# Patient Record
Sex: FEMALE | Race: WHITE | NOT HISPANIC OR LATINO | Employment: OTHER | URBAN - METROPOLITAN AREA
[De-identification: names, ages, dates, MRNs, and addresses within clinical notes are randomized per-mention and may not be internally consistent; named-entity substitution may affect disease eponyms.]

---

## 2018-09-21 ENCOUNTER — TRANSCRIBE ORDERS (OUTPATIENT)
Dept: ADMINISTRATIVE | Facility: HOSPITAL | Age: 78
End: 2018-09-21

## 2018-09-21 DIAGNOSIS — Z12.39 SCREENING BREAST EXAMINATION: Primary | ICD-10-CM

## 2018-10-10 ENCOUNTER — HOSPITAL ENCOUNTER (OUTPATIENT)
Dept: MAMMOGRAPHY | Facility: CLINIC | Age: 78
Discharge: HOME | End: 2018-10-10
Payer: COMMERCIAL

## 2018-10-10 DIAGNOSIS — Z12.39 SCREENING BREAST EXAMINATION: ICD-10-CM

## 2018-10-10 PROCEDURE — 77063 BREAST TOMOSYNTHESIS BI: CPT

## 2018-10-10 PROCEDURE — 77067 SCR MAMMO BI INCL CAD: CPT

## 2018-10-12 ENCOUNTER — HOSPITAL ENCOUNTER (OUTPATIENT)
Dept: MAMMOGRAPHY | Facility: CLINIC | Age: 78
Discharge: HOME | End: 2018-10-12
Payer: COMMERCIAL

## 2018-10-12 DIAGNOSIS — R92.8 ABNORMAL MAMMOGRAM: ICD-10-CM

## 2018-10-12 PROCEDURE — 77065 DX MAMMO INCL CAD UNI: CPT

## 2018-10-12 RX ORDER — DEXTROAMPHETAMINE SACCHARATE, AMPHETAMINE ASPARTATE, DEXTROAMPHETAMINE SULFATE AND AMPHETAMINE SULFATE 3.75; 3.75; 3.75; 3.75 MG/1; MG/1; MG/1; MG/1
15 TABLET ORAL 2 TIMES DAILY
COMMUNITY
End: 2018-11-01

## 2018-10-12 NOTE — PROGRESS NOTES
Met with patient, patient daughter Divine Brooks 89[address] and Dr Elvia Jean-Baptiste regarding recommendation for;      _____ RIGHT ___X___LEFT      _____Ultrasound guided  ___X___Stereotactic breast biopsy  __X___Verbalized understanding        Blood thinners:  _____yes ___X__no    Date stopped: ____N/A_____    Biopsy teaching sheet given:  ___X____yes ______no

## 2018-10-16 ENCOUNTER — HOSPITAL ENCOUNTER (OUTPATIENT)
Dept: MAMMOGRAPHY | Facility: CLINIC | Age: 78
Discharge: HOME | End: 2018-10-16
Payer: COMMERCIAL

## 2018-10-16 ENCOUNTER — HOSPITAL ENCOUNTER (OUTPATIENT)
Dept: MAMMOGRAPHY | Facility: CLINIC | Age: 78
Discharge: HOME | End: 2018-10-16

## 2018-10-16 VITALS — DIASTOLIC BLOOD PRESSURE: 77 MMHG | HEART RATE: 76 BPM | SYSTOLIC BLOOD PRESSURE: 141 MMHG

## 2018-10-16 DIAGNOSIS — R92.8 ABNORMAL MAMMOGRAM: ICD-10-CM

## 2018-10-16 PROCEDURE — 88341 IMHCHEM/IMCYTCHM EA ADD ANTB: CPT | Performed by: PATHOLOGY

## 2018-10-16 PROCEDURE — 88305 TISSUE EXAM BY PATHOLOGIST: CPT | Performed by: PATHOLOGY

## 2018-10-16 PROCEDURE — 88342 IMHCHEM/IMCYTCHM 1ST ANTB: CPT | Performed by: PATHOLOGY

## 2018-10-16 PROCEDURE — 19081 BX BREAST 1ST LESION STRTCTC: CPT

## 2018-10-16 RX ORDER — LIDOCAINE HYDROCHLORIDE 10 MG/ML
5 INJECTION, SOLUTION INFILTRATION; PERINEURAL ONCE
Status: COMPLETED | OUTPATIENT
Start: 2018-10-16 | End: 2018-10-16

## 2018-10-16 RX ORDER — LIDOCAINE HYDROCHLORIDE AND EPINEPHRINE 20; 5 MG/ML; UG/ML
10 INJECTION, SOLUTION EPIDURAL; INFILTRATION; INTRACAUDAL; PERINEURAL ONCE
Status: COMPLETED | OUTPATIENT
Start: 2018-10-16 | End: 2018-10-16

## 2018-10-16 RX ADMIN — LIDOCAINE HYDROCHLORIDE 5 ML: 10 INJECTION, SOLUTION INFILTRATION; PERINEURAL at 14:35

## 2018-10-16 RX ADMIN — LIDOCAINE HYDROCHLORIDE AND EPINEPHRINE 10 ML: 20; 5 INJECTION, SOLUTION EPIDURAL; INFILTRATION; INTRACAUDAL; PERINEURAL at 14:35

## 2018-10-16 NOTE — PROGRESS NOTES
Patient arrived via:    __x___ambulatory    _____wheelchair    _____stretcher      Domestic violence screen    ___x___negative______positive    Breast Implants:    _______yes ____x____no

## 2018-10-16 NOTE — PROGRESS NOTES
Ice pack given:    __x___yes _____no    Discharge instructions signed by patient:    __x__yes _____no    Discharge instructions given to patient:    ____x_yes _____no    Discharged via:    ___x__amulatory    _____wheelchair    _____stretcher    Stable on discharge:    __x___yes ____no  Please call pt daughter Bonnie Penny with results [phone]

## 2018-10-16 NOTE — DISCHARGE INSTR - OTHER ORDERS
POST LARGE CORE BREAST BIOPSY PATIENT INFORMATION      1  Place an ice pack inside your bra over the top of the dressing every hour for 20 minutes (20 minutes on, 60 minutes off)  Do this until bedtime  2  Do not shower or bathe until the following morning  3  You may bathe your breast carefully with the steri-strips in place  Be careful    Not to loosen them  The steri-strips will fall off in 3-5 days  4  You may have mild discomfort, and you may have some bruising where the   Needle entered the skin  This should clear within 5-7 days  5  If you need medicine for discomfort, take acetaminophen products such as   Tylenol  You may also take Advil or Motrin products  6  Do not participate in strenuous activities such as-tennis, aerobics, skiing,  Weight lifting, etc  for 24 hours  Refrain from swimming/soaking for 72 hours  7  Wearing a bra for sleeping may be more comfortable for the first 24-48 hours  8  Watch for continued bleeding, pain or fever over 101; please call with any questions or concerns  For procedures done at the [facility] call:  King Anderson RN at [phone]  Adalberto Coronel RN at [phone]                    *After 4 PM call the Interventional Radiology Department                    [phone] and ask to speak with the nurse on call  For procedures done at the [address] [place] [address] call:         Samir Sears RN at   *After 4 PM call the Interventional Radiology Department   [phone] and ask to speak with the nurse on call  For procedures done at [address] [place] [address] call: The Radiology Nurse at [phone]  *After 4 PM call your physician, or go to the Emergency Department  9          The final results of your biopsy are usually available within one week

## 2018-10-16 NOTE — PROGRESS NOTES
Procedure type:    _____ultrasound guided ___x__stereotactic    Breast:    __x___Left _____Right    Location: Lateral     Needle: 10 gauge revolve     # of passes: 6 ( 1 with calcifications 5 without calcifications )     Clip: Mammotome U shape     Performed by: Dr Viv Cha held for 5 minutes by:    Blaine Strips:    ___x__yes _____no    Band aid:    ___x__yes_____no    Tape and guaze:    _____yes __x___no    Tolerated procedure:    __x___yes _____no

## 2018-10-17 NOTE — PROGRESS NOTES
Post procedure call completed on 10/17/18 at 1157    Bleeding: _____yes ___x__no    Pain: _____yes ___x___no    Redness/Swelling: ______yes ___x___no    Band aid removed: _____yes __x___no    Steri-Strips intact: ___x___yes _____no

## 2018-10-22 ENCOUNTER — TELEPHONE (OUTPATIENT)
Dept: MAMMOGRAPHY | Facility: CLINIC | Age: 78
End: 2018-10-22

## 2018-10-25 PROBLEM — N60.92 ATYPICAL DUCTAL HYPERPLASIA OF LEFT BREAST: Status: ACTIVE | Noted: 2018-10-25

## 2018-10-31 ENCOUNTER — LAB (OUTPATIENT)
Dept: LAB | Facility: CLINIC | Age: 78
End: 2018-10-31
Payer: COMMERCIAL

## 2018-10-31 ENCOUNTER — OFFICE VISIT (OUTPATIENT)
Dept: SURGICAL ONCOLOGY | Facility: CLINIC | Age: 78
End: 2018-10-31
Payer: COMMERCIAL

## 2018-10-31 ENCOUNTER — APPOINTMENT (OUTPATIENT)
Dept: RADIOLOGY | Facility: CLINIC | Age: 78
End: 2018-10-31
Payer: COMMERCIAL

## 2018-10-31 VITALS
HEART RATE: 84 BPM | HEIGHT: 66 IN | DIASTOLIC BLOOD PRESSURE: 86 MMHG | TEMPERATURE: 97.8 F | SYSTOLIC BLOOD PRESSURE: 126 MMHG | WEIGHT: 122 LBS | BODY MASS INDEX: 19.61 KG/M2 | RESPIRATION RATE: 14 BRPM

## 2018-10-31 DIAGNOSIS — N60.92 ATYPICAL DUCTAL HYPERPLASIA OF LEFT BREAST: ICD-10-CM

## 2018-10-31 DIAGNOSIS — N60.92 ATYPICAL DUCTAL HYPERPLASIA OF LEFT BREAST: Primary | ICD-10-CM

## 2018-10-31 LAB
ALBUMIN SERPL BCP-MCNC: 4 G/DL (ref 3.5–5)
ALP SERPL-CCNC: 77 U/L (ref 46–116)
ALT SERPL W P-5'-P-CCNC: 26 U/L (ref 12–78)
ANION GAP SERPL CALCULATED.3IONS-SCNC: 10 MMOL/L (ref 4–13)
AST SERPL W P-5'-P-CCNC: 18 U/L (ref 5–45)
ATRIAL RATE: 62 BPM
BASOPHILS # BLD AUTO: 0.06 THOUSANDS/ΜL (ref 0–0.1)
BASOPHILS NFR BLD AUTO: 1 % (ref 0–1)
BILIRUB SERPL-MCNC: 0.5 MG/DL (ref 0.2–1)
BUN SERPL-MCNC: 21 MG/DL (ref 5–25)
CALCIUM SERPL-MCNC: 9.8 MG/DL (ref 8.3–10.1)
CHLORIDE SERPL-SCNC: 102 MMOL/L (ref 100–108)
CO2 SERPL-SCNC: 26 MMOL/L (ref 21–32)
CREAT SERPL-MCNC: 1.23 MG/DL (ref 0.6–1.3)
EOSINOPHIL # BLD AUTO: 0.06 THOUSAND/ΜL (ref 0–0.61)
EOSINOPHIL NFR BLD AUTO: 1 % (ref 0–6)
ERYTHROCYTE [DISTWIDTH] IN BLOOD BY AUTOMATED COUNT: 13.2 % (ref 11.6–15.1)
GFR SERPL CREATININE-BSD FRML MDRD: 43 ML/MIN/1.73SQ M
GLUCOSE P FAST SERPL-MCNC: 90 MG/DL (ref 65–99)
HCT VFR BLD AUTO: 43.2 % (ref 34.8–46.1)
HGB BLD-MCNC: 14.4 G/DL (ref 11.5–15.4)
IMM GRANULOCYTES # BLD AUTO: 0.03 THOUSAND/UL (ref 0–0.2)
IMM GRANULOCYTES NFR BLD AUTO: 1 % (ref 0–2)
LYMPHOCYTES # BLD AUTO: 1.61 THOUSANDS/ΜL (ref 0.6–4.47)
LYMPHOCYTES NFR BLD AUTO: 26 % (ref 14–44)
MCH RBC QN AUTO: 31 PG (ref 26.8–34.3)
MCHC RBC AUTO-ENTMCNC: 33.3 G/DL (ref 31.4–37.4)
MCV RBC AUTO: 93 FL (ref 82–98)
MONOCYTES # BLD AUTO: 0.53 THOUSAND/ΜL (ref 0.17–1.22)
MONOCYTES NFR BLD AUTO: 9 % (ref 4–12)
NEUTROPHILS # BLD AUTO: 3.98 THOUSANDS/ΜL (ref 1.85–7.62)
NEUTS SEG NFR BLD AUTO: 62 % (ref 43–75)
NRBC BLD AUTO-RTO: 0 /100 WBCS
P AXIS: 74 DEGREES
PLATELET # BLD AUTO: 265 THOUSANDS/UL (ref 149–390)
PMV BLD AUTO: 10.1 FL (ref 8.9–12.7)
POTASSIUM SERPL-SCNC: 4.5 MMOL/L (ref 3.5–5.3)
PR INTERVAL: 156 MS
PROT SERPL-MCNC: 7.4 G/DL (ref 6.4–8.2)
QRS AXIS: 82 DEGREES
QRSD INTERVAL: 76 MS
QT INTERVAL: 432 MS
QTC INTERVAL: 438 MS
RBC # BLD AUTO: 4.64 MILLION/UL (ref 3.81–5.12)
SODIUM SERPL-SCNC: 138 MMOL/L (ref 136–145)
T WAVE AXIS: 77 DEGREES
VENTRICULAR RATE: 62 BPM
WBC # BLD AUTO: 6.27 THOUSAND/UL (ref 4.31–10.16)

## 2018-10-31 PROCEDURE — 93005 ELECTROCARDIOGRAM TRACING: CPT

## 2018-10-31 PROCEDURE — 99205 OFFICE O/P NEW HI 60 MIN: CPT | Performed by: SURGERY

## 2018-10-31 PROCEDURE — 36415 COLL VENOUS BLD VENIPUNCTURE: CPT

## 2018-10-31 PROCEDURE — 85025 COMPLETE CBC W/AUTO DIFF WBC: CPT

## 2018-10-31 PROCEDURE — 80053 COMPREHEN METABOLIC PANEL: CPT

## 2018-10-31 PROCEDURE — 71046 X-RAY EXAM CHEST 2 VIEWS: CPT

## 2018-10-31 PROCEDURE — 93010 ELECTROCARDIOGRAM REPORT: CPT | Performed by: INTERNAL MEDICINE

## 2018-10-31 RX ORDER — OXYCODONE HYDROCHLORIDE AND ACETAMINOPHEN 5; 325 MG/1; MG/1
1 TABLET ORAL EVERY 6 HOURS PRN
Qty: 6 TABLET | Refills: 0 | Status: SHIPPED | OUTPATIENT
Start: 2018-10-31 | End: 2019-01-11

## 2018-10-31 NOTE — PATIENT INSTRUCTIONS
Pre-Surgery Instructions:   Medication Instructions    amphetamine-dextroamphetamine (ADDERALL) 15 MG tablet Take morning of surgery         Breast Lumpectomy   AMBULATORY CARE:   What you need to know about a lumpectomy:  A lumpectomy is surgery to remove a mass in your breast  Breast tissue that surrounds the mass may also be taken  A lumpectomy is also known as breast-conserving surgery, a partial mastectomy, or a segmental mastectomy  How to prepare for a lumpectomy:   · You may need a mammogram or ultrasound before surgery  These tests may be done the same day as your surgery or at an earlier time  Your healthcare provider may use pictures from these tests to alfred the location of the mass  The marker will show him where to make your incision  · Your healthcare provider will talk to you about how to prepare for surgery  He may tell you not to eat or drink anything after midnight on the day of your surgery  He will tell you what medicines to take or not take on the day of your surgery  You may need to stop taking blood thinners or aspirin several days before your surgery  Arrange for someone to drive you home and stay with you for 24 hours after surgery  This person can help care for you, and monitor for any problems  What will happen during a lumpectomy:  You will be given general anesthesia to keep you asleep and free from pain during surgery  You may be given an antibiotic through your IV to help prevent a bacterial infection  Your healthcare provider will make an incision in your breast and remove the mass  He may also remove breast tissue or lymph nodes that are close to the mass  A drain may be inserted near your incision to remove extra fluid  This will decrease swelling and help your incision heal  Your healthcare provider will close your incision with stitches or strips of medical tape and cover it with a bandage  He may also wrap a tight-fitting bandage around both of your breasts   This may decrease swelling, bleeding, and pain  What will happen after a lumpectomy:  Healthcare providers will monitor you until you are awake  You may able to go home when you are awake and your pain is controlled  Instead you may need to spend the night in the hospital    Risks of a lumpectomy:  You may bleed more than expected or get an infection  Nerves, blood vessels, and muscles may be damaged during your surgery  You may have swelling in your arm closest to the lumpectomy or where lymph nodes were removed  This swelling is called lymphedema  Lymphedema may cause tingling, numbness, stiffness, and weakness in your arm  This may be permanent  You may get a blood clot in your arm or leg  The blood clot may travel to your heart lungs, or brain  This may become life-threatening  Call 911 for any of the following:   · You feel lightheaded, short of breath, and have chest pain  · You cough up blood  · You have trouble breathing  Seek care immediately if:   · Blood soaks through your bandage  · Your stitches come apart  · Your bruise suddenly gets bigger  · Your leg or arm is larger than normal and painful  Contact your healthcare provider if:   · You have a fever or chills  · Your wound is red, swollen, or draining pus  · You have nausea or are vomiting  · Your skin is itchy, swollen, or you have a rash  · Your pain does not get better after you take pain medicine  · Your drain falls out or stops draining fluid  · Your drain has pus or foul-smelling fluid coming out of it  · You have questions or concerns about your condition or care  Medicines: You may need any of the following:  · Antibiotics  help prevent a bacterial infection  · Prescription pain medicine  may be given  Ask your healthcare provider how to take this medicine safely  Some prescription pain medicines contain acetaminophen   Do not take other medicines that contain acetaminophen without talking to your healthcare provider  Too much acetaminophen may cause liver damage  Prescription pain medicine may cause constipation  Ask your healthcare provider how to prevent or treat constipation  · NSAIDs , such as ibuprofen, help decrease swelling, pain, and fever  NSAIDs can cause stomach bleeding or kidney problems in certain people  If you take blood thinner medicine, always ask your healthcare provider if NSAIDs are safe for you  Always read the medicine label and follow directions  · Take your medicine as directed  Contact your healthcare provider if you think your medicine is not helping or if you have side effects  Tell him or her if you are allergic to any medicine  Keep a list of the medicines, vitamins, and herbs you take  Include the amounts, and when and why you take them  Bring the list or the pill bottles to follow-up visits  Carry your medicine list with you in case of an emergency  Care for your wound as directed: If you have a tight-fitting bandage, you can remove it in 24 to 48 hours, or as directed  Ask your healthcare provider when your incision can get wet  You may need to take a sponge bath until your drain is removed  Carefully wash around the incision with soap and water  It is okay to allow the soap and water to gently run over your incision  Gently pat dry the area and put on new, clean bandages as directed  Change your bandages when they get wet or dirty  Check your incision every day for redness, pus, or swelling  Self-care:   · Apply ice  on your breast for 15 to 20 minutes every hour or as directed  Use an ice pack, or put crushed ice in a plastic bag  Cover it with a towel  Ice helps prevent tissue damage and decreases swelling and pain  · Rest  as directed  Do not lift anything heavy  Do not push or pull with your arms  Take short walks around the house  Gradually walk further as you feel better  Ask your healthcare provider when you can return to your normal activities  · Empty your drain  as directed  You may need to write down how much you empty from your drain each day  Ask your healthcare provider for more information about how to empty your drain  · Wear a supportive bra  as directed  Wait until you remove the tight-fitting bandage to wear a bra  You may be given a surgical bra or told to wear a sports bra  A supportive bra may help hold your bandages in place  It may also help with swelling and pain  Do not  wear bras with lace or underwire  They may rub against your incision and cause discomfort  Arm stretches: Your healthcare provider may show you how to do arm stretches  Arm stretches may prevent stiff arms or shoulders  You may need to wait until after your drains are removed to begin stretching  Do not do arm stretches until your healthcare provider says it is okay  Ask your healthcare provider for more information about arm stretches  Follow up with your healthcare provider as directed:  Write down your questions so you remember to ask them during your visits  © 2017 [facility] Information is for End User's use only and may not be sold, redistributed or otherwise used for commercial purposes  All illustrations and images included in CareNotes® are the copyrighted property of [employer] , [employer]  or Mehul Gaspar  The above information is an  only  It is not intended as medical advice for individual conditions or treatments  Talk to your doctor, nurse or pharmacist before following any medical regimen to see if it is safe and effective for you

## 2018-10-31 NOTE — LETTER
October 31, 2018     [address]  [address] [place] [address]    Patient: Julieta Vega   YOB: 1940   Date of Visit: 10/31/2018       Dear Dr Elmo Cox: Thank you for referring Della Cevallos to me for evaluation  Below are my notes for this consultation  If you have questions, please do not hesitate to call me  I look forward to following your patient along with you  Sincerely,        Zunilda Lazaro MD        CC: No Recipients  Zunilda Lazaro MD  10/31/2018 10:49 AM  Sign at close encounter               Surgical Oncology Consult Note       [address] [place] [address]  1940  [id number]  [address],[address]  [facility] [address] [address]      Chief Complaint:     Chief Complaint   Patient presents with    Consult     New diagnosis of Atypical ductal hyperplasia of left breast       Assessment and Plan:   Assessment/Plan   Patient has a new diagnosis of left breast atypical ductal hyperplasia  I have recommended a needle localization lumpectomy  The patient is agreeable to this approach  We subsequent to the process of informed consent for this  Oncology History:      No history exists  History of Present Illness: This is a 42-year-old woman who went for a screening mammogram which showed no abnormalities on the right side however on the left side there are 2 areas of concern  She subsequently had a diagnostic mammogram which showed that 1 area had scattered calcifications the other area however was meritorious of a biopsy which was subsequently performed which demonstrated atypical ductal hyperplasia  This was concordant with the radiologist   They recommended surgical consultation  The patient now presents here for an opinion regarding further management    She has a family history of a father with prostate cancer at an elderly age  Review of Systems:   Review of Systems   Constitutional: Negative for activity change, appetite change and fatigue  HENT: Negative  Eyes: Negative  Respiratory: Negative for cough, shortness of breath and wheezing  Cardiovascular: Negative for chest pain and leg swelling  Gastrointestinal: Negative  Endocrine: Negative  Genitourinary: Negative  Skin: Negative  Allergic/Immunologic: Negative  Neurological: Negative  Hematological: Negative  Psychiatric/Behavioral: Negative  Past Medical History:      Patient Active Problem List   Diagnosis    Atypical ductal hyperplasia of left breast      History reviewed  No pertinent past medical history  Past Surgical History:   Procedure Laterality Date    MAMMO STEREOTACTIC BREAST BIOPSY LEFT (ALL INC) Left 10/16/2018        Family History   Problem Relation Age of Onset    Prostate cancer Father         Social History     Social History    Marital status:      Spouse name: N/A    Number of children: N/A    Years of education: N/A     Occupational History    Not on file  Social History Main Topics    Smoking status: Former Smoker     Quit date: 4/30/2016    Smokeless tobacco: Never Used    Alcohol use 1 8 oz/week     3 Standard drinks or equivalent per week    Drug use: No    Sexual activity: No     Other Topics Concern    Not on file     Social History Narrative    No narrative on file        Current Outpatient Prescriptions:     amphetamine-dextroamphetamine (ADDERALL) 15 MG tablet, Take 15 mg by mouth 2 (two) times a day, Disp: , Rfl:    No Known Allergies    Physical Exam:     Vitals:    10/31/18 1014   BP: 126/86   Pulse: 84   Resp: 14   Temp: 97 8 °F (36 6 °C)     Physical Exam   Constitutional: She is oriented to person, place, and time  She appears well-developed and well-nourished     HENT:   Head: Normocephalic and atraumatic  Mouth/Throat: Oropharynx is clear and moist    Eyes: Pupils are equal, round, and reactive to light  EOM are normal    Neck: Normal range of motion  Neck supple  No JVD present  No tracheal deviation present  No thyromegaly present  Cardiovascular: Normal rate, regular rhythm, normal heart sounds and intact distal pulses  Exam reveals no gallop and no friction rub  No murmur heard  Pulmonary/Chest: Effort normal and breath sounds normal  No respiratory distress  She has no wheezes  She has no rales  Examination of the right breast in the sitting and supine position demonstrate no skin changes dominant masses or axillary adenopathy  Examination of the left breast demonstrates a dominant mass as well as some ecchymosis in the lower outer quadrant  There are no other dominant masses  This is consistent with her biopsy  There is no axillary or supraclavicular adenopathy and no worrisome skin changes and no nipple discharge  Abdominal: Soft  She exhibits no distension and no mass  There is no hepatomegaly  There is no tenderness  There is no rebound and no guarding  Musculoskeletal: Normal range of motion  She exhibits no edema or tenderness  Lymphadenopathy:     She has no cervical adenopathy  Neurological: She is alert and oriented to person, place, and time  No cranial nerve deficit  Skin: Skin is warm and dry  No rash noted  No erythema  Psychiatric: She has a normal mood and affect  Her behavior is normal    Vitals reviewed  Results:   Pathology:  I reviewed her pathology as well as her Imaging I agree with radiologist that there concordant  Discussion/Summary:   I explained the nature of atypical ductal hyperplasia to the patient and her daughter  I explained that we prefer excising the area to make sure there is no adjacent in situ or invasive cancer which we would treat differently    A subsequent to the process of informed consent for needle localization lumpectomy of the left breast   The patient understands she could potentially need additional treatment if a cancer was identified  I also explained that atypical ductal hyperplasia increases when risk of developing breast cancers in the future though I doubt that it would increase her risk substantially to change screening recommendations  We would have further discussions on final pathology  Advance Care Planning/Advance Directives:  I discussed the disease status, treatment plans and follow-up with the patient

## 2018-10-31 NOTE — PROGRESS NOTES
Surgical Oncology Consult Note       [address]  [facility] [place]  [address] [place] [address]  1940  [id number]  [facility]  [facility] [place]  [address] [place] [address]      Chief Complaint:     Chief Complaint   Patient presents with    Consult     New diagnosis of Atypical ductal hyperplasia of left breast       Assessment and Plan:   Assessment/Plan   Patient has a new diagnosis of left breast atypical ductal hyperplasia  I have recommended a needle localization lumpectomy  The patient is agreeable to this approach  We subsequent to the process of informed consent for this  Oncology History:      No history exists  History of Present Illness: This is a 17-year-old woman who went for a screening mammogram which showed no abnormalities on the right side however on the left side there are 2 areas of concern  She subsequently had a diagnostic mammogram which showed that 1 area had scattered calcifications the other area however was meritorious of a biopsy which was subsequently performed which demonstrated atypical ductal hyperplasia  This was concordant with the radiologist   They recommended surgical consultation  The patient now presents here for an opinion regarding further management  She has a family history of a father with prostate cancer at an elderly age  Review of Systems:   Review of Systems   Constitutional: Negative for activity change, appetite change and fatigue  HENT: Negative  Eyes: Negative  Respiratory: Negative for cough, shortness of breath and wheezing  Cardiovascular: Negative for chest pain and leg swelling  Gastrointestinal: Negative  Endocrine: Negative  Genitourinary: Negative  Skin: Negative  Allergic/Immunologic: Negative  Neurological: Negative  Hematological: Negative  Psychiatric/Behavioral: Negative  Past Medical History:      Patient Active Problem List   Diagnosis    Atypical ductal hyperplasia of left breast      History reviewed  No pertinent past medical history  Past Surgical History:   Procedure Laterality Date    MAMMO STEREOTACTIC BREAST BIOPSY LEFT (ALL INC) Left 10/16/2018        Family History   Problem Relation Age of Onset    Prostate cancer Father         Social History     Social History    Marital status:      Spouse name: N/A    Number of children: N/A    Years of education: N/A     Occupational History    Not on file  Social History Main Topics    Smoking status: Former Smoker     Quit date: 4/30/2016    Smokeless tobacco: Never Used    Alcohol use 1 8 oz/week     3 Standard drinks or equivalent per week    Drug use: No    Sexual activity: No     Other Topics Concern    Not on file     Social History Narrative    No narrative on file        Current Outpatient Prescriptions:     amphetamine-dextroamphetamine (ADDERALL) 15 MG tablet, Take 15 mg by mouth 2 (two) times a day, Disp: , Rfl:    No Known Allergies    Physical Exam:     Vitals:    10/31/18 1014   BP: 126/86   Pulse: 84   Resp: 14   Temp: 97 8 °F (36 6 °C)     Physical Exam   Constitutional: She is oriented to person, place, and time  She appears well-developed and well-nourished  HENT:   Head: Normocephalic and atraumatic  Mouth/Throat: Oropharynx is clear and moist    Eyes: Pupils are equal, round, and reactive to light  EOM are normal    Neck: Normal range of motion  Neck supple  No JVD present  No tracheal deviation present  No thyromegaly present  Cardiovascular: Normal rate, regular rhythm, normal heart sounds and intact distal pulses  Exam reveals no gallop and no friction rub  No murmur heard  Pulmonary/Chest: Effort normal and breath sounds normal  No respiratory distress  She has no wheezes  She has no rales     Examination of the right breast in the sitting and supine position demonstrate no skin changes dominant masses or axillary adenopathy  Examination of the left breast demonstrates a dominant mass as well as some ecchymosis in the lower outer quadrant  There are no other dominant masses  This is consistent with her biopsy  There is no axillary or supraclavicular adenopathy and no worrisome skin changes and no nipple discharge  Abdominal: Soft  She exhibits no distension and no mass  There is no hepatomegaly  There is no tenderness  There is no rebound and no guarding  Musculoskeletal: Normal range of motion  She exhibits no edema or tenderness  Lymphadenopathy:     She has no cervical adenopathy  Neurological: She is alert and oriented to person, place, and time  No cranial nerve deficit  Skin: Skin is warm and dry  No rash noted  No erythema  Psychiatric: She has a normal mood and affect  Her behavior is normal    Vitals reviewed  Results:   Pathology:  I reviewed her pathology as well as her Imaging I agree with radiologist that there concordant  Discussion/Summary:   I explained the nature of atypical ductal hyperplasia to the patient and her daughter  I explained that we prefer excising the area to make sure there is no adjacent in situ or invasive cancer which we would treat differently  A subsequent to the process of informed consent for needle localization lumpectomy of the left breast   The patient understands she could potentially need additional treatment if a cancer was identified  I also explained that atypical ductal hyperplasia increases when risk of developing breast cancers in the future though I doubt that it would increase her risk substantially to change screening recommendations  We would have further discussions on final pathology  Advance Care Planning/Advance Directives:  I discussed the disease status, treatment plans and follow-up with the patient

## 2018-11-01 ENCOUNTER — TELEPHONE (OUTPATIENT)
Dept: SURGICAL ONCOLOGY | Facility: CLINIC | Age: 78
End: 2018-11-01

## 2018-11-01 RX ORDER — OMEGA-3-ACID ETHYL ESTERS 1 G/1
1 CAPSULE, LIQUID FILLED ORAL DAILY
COMMUNITY
End: 2019-01-11

## 2018-11-01 RX ORDER — LANOLIN ALCOHOL/MO/W.PET/CERES
CREAM (GRAM) TOPICAL DAILY
COMMUNITY
End: 2019-01-11

## 2018-11-01 NOTE — PRE-PROCEDURE INSTRUCTIONS
Pre-Surgery Instructions:   Medication Instructions    cyanocobalamin (VITAMIN B-12) 1,000 mcg tablet Patient was instructed by Physician and understands   omega-3-acid ethyl esters (LOVAZA) 1 g capsule Patient was instructed by Physician and understands  Pre op and bathing instructions reviewed   Pt will get hibiclens

## 2018-11-01 NOTE — TELEPHONE ENCOUNTER
Called to review cxr findings and rec for CT, left message for pt to call back  I spoke with the patient's Trey Llanos  I explained that there is an abnormality on her chest x-ray in a radiologist had recommended a CT scan  The patient's daughter informs me that on a previous chest x-ray in [place] they had said she had a shadow  They are going to try to get these films and/or reports and possibly avoid a CT  I explained I do not think this finding is related to her breast problem and I recommended proceeding with surgery for the time being and we would re-evaluate this as films became available  If they are unable to get the films then we would be able to get a CT for follow-up

## 2018-11-07 ENCOUNTER — TELEPHONE (OUTPATIENT)
Dept: SURGICAL ONCOLOGY | Facility: CLINIC | Age: 78
End: 2018-11-07

## 2018-11-07 DIAGNOSIS — R93.89 ABNORMAL CHEST X-RAY: Primary | ICD-10-CM

## 2018-11-07 NOTE — TELEPHONE ENCOUNTER
Left message tyo return my call regarding CT review which has recommended a repeat CT since there is a small increase in the area

## 2018-11-09 ENCOUNTER — TELEPHONE (OUTPATIENT)
Dept: SURGICAL ONCOLOGY | Facility: CLINIC | Age: 78
End: 2018-11-09

## 2018-11-09 NOTE — TELEPHONE ENCOUNTER
Spoke with Hailee Bartlett (listed on patient's communication consent)    Reviewed that a comparison had been made of recent CXR to CT chest from 2015 and there is an increased size of the nodule and a CT at this time has been recommended  Provided the number for central scheduling and Hailee Bartlett will call to have the CT scan scheduled  Order has been previously placed by Dr Gerson Elena  All of her questions were answered  ----- Message from Adarsh Christy sent at 11/9/2018  8:53 AM EST -----  Regarding: Dr Shin Alpha: [phone]  Aurelia's daughter, Hailee Bartlett, called she stated that Dr Gerson Elena called her mom and she would like to know what the call was in pertinence to? May Dr Gerson Elena call her back at [phone]

## 2018-11-13 ENCOUNTER — ANESTHESIA (OUTPATIENT)
Dept: PERIOP | Facility: HOSPITAL | Age: 78
End: 2018-11-13
Payer: COMMERCIAL

## 2018-11-13 ENCOUNTER — HOSPITAL ENCOUNTER (OUTPATIENT)
Facility: HOSPITAL | Age: 78
Setting detail: OUTPATIENT SURGERY
Discharge: HOME | End: 2018-11-13
Attending: SURGERY | Admitting: SURGERY
Payer: COMMERCIAL

## 2018-11-13 ENCOUNTER — HOSPITAL ENCOUNTER (OUTPATIENT)
Dept: MAMMOGRAPHY | Facility: HOSPITAL | Age: 78
Discharge: HOME | End: 2018-11-13
Attending: SURGERY
Payer: COMMERCIAL

## 2018-11-13 ENCOUNTER — ANESTHESIA EVENT (OUTPATIENT)
Dept: PERIOP | Facility: HOSPITAL | Age: 78
End: 2018-11-13
Payer: COMMERCIAL

## 2018-11-13 ENCOUNTER — APPOINTMENT (OUTPATIENT)
Dept: MAMMOGRAPHY | Facility: HOSPITAL | Age: 78
End: 2018-11-13
Attending: SURGERY
Payer: COMMERCIAL

## 2018-11-13 VITALS
WEIGHT: 125 LBS | SYSTOLIC BLOOD PRESSURE: 151 MMHG | HEART RATE: 76 BPM | DIASTOLIC BLOOD PRESSURE: 68 MMHG | TEMPERATURE: 97.6 F | OXYGEN SATURATION: 97 % | HEIGHT: 65 IN | RESPIRATION RATE: 16 BRPM | BODY MASS INDEX: 20.83 KG/M2

## 2018-11-13 DIAGNOSIS — N60.92 ATYPICAL DUCTAL HYPERPLASIA OF LEFT BREAST: ICD-10-CM

## 2018-11-13 DIAGNOSIS — N60.92 BENIGN MAMMARY DYSPLASIA OF LEFT BREAST: ICD-10-CM

## 2018-11-13 PROCEDURE — 88342 IMHCHEM/IMCYTCHM 1ST ANTB: CPT | Performed by: PATHOLOGY

## 2018-11-13 PROCEDURE — 19281 PERQ DEVICE BREAST 1ST IMAG: CPT

## 2018-11-13 PROCEDURE — 88307 TISSUE EXAM BY PATHOLOGIST: CPT | Performed by: PATHOLOGY

## 2018-11-13 PROCEDURE — 19301 PARTIAL MASTECTOMY: CPT | Performed by: SURGERY

## 2018-11-13 RX ORDER — FENTANYL CITRATE/PF 50 MCG/ML
50 SYRINGE (ML) INJECTION
Status: DISCONTINUED | OUTPATIENT
Start: 2018-11-13 | End: 2018-11-13 | Stop reason: HOSPADM

## 2018-11-13 RX ORDER — BUPIVACAINE HYDROCHLORIDE AND EPINEPHRINE 2.5; 5 MG/ML; UG/ML
INJECTION, SOLUTION INFILTRATION; PERINEURAL AS NEEDED
Status: DISCONTINUED | OUTPATIENT
Start: 2018-11-13 | End: 2018-11-13 | Stop reason: HOSPADM

## 2018-11-13 RX ORDER — LIDOCAINE HYDROCHLORIDE 10 MG/ML
INJECTION, SOLUTION INFILTRATION; PERINEURAL AS NEEDED
Status: DISCONTINUED | OUTPATIENT
Start: 2018-11-13 | End: 2018-11-13 | Stop reason: SURG

## 2018-11-13 RX ORDER — ONDANSETRON 2 MG/ML
4 INJECTION INTRAMUSCULAR; INTRAVENOUS ONCE AS NEEDED
Status: DISCONTINUED | OUTPATIENT
Start: 2018-11-13 | End: 2018-11-13 | Stop reason: HOSPADM

## 2018-11-13 RX ORDER — OXYCODONE HYDROCHLORIDE AND ACETAMINOPHEN 5; 325 MG/1; MG/1
1 TABLET ORAL EVERY 4 HOURS PRN
Status: DISCONTINUED | OUTPATIENT
Start: 2018-11-13 | End: 2018-11-13 | Stop reason: HOSPADM

## 2018-11-13 RX ORDER — ONDANSETRON 2 MG/ML
INJECTION INTRAMUSCULAR; INTRAVENOUS AS NEEDED
Status: DISCONTINUED | OUTPATIENT
Start: 2018-11-13 | End: 2018-11-13 | Stop reason: SURG

## 2018-11-13 RX ORDER — SODIUM CHLORIDE 9 MG/ML
100 INJECTION, SOLUTION INTRAVENOUS CONTINUOUS
Status: DISCONTINUED | OUTPATIENT
Start: 2018-11-13 | End: 2018-11-13 | Stop reason: HOSPADM

## 2018-11-13 RX ORDER — LIDOCAINE HYDROCHLORIDE 10 MG/ML
5 INJECTION, SOLUTION EPIDURAL; INFILTRATION; INTRACAUDAL; PERINEURAL ONCE
Status: COMPLETED | OUTPATIENT
Start: 2018-11-13 | End: 2018-11-13

## 2018-11-13 RX ORDER — FENTANYL CITRATE 50 UG/ML
INJECTION, SOLUTION INTRAMUSCULAR; INTRAVENOUS AS NEEDED
Status: DISCONTINUED | OUTPATIENT
Start: 2018-11-13 | End: 2018-11-13 | Stop reason: SURG

## 2018-11-13 RX ORDER — SODIUM CHLORIDE 9 MG/ML
INJECTION, SOLUTION INTRAVENOUS CONTINUOUS PRN
Status: DISCONTINUED | OUTPATIENT
Start: 2018-11-13 | End: 2018-11-13 | Stop reason: SURG

## 2018-11-13 RX ORDER — PROPOFOL 10 MG/ML
INJECTION, EMULSION INTRAVENOUS AS NEEDED
Status: DISCONTINUED | OUTPATIENT
Start: 2018-11-13 | End: 2018-11-13 | Stop reason: SURG

## 2018-11-13 RX ADMIN — ONDANSETRON 4 MG: 2 INJECTION INTRAMUSCULAR; INTRAVENOUS at 10:06

## 2018-11-13 RX ADMIN — DEXAMETHASONE SODIUM PHOSPHATE 4 MG: 10 INJECTION INTRAMUSCULAR; INTRAVENOUS at 09:43

## 2018-11-13 RX ADMIN — FENTANYL CITRATE 50 MCG: 50 INJECTION INTRAMUSCULAR; INTRAVENOUS at 09:44

## 2018-11-13 RX ADMIN — FENTANYL CITRATE 25 MCG: 50 INJECTION INTRAMUSCULAR; INTRAVENOUS at 10:00

## 2018-11-13 RX ADMIN — LIDOCAINE HYDROCHLORIDE 4 ML: 10 INJECTION, SOLUTION EPIDURAL; INFILTRATION; INTRACAUDAL; PERINEURAL at 09:00

## 2018-11-13 RX ADMIN — FENTANYL CITRATE 25 MCG: 50 INJECTION INTRAMUSCULAR; INTRAVENOUS at 10:09

## 2018-11-13 RX ADMIN — PROPOFOL 50 MG: 10 INJECTION, EMULSION INTRAVENOUS at 09:40

## 2018-11-13 RX ADMIN — CEFAZOLIN SODIUM 1000 MG: 1 SOLUTION INTRAVENOUS at 09:35

## 2018-11-13 RX ADMIN — LIDOCAINE HYDROCHLORIDE 50 MG: 10 INJECTION, SOLUTION INFILTRATION; PERINEURAL at 09:40

## 2018-11-13 RX ADMIN — SODIUM CHLORIDE: 0.9 INJECTION, SOLUTION INTRAVENOUS at 09:43

## 2018-11-13 NOTE — ANESTHESIA PREPROCEDURE EVALUATION
Review of Systems/Medical History  Patient summary reviewed    No history of anesthetic complications     Cardiovascular  EKG reviewed, Exercise tolerance (METS): >4,  Hyperlipidemia,    Pulmonary  Smoker ex-smoker  ,        GI/Hepatic  Negative GI/hepatic ROS          Negative  ROS        Endo/Other  Negative endo/other ROS      GYN    Breast cancer        Hematology  Negative hematology ROS      Musculoskeletal  Negative musculoskeletal ROS        Neurology  Negative neurology ROS      Psychology   Negative psychology ROS              Physical Exam    Airway    Mallampati score: I  TM Distance: >3 FB  Neck ROM: full     Dental   upper dentures,     Cardiovascular  Cardiovascular exam normal    Pulmonary  Pulmonary exam normal     Other Findings        Anesthesia Plan  ASA Score- 2     Anesthesia Type- general with ASA Monitors  Additional Monitors:   Airway Plan: LMA  Plan Factors-  Patient did not smoke on day of surgery  Induction- intravenous  Postoperative Plan-   Planned trial extubation    Informed Consent- Anesthetic plan and risks discussed with patient

## 2018-11-13 NOTE — ANESTHESIA POSTPROCEDURE EVALUATION
Post-Op Assessment Note      CV Status:  Stable    Mental Status:  Alert and awake    Hydration Status:  Euvolemic and stable    PONV Controlled:  Controlled    Airway Patency:  Patent    Post Op Vitals Reviewed: Yes          Staff: Anesthesiologist           /59   Temp   97 3   Pulse  73   Resp   16   SpO2   99

## 2018-11-13 NOTE — H&P (VIEW-ONLY)
Surgical Oncology Consult Note       [address]  [facility] [place] [address]  1940  [id number]  [address] [facility]  [facility] [id number]      Chief Complaint:     Chief Complaint   Patient presents with    Consult     New diagnosis of Atypical ductal hyperplasia of left breast       Assessment and Plan:   Assessment/Plan   Patient has a new diagnosis of left breast atypical ductal hyperplasia  I have recommended a needle localization lumpectomy  The patient is agreeable to this approach  We subsequent to the process of informed consent for this  Oncology History:      No history exists  History of Present Illness: This is a 81-year-old woman who went for a screening mammogram which showed no abnormalities on the right side however on the left side there are 2 areas of concern  She subsequently had a diagnostic mammogram which showed that 1 area had scattered calcifications the other area however was meritorious of a biopsy which was subsequently performed which demonstrated atypical ductal hyperplasia  This was concordant with the radiologist   They recommended surgical consultation  The patient now presents here for an opinion regarding further management  She has a family history of a father with prostate cancer at an elderly age  Review of Systems:   Review of Systems   Constitutional: Negative for activity change, appetite change and fatigue  HENT: Negative  Eyes: Negative  Respiratory: Negative for cough, shortness of breath and wheezing  Cardiovascular: Negative for chest pain and leg swelling  Gastrointestinal: Negative  Endocrine: Negative  Genitourinary: Negative  Skin: Negative  Allergic/Immunologic: Negative  Neurological: Negative  Hematological: Negative  Psychiatric/Behavioral: Negative  Past Medical History:      Patient Active Problem List   Diagnosis    Atypical ductal hyperplasia of left breast      History reviewed  No pertinent past medical history  Past Surgical History:   Procedure Laterality Date    MAMMO STEREOTACTIC BREAST BIOPSY LEFT (ALL INC) Left 10/16/2018        Family History   Problem Relation Age of Onset    Prostate cancer Father         Social History     Social History    Marital status:      Spouse name: N/A    Number of children: N/A    Years of education: N/A     Occupational History    Not on file  Social History Main Topics    Smoking status: Former Smoker     Quit date: 4/30/2016    Smokeless tobacco: Never Used    Alcohol use 1 8 oz/week     3 Standard drinks or equivalent per week    Drug use: No    Sexual activity: No     Other Topics Concern    Not on file     Social History Narrative    No narrative on file        Current Outpatient Prescriptions:     amphetamine-dextroamphetamine (ADDERALL) 15 MG tablet, Take 15 mg by mouth 2 (two) times a day, Disp: , Rfl:    No Known Allergies    Physical Exam:     Vitals:    10/31/18 1014   BP: 126/86   Pulse: 84   Resp: 14   Temp: 97 8 °F (36 6 °C)     Physical Exam   Constitutional: She is oriented to person, place, and time  She appears well-developed and well-nourished  HENT:   Head: Normocephalic and atraumatic  Mouth/Throat: Oropharynx is clear and moist    Eyes: Pupils are equal, round, and reactive to light  EOM are normal    Neck: Normal range of motion  Neck supple  No JVD present  No tracheal deviation present  No thyromegaly present  Cardiovascular: Normal rate, regular rhythm, normal heart sounds and intact distal pulses  Exam reveals no gallop and no friction rub  No murmur heard  Pulmonary/Chest: Effort normal and breath sounds normal  No respiratory distress  She has no wheezes  She has no rales     Examination of the right breast in the sitting and supine position demonstrate no skin changes dominant masses or axillary adenopathy  Examination of the left breast demonstrates a dominant mass as well as some ecchymosis in the lower outer quadrant  There are no other dominant masses  This is consistent with her biopsy  There is no axillary or supraclavicular adenopathy and no worrisome skin changes and no nipple discharge  Abdominal: Soft  She exhibits no distension and no mass  There is no hepatomegaly  There is no tenderness  There is no rebound and no guarding  Musculoskeletal: Normal range of motion  She exhibits no edema or tenderness  Lymphadenopathy:     She has no cervical adenopathy  Neurological: She is alert and oriented to person, place, and time  No cranial nerve deficit  Skin: Skin is warm and dry  No rash noted  No erythema  Psychiatric: She has a normal mood and affect  Her behavior is normal    Vitals reviewed  Results:   Pathology:  I reviewed her pathology as well as her Imaging I agree with radiologist that there concordant  Discussion/Summary:   I explained the nature of atypical ductal hyperplasia to the patient and her daughter  I explained that we prefer excising the area to make sure there is no adjacent in situ or invasive cancer which we would treat differently  A subsequent to the process of informed consent for needle localization lumpectomy of the left breast   The patient understands she could potentially need additional treatment if a cancer was identified  I also explained that atypical ductal hyperplasia increases when risk of developing breast cancers in the future though I doubt that it would increase her risk substantially to change screening recommendations  We would have further discussions on final pathology  Advance Care Planning/Advance Directives:  I discussed the disease status, treatment plans and follow-up with the patient

## 2018-11-13 NOTE — DISCHARGE INSTRUCTIONS
POST-OPERATIVE BREAST CARE INSTRUCTIONS    Wound Closure/Dressing: Your wound is closed with:   Steri strips-white pieces of tape that hold your incision together along with surgical glue           Dissolvable sutures-underneath the skin    Wound care:     If you have gauze over your wound you may remove it the day after surgery  Leave the Steri-strips on, they will fall off on their own in 1-2 weeks   You may shower using soap and water to clean your wound  Gently pat dry  Drain Care: If you do not have a drain, disregard this section   Visiting Nursing should have been arranged upon discharge from hospital   A nurse will call your home to schedule an initial visit  Please call the number below if you have not received a call within 48 hours of hospital discharge   You may shower with the ESTHER drains  Wash area around the drains with soap and water and pat dry   Record drain outputs on chart given to you at pre op visit and bring that chart to office at post op appt   ESTHER drains can be removed in the office by a nurse either at post op visit OR when drain output is 30 ccs or less in a 24 hour period for three days in a row   If you had plastic surgery or reconstructive surgery, the plastic surgeon will manage the drains  Other:       You can begin wearing your own bra when it feels comfortable   You may resume using deodorant the day after surgery                 Post-op visit:  your post-op visit is scheduled for:  2018 @ 12:30 PM    Call our office at [phone] if you have any  of the followin  Redness, swelling, heat, unusual drainage or heavy bleeding from wound  2  Fever greater than 101 °  3  Pain not relieved by medication

## 2018-11-13 NOTE — OP NOTE
OPERATIVE REPORT  PATIENT NAME: Devon White    :  1940  MRN: [id number]  Pt Location: [facility] OR ROOM 02    SURGERY DATE: 2018    Surgeon(s) and Role:     * Jen Amezcua MD - Primary     * Darryll Mcardle, MD - Assisting    Preop Diagnosis:  Atypical ductal hyperplasia of left breast [N60 92]    Post-Op Diagnosis Codes:     * Atypical ductal hyperplasia of left breast [N60 92]    Procedure(s) (LRB):  BREAST LUMPECTOMY; BREAST NEEDLE LOCALIZATION (NEEDLE LOC AT 0830) (Left)    Specimen(s):  ID Type Source Tests Collected by Time Destination   1 : left breast lumpectomy  Tissue Breast, Left TISSUE EXAM Jen Amezcua MD 2018 1000        Estimated Blood Loss:   Minimal    Drains:       Anesthesia Type:   General    Operative Indications:  Atypical ductal hyperplasia of left breast [N60 92]      Operative Findings:  Good specimen radiograph    Complications:   None    Procedure and Technique:  I previously reviewed the needle localization images  Lumpectomy  The patient was brought to the operation room and placed under general anesthesia  Attention was paid to ensure appropriate padding and correct positioning  The left breast was prepped and draped in a sterile fashion  I initiated a time-out, identifying the patient, the correct side and the above procedure  All parties agreed with the time out  The planned incision was then injected with Marcaine and epinephrine for local anesthesia  The incision was sharply incised  The localizing wire was used to guide the dissection  Superior, inferior, medial and lateral planes were developed around the localizing wire and the specimen truncated posteriorly with electrocautery just beyond the tip of the wire  The specimen was then inked for orientation purposes  A specimen radiograph was taken in two dimensions which showed the clip in the middle of the specimen   Superficial bleeding controlled with electrocautery and the wound was extensively irrigated  The wound was closed with two layers, an inner layer of 3-0 vicryl and a subcuticular layer of 4-0 monocryl  Steri-strips were applied     I was present for the entire procedure    Patient Disposition:  PACU     SIGNATURE: Gary Krause MD  DATE: November 13, 2018  TIME: 10:05 AM

## 2018-11-15 ENCOUNTER — TELEPHONE (OUTPATIENT)
Dept: SURGICAL ONCOLOGY | Facility: CLINIC | Age: 78
End: 2018-11-15

## 2018-11-15 NOTE — TELEPHONE ENCOUNTER
Post op telephone call to patient  No questions or concerns  Post op appointment scheduled for 11/29  Encouraged to contact the office with any questions or concerns

## 2018-11-18 ENCOUNTER — HOSPITAL ENCOUNTER (OUTPATIENT)
Dept: CT IMAGING | Facility: HOSPITAL | Age: 78
Discharge: HOME | End: 2018-11-18
Attending: SURGERY
Payer: COMMERCIAL

## 2018-11-18 DIAGNOSIS — R93.89 ABNORMAL CHEST X-RAY: ICD-10-CM

## 2018-11-18 PROCEDURE — 71250 CT THORAX DX C-: CPT

## 2018-11-21 ENCOUNTER — TELEPHONE (OUTPATIENT)
Dept: SURGICAL ONCOLOGY | Facility: CLINIC | Age: 78
End: 2018-11-21

## 2018-11-21 NOTE — TELEPHONE ENCOUNTER
I spoke with the daughter and conveyed the CT scan reports  I have also faxed to her PCP a note as well as a copy of the CT scan  I have asked them to manage follow-up for her CT scan nodule  I did convey that her breast mass was benign    We will see her in her postoperative visit and follow-up further

## 2018-11-26 PROBLEM — D24.2 PAPILLOMA OF LEFT BREAST: Status: ACTIVE | Noted: 2018-11-26

## 2018-12-03 ENCOUNTER — OFFICE VISIT (OUTPATIENT)
Dept: SURGICAL ONCOLOGY | Facility: CLINIC | Age: 78
End: 2018-12-03

## 2018-12-03 VITALS
RESPIRATION RATE: 16 BRPM | HEART RATE: 80 BPM | WEIGHT: 127.5 LBS | BODY MASS INDEX: 21.24 KG/M2 | DIASTOLIC BLOOD PRESSURE: 68 MMHG | TEMPERATURE: 98.1 F | SYSTOLIC BLOOD PRESSURE: 110 MMHG | HEIGHT: 65 IN

## 2018-12-03 DIAGNOSIS — D24.2 PAPILLOMA OF LEFT BREAST: ICD-10-CM

## 2018-12-03 DIAGNOSIS — R91.1 LUNG NODULE, SOLITARY: ICD-10-CM

## 2018-12-03 DIAGNOSIS — N60.92 ATYPICAL DUCTAL HYPERPLASIA OF LEFT BREAST: Primary | ICD-10-CM

## 2018-12-03 DIAGNOSIS — R91.1 LUNG NODULE: Primary | ICD-10-CM

## 2018-12-03 PROCEDURE — 99024 POSTOP FOLLOW-UP VISIT: CPT | Performed by: SURGERY

## 2018-12-03 NOTE — PROGRESS NOTES
Surgical Oncology Breast Post-Op       [address]  [facility] [address]  1940  [id number]  [address] SURGICAL ONCOLOGY [address] [address]    Chief Complaint:   Joseph Vasquez is seen for a post-operative visit of her recent left breast surgery  Oncology History:      No history exists  Assessment & Plan:   Assessment/Plan    The patient is healing well from her surgery  I explained that she had atypical ductal hyperplasia  Her lifetime risk is under 20% consequently I have recommended annual 3D mammograms but no adjuvant studies as long as her mammograms are normal   We will see her back on as-needed basis  However because of her lung nodule am recommending she see thoracic surgery  She is in agreement with this  History of Present Illness:   Patient was recently diagnosed with atypical ductal hyperplasia  However on her preoperative chest x-ray she had a nodule  A CT scan was done and ultimately her old records were obtained which demonstrated this nodule is increased  I have recommended she see thoracic surgery for opinion regarding further management  Interval History:   Patient tolerated her surgery quite well  I provided her a copy of her pathology report  Review of Systems:   Review of Systems   All other systems reviewed and are negative  Past Medical History:     Patient Active Problem List   Diagnosis    Atypical ductal hyperplasia of left breast    Papilloma of left breast     Past Medical History:   Diagnosis Date    Dementia     Early stage     Hyperlipidemia     diet controled     Past Surgical History:   Procedure Laterality Date    BREAST LUMPECTOMY Left 11/13/2018    Procedure: BREAST LUMPECTOMY; BREAST NEEDLE LOCALIZATION (NEEDLE LOC AT 0830);   Surgeon: Romain Wellington MD;  [facility];  Service: Surgical Oncology    COLONOSCOPY      LAPAROSCOPY      MAMMO NEEDLE LOCALIZATION LEFT (ALL INC) Left 11/13/2018    MAMMO STEREOTACTIC BREAST BIOPSY LEFT (ALL INC) Left 10/16/2018     Family History   Problem Relation Age of Onset    Prostate cancer Father      Social History     Social History    Marital status:      Spouse name: N/A    Number of children: N/A    Years of education: N/A     Occupational History    Not on file  Social History Main Topics    Smoking status: Former Smoker     Quit date: 4/30/2016    Smokeless tobacco: Never Used      Comment: smoked 1 ppd x 60 yrs    Alcohol use 0 6 oz/week     1 Cans of beer per week      Comment: daily    Drug use: No    Sexual activity: No     Other Topics Concern    Not on file     Social History Narrative    No narrative on file       Current Outpatient Prescriptions:     cyanocobalamin (VITAMIN B-12) 1,000 mcg tablet, Take by mouth daily, Disp: , Rfl:     omega-3-acid ethyl esters (LOVAZA) 1 g capsule, Take 1 g by mouth daily, Disp: , Rfl:     oxyCODONE-acetaminophen (PERCOCET) 5-325 mg per tablet, Take 1 tablet by mouth every 6 (six) hours as needed for moderate pain Earliest Fill Date: 10/31/18 Max Daily Amount: 4 tablets (Patient not taking: Reported on 12/3/2018 ), Disp: 6 tablet, Rfl: 0  No Known Allergies    Physical Exams:     Vitals:    12/03/18 0951   BP: 110/68   Pulse: 80   Resp: 16   Temp: 98 1 °F (36 7 °C)     Physical Exam   Pulmonary/Chest:   The patient's breast wound is healing quite well  Results:       Labs:       Discussion/Summary:   The patient is aware of her CT scan results  I provided her a copy of her breast pathology as well as her CT scan  We will coordinate appointment with thoracic Oncology

## 2018-12-03 NOTE — LETTER
December 3, 2018     [address]  [address] [place] [address]    Patient: Sulaiman Sahni   YOB: 1940   Date of Visit: 12/3/2018       Dear Dr Rosalba Howard: Thank you for referring Laureen Oglesby to me for evaluation  Below are my notes for this consultation  If you have questions, please do not hesitate to call me  I look forward to following your patient along with you  Sincerely,        Talon Rolon MD        CC: No Recipients  Talon Rolon MD  12/3/2018 10:40 AM  Sign at close encounter     Surgical Oncology Breast Post-Op       [address] [place] [address]  1940  [id number]  [address] [facility],[address]  [facility] [address] [address]    Chief Complaint:   William Campbell is seen for a post-operative visit of her recent left breast surgery  Oncology History:      No history exists  Assessment & Plan:   Assessment/Plan    The patient is healing well from her surgery  I explained that she had atypical ductal hyperplasia  Her lifetime risk is under 20% consequently I have recommended annual 3D mammograms but no adjuvant studies as long as her mammograms are normal   We will see her back on as-needed basis  However because of her lung nodule am recommending she see thoracic surgery  She is in agreement with this  History of Present Illness:   Patient was recently diagnosed with atypical ductal hyperplasia  However on her preoperative chest x-ray she had a nodule  A CT scan was done and ultimately her old records were obtained which demonstrated this nodule is increased  I have recommended she see thoracic surgery for opinion regarding further management  Interval History:   Patient tolerated her surgery quite well  I provided her a copy of her pathology report      Review of Systems:   Review of Systems   All other systems reviewed and are negative  Past Medical History:     Patient Active Problem List   Diagnosis    Atypical ductal hyperplasia of left breast    Papilloma of left breast     Past Medical History:   Diagnosis Date    Dementia     Early stage     Hyperlipidemia     diet controled     Past Surgical History:   Procedure Laterality Date    BREAST LUMPECTOMY Left 11/13/2018    Procedure: BREAST LUMPECTOMY; BREAST NEEDLE LOCALIZATION (NEEDLE LOC AT 0830); Surgeon: Beckie Vargas MD;  [facility];  Service: Surgical Oncology    COLONOSCOPY      LAPAROSCOPY      MAMMO NEEDLE LOCALIZATION LEFT (ALL INC) Left 11/13/2018    MAMMO STEREOTACTIC BREAST BIOPSY LEFT (ALL INC) Left 10/16/2018     Family History   Problem Relation Age of Onset    Prostate cancer Father      Social History     Social History    Marital status:      Spouse name: N/A    Number of children: N/A    Years of education: N/A     Occupational History    Not on file       Social History Main Topics    Smoking status: Former Smoker     Quit date: 4/30/2016    Smokeless tobacco: Never Used      Comment: smoked 1 ppd x 60 yrs    Alcohol use 0 6 oz/week     1 Cans of beer per week      Comment: daily    Drug use: No    Sexual activity: No     Other Topics Concern    Not on file     Social History Narrative    No narrative on file       Current Outpatient Prescriptions:     cyanocobalamin (VITAMIN B-12) 1,000 mcg tablet, Take by mouth daily, Disp: , Rfl:     omega-3-acid ethyl esters (LOVAZA) 1 g capsule, Take 1 g by mouth daily, Disp: , Rfl:     oxyCODONE-acetaminophen (PERCOCET) 5-325 mg per tablet, Take 1 tablet by mouth every 6 (six) hours as needed for moderate pain Earliest Fill Date: 10/31/18 Max Daily Amount: 4 tablets (Patient not taking: Reported on 12/3/2018 ), Disp: 6 tablet, Rfl: 0  No Known Allergies    Physical Exams:     Vitals:    12/03/18 0951   BP: 110/68 Pulse: 80   Resp: 16   Temp: 98 1 °F (36 7 °C)     Physical Exam   Pulmonary/Chest:   The patient's breast wound is healing quite well  Results:       Labs:       Discussion/Summary:   The patient is aware of her CT scan results  I provided her a copy of her breast pathology as well as her CT scan  We will coordinate appointment with thoracic Oncology

## 2018-12-14 ENCOUNTER — HOSPITAL ENCOUNTER (OUTPATIENT)
Dept: PULMONOLOGY | Facility: HOSPITAL | Age: 78
Discharge: HOME | End: 2018-12-14
Attending: THORACIC SURGERY (CARDIOTHORACIC VASCULAR SURGERY)
Payer: COMMERCIAL

## 2018-12-14 DIAGNOSIS — R91.1 LUNG NODULE: ICD-10-CM

## 2018-12-14 PROCEDURE — 94727 GAS DIL/WSHOT DETER LNG VOL: CPT

## 2018-12-14 PROCEDURE — 94729 DIFFUSING CAPACITY: CPT | Performed by: INTERNAL MEDICINE

## 2018-12-14 PROCEDURE — 94060 EVALUATION OF WHEEZING: CPT | Performed by: INTERNAL MEDICINE

## 2018-12-14 PROCEDURE — 94760 N-INVAS EAR/PLS OXIMETRY 1: CPT

## 2018-12-14 PROCEDURE — 94727 GAS DIL/WSHOT DETER LNG VOL: CPT | Performed by: INTERNAL MEDICINE

## 2018-12-14 PROCEDURE — 94060 EVALUATION OF WHEEZING: CPT

## 2018-12-14 PROCEDURE — 94729 DIFFUSING CAPACITY: CPT

## 2018-12-14 RX ORDER — ALBUTEROL SULFATE 2.5 MG/3ML
2.5 SOLUTION RESPIRATORY (INHALATION) ONCE
Status: COMPLETED | OUTPATIENT
Start: 2018-12-14 | End: 2018-12-14

## 2018-12-14 RX ADMIN — ALBUTEROL SULFATE 2.5 MG: 2.5 SOLUTION RESPIRATORY (INHALATION) at 08:47

## 2018-12-17 ENCOUNTER — OFFICE VISIT (OUTPATIENT)
Dept: CARDIAC SURGERY | Facility: CLINIC | Age: 78
End: 2018-12-17
Payer: COMMERCIAL

## 2018-12-17 VITALS
DIASTOLIC BLOOD PRESSURE: 72 MMHG | WEIGHT: 128 LBS | HEART RATE: 81 BPM | BODY MASS INDEX: 21.33 KG/M2 | OXYGEN SATURATION: 98 % | HEIGHT: 65 IN | SYSTOLIC BLOOD PRESSURE: 171 MMHG | TEMPERATURE: 99 F

## 2018-12-17 DIAGNOSIS — R91.1 LUNG NODULE, SOLITARY: ICD-10-CM

## 2018-12-17 DIAGNOSIS — R91.8 RIGHT LOWER LOBE LUNG MASS: Primary | ICD-10-CM

## 2018-12-17 PROCEDURE — 99205 OFFICE O/P NEW HI 60 MIN: CPT | Performed by: THORACIC SURGERY (CARDIOTHORACIC VASCULAR SURGERY)

## 2018-12-17 NOTE — LETTER
December 17, 2018     Talon Rolon MD  [facility] [address] [place] [address]    Patient: Sulaiman Sahni   YOB: 1940   Date of Visit: 12/17/2018     Dear Dr Rima Ware      Thank you for referring Laureen Oglesby to me for evaluation  Below are the relevant portions of my assessment and plan of care  I supervised the Advanced Practitioner  I discussed the case with the Advanced Practitioner, reviewed the note and agree  Ms  Gordon Obando is a very sweet woman who presents with a newly enlarging RLL nodule  Additionally, she has an enlarged R paratracheal lymph node  She has multiple other pulmonary nodules but these are stable in size and character  At this time, we discussed that based upon appearance, growth and her risk factors, that there is a good chance that this is a cancer  I have advised her to get a PET CT  If she demonstrates avidity in her mediastinum, I will plan to proceed with a bronch and a mediastinoscopy  If not, then her PFTs indicate that she would be a good candidate for resection and I will plan to proceed with a VATS RLLobectomy  At this time in the interim, she will get a PET CT and return to see me very soon  Jailyn Price MD  Thoracic Surgery  (Available on [employer])      Raheem River MD 12/17/18          If you have questions, please do not hesitate to call me  I look forward to following William Campbell along with you           Sincerely,        Raheem River MD        CC: [address]    Progress Notes:

## 2018-12-17 NOTE — PROGRESS NOTES
Thoracic Consult  Assessment/Plan:    Right lower lobe lung mass  We discussed her CT results showing a RLL lung mass that has grown in size since 2015  Due to her smoking history as well as the growth, we recommend PET/CT to better differentiate this mass and assist in staging  The patient is aware that this is likely malignant due to its spiculated shape as well as factors  Mentioned above  After these results are obtained, we will discuss treatment options including surgery or chemo/radiation based on those results  Additionally, she is aware that her PFTs are appropriate and make her a good candidate for surgery, if needed  Lung nodule, solitary  LLL lung nodule stable since 2015  Will continue to monitor on CT       Diagnoses and all orders for this visit:    Right lower lobe lung mass    Lung nodule, solitary  -     Ambulatory referral to Thoracic Surgery          Thoracic History   Diagnosis:    Procedures/Surgeries:    Pathology:    Adjuvant Therapy:       Subjective:    Patient ID: Devon White is a 70 y o  female  HPI:  Ms Liya Campuzano is a 70year old female referred by Dr Karry Phoenix regarding a right pulmonary nodule found on a pre-operative CXR performed prior to breast lumpectomy  As a result, a CT was obtained on 11/18/18 which demonstrated a right lower lobe mass that grew from 11x9 8mm to 2 1x1 9x3 4cm  It appears to have visceral pleura involvement  Additionally, there is a stable left lower lobe spiculated lesion unchanged from a prior CT in 2015  Today, the patient is feeling well  She denies chest pain, shortness of breath, dyspnea on exertion, cough, hemoptysis, sputum production, fevers, chills, new bone pain, arthralgias, headaches, blurred vision, or seizures  She admits to PND and allergies which will make her cough       The following portions of the patient's history were reviewed and updated as appropriate: allergies, current medications, past family history, past medical history, past social history, past surgical history and problem list     Past Medical History:   Diagnosis Date    Abnormal chest x-ray     Abnormal mammogram     Dementia     Early stage     Ductal hyperplasia of breast     Left     Hyperlipidemia     diet controled    Lung nodule     Right Lower Lobe       Past Surgical History:   Procedure Laterality Date    BREAST LUMPECTOMY Left 11/13/2018    Procedure: BREAST LUMPECTOMY; BREAST NEEDLE LOCALIZATION (NEEDLE LOC AT 0830); Surgeon: Beckie Vargas MD;  [facility];  Service: Surgical Oncology    COLONOSCOPY      LAPAROSCOPY      MAMMO NEEDLE LOCALIZATION LEFT (ALL INC) Left 11/13/2018    MAMMO STEREOTACTIC BREAST BIOPSY LEFT (ALL INC) Left 10/16/2018      Family History   Problem Relation Age of Onset    Prostate cancer Father     Hypertension Mother     Glaucoma Mother     Macular degeneration Mother       Social History     Social History    Marital status:      Spouse name: N/A    Number of children: N/A    Years of education: N/A     Occupational History    Not on file  Social History Main Topics    Smoking status: Former Smoker     Packs/day: 1 00     Years: 60 00     Quit date: 4/30/2016    Smokeless tobacco: Never Used    Alcohol use 0 6 oz/week     1 Cans of beer per week      Comment: daily    Drug use: No    Sexual activity: No     Other Topics Concern    Not on file     Social History Narrative    No narrative on file      Review of Systems   Constitutional: Negative for activity change, appetite change, chills, fever and unexpected weight change  Eyes: Negative for visual disturbance  Respiratory: Negative for cough, chest tightness and shortness of breath  Cardiovascular: Negative for chest pain  Gastrointestinal: Negative for abdominal pain, nausea and vomiting  Endocrine: Negative for cold intolerance and heat intolerance  Musculoskeletal: Negative for arthralgias and back pain     Skin: Negative for rash    Neurological: Negative for headaches  Objective:   Physical Exam   Constitutional: She is oriented to person, place, and time  She appears well-developed and well-nourished  No distress  HENT:   Head: Normocephalic and atraumatic  Eyes: Pupils are equal, round, and reactive to light  No scleral icterus  Neck: Neck supple  No tracheal deviation present  Cardiovascular: Normal rate, regular rhythm and normal heart sounds  Pulmonary/Chest: Effort normal and breath sounds normal  No respiratory distress  Abdominal: Soft  Bowel sounds are normal  She exhibits no distension  Lymphadenopathy:     She has no cervical adenopathy  Neurological: She is alert and oriented to person, place, and time  Skin: Skin is warm and dry  Psychiatric: She has a normal mood and affect  Thought content normal    BP (!) 171/72 (BP Location: Right arm, Patient Position: Sitting, Cuff Size: Adult)   Pulse 81   Temp 99 °F (37 2 °C)   Ht 5' 5" (1 651 m)   Wt 58 1 kg (128 lb)   SpO2 98%   BMI 21 30 kg/m²     Xr Chest Pa & Lateral    Addendum Date: 11/6/2018 Addendum   ADDENDUM: Comparison to prior CT scan of the chest is obtained from outside facility 10/21/2015  The opacity noted in the right midlung field likely corresponds to a spiculated nodule seen on the CT scan in the superior segment of the right lower lobe  The craniocaudal dimension appears increased since the previous CT scan and therefore a repeat CT  scan is recommended  Result Date: 11/6/2018  Impression Unusual vertically oriented linear opacity in the right midlung field measuring 3 4 x 1 0 cm of uncertain etiology  CT scan of chest recommended for further evaluation  7 mm left lower lobe nodular density  The study was marked in EPIC for significant notification   Workstation performed: [id number]

## 2018-12-17 NOTE — ASSESSMENT & PLAN NOTE
We discussed her CT results showing a RLL lung mass that has grown in size since 2015  Due to her smoking history as well as the growth, we recommend PET/CT to better differentiate this mass and assist in staging  The patient is aware that this is likely malignant due to its spiculated shape as well as factors  Mentioned above  After these results are obtained, we will discuss treatment options including surgery or chemo/radiation based on those results  Additionally, she is aware that her PFTs are appropriate and make her a good candidate for surgery, if needed

## 2018-12-31 ENCOUNTER — HOSPITAL ENCOUNTER (OUTPATIENT)
Dept: RADIOLOGY | Age: 78
Discharge: HOME | End: 2018-12-31
Payer: COMMERCIAL

## 2018-12-31 DIAGNOSIS — D38.1 NEOPLASM OF UNCERTAIN BEHAVIOR OF TRACHEA, BRONCHUS, AND LUNG: ICD-10-CM

## 2018-12-31 LAB — GLUCOSE SERPL-MCNC: 84 MG/DL (ref 65–140)

## 2018-12-31 PROCEDURE — A9552 F18 FDG: HCPCS

## 2018-12-31 PROCEDURE — 78815 PET IMAGE W/CT SKULL-THIGH: CPT

## 2018-12-31 PROCEDURE — 82948 REAGENT STRIP/BLOOD GLUCOSE: CPT

## 2019-01-03 ENCOUNTER — OFFICE VISIT (OUTPATIENT)
Dept: CARDIAC SURGERY | Facility: CLINIC | Age: 79
End: 2019-01-03
Payer: COMMERCIAL

## 2019-01-03 VITALS
RESPIRATION RATE: 16 BRPM | SYSTOLIC BLOOD PRESSURE: 120 MMHG | BODY MASS INDEX: 21.66 KG/M2 | WEIGHT: 130 LBS | HEIGHT: 65 IN | HEART RATE: 80 BPM | DIASTOLIC BLOOD PRESSURE: 68 MMHG

## 2019-01-03 DIAGNOSIS — R91.1 LUNG NODULE: Primary | ICD-10-CM

## 2019-01-03 PROCEDURE — 99215 OFFICE O/P EST HI 40 MIN: CPT | Performed by: THORACIC SURGERY (CARDIOTHORACIC VASCULAR SURGERY)

## 2019-01-03 RX ORDER — ACETAMINOPHEN 325 MG/1
975 TABLET ORAL ONCE
Status: CANCELLED | OUTPATIENT
Start: 2019-01-03 | End: 2019-01-03

## 2019-01-03 RX ORDER — GABAPENTIN 300 MG/1
600 CAPSULE ORAL ONCE
Status: CANCELLED | OUTPATIENT
Start: 2019-01-03 | End: 2019-01-03

## 2019-01-03 RX ORDER — CELECOXIB 200 MG/1
200 CAPSULE ORAL ONCE
Status: CANCELLED | OUTPATIENT
Start: 2019-01-03 | End: 2019-01-03

## 2019-01-03 RX ORDER — CEFAZOLIN SODIUM 2 G/50ML
2000 SOLUTION INTRAVENOUS ONCE
Status: CANCELLED | OUTPATIENT
Start: 2019-01-03 | End: 2019-01-03

## 2019-01-03 NOTE — ASSESSMENT & PLAN NOTE
Ms Joel is a very pleasant 78yo F who presents with a right lower lobe mass  She underwent a PET CT and presents today to discuss the results  Her PET scan, which I personally reviewed, demonstrates mild avidity in the mass  While she has enlarged paratracheal lymph nodes, these do not demonstrate avidity  She is an appropriate candidate for surgery based upon her PFTs  Today we discussed surgical options  I explained that while we still don't have a diagnosis, this is most likely a cancer  I recommended starting with a mediastinoscopy to assess the lymph nodes  As long as these are negative on frozen pathology, we will proceed with a wedge resection possible biopsy  I explained that I may not be able to wedge the lesion out to send it for frozen  If this is the case, we will proceed with a lobectomy from the start, but I will attempt a wedge first  We discussed the risks and benefits and she has elected to proceed  Consent was obtained in the office today  She is scheduled for a flexible bronchoscopy, mediastinoscopy, R VATS LLLobectomy, possible wedge for January 16th      Carlos Mendoza MD  Thoracic Surgery  (Available on [facility] Text)

## 2019-01-03 NOTE — LETTER
January 3, 2019     [address]  [address] [place] [address]    Patient: Coni Henao   YOB: 1940   Date of Visit: 1/3/2019     Dear Dr Jesi Dacosta      Thank you for referring Rusty Rodriguez to me for evaluation  Below are the relevant portions of my assessment and plan of care  Ms Ben Garza is a very pleasant 76yo F who presents with a right lower lobe mass  She underwent a PET CT and presents today to discuss the results  Her PET scan, which I personally reviewed, demonstrates mild avidity in the mass  While she has enlarged paratracheal lymph nodes, these do not demonstrate avidity  She is an appropriate candidate for surgery based upon her PFTs  Today we discussed surgical options  I explained that while we still don't have a diagnosis, this is most likely a cancer  I recommended starting with a mediastinoscopy to assess the lymph nodes  As long as these are negative on frozen pathology, we will proceed with a wedge resection possible biopsy  I explained that I may not be able to wedge the lesion out to send it for frozen  If this is the case, we will proceed with a lobectomy from the start, but I will attempt a wedge first  We discussed the risks and benefits and she has elected to proceed  Consent was obtained in the office today  She is scheduled for a flexible bronchoscopy, mediastinoscopy, R VATS LLLobectomy, possible wedge for January 16th  Mana Neumann MD  Thoracic Surgery  (Available on [facility] Text)        If you have questions, please do not hesitate to call me  I look forward to following Yolanda Tomas along with you           Sincerely,        Bernice Kahn MD        CC: No Recipients    Progress Notes:

## 2019-01-03 NOTE — PROGRESS NOTES
Thoracic Follow-Up  Assessment/Plan:    Right lower lobe lung mass  Ms  Tacos Cortez is a very pleasant 78yo F who presents with a right lower lobe mass  She underwent a PET CT and presents today to discuss the results  Her PET scan, which I personally reviewed, demonstrates mild avidity in the mass  While she has enlarged paratracheal lymph nodes, these do not demonstrate avidity  She is an appropriate candidate for surgery based upon her PFTs  Today we discussed surgical options  I explained that while we still don't have a diagnosis, this is most likely a cancer  I recommended starting with a mediastinoscopy to assess the lymph nodes  As long as these are negative on frozen pathology, we will proceed with a wedge resection possible biopsy  I explained that I may not be able to wedge the lesion out to send it for frozen  If this is the case, we will proceed with a lobectomy from the start, but I will attempt a wedge first  We discussed the risks and benefits and she has elected to proceed  Consent was obtained in the office today  She is scheduled for a flexible bronchoscopy, mediastinoscopy, R VATS LLLobectomy, possible wedge for January 16th  Jenna Chirinos MD  Thoracic Surgery  (Available on [employer] Text)         Diagnoses and all orders for this visit:    Lung nodule  -     Type and screen; Future  -     Basic metabolic panel; Future  -     APTT; Future  -     CBC and Platelet; Future  -     Protime-INR; Future  -     EKG 12 lead;  Future  -     Case request operating room: BRONCHOSCOPY FLEXIBLE, mediastinoscopy, possible right VATS lower lobectomy; Standing  -     Case request operating room: BRONCHOSCOPY FLEXIBLE, mediastinoscopy, possible right VATS lower lobectomy    Other orders  -     Diet NPO; Sips with meds; Standing  -     Nursing communcation Please give pre-op Carbohydrate drink to patient 2-4 hours prior to surgery; Standing  -     Void on call to OR; Standing  -     Insert peripheral IV; Standing  -     Place sequential compression device; Standing  -     acetaminophen (TYLENOL) tablet 975 mg; Take 3 tablets (975 mg total) by mouth once   -     celecoxib (CeleBREX) capsule 200 mg; Take 1 capsule (200 mg total) by mouth once   -     gabapentin (NEURONTIN) capsule 600 mg; Take 2 capsules (600 mg total) by mouth once   -     ceFAZolin (ANCEF) IVPB (premix) 2,000 mg; Infuse 2,000 mg into a venous catheter once           Thoracic History          Subjective:    Patient ID: Coni Henao is a 70 y o  female  HPI  Ms Brian Santoyo is a very pleasant 76yo F who presents with a right lower lobe mass  She underwent a PET CT and presents today to discuss the results  Today in the office again I asked her about her functional status  She states that she climbs multiple steps everyday as she lives in a two story house  She reports feeling winded after many sets of stairs but that she can easily climb one set of stairs without a problem  She reports that she has no change in her medical history since her last visit with me  From Previous HNP 12/17/18:  Ms Brian Santoyo is a 70year old female referred by Dr Gerson Elena regarding a right pulmonary nodule found on a pre-operative CXR performed prior to breast lumpectomy  As a result, a CT was obtained on 11/18/18 which demonstrated a right lower lobe mass that grew from 11x9 8mm to 2 1x1 9x3 4cm  It appears to have visceral pleura involvement  Additionally, there is a stable left lower lobe spiculated lesion unchanged from a prior CT in 2015  Today, the patient is feeling well  She denies chest pain, shortness of breath, dyspnea on exertion, cough, hemoptysis, sputum production, fevers, chills, new bone pain, arthralgias, headaches, blurred vision, or seizures  She admits to PND and allergies which will make her cough       The following portions of the patient's history were reviewed and updated as appropriate: allergies, current medications, past family history, past medical history, past social history, past surgical history and problem list     Review of Systems   Constitutional: Negative for unexpected weight change  HENT: Negative  Eyes: Negative  Negative for visual disturbance  Respiratory: Negative for cough, shortness of breath and stridor  Cardiovascular: Negative for chest pain  Gastrointestinal: Negative  Endocrine: Negative  Genitourinary: Negative  Musculoskeletal: Negative  Neurological: Negative for dizziness and headaches  Hematological: Negative for adenopathy  Psychiatric/Behavioral: Negative  Objective:   Physical Exam   Constitutional: She is oriented to person, place, and time  She appears well-developed and well-nourished  HENT:   Head: Normocephalic and atraumatic  Eyes: Pupils are equal, round, and reactive to light  Neck: Normal range of motion  No tracheal deviation present  Cardiovascular: Normal rate, regular rhythm and normal heart sounds  No murmur heard  Pulmonary/Chest: Effort normal and breath sounds normal  No stridor  No respiratory distress  She has no wheezes  She has no rales  She exhibits no tenderness  Abdominal: Soft  Bowel sounds are normal  She exhibits no distension  There is no tenderness  There is no rebound  Musculoskeletal: Normal range of motion  She exhibits no edema  Lymphadenopathy:     She has no cervical adenopathy  Neurological: She is alert and oriented to person, place, and time  Skin: Skin is warm and dry  No rash noted  She is not diaphoretic  No erythema  No pallor  Psychiatric: She has a normal mood and affect  Her behavior is normal  Judgment and thought content normal    Nursing note and vitals reviewed    /68 (BP Location: Right arm, Patient Position: Sitting, Cuff Size: Standard)   Pulse 80   Resp 16   Ht 5' 5" (1 651 m)   Wt 59 kg (130 lb)   BMI 21 63 kg/m²     Xr Chest Pa & Lateral    Addendum Date: 11/6/2018 Addendum   ADDENDUM: Comparison to prior CT scan of the chest is obtained from outside facility 10/21/2015  The opacity noted in the right midlung field likely corresponds to a spiculated nodule seen on the CT scan in the superior segment of the right lower lobe  The craniocaudal dimension appears increased since the previous CT scan and therefore a repeat CT  scan is recommended  Result Date: 11/6/2018  Impression Unusual vertically oriented linear opacity in the right midlung field measuring 3 4 x 1 0 cm of uncertain etiology  CT scan of chest recommended for further evaluation  7 mm left lower lobe nodular density  The study was marked in EPIC for significant notification  Workstation performed: [id number]      No CT Chest results available for this patient  No CT Chest,Abdomen,Pelvis results available for this patient  Nm Pet Ct Skull Base To Mid Thigh    Result Date: 12/31/2018  Narrative PET/CT SCAN INDICATION:  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, bronchus and lung   abnormal chest CT, enlarging right lower lung nodule, history of left breast lumpectomy 11/13/2018, atypical ductal hyperplasia MODIFIER: PI COMPARISON: CT 11/18/2018 and priors CELL TYPE:  None TECHNIQUE:   8 2 mCi F-18-FDG administered IV  Multiplanar attenuation corrected and non attenuation corrected PET images are available for interpretation, and contiguous, low dose, axial CT sections were obtained from the skull base through the femurs   Intravenous contrast material was not utilized  This examination, like all CT scans performed in the [facility], was performed utilizing techniques to minimize radiation dose exposure, including the use of iterative reconstruction and automated exposure control  Fasting serum glucose: 84 mg/dl FINDINGS: VISUALIZED BRAIN:   No acute abnormalities are seen  HEAD/NECK: Minimal nodular right carotid activity, SUV 2 9, is nonspecific  No discrete CT abnormality   There is otherwise a physiologic distribution of FDG  No FDG avid cervical adenopathy is seen  CT images: Thyroid calcifications  CHEST: Enlarging 1 7 x 2 8 x 3 6 cm superior right lower lung nodule demonstrates mild FDG activity, SUV 2 3  This is most concerning for malignancy  Faint groundglass density in the superior right upper lung measuring approximately 9 mm, series 3 image 83 does not demonstrate appreciable FDG activity  Faint groundglass density left lower lung series 3 image 101, 4 mm left lower lung nodule image 106, and 5 mm left upper lung nodule image 85, are too small for accurate PET evaluation  Mild left breast activity, SUV 1 7, is likely inflammatory from recent lumpectomy  No additional FDG avid soft tissue lesions are seen  1 6 x 1 1 cm precarinal node does not demonstrate significant FDG activity, SUV 2 1, similar to background soft tissue activity  On the prior CT of 10/21/2015, this measured 1 7 x 1 1 cm  CT images: Biapical pleural parenchymal scarring  Left lower granuloma  ABDOMEN:   No FDG avid soft tissue lesions are seen  Left hepatic hypodensity without significant FDG activity  CT images: Unremarkable  PELVIS: No FDG avid soft tissue lesions are seen  CT images: Unremarkable  OSSEOUS STRUCTURES: No FDG avid lesions are seen  CT images: No significant findings  Impression 1  Enlarging 1 7 x 2 8 x 3 6 cm superior right lower lung nodule demonstrates mild FDG activity, SUV 2 3  This is most concerning for malignancy until proven otherwise  Tissue sampling recommended  No hypermetabolic hilar or mediastinal adenopathy  2   Minimal milder right parotid gland activity is nonspecific, without discrete CT abnormality  This may be reassessed on follow-up exam  3   No hypermetabolic metastases visualized in the abdomen, pelvis or osseous structures  4   Continued CT follow-up recommended for additional nodular lung densities   Workstation performed: [id number]      No Barium Swallow results available for this patient

## 2019-01-10 ENCOUNTER — TRANSCRIBE ORDERS (OUTPATIENT)
Dept: ADMINISTRATIVE | Facility: HOSPITAL | Age: 79
End: 2019-01-10

## 2019-01-10 ENCOUNTER — APPOINTMENT (OUTPATIENT)
Dept: LAB | Facility: HOSPITAL | Age: 79
End: 2019-01-10
Attending: THORACIC SURGERY (CARDIOTHORACIC VASCULAR SURGERY)
Payer: COMMERCIAL

## 2019-01-10 DIAGNOSIS — R91.1 LUNG NODULE: Primary | ICD-10-CM

## 2019-01-10 DIAGNOSIS — R91.1 LUNG NODULE: ICD-10-CM

## 2019-01-10 LAB
ABO GROUP BLD: NORMAL
ANION GAP SERPL CALCULATED.3IONS-SCNC: 8 MMOL/L (ref 4–13)
APTT PPP: 29 SECONDS (ref 26–38)
ATRIAL RATE: 83 BPM
BLD GP AB SCN SERPL QL: NEGATIVE
BUN SERPL-MCNC: 22 MG/DL (ref 5–25)
CALCIUM SERPL-MCNC: 9.4 MG/DL (ref 8.3–10.1)
CHLORIDE SERPL-SCNC: 106 MMOL/L (ref 100–108)
CO2 SERPL-SCNC: 27 MMOL/L (ref 21–32)
CREAT SERPL-MCNC: 1.15 MG/DL (ref 0.6–1.3)
ERYTHROCYTE [DISTWIDTH] IN BLOOD BY AUTOMATED COUNT: 14 % (ref 11.6–15.1)
GFR SERPL CREATININE-BSD FRML MDRD: 46 ML/MIN/1.73SQ M
GLUCOSE P FAST SERPL-MCNC: 91 MG/DL (ref 65–99)
HCT VFR BLD AUTO: 41.7 % (ref 34.8–46.1)
HGB BLD-MCNC: 13.7 G/DL (ref 11.5–15.4)
INR PPP: 1 (ref 0.86–1.17)
MCH RBC QN AUTO: 30.8 PG (ref 26.8–34.3)
MCHC RBC AUTO-ENTMCNC: 32.9 G/DL (ref 31.4–37.4)
MCV RBC AUTO: 94 FL (ref 82–98)
P AXIS: 78 DEGREES
PLATELET # BLD AUTO: 235 THOUSANDS/UL (ref 149–390)
PMV BLD AUTO: 10.5 FL (ref 8.9–12.7)
POTASSIUM SERPL-SCNC: 4.7 MMOL/L (ref 3.5–5.3)
PR INTERVAL: 152 MS
PROTHROMBIN TIME: 13.1 SECONDS (ref 11.8–14.2)
QRS AXIS: 76 DEGREES
QRSD INTERVAL: 74 MS
QT INTERVAL: 388 MS
QTC INTERVAL: 455 MS
RBC # BLD AUTO: 4.45 MILLION/UL (ref 3.81–5.12)
RH BLD: POSITIVE
SODIUM SERPL-SCNC: 141 MMOL/L (ref 136–145)
SPECIMEN EXPIRATION DATE: NORMAL
T WAVE AXIS: 66 DEGREES
VENTRICULAR RATE: 83 BPM
WBC # BLD AUTO: 7.87 THOUSAND/UL (ref 4.31–10.16)

## 2019-01-10 PROCEDURE — 93005 ELECTROCARDIOGRAM TRACING: CPT

## 2019-01-10 PROCEDURE — 86850 RBC ANTIBODY SCREEN: CPT

## 2019-01-10 PROCEDURE — 93010 ELECTROCARDIOGRAM REPORT: CPT | Performed by: INTERNAL MEDICINE

## 2019-01-10 PROCEDURE — 80048 BASIC METABOLIC PNL TOTAL CA: CPT

## 2019-01-10 PROCEDURE — 36415 COLL VENOUS BLD VENIPUNCTURE: CPT

## 2019-01-10 PROCEDURE — 85610 PROTHROMBIN TIME: CPT

## 2019-01-10 PROCEDURE — 85730 THROMBOPLASTIN TIME PARTIAL: CPT

## 2019-01-10 PROCEDURE — 86900 BLOOD TYPING SEROLOGIC ABO: CPT

## 2019-01-10 PROCEDURE — 86901 BLOOD TYPING SEROLOGIC RH(D): CPT

## 2019-01-10 PROCEDURE — 85027 COMPLETE CBC AUTOMATED: CPT

## 2019-01-11 ENCOUNTER — ANESTHESIA EVENT (OUTPATIENT)
Dept: PERIOP | Facility: HOSPITAL | Age: 79
DRG: 164 | End: 2019-01-11
Payer: COMMERCIAL

## 2019-01-11 NOTE — ANESTHESIA PREPROCEDURE EVALUATION
High risk for VALDO per Jose Angel protocol  ACEI/ARBs to be held  IVF per HUONG ALMONTE [facility]  Postop neph consult

## 2019-01-11 NOTE — PRE-PROCEDURE INSTRUCTIONS
No outpatient prescriptions have been marked as taking for the 1/16/19 encounter [facility] Encounter)     Pre procedure instructions reviewed with daughter, verbalizes understanding

## 2019-01-16 ENCOUNTER — HOSPITAL ENCOUNTER (INPATIENT)
Facility: HOSPITAL | Age: 79
LOS: 9 days | Discharge: HOME HEALTH | DRG: 164 | End: 2019-01-25
Attending: THORACIC SURGERY (CARDIOTHORACIC VASCULAR SURGERY) | Admitting: THORACIC SURGERY (CARDIOTHORACIC VASCULAR SURGERY)
Payer: COMMERCIAL

## 2019-01-16 ENCOUNTER — ANESTHESIA (OUTPATIENT)
Dept: PERIOP | Facility: HOSPITAL | Age: 79
DRG: 164 | End: 2019-01-16
Payer: COMMERCIAL

## 2019-01-16 ENCOUNTER — APPOINTMENT (INPATIENT)
Dept: RADIOLOGY | Facility: HOSPITAL | Age: 79
DRG: 164 | End: 2019-01-16
Payer: COMMERCIAL

## 2019-01-16 DIAGNOSIS — N28.9 KIDNEY DISEASE: Primary | ICD-10-CM

## 2019-01-16 DIAGNOSIS — R91.8 RIGHT LOWER LOBE LUNG MASS: ICD-10-CM

## 2019-01-16 DIAGNOSIS — R91.1 LUNG NODULE: ICD-10-CM

## 2019-01-16 LAB
ANION GAP SERPL CALCULATED.3IONS-SCNC: 6 MMOL/L (ref 4–13)
BUN SERPL-MCNC: 20 MG/DL (ref 5–25)
CALCIUM SERPL-MCNC: 8.6 MG/DL (ref 8.3–10.1)
CHLORIDE SERPL-SCNC: 107 MMOL/L (ref 100–108)
CO2 SERPL-SCNC: 24 MMOL/L (ref 21–32)
CREAT SERPL-MCNC: 1.06 MG/DL (ref 0.6–1.3)
ERYTHROCYTE [DISTWIDTH] IN BLOOD BY AUTOMATED COUNT: 13.9 % (ref 11.6–15.1)
GFR SERPL CREATININE-BSD FRML MDRD: 51 ML/MIN/1.73SQ M
GLUCOSE SERPL-MCNC: 144 MG/DL (ref 65–140)
HCT VFR BLD AUTO: 39.7 % (ref 34.8–46.1)
HGB BLD-MCNC: 13 G/DL (ref 11.5–15.4)
MAGNESIUM SERPL-MCNC: 2.1 MG/DL (ref 1.6–2.6)
MCH RBC QN AUTO: 30.9 PG (ref 26.8–34.3)
MCHC RBC AUTO-ENTMCNC: 32.7 G/DL (ref 31.4–37.4)
MCV RBC AUTO: 94 FL (ref 82–98)
PLATELET # BLD AUTO: 210 THOUSANDS/UL (ref 149–390)
PMV BLD AUTO: 10.2 FL (ref 8.9–12.7)
POTASSIUM SERPL-SCNC: 3.9 MMOL/L (ref 3.5–5.3)
RBC # BLD AUTO: 4.21 MILLION/UL (ref 3.81–5.12)
SODIUM SERPL-SCNC: 137 MMOL/L (ref 136–145)
WBC # BLD AUTO: 10.84 THOUSAND/UL (ref 4.31–10.16)

## 2019-01-16 PROCEDURE — 07B74ZX EXCISION OF THORAX LYMPHATIC, PERCUTANEOUS ENDOSCOPIC APPROACH, DIAGNOSTIC: ICD-10-PCS | Performed by: THORACIC SURGERY (CARDIOTHORACIC VASCULAR SURGERY)

## 2019-01-16 PROCEDURE — 88313 SPECIAL STAINS GROUP 2: CPT | Performed by: PATHOLOGY

## 2019-01-16 PROCEDURE — 83735 ASSAY OF MAGNESIUM: CPT | Performed by: PHYSICIAN ASSISTANT

## 2019-01-16 PROCEDURE — 88309 TISSUE EXAM BY PATHOLOGIST: CPT | Performed by: PATHOLOGY

## 2019-01-16 PROCEDURE — 0BJ08ZZ INSPECTION OF TRACHEOBRONCHIAL TREE, VIA NATURAL OR ARTIFICIAL OPENING ENDOSCOPIC: ICD-10-PCS | Performed by: THORACIC SURGERY (CARDIOTHORACIC VASCULAR SURGERY)

## 2019-01-16 PROCEDURE — 32663 THORACOSCOPY W/LOBECTOMY: CPT | Performed by: PHYSICIAN ASSISTANT

## 2019-01-16 PROCEDURE — 85027 COMPLETE CBC AUTOMATED: CPT | Performed by: PHYSICIAN ASSISTANT

## 2019-01-16 PROCEDURE — 39402 MEDIASTINOSCPY W/LMPH NOD BX: CPT | Performed by: THORACIC SURGERY (CARDIOTHORACIC VASCULAR SURGERY)

## 2019-01-16 PROCEDURE — 94760 N-INVAS EAR/PLS OXIMETRY 1: CPT

## 2019-01-16 PROCEDURE — 88305 TISSUE EXAM BY PATHOLOGIST: CPT | Performed by: PATHOLOGY

## 2019-01-16 PROCEDURE — 88331 PATH CONSLTJ SURG 1 BLK 1SPC: CPT | Performed by: PATHOLOGY

## 2019-01-16 PROCEDURE — 88341 IMHCHEM/IMCYTCHM EA ADD ANTB: CPT | Performed by: PATHOLOGY

## 2019-01-16 PROCEDURE — 88342 IMHCHEM/IMCYTCHM 1ST ANTB: CPT | Performed by: PATHOLOGY

## 2019-01-16 PROCEDURE — 32663 THORACOSCOPY W/LOBECTOMY: CPT | Performed by: THORACIC SURGERY (CARDIOTHORACIC VASCULAR SURGERY)

## 2019-01-16 PROCEDURE — 31622 DX BRONCHOSCOPE/WASH: CPT | Performed by: THORACIC SURGERY (CARDIOTHORACIC VASCULAR SURGERY)

## 2019-01-16 PROCEDURE — C9290 INJ, BUPIVACAINE LIPOSOME: HCPCS | Performed by: THORACIC SURGERY (CARDIOTHORACIC VASCULAR SURGERY)

## 2019-01-16 PROCEDURE — 32674 THORACOSCOPY LYMPH NODE EXC: CPT | Performed by: PHYSICIAN ASSISTANT

## 2019-01-16 PROCEDURE — 99221 1ST HOSP IP/OBS SF/LOW 40: CPT | Performed by: NURSE PRACTITIONER

## 2019-01-16 PROCEDURE — 0BTF4ZZ RESECTION OF RIGHT LOWER LUNG LOBE, PERCUTANEOUS ENDOSCOPIC APPROACH: ICD-10-PCS | Performed by: THORACIC SURGERY (CARDIOTHORACIC VASCULAR SURGERY)

## 2019-01-16 PROCEDURE — 80048 BASIC METABOLIC PNL TOTAL CA: CPT | Performed by: PHYSICIAN ASSISTANT

## 2019-01-16 PROCEDURE — 32674 THORACOSCOPY LYMPH NODE EXC: CPT | Performed by: THORACIC SURGERY (CARDIOTHORACIC VASCULAR SURGERY)

## 2019-01-16 PROCEDURE — 71045 X-RAY EXAM CHEST 1 VIEW: CPT

## 2019-01-16 RX ORDER — FENTANYL CITRATE 50 UG/ML
INJECTION, SOLUTION INTRAMUSCULAR; INTRAVENOUS AS NEEDED
Status: DISCONTINUED | OUTPATIENT
Start: 2019-01-16 | End: 2019-01-16 | Stop reason: SURG

## 2019-01-16 RX ORDER — ACETAMINOPHEN 325 MG/1
975 TABLET ORAL EVERY 6 HOURS
Status: DISCONTINUED | OUTPATIENT
Start: 2019-01-16 | End: 2019-01-19

## 2019-01-16 RX ORDER — SODIUM CHLORIDE, SODIUM LACTATE, POTASSIUM CHLORIDE, CALCIUM CHLORIDE 600; 310; 30; 20 MG/100ML; MG/100ML; MG/100ML; MG/100ML
60 INJECTION, SOLUTION INTRAVENOUS CONTINUOUS
Status: DISCONTINUED | OUTPATIENT
Start: 2019-01-16 | End: 2019-01-17

## 2019-01-16 RX ORDER — SODIUM CHLORIDE, SODIUM LACTATE, POTASSIUM CHLORIDE, CALCIUM CHLORIDE 600; 310; 30; 20 MG/100ML; MG/100ML; MG/100ML; MG/100ML
125 INJECTION, SOLUTION INTRAVENOUS CONTINUOUS
Status: DISCONTINUED | OUTPATIENT
Start: 2019-01-16 | End: 2019-01-16

## 2019-01-16 RX ORDER — PROPOFOL 10 MG/ML
INJECTION, EMULSION INTRAVENOUS AS NEEDED
Status: DISCONTINUED | OUTPATIENT
Start: 2019-01-16 | End: 2019-01-16 | Stop reason: SURG

## 2019-01-16 RX ORDER — POLYETHYLENE GLYCOL 3350 17 G/17G
17 POWDER, FOR SOLUTION ORAL DAILY
Status: DISCONTINUED | OUTPATIENT
Start: 2019-01-17 | End: 2019-01-25 | Stop reason: HOSPADM

## 2019-01-16 RX ORDER — LABETALOL 20 MG/4 ML (5 MG/ML) INTRAVENOUS SYRINGE
5
Status: DISCONTINUED | OUTPATIENT
Start: 2019-01-16 | End: 2019-01-16 | Stop reason: HOSPADM

## 2019-01-16 RX ORDER — PROPOFOL 10 MG/ML
INJECTION, EMULSION INTRAVENOUS CONTINUOUS PRN
Status: DISCONTINUED | OUTPATIENT
Start: 2019-01-16 | End: 2019-01-16 | Stop reason: SURG

## 2019-01-16 RX ORDER — FENTANYL CITRATE/PF 50 MCG/ML
25 SYRINGE (ML) INJECTION
Status: DISCONTINUED | OUTPATIENT
Start: 2019-01-16 | End: 2019-01-16 | Stop reason: HOSPADM

## 2019-01-16 RX ORDER — ONDANSETRON 2 MG/ML
INJECTION INTRAMUSCULAR; INTRAVENOUS AS NEEDED
Status: DISCONTINUED | OUTPATIENT
Start: 2019-01-16 | End: 2019-01-16 | Stop reason: SURG

## 2019-01-16 RX ORDER — 0.9 % SODIUM CHLORIDE 0.9 %
VIAL (ML) INJECTION AS NEEDED
Status: DISCONTINUED | OUTPATIENT
Start: 2019-01-16 | End: 2019-01-16 | Stop reason: HOSPADM

## 2019-01-16 RX ORDER — GABAPENTIN 300 MG/1
600 CAPSULE ORAL ONCE
Status: COMPLETED | OUTPATIENT
Start: 2019-01-16 | End: 2019-01-16

## 2019-01-16 RX ORDER — NEOSTIGMINE METHYLSULFATE 1 MG/ML
INJECTION INTRAVENOUS AS NEEDED
Status: DISCONTINUED | OUTPATIENT
Start: 2019-01-16 | End: 2019-01-16 | Stop reason: SURG

## 2019-01-16 RX ORDER — CEFAZOLIN SODIUM 2 G/50ML
2000 SOLUTION INTRAVENOUS ONCE
Status: COMPLETED | OUTPATIENT
Start: 2019-01-16 | End: 2019-01-16

## 2019-01-16 RX ORDER — SODIUM CHLORIDE 9 MG/ML
INJECTION, SOLUTION INTRAVENOUS CONTINUOUS PRN
Status: DISCONTINUED | OUTPATIENT
Start: 2019-01-16 | End: 2019-01-16 | Stop reason: SURG

## 2019-01-16 RX ORDER — GABAPENTIN 300 MG/1
300 CAPSULE ORAL 3 TIMES DAILY
Status: DISCONTINUED | OUTPATIENT
Start: 2019-01-16 | End: 2019-01-25 | Stop reason: HOSPADM

## 2019-01-16 RX ORDER — BUPIVACAINE HYDROCHLORIDE 2.5 MG/ML
INJECTION, SOLUTION INFILTRATION; PERINEURAL AS NEEDED
Status: DISCONTINUED | OUTPATIENT
Start: 2019-01-16 | End: 2019-01-16 | Stop reason: HOSPADM

## 2019-01-16 RX ORDER — DOCUSATE SODIUM 100 MG/1
100 CAPSULE, LIQUID FILLED ORAL 2 TIMES DAILY
Status: DISCONTINUED | OUTPATIENT
Start: 2019-01-16 | End: 2019-01-25 | Stop reason: HOSPADM

## 2019-01-16 RX ORDER — HYDROMORPHONE HCL/PF 1 MG/ML
0.5 SYRINGE (ML) INJECTION
Status: DISCONTINUED | OUTPATIENT
Start: 2019-01-16 | End: 2019-01-16 | Stop reason: HOSPADM

## 2019-01-16 RX ORDER — ONDANSETRON 2 MG/ML
4 INJECTION INTRAMUSCULAR; INTRAVENOUS ONCE AS NEEDED
Status: DISCONTINUED | OUTPATIENT
Start: 2019-01-16 | End: 2019-01-16 | Stop reason: HOSPADM

## 2019-01-16 RX ORDER — MEPERIDINE HYDROCHLORIDE 25 MG/ML
12.5 INJECTION INTRAMUSCULAR; INTRAVENOUS; SUBCUTANEOUS AS NEEDED
Status: DISCONTINUED | OUTPATIENT
Start: 2019-01-16 | End: 2019-01-16 | Stop reason: HOSPADM

## 2019-01-16 RX ORDER — HEPARIN SODIUM 5000 [USP'U]/ML
INJECTION, SOLUTION INTRAVENOUS; SUBCUTANEOUS AS NEEDED
Status: DISCONTINUED | OUTPATIENT
Start: 2019-01-16 | End: 2019-01-16 | Stop reason: SURG

## 2019-01-16 RX ORDER — ALBUTEROL SULFATE 2.5 MG/3ML
2.5 SOLUTION RESPIRATORY (INHALATION) ONCE AS NEEDED
Status: DISCONTINUED | OUTPATIENT
Start: 2019-01-16 | End: 2019-01-16 | Stop reason: HOSPADM

## 2019-01-16 RX ORDER — ONDANSETRON 2 MG/ML
4 INJECTION INTRAMUSCULAR; INTRAVENOUS EVERY 6 HOURS PRN
Status: DISCONTINUED | OUTPATIENT
Start: 2019-01-16 | End: 2019-01-25 | Stop reason: HOSPADM

## 2019-01-16 RX ORDER — OXYCODONE HYDROCHLORIDE 5 MG/1
5 TABLET ORAL EVERY 4 HOURS PRN
Status: DISCONTINUED | OUTPATIENT
Start: 2019-01-16 | End: 2019-01-25 | Stop reason: HOSPADM

## 2019-01-16 RX ORDER — LIDOCAINE HYDROCHLORIDE 10 MG/ML
INJECTION, SOLUTION INFILTRATION; PERINEURAL AS NEEDED
Status: DISCONTINUED | OUTPATIENT
Start: 2019-01-16 | End: 2019-01-16 | Stop reason: SURG

## 2019-01-16 RX ORDER — HYDROMORPHONE HCL/PF 1 MG/ML
0.5 SYRINGE (ML) INJECTION
Status: DISCONTINUED | OUTPATIENT
Start: 2019-01-16 | End: 2019-01-25 | Stop reason: HOSPADM

## 2019-01-16 RX ORDER — ACETAMINOPHEN 325 MG/1
975 TABLET ORAL ONCE
Status: COMPLETED | OUTPATIENT
Start: 2019-01-16 | End: 2019-01-16

## 2019-01-16 RX ORDER — ROCURONIUM BROMIDE 10 MG/ML
INJECTION, SOLUTION INTRAVENOUS AS NEEDED
Status: DISCONTINUED | OUTPATIENT
Start: 2019-01-16 | End: 2019-01-16 | Stop reason: SURG

## 2019-01-16 RX ORDER — GLYCOPYRROLATE 0.2 MG/ML
INJECTION INTRAMUSCULAR; INTRAVENOUS AS NEEDED
Status: DISCONTINUED | OUTPATIENT
Start: 2019-01-16 | End: 2019-01-16 | Stop reason: SURG

## 2019-01-16 RX ORDER — PANTOPRAZOLE SODIUM 40 MG/1
40 TABLET, DELAYED RELEASE ORAL
Status: DISCONTINUED | OUTPATIENT
Start: 2019-01-17 | End: 2019-01-25 | Stop reason: HOSPADM

## 2019-01-16 RX ADMIN — SODIUM CHLORIDE, SODIUM LACTATE, POTASSIUM CHLORIDE, AND CALCIUM CHLORIDE: .6; .31; .03; .02 INJECTION, SOLUTION INTRAVENOUS at 16:28

## 2019-01-16 RX ADMIN — PHENYLEPHRINE HYDROCHLORIDE 30 MCG/MIN: 10 INJECTION INTRAVENOUS at 11:28

## 2019-01-16 RX ADMIN — ROCURONIUM BROMIDE 10 MG: 10 INJECTION INTRAVENOUS at 13:31

## 2019-01-16 RX ADMIN — SODIUM CHLORIDE, SODIUM LACTATE, POTASSIUM CHLORIDE, AND CALCIUM CHLORIDE 60 ML/HR: .6; .31; .03; .02 INJECTION, SOLUTION INTRAVENOUS at 19:08

## 2019-01-16 RX ADMIN — FENTANYL CITRATE 50 MCG: 50 INJECTION, SOLUTION INTRAMUSCULAR; INTRAVENOUS at 12:46

## 2019-01-16 RX ADMIN — FENTANYL CITRATE 50 MCG: 50 INJECTION, SOLUTION INTRAMUSCULAR; INTRAVENOUS at 11:04

## 2019-01-16 RX ADMIN — PROPOFOL 130 MCG/KG/MIN: 10 INJECTION, EMULSION INTRAVENOUS at 11:10

## 2019-01-16 RX ADMIN — ROCURONIUM BROMIDE 10 MG: 10 INJECTION INTRAVENOUS at 15:00

## 2019-01-16 RX ADMIN — GLYCOPYRROLATE 0.6 MG: 0.2 INJECTION, SOLUTION INTRAMUSCULAR; INTRAVENOUS at 16:51

## 2019-01-16 RX ADMIN — LIDOCAINE HYDROCHLORIDE 50 MG: 10 INJECTION, SOLUTION INFILTRATION; PERINEURAL at 11:04

## 2019-01-16 RX ADMIN — PROPOFOL 150 MG: 10 INJECTION, EMULSION INTRAVENOUS at 11:04

## 2019-01-16 RX ADMIN — ROCURONIUM BROMIDE 20 MG: 10 INJECTION INTRAVENOUS at 12:43

## 2019-01-16 RX ADMIN — FENTANYL CITRATE 25 MCG: 50 INJECTION, SOLUTION INTRAMUSCULAR; INTRAVENOUS at 18:32

## 2019-01-16 RX ADMIN — DOCUSATE SODIUM 100 MG: 100 CAPSULE, LIQUID FILLED ORAL at 20:52

## 2019-01-16 RX ADMIN — FENTANYL CITRATE 25 MCG: 50 INJECTION, SOLUTION INTRAMUSCULAR; INTRAVENOUS at 18:59

## 2019-01-16 RX ADMIN — FENTANYL CITRATE 25 MCG: 50 INJECTION, SOLUTION INTRAMUSCULAR; INTRAVENOUS at 18:38

## 2019-01-16 RX ADMIN — FENTANYL CITRATE 50 MCG: 50 INJECTION, SOLUTION INTRAMUSCULAR; INTRAVENOUS at 11:50

## 2019-01-16 RX ADMIN — DEXMEDETOMIDINE HYDROCHLORIDE 0.3 MCG/KG/HR: 100 INJECTION, SOLUTION INTRAVENOUS at 11:10

## 2019-01-16 RX ADMIN — KETAMINE HYDROCHLORIDE 0.7 MG/KG/HR: 50 INJECTION, SOLUTION INTRAMUSCULAR; INTRAVENOUS at 12:50

## 2019-01-16 RX ADMIN — ROCURONIUM BROMIDE 10 MG: 10 INJECTION INTRAVENOUS at 14:15

## 2019-01-16 RX ADMIN — PROPOFOL 50 MG: 10 INJECTION, EMULSION INTRAVENOUS at 12:46

## 2019-01-16 RX ADMIN — GABAPENTIN 600 MG: 300 CAPSULE ORAL at 09:26

## 2019-01-16 RX ADMIN — CEFAZOLIN SODIUM 2000 MG: 2 SOLUTION INTRAVENOUS at 11:18

## 2019-01-16 RX ADMIN — ROCURONIUM BROMIDE 10 MG: 10 INJECTION INTRAVENOUS at 11:52

## 2019-01-16 RX ADMIN — NEOSTIGMINE METHYLSULFATE 3 MG: 1 INJECTION INTRAVENOUS at 16:51

## 2019-01-16 RX ADMIN — SODIUM CHLORIDE: 9 INJECTION, SOLUTION INTRAVENOUS at 11:09

## 2019-01-16 RX ADMIN — SODIUM CHLORIDE, SODIUM LACTATE, POTASSIUM CHLORIDE, AND CALCIUM CHLORIDE 125 ML/HR: .6; .31; .03; .02 INJECTION, SOLUTION INTRAVENOUS at 09:37

## 2019-01-16 RX ADMIN — CEFAZOLIN SODIUM 2000 MG: 2 SOLUTION INTRAVENOUS at 15:14

## 2019-01-16 RX ADMIN — GABAPENTIN 300 MG: 300 CAPSULE ORAL at 20:54

## 2019-01-16 RX ADMIN — FENTANYL CITRATE 50 MCG: 50 INJECTION, SOLUTION INTRAMUSCULAR; INTRAVENOUS at 13:23

## 2019-01-16 RX ADMIN — FENTANYL CITRATE 50 MCG: 50 INJECTION, SOLUTION INTRAMUSCULAR; INTRAVENOUS at 11:22

## 2019-01-16 RX ADMIN — ROCURONIUM BROMIDE 10 MG: 10 INJECTION INTRAVENOUS at 15:22

## 2019-01-16 RX ADMIN — ACETAMINOPHEN 975 MG: 325 TABLET ORAL at 20:52

## 2019-01-16 RX ADMIN — ACETAMINOPHEN 975 MG: 325 TABLET ORAL at 09:26

## 2019-01-16 RX ADMIN — ONDANSETRON 4 MG: 2 INJECTION INTRAMUSCULAR; INTRAVENOUS at 16:35

## 2019-01-16 RX ADMIN — DEXAMETHASONE SODIUM PHOSPHATE 8 MG: 10 INJECTION INTRAMUSCULAR; INTRAVENOUS at 11:11

## 2019-01-16 RX ADMIN — ROCURONIUM BROMIDE 50 MG: 10 INJECTION INTRAVENOUS at 11:04

## 2019-01-16 RX ADMIN — HEPARIN SODIUM 5000 UNITS: 5000 INJECTION, SOLUTION INTRAVENOUS; SUBCUTANEOUS at 12:45

## 2019-01-16 RX ADMIN — SODIUM CHLORIDE, SODIUM LACTATE, POTASSIUM CHLORIDE, AND CALCIUM CHLORIDE: .6; .31; .03; .02 INJECTION, SOLUTION INTRAVENOUS at 10:34

## 2019-01-16 RX ADMIN — FENTANYL CITRATE 50 MCG: 50 INJECTION, SOLUTION INTRAMUSCULAR; INTRAVENOUS at 15:26

## 2019-01-16 RX ADMIN — ROCURONIUM BROMIDE 10 MG: 10 INJECTION INTRAVENOUS at 14:31

## 2019-01-16 RX ADMIN — ROCURONIUM BROMIDE 10 MG: 10 INJECTION INTRAVENOUS at 16:06

## 2019-01-16 NOTE — CONSULTS
Consultation - Nephrology   Kylah Rocha 70 y o  female MRN: [id number]  Unit/Bed#: OR POOL Encounter: [id number]    ASSESSMENT and PLAN:  1  Suspected chronic kidney Disease IIIA:  Likely age related with decreased muscle mass  -Will need formal work up as outpatient   -Baseline appears to be 1 1-1 3 with eGFR 43-46  -Avoid hypotension to prevent decreased renal perfusion  -Avoid hypotension  -Follow I/o, labs and volume status  -Will follow post op    2  Volume:  Examines on the dry side  -Agree with IVF    3  Left lower lung mass: For left Vats LLL wedge resection    4  BP:  BP preop acceptable  -Goal 010-781 systolic  -Not on HTN/ACE/ARB as outpatient      HISTORY OF PRESENT ILLNESS:  Requesting Physician: Nini Reyna MD  Reason for Consult: CKD III    Kylah Rocha is a 70 y o  female with PMH of HLD, previous tobacco user quit in 21016, breat lumpectomy, benign per patient report, now with Pet positive left Lower lobe mass who was electively admitted for left VATS, LLL wedge resection by CT  Lab work revealed suspected CKD III with creatinine 1 1-1 3 dating back to 10/2018 and a renal consultation is requested today for assistance in the management of CKD III and IV optimization  On discussion with patient and daughter, she reports not on any home medications  She denies NSAID use and only uses Tylenol  She report PCP reported renal dysfunction in October, 2018 and to drink lots of water  She denies history of HTN, DM, MI, CVA or breast cancer  She denies chest pain, SOB, dizziness, lightheadedness, nausea, vomiting, decreased appetite, cassy loss, urinary issues, fevers or chills,  She is from [facility]      PAST MEDICAL HISTORY:  Past Medical History:   Diagnosis Date    Abnormal chest x-ray     Abnormal mammogram     Dementia     Early stage     Ductal hyperplasia of breast     Left     Hyperlipidemia     diet controled    Lung nodule     Right Lower Lobe        PAST SURGICAL HISTORY:  Past Surgical History:   Procedure Laterality Date    BREAST LUMPECTOMY Left 11/13/2018    Procedure: BREAST LUMPECTOMY; BREAST NEEDLE LOCALIZATION (NEEDLE LOC AT 0830); Surgeon: Fernie Manjarrez MD;  [facility];  Service: Surgical Oncology    COLONOSCOPY      LAPAROSCOPY      MAMMO NEEDLE LOCALIZATION LEFT (ALL INC) Left 11/13/2018    MAMMO STEREOTACTIC BREAST BIOPSY LEFT (ALL INC) Left 10/16/2018       ALLERGIES:  No Known Allergies    SOCIAL HISTORY:  History   Alcohol Use    4 2 oz/week    7 Cans of beer per week     Comment: daily     History   Drug Use No     History   Smoking Status    Former Smoker    Packs/day: 1 00    Years: 60 00    Quit date: 4/30/2016   Smokeless Tobacco    Never Used       FAMILY HISTORY:  Family History   Problem Relation Age of Onset    Prostate cancer Father     Hypertension Mother     Glaucoma Mother     Macular degeneration Mother        MEDICATIONS:    Current Facility-Administered Medications:     bupivacaine liposomal (EXPAREL) 1 3 % injection 20 mL, 20 mL, Infiltration, Once, Marshal Mcduffie MD    ceFAZolin (ANCEF) IVPB (premix) 2,000 mg, 2,000 mg, Intravenous, Once, Marshal Mcduffie MD    lactated ringers infusion, 125 mL/hr, Intravenous, Continuous, Sonia Lopez MD, Last Rate: 125 mL/hr at 01/16/19 0937, 125 mL/hr at 01/16/19 3702    REVIEW OF SYSTEMS:  All the systems were reviewed and were negative except as documented on the HPI      PHYSICAL EXAM:  Current Weight: Weight - Scale: 59 kg (130 lb)  First Weight: Weight - Scale: 59 kg (130 lb)  Vitals:    01/15/19 1016 01/16/19 0900   BP:  139/71   Pulse:  79   Resp:  20   Temp:  99 8 °F (37 7 °C)   TempSrc:  Tympanic Core   SpO2:  99%   Weight: 59 kg (130 lb) 59 kg (130 lb)   Height:  5' 5" (1 651 m)     No intake or output data in the 24 hours ending 01/16/19 1119  General: conscious, cooperative, not in acute distress, thin  Skin: no rash, warm and dry  Eyes: pale conjunctivae, anicteric sclerae  ENT: fairly dry lips and mucous membranes  Neck: supple, no JVD  Chest: clear breath sounds, decreased base without crackles or wheeze  CVS: normal rate, regular rhythm, no rub  Abdomen: soft, non-tender, non-distended, normoactive bowel sounds  Extremities: no edema of both legs  Neuro: awake, alert, oriented  Psych: appropriate affect        Lab Results:     Results from last 7 days  Lab Units 01/10/19  0847   WBC Thousand/uL 7 87   HEMOGLOBIN g/dL 13 7   HEMATOCRIT % 41 7   PLATELETS Thousands/uL 235   POTASSIUM mmol/L 4 7   CHLORIDE mmol/L 106   CO2 mmol/L 27   BUN mg/dL 22   CREATININE mg/dL 1 15   CALCIUM mg/dL 9 4       Other Studies:

## 2019-01-16 NOTE — INTERVAL H&P NOTE
H&P reviewed  After examining the patient I find no changes in the patients condition since the H&P had been written      Enoch Pike MD  Thoracic Surgery  (Available on [facility] FOR [facility] Text)

## 2019-01-16 NOTE — H&P (VIEW-ONLY)
Thoracic Follow-Up  Assessment/Plan:    Right lower lobe lung mass  Ms Gail James is a very pleasant 78yo F who presents with a right lower lobe mass  She underwent a PET CT and presents today to discuss the results  Her PET scan, which I personally reviewed, demonstrates mild avidity in the mass  While she has enlarged paratracheal lymph nodes, these do not demonstrate avidity  She is an appropriate candidate for surgery based upon her PFTs  Today we discussed surgical options  I explained that while we still don't have a diagnosis, this is most likely a cancer  I recommended starting with a mediastinoscopy to assess the lymph nodes  As long as these are negative on frozen pathology, we will proceed with a wedge resection possible biopsy  I explained that I may not be able to wedge the lesion out to send it for frozen  If this is the case, we will proceed with a lobectomy from the start, but I will attempt a wedge first  We discussed the risks and benefits and she has elected to proceed  Consent was obtained in the office today  She is scheduled for a flexible bronchoscopy, mediastinoscopy, R VATS LLLobectomy, possible wedge for January 16th  Will Richardson MD  Thoracic Surgery  (Available on [facility] Text)         Diagnoses and all orders for this visit:    Lung nodule  -     Type and screen; Future  -     Basic metabolic panel; Future  -     APTT; Future  -     CBC and Platelet; Future  -     Protime-INR; Future  -     EKG 12 lead;  Future  -     Case request operating room: BRONCHOSCOPY FLEXIBLE, mediastinoscopy, possible right VATS lower lobectomy; Standing  -     Case request operating room: BRONCHOSCOPY FLEXIBLE, mediastinoscopy, possible right VATS lower lobectomy    Other orders  -     Diet NPO; Sips with meds; Standing  -     Nursing communcation Please give pre-op Carbohydrate drink to patient 2-4 hours prior to surgery; Standing  -     Void on call to OR; Standing  -     Insert peripheral IV; Standing  -     Place sequential compression device; Standing  -     acetaminophen (TYLENOL) tablet 975 mg; Take 3 tablets (975 mg total) by mouth once   -     celecoxib (CeleBREX) capsule 200 mg; Take 1 capsule (200 mg total) by mouth once   -     gabapentin (NEURONTIN) capsule 600 mg; Take 2 capsules (600 mg total) by mouth once   -     ceFAZolin (ANCEF) IVPB (premix) 2,000 mg; Infuse 2,000 mg into a venous catheter once           Thoracic History          Subjective:    Patient ID: America Quintero is a 70 y o  female  HPI  Ms Julian Soriano is a very pleasant 78yo F who presents with a right lower lobe mass  She underwent a PET CT and presents today to discuss the results  Today in the office again I asked her about her functional status  She states that she climbs multiple steps everyday as she lives in a two story house  She reports feeling winded after many sets of stairs but that she can easily climb one set of stairs without a problem  She reports that she has no change in her medical history since her last visit with me  From Previous HNP 12/17/18:  Ms Julian Soriano is a 70year old female referred by Dr Aleksandra Cool regarding a right pulmonary nodule found on a pre-operative CXR performed prior to breast lumpectomy  As a result, a CT was obtained on 11/18/18 which demonstrated a right lower lobe mass that grew from 11x9 8mm to 2 1x1 9x3 4cm  It appears to have visceral pleura involvement  Additionally, there is a stable left lower lobe spiculated lesion unchanged from a prior CT in 2015  Today, the patient is feeling well  She denies chest pain, shortness of breath, dyspnea on exertion, cough, hemoptysis, sputum production, fevers, chills, new bone pain, arthralgias, headaches, blurred vision, or seizures  She admits to PND and allergies which will make her cough       The following portions of the patient's history were reviewed and updated as appropriate: allergies, current medications, past family history, past medical history, past social history, past surgical history and problem list     Review of Systems   Constitutional: Negative for unexpected weight change  HENT: Negative  Eyes: Negative  Negative for visual disturbance  Respiratory: Negative for cough, shortness of breath and stridor  Cardiovascular: Negative for chest pain  Gastrointestinal: Negative  Endocrine: Negative  Genitourinary: Negative  Musculoskeletal: Negative  Neurological: Negative for dizziness and headaches  Hematological: Negative for adenopathy  Psychiatric/Behavioral: Negative  Objective:   Physical Exam   Constitutional: She is oriented to person, place, and time  She appears well-developed and well-nourished  HENT:   Head: Normocephalic and atraumatic  Eyes: Pupils are equal, round, and reactive to light  Neck: Normal range of motion  No tracheal deviation present  Cardiovascular: Normal rate, regular rhythm and normal heart sounds  No murmur heard  Pulmonary/Chest: Effort normal and breath sounds normal  No stridor  No respiratory distress  She has no wheezes  She has no rales  She exhibits no tenderness  Abdominal: Soft  Bowel sounds are normal  She exhibits no distension  There is no tenderness  There is no rebound  Musculoskeletal: Normal range of motion  She exhibits no edema  Lymphadenopathy:     She has no cervical adenopathy  Neurological: She is alert and oriented to person, place, and time  Skin: Skin is warm and dry  No rash noted  She is not diaphoretic  No erythema  No pallor  Psychiatric: She has a normal mood and affect  Her behavior is normal  Judgment and thought content normal    Nursing note and vitals reviewed    /68 (BP Location: Right arm, Patient Position: Sitting, Cuff Size: Standard)   Pulse 80   Resp 16   Ht 5' 5" (1 651 m)   Wt 59 kg (130 lb)   BMI 21 63 kg/m²     Xr Chest Pa & Lateral    Addendum Date: 11/6/2018 Addendum   ADDENDUM: Comparison to prior CT scan of the chest is obtained from outside facility 10/21/2015  The opacity noted in the right midlung field likely corresponds to a spiculated nodule seen on the CT scan in the superior segment of the right lower lobe  The craniocaudal dimension appears increased since the previous CT scan and therefore a repeat CT  scan is recommended  Result Date: 11/6/2018  Impression Unusual vertically oriented linear opacity in the right midlung field measuring 3 4 x 1 0 cm of uncertain etiology  CT scan of chest recommended for further evaluation  7 mm left lower lobe nodular density  The study was marked in EPIC for significant notification  Workstation performed: [id number]      No CT Chest results available for this patient  No CT Chest,Abdomen,Pelvis results available for this patient  Nm Pet Ct Skull Base To Mid Thigh    Result Date: 12/31/2018  Narrative PET/CT SCAN INDICATION:  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, bronchus and lung   abnormal chest CT, enlarging right lower lung nodule, history of left breast lumpectomy 11/13/2018, atypical ductal hyperplasia MODIFIER: PI COMPARISON: CT 11/18/2018 and priors CELL TYPE:  None TECHNIQUE:   8 2 mCi F-18-FDG administered IV  Multiplanar attenuation corrected and non attenuation corrected PET images are available for interpretation, and contiguous, low dose, axial CT sections were obtained from the skull base through the femurs   Intravenous contrast material was not utilized  This examination, like all CT scans performed in the [facility], was performed utilizing techniques to minimize radiation dose exposure, including the use of iterative reconstruction and automated exposure control  Fasting serum glucose: 84 mg/dl FINDINGS: VISUALIZED BRAIN:   No acute abnormalities are seen  HEAD/NECK: Minimal nodular right carotid activity, SUV 2 9, is nonspecific  No discrete CT abnormality   There is otherwise a physiologic distribution of FDG  No FDG avid cervical adenopathy is seen  CT images: Thyroid calcifications  CHEST: Enlarging 1 7 x 2 8 x 3 6 cm superior right lower lung nodule demonstrates mild FDG activity, SUV 2 3  This is most concerning for malignancy  Faint groundglass density in the superior right upper lung measuring approximately 9 mm, series 3 image 83 does not demonstrate appreciable FDG activity  Faint groundglass density left lower lung series 3 image 101, 4 mm left lower lung nodule image 106, and 5 mm left upper lung nodule image 85, are too small for accurate PET evaluation  Mild left breast activity, SUV 1 7, is likely inflammatory from recent lumpectomy  No additional FDG avid soft tissue lesions are seen  1 6 x 1 1 cm precarinal node does not demonstrate significant FDG activity, SUV 2 1, similar to background soft tissue activity  On the prior CT of 10/21/2015, this measured 1 7 x 1 1 cm  CT images: Biapical pleural parenchymal scarring  Left lower granuloma  ABDOMEN:   No FDG avid soft tissue lesions are seen  Left hepatic hypodensity without significant FDG activity  CT images: Unremarkable  PELVIS: No FDG avid soft tissue lesions are seen  CT images: Unremarkable  OSSEOUS STRUCTURES: No FDG avid lesions are seen  CT images: No significant findings  Impression 1  Enlarging 1 7 x 2 8 x 3 6 cm superior right lower lung nodule demonstrates mild FDG activity, SUV 2 3  This is most concerning for malignancy until proven otherwise  Tissue sampling recommended  No hypermetabolic hilar or mediastinal adenopathy  2   Minimal milder right parotid gland activity is nonspecific, without discrete CT abnormality  This may be reassessed on follow-up exam  3   No hypermetabolic metastases visualized in the abdomen, pelvis or osseous structures  4   Continued CT follow-up recommended for additional nodular lung densities   Workstation performed: [id number]      No Barium Swallow results available for this patient

## 2019-01-16 NOTE — PERIOPERATIVE NURSING NOTE
White team resident aware that chest tube air leak has read 1100, states that he read chest xray and patient is ok to go to floor

## 2019-01-16 NOTE — ANESTHESIA PREPROCEDURE EVALUATION
Review of Systems/Medical History  Patient summary reviewed    No history of anesthetic complications     Cardiovascular  EKG reviewed, Exercise tolerance (METS): >4,  Hyperlipidemia,    Pulmonary  Smoker ex-smoker  ,        GI/Hepatic  Negative GI/hepatic ROS          Negative  ROS        Endo/Other  Negative endo/other ROS      GYN    Breast cancer        Hematology  Negative hematology ROS      Musculoskeletal  Negative musculoskeletal ROS        Neurology  Negative neurology ROS      Psychology   Negative psychology ROS              Physical Exam    Airway    Mallampati score: I  TM Distance: >3 FB  Neck ROM: full     Dental   upper dentures,     Cardiovascular  Cardiovascular exam normal    Pulmonary  Pulmonary exam normal     Other Findings        Anesthesia Plan  ASA Score- 2     Anesthesia Type- general with ASA Monitors  Additional Monitors: arterial line  Airway Plan: ETT  Comment: Patient seen and examined  History reviewed  Patient to be done under general anesthesia with double lumen ETT and routine monitors  Risks discussed with the patient  Consent obtained        Plan Factors-  Patient did not smoke on day of surgery  Induction- intravenous  Postoperative Plan- Plan for postoperative opioid use  Planned trial extubation    Informed Consent- Anesthetic plan and risks discussed with patient  I personally reviewed this patient with the CRNA  Discussed and agreed on the Anesthesia Plan with the CRNA Gerhardt Sep

## 2019-01-16 NOTE — ANESTHESIA POSTPROCEDURE EVALUATION
Post-Op Assessment Note      CV Status:  Stable    Mental Status:  Alert and awake    Hydration Status:  Euvolemic    PONV Controlled:  Controlled    Airway Patency:  Patent    Post Op Vitals Reviewed: Yes          Staff: AnesthesiologistAZRA           /84 (01/16/19 1749)    Temp 97 5 °F (36 4 °C) (01/16/19 1749)    Pulse 80 (01/16/19 1749)   Resp 17 (01/16/19 1749)    SpO2 100 % (01/16/19 1749)

## 2019-01-16 NOTE — ANESTHESIA PROCEDURE NOTES
Arterial Line Insertion  Date/Time: 1/16/2019 11:18 AM  Performed by: Han Garcia by: Lg Rolon   Consent: Written consent obtained    Indications: hemodynamic monitoring  Orientation:  Right  Location: radial artery  Anesthesia: see MAR for details  Procedure Details:  Needle gauge: 20  Seldinger technique: Seldinger technique used  Number of attempts: 1    Post-procedure:  Post-procedure: dressing applied  Waveform: good waveform  Post-procedure CNS: normal

## 2019-01-16 NOTE — OP NOTE
OPERATIVE REPORT  PATIENT NAME: Irving Snatiago    :  1940  MRN: [id number]  Pt Location: BE OR ROOM 08    SURGERY DATE: 2019    Surgeon(s) and Role:     * Christine Gordon MD - Primary     * Noa Berry MD - Assisting     * Eric Conteh PA-C - Assisting    Preop Diagnosis:  Lung nodule [R91 1]    Post-Op Diagnosis Codes:     * Lung nodule [R91 1]    Procedure(s) (LRB):  BRONCHOSCOPY FLEXIBLE, (Right)  MEDIASTINOSCOPY (Right)  LOBECTOMY LUNG (Right)    Specimen(s):  ID Type Source Tests Collected by Time Destination   1 : pre-tracheal Tissue Lymph Node TISSUE EXAM Christine Gordon MD 2019 1156    2 : level 4R Tissue Lymph Node TISSUE EXAM Christine Gordon MD 2019 1159    3 : level 2R Tissue Lymph Node TISSUE EXAM Christine Gordon MD 2019 1201    4 : level 4L Tissue Lymph Node TISSUE EXAM Christine Gordon MD 2019 1203    5 : level 2L Tissue Lymph Node TISSUE EXAM Christine Gordon MD 2019 1207    6 : level 4R #2 Tissue Lymph Node TISSUE EXAM Christine Gordon MD 2019 1218    7 : level 2R #2 Tissue Lymph Node TISSUE EXAM Christine Gordon MD 2019 1221    8 : level 7 Tissue Lymph Node TISSUE EXAM Christine Gordon MD 2019 1156    9 : level 10 posterior Tissue Lymph Node TISSUE EXAM Christine Gordon MD 2019 1442    10 : level 11 R Tissue Lymph Node TISSUE EXAM Christine Gordon MD 2019 1538    11 : level 12  Tissue Lymph Node TISSUE EXAM Christine Gordon MD 2019 1605    12 : right lower lobe  Tissue Lung TISSUE EXAM Christine Gordon MD 2019 1156        Estimated Blood Loss:   100 mL    Drains:  Chest Tube 1 Right 28 Fr  (Active)   Number of days: 0       Urethral Catheter Latex 16 Fr   (Active)   Number of days: 0       Anesthesia Type:   General    Operative Indications:  Lung nodule [R91 1]    Operative Findings:  Large mediastinal lymph nodes  Large palpable mass unable to wedge resect Complications:   None    Procedure and Technique:  The patient was correctly identified by name and medical record number in the holding area and brought to the operative suite, where he was placed supine on the operative table  After satisfactory induction of general endotracheal anesthesia, a flexible bronchoscope was passed through the endotracheal tube visualizing the distal trachea, hemal, right and left main stem bronchi, including all of the primary and secondary divisions  No evidence of any endobronchial tumor was noted  No suspicion or identified risk for TB or other airborne infectious disease; bronchoscopy procedure being performed for diagnostic purposes  Flexible bronchoscopy was then terminated and the scope was withdrawn  The patient was positioned supine with a shoulder roll underneath to provide optimal cervical extension  The neck and chest were prepped and draped in the usual sterile fashion  A 2 5 cm curvalinear transverse skin incision was then made 1 fingerbreath above the sternal notch using a #15-0 blade scalpel  Dissection was carried down through the subcutaneous tissue and platysma with electrocautery  The strap muscles were then identified and divided with electrocautery at the vertical midline along the median raphe, revealing the underlying pretracheal fascia  The pretracheal fascia was entered sharply with Metzenbaum scissors, and digital dissection was then used to develop a plane along the anterior trachea with care to avoid the innominate artery  The mediastinoscope was introduced into this tract  A combination of blunt dissection and electrocautery were used to expose the lateral borders of the trachea all the way down to the hemal under mediastinoscopic visualization  Station 2R, 4R, 2L, 4L and a pretracheal node were biopsied and sent for frozen section  No malignancy was identified so we proceeded with a VATS resection  Hemostasis was excellent    After the biopsies were complete, the mediastinoscope was withdrawn  The strap muscles were brought together at the vertical midline and closed with #3-0 vicryl  The platysma was closed transversely with #3-0 Vicryl  A deep dermal #3-0 vicryl and subcuticular #4-0 Monocryl were used to close the skin in separate layers  The wound was dressed with Dermabond  The patient was positioned in the left lateral decubitus position, prepped and draped in the usual fashion  A time-out was performed to confirm procedure and laterality  A 1 5cm incision was made in the 8th intercostal space, in the posterior axillary line  This was carried down to the pleura and the thoracic cavity was entered  The thoracoscope was introduced and the cavity explored  An additional port was placed in the 7th intercostal space in line with the scapula tip  An access port was then created in the 4th intercostal space in the anterior axillary line  An intercostal nerve block was then applied using Exparel into rib spaces 3-10  The entire lung was then mobilized using a combination of electrocautery and sharp dissection to divide adhesions to the mediastinum  Additional adhesions were taken down from the posterior and lateral wall and apex using the Harmonic scalpel  The inferior pulmonary ligament was taken down using electrocautery  No level 9 lymph nodes were identified  The overlying pleura was incised up the posterior hilum to the level of the superior edge of the inferior pulmonary vein  The lung was then retracted superiorly and posteriorly, exposing the inferior mediastinal structures  Level 7 lymph node station was fully dissected  The inferior pulmonary vein was dissected free and encircled  Using a vascular load stapler, the inferior pulmonary vein was then divided  Dissection was then carried out over the interlobar pulmonary artery through the fissure and the pulmonary artery and basilar and superior segment branches were dissected free  These were circumferentially dissected and then ligated and divided with a vascular load stapler  The alesha tissue in station 10 and 11 around the artery was dissected and removed and sent for pathology  The lung was then retracted anteriorly and attention was turned to the bronchus and the left lower lobe division  An endovascular stapler was placed around the left lower lobe bronchus  Before division, a clamp trial was performed to confirm that the resection would spare the upper and middle lobe  After this division, the lobectomy was completed with serial firings of the thick load 60mm tissue stapler  The lobe was placed in a Lap-Sac and removed from the thoracic cavity  The specimen was sent off the table  The resection bed and all dissected areas were examined and hemostasis was deemed as adequate  A posterior apical 28F chest tube was placed and secured to the skin with 0 Prolene sutures  The lung was re-expanded under direct visualization  The wounds were closed in layers using Vicryl suture followed by subcuticular monofilament suture  Dermabond was applied to each wound  At the end of the procedure, the instrument, sponge and needle counts were confirmed to be correct x2  The patient tolerated the procedure well and was delivered to the PACU  I was present for the entire procedure  No appropriate level resident was available to do the case, so a PA was required for tissue handling, camera manipulation and dissection      Patient Disposition:  PACU     SIGNATURE: Danna Najera MD  DATE: January 16, 2019  TIME: 5:21 PM

## 2019-01-17 ENCOUNTER — APPOINTMENT (INPATIENT)
Dept: RADIOLOGY | Facility: HOSPITAL | Age: 79
DRG: 164 | End: 2019-01-17
Payer: COMMERCIAL

## 2019-01-17 LAB
ANION GAP SERPL CALCULATED.3IONS-SCNC: 7 MMOL/L (ref 4–13)
BUN SERPL-MCNC: 19 MG/DL (ref 5–25)
CALCIUM SERPL-MCNC: 8.3 MG/DL (ref 8.3–10.1)
CHLORIDE SERPL-SCNC: 105 MMOL/L (ref 100–108)
CO2 SERPL-SCNC: 23 MMOL/L (ref 21–32)
CREAT SERPL-MCNC: 0.93 MG/DL (ref 0.6–1.3)
ERYTHROCYTE [DISTWIDTH] IN BLOOD BY AUTOMATED COUNT: 13.8 % (ref 11.6–15.1)
GFR SERPL CREATININE-BSD FRML MDRD: 60 ML/MIN/1.73SQ M
GLUCOSE SERPL-MCNC: 138 MG/DL (ref 65–140)
HCT VFR BLD AUTO: 35.2 % (ref 34.8–46.1)
HGB BLD-MCNC: 11.6 G/DL (ref 11.5–15.4)
MCH RBC QN AUTO: 30.6 PG (ref 26.8–34.3)
MCHC RBC AUTO-ENTMCNC: 33 G/DL (ref 31.4–37.4)
MCV RBC AUTO: 93 FL (ref 82–98)
PLATELET # BLD AUTO: 212 THOUSANDS/UL (ref 149–390)
PLATELET # BLD AUTO: 224 THOUSANDS/UL (ref 149–390)
PMV BLD AUTO: 10.4 FL (ref 8.9–12.7)
PMV BLD AUTO: 10.8 FL (ref 8.9–12.7)
POTASSIUM SERPL-SCNC: 4 MMOL/L (ref 3.5–5.3)
RBC # BLD AUTO: 3.79 MILLION/UL (ref 3.81–5.12)
SODIUM SERPL-SCNC: 135 MMOL/L (ref 136–145)
WBC # BLD AUTO: 9.63 THOUSAND/UL (ref 4.31–10.16)

## 2019-01-17 PROCEDURE — 97110 THERAPEUTIC EXERCISES: CPT

## 2019-01-17 PROCEDURE — G8979 MOBILITY GOAL STATUS: HCPCS

## 2019-01-17 PROCEDURE — 97163 PT EVAL HIGH COMPLEX 45 MIN: CPT

## 2019-01-17 PROCEDURE — 97166 OT EVAL MOD COMPLEX 45 MIN: CPT

## 2019-01-17 PROCEDURE — 85027 COMPLETE CBC AUTOMATED: CPT | Performed by: PHYSICIAN ASSISTANT

## 2019-01-17 PROCEDURE — 71045 X-RAY EXAM CHEST 1 VIEW: CPT

## 2019-01-17 PROCEDURE — 99232 SBSQ HOSP IP/OBS MODERATE 35: CPT | Performed by: INTERNAL MEDICINE

## 2019-01-17 PROCEDURE — G8988 SELF CARE GOAL STATUS: HCPCS

## 2019-01-17 PROCEDURE — 80048 BASIC METABOLIC PNL TOTAL CA: CPT | Performed by: PHYSICIAN ASSISTANT

## 2019-01-17 PROCEDURE — 85049 AUTOMATED PLATELET COUNT: CPT | Performed by: PHYSICIAN ASSISTANT

## 2019-01-17 PROCEDURE — G8987 SELF CARE CURRENT STATUS: HCPCS

## 2019-01-17 PROCEDURE — 99024 POSTOP FOLLOW-UP VISIT: CPT | Performed by: THORACIC SURGERY (CARDIOTHORACIC VASCULAR SURGERY)

## 2019-01-17 PROCEDURE — G8989 SELF CARE D/C STATUS: HCPCS

## 2019-01-17 PROCEDURE — G8978 MOBILITY CURRENT STATUS: HCPCS

## 2019-01-17 RX ADMIN — DOCUSATE SODIUM 100 MG: 100 CAPSULE, LIQUID FILLED ORAL at 08:27

## 2019-01-17 RX ADMIN — ACETAMINOPHEN 975 MG: 325 TABLET ORAL at 13:57

## 2019-01-17 RX ADMIN — ACETAMINOPHEN 975 MG: 325 TABLET ORAL at 20:35

## 2019-01-17 RX ADMIN — PANTOPRAZOLE SODIUM 40 MG: 40 TABLET, DELAYED RELEASE ORAL at 06:23

## 2019-01-17 RX ADMIN — ACETAMINOPHEN 975 MG: 325 TABLET ORAL at 08:27

## 2019-01-17 RX ADMIN — POLYETHYLENE GLYCOL 3350 17 G: 17 POWDER, FOR SOLUTION ORAL at 08:26

## 2019-01-17 RX ADMIN — DOCUSATE SODIUM 100 MG: 100 CAPSULE, LIQUID FILLED ORAL at 18:02

## 2019-01-17 RX ADMIN — GABAPENTIN 300 MG: 300 CAPSULE ORAL at 20:35

## 2019-01-17 RX ADMIN — ENOXAPARIN SODIUM 40 MG: 40 INJECTION SUBCUTANEOUS at 08:26

## 2019-01-17 RX ADMIN — GABAPENTIN 300 MG: 300 CAPSULE ORAL at 08:27

## 2019-01-17 RX ADMIN — GABAPENTIN 300 MG: 300 CAPSULE ORAL at 18:02

## 2019-01-17 RX ADMIN — OXYCODONE HYDROCHLORIDE 5 MG: 5 TABLET ORAL at 21:07

## 2019-01-17 NOTE — UTILIZATION REVIEW
[address]  Utilization Review Department  Phone: [phone]; Fax [phone]  [email or web address]  ATTENTION: Please call with any questions or concerns to [phone]  and carefully listen to the prompts so that you are directed to the right person  Send all requests for admission clinical reviews, approved or denied determinations and any other requests to fax [phone]  All voicemails are confidential   =====================================================================  Initial Clinical Review    Age/Sex: 70 y o  female  Surgery Date: 01/16/2019  Procedure: BRONCHOSCOPY FLEXIBLE, (Right)  MEDIASTINOSCOPY (Right)  LOBECTOMY LUNG (Right)  Anesthesia: General  Admission Orders: Date/Time/Statement: 1/16/19 @ 1716   Orders Placed This Encounter   Procedures    Inpatient Admission     Standing Status:   Standing     Number of Occurrences:   1     Order Specific Question:   Admitting Physician     Answer:   Triston Laird [[id number]]     Order Specific Question:   Level of Care     Answer:   Level 1 Stepdown [13]     Order Specific Question:   Estimated length of stay     Answer:   Inpatient Only Surgery     Vital Signs: /71 (BP Location: Left arm)   Pulse 75   Temp 98 7 °F (37 1 °C) (Oral)   Resp 20   Ht 5' 5" (1 651 m)   Wt 59 2 kg (130 lb 8 2 oz)   SpO2 95%   BMI 21 72 kg/m²   Diet:        Diet Orders            Start     Ordered    01/1940  Diet Regular; Regular House  Diet effective now     Question Answer Comment   Diet Type Regular    Regular Regular House    RD to adjust diet per protocol?  Yes        01/16/19 1941      Mobility: Ambulate TID  Consult PT  TELM  Chest Tube to water seal  Elevate HOB  DVT Prophylaxis: Doug SCDs  Medication:  Scheduled Meds:  Current Facility-Administered Medications:  acetaminophen 975 mg Oral Q6H   docusate sodium 100 mg Oral BID   enoxaparin 40 mg Subcutaneous Daily   gabapentin 300 mg Oral TID HYDROmorphone 0 5 mg Intravenous Q3H PRN   ondansetron 4 mg Intravenous Q6H PRN   oxyCODONE 5 mg Oral Q4H PRN   pantoprazole 40 mg Oral Early Morning   polyethylene glycol 17 g Oral Daily

## 2019-01-17 NOTE — PROGRESS NOTES
Progress Note - Thoracic Surgery   Chacorta Tavares 70 y o  female MRN: [id number]  Unit/Bed#: [facility] 409-01 Encounter: [id number]    Assessment/Plan:  70 y o  female with RLL lung nodule   - 300 cc serosang out of chest tube, large air leak    * Lung nodule   Assessment & Plan    S/p flex bronch, mediastinoscopy, R VATS, lower lobectomy 1/15  Chest tube to H2O seal today  Diet as tolerated  OOB, ambulate, incentive spirometer  Analgesia  DVT ppx -- SQH           Subjective/Objective     Subjective: Pain controlled this AM  On 1L nc      Objective:     Vitals: Blood pressure 118/55, pulse 79, temperature 98 °F (36 7 °C), temperature source Axillary, resp  rate 18, height 5' 5" (1 651 m), weight 59 kg (130 lb), SpO2 99 %, not currently breastfeeding  ,Body mass index is 21 63 kg/m²  I/O       01/15 0701 - 01/16 0700 01/16 0701 - 01/17 0700    P  O   200    I V  (mL/kg)  1300 (22)    Total Intake(mL/kg)  1500 (25 4)    Urine (mL/kg/hr)  525    Blood  100    Chest Tube  300    Total Output   925    Net   +575                Physical Exam:  GEN: NAD  HEENT: MMM  CV: RRR  Lung: Normal effort, Ct to sxn, large air leak  Ab: Soft, NT/ND  Extrem: No CCE  Neuro:  A+Ox3    Lab, Imaging and other studies:   CBC with diff:   Lab Results   Component Value Date    WBC 9 63 01/17/2019    HGB 11 6 01/17/2019    HCT 35 2 01/17/2019    MCV 93 01/17/2019     01/17/2019    MCH 30 6 01/17/2019    MCHC 33 0 01/17/2019    RDW 13 8 01/17/2019    MPV 10 8 01/17/2019   , BMP/CMP:   Lab Results   Component Value Date    SODIUM 135 (L) 01/17/2019    K 4 0 01/17/2019     01/17/2019    CO2 23 01/17/2019    BUN 19 01/17/2019    CREATININE 0 93 01/17/2019    CALCIUM 8 3 01/17/2019    EGFR 60 01/17/2019   , Magnesium: No components found for: MAG  VTE Pharmacologic Prophylaxis: Enoxaparin (Lovenox)  VTE Mechanical Prophylaxis: sequential compression device

## 2019-01-17 NOTE — RESPIRATORY THERAPY NOTE
RT Protocol Note  Linnette Mendoza 70 y o  female MRN: [id number]  Unit/Bed#: [facility] 409-01 Encounter: [id number]    Assessment    Principal Problem:    Lung nodule      Home Pulmonary Medications:  n/a       Past Medical History:   Diagnosis Date    Abnormal chest x-ray     Abnormal mammogram     Dementia     Early stage     Ductal hyperplasia of breast     Left     Hyperlipidemia     diet controled    Lung nodule     Right Lower Lobe      Social History     Social History    Marital status:      Spouse name: N/A    Number of children: N/A    Years of education: N/A     Social History Main Topics    Smoking status: Former Smoker     Packs/day: 1 00     Years: 60 00     Quit date: 4/30/2016    Smokeless tobacco: Never Used    Alcohol use 4 2 oz/week     7 Cans of beer per week      Comment: daily    Drug use: No    Sexual activity: No     Other Topics Concern    None     Social History Narrative    None       Subjective         Objective    Physical Exam:   Assessment Type: Assess only  General Appearance: Awake, Alert  Respiratory Pattern: Normal  Chest Assessment: Chest expansion symmetrical  Bilateral Breath Sounds: Coarse    Vitals:  Blood pressure 104/69, pulse 70, temperature (!) 97 1 °F (36 2 °C), resp  rate 16, height 5' 5" (1 651 m), weight 59 kg (130 lb), SpO2 100 %, not currently breastfeeding  Imaging and other studies: I have personally reviewed pertinent reports  Plan       Airway Clearance Plan: Incentive Spirometer     Resp Comments: Pt initiated on airway clearance protocol at this time and instructed on IS  Pt admitted for lung nodule and hyperplasia of left breast lumpectomy  Pt was admitted for left VATS and LLL wedge resection  Pt performed IS well and achieved 100mL w/ goal of 1250  Will continue to monitor pt closely

## 2019-01-17 NOTE — RESPIRATORY THERAPY NOTE
RT Protocol Note  Coni Henao 70 y o  female MRN: [id number]  Unit/Bed#: [facility] 409-01 Encounter: [id number]    Assessment    Principal Problem:    Lung nodule      Home Pulmonary Medications:  n/a       Past Medical History:   Diagnosis Date    Abnormal chest x-ray     Abnormal mammogram     Dementia     Early stage     Ductal hyperplasia of breast     Left     Hyperlipidemia     diet controled    Lung nodule     Right Lower Lobe      Social History     Social History    Marital status:      Spouse name: N/A    Number of children: N/A    Years of education: N/A     Social History Main Topics    Smoking status: Former Smoker     Packs/day: 1 00     Years: 60 00     Quit date: 4/30/2016    Smokeless tobacco: Never Used    Alcohol use 4 2 oz/week     7 Cans of beer per week      Comment: daily    Drug use: No    Sexual activity: No     Other Topics Concern    None     Social History Narrative    None       Subjective         Objective    Physical Exam:   Assessment Type: Assess only  General Appearance: Awake, Alert  Respiratory Pattern: Normal  Chest Assessment: Chest expansion symmetrical  Bilateral Breath Sounds: Coarse    Vitals:  Blood pressure 104/69, pulse 70, temperature (!) 97 1 °F (36 2 °C), resp  rate 16, height 5' 5" (1 651 m), weight 59 kg (130 lb), SpO2 100 %, not currently breastfeeding  Imaging and other studies: I have personally reviewed pertinent reports  Plan       Airway Clearance Plan: Incentive Spirometer     Resp Comments: (P) Pt initiated on airway clearance protocol at this time and instructed on IS  Pt admitted for lung nodule and hyperplasia of left breast lumpectomy  Pt was admitted for left VATS and LLL wedge resection  Pt performed IS well and achieved 1000mL w/ goal of 1250  Will continue to monitor pt closely

## 2019-01-17 NOTE — RESTORATIVE TECHNICIAN NOTE
Restorative Specialist Mobility Note       Activity: Chair, Ambulate in trotter     Assistive Device: Front wheel walker

## 2019-01-17 NOTE — RESTORATIVE TECHNICIAN NOTE
Restorative Specialist Mobility Note       Activity: Ambulate in trotter, Chair, Bathroom privileges     Assistive Device: Front wheel walker

## 2019-01-17 NOTE — ASSESSMENT & PLAN NOTE
S/p flex bronch, mediastinoscopy, R VATS, lower lobectomy 1/15  Chest tube to H2O seal today  Diet as tolerated  OOB, ambulate, incentive spirometer  Analgesia  DVT ppx -- [facility]

## 2019-01-17 NOTE — PLAN OF CARE
Problem: PHYSICAL THERAPY ADULT  Goal: Performs mobility at highest level of function for planned discharge setting  See evaluation for individualized goals  Treatment/Interventions: Functional transfer training, LE strengthening/ROM, Elevations, Therapeutic exercise, Endurance training, Equipment eval/education, Bed mobility, Gait training, Spoke to nursing, OT  Equipment Recommended: Ann Marie Clark (at this time)       See flowsheet documentation for full assessment, interventions and recommendations  Prognosis: Good  Problem List: Decreased strength, Decreased endurance, Impaired balance, Decreased mobility, Decreased cognition, Decreased safety awareness  Assessment: Pt is 70 y o  efmale admitted with Dx of Lung nodule and underwent BRONCHOSCOPY FLEXIBLE, (Right), MEDIASTINOSCOPY (Right), LOBECTOMY LUNG (Right) on 1/16/2019  Pt 's comorbidities affecting POC include: dementia, CKD (per current notes) and personal factors of: living alone, FARZAD and steps in the house  Pt's clinical presentation is currently unstable/unpredictable which is evident in ongoing telem monitoring, CT in place ("large air leak" per CT sx note), need for input for task focus and mobility technique/safety and need for min assist w/ all phases of mobility incl amb w/ rw when usually mobilizing independently and w/o AD  Pt presents w/ postop guarding, generalized weakness, incl min decreased LE strength, decreased functional endurance, impaired standing balance w/ associated gait deviations (rw is being used) and fall risk  Will cont to follow pt in PT for progressive mobilization to address above functional deficits and to max level of (I), endurance, and safety  Otherwise, anticipate pt will return home w/ available family support upon D/C provided she cont improving w/ mobility skills, safety, and endurance (incl on the steps) and when medically cleared; home PT follow up is recommended; will follow    Barriers to Discharge: Inaccessible home environment, Decreased caregiver support     Recommendation: Home PT, Home with family support (r/o 1st floor set-up)          See flowsheet documentation for full assessment

## 2019-01-17 NOTE — RESTORATIVE TECHNICIAN NOTE
Restorative Specialist Mobility Note       Activity: Ambulate in trotter, Chair     Assistive Device: Front wheel walker

## 2019-01-17 NOTE — PHYSICAL THERAPY NOTE
Physical Therapy Evaluation     Patient's Name: Gabriella Ruff    Admitting Diagnosis  Lung nodule [R91 1]    Problem List  Patient Active Problem List   Diagnosis    Atypical ductal hyperplasia of left breast    Papilloma of left breast    Lung nodule, solitary    Right lower lobe lung mass    Lung nodule       Past Medical History  Past Medical History:   Diagnosis Date    Abnormal chest x-ray     Abnormal mammogram     Dementia     Early stage     Ductal hyperplasia of breast     Left     Hyperlipidemia     diet controled    Lung nodule     Right Lower Lobe        Past Surgical History  Past Surgical History:   Procedure Laterality Date    BREAST LUMPECTOMY Left 11/13/2018    Procedure: BREAST LUMPECTOMY; BREAST NEEDLE LOCALIZATION (NEEDLE LOC AT 0830);   Surgeon: Vidhya Bryant MD;  [facility];  Service: Surgical Oncology    COLONOSCOPY      LAPAROSCOPY      MAMMO NEEDLE LOCALIZATION LEFT (ALL INC) Left 11/13/2018    MAMMO STEREOTACTIC BREAST BIOPSY LEFT (ALL INC) Left 10/16/2018    OR BRONCHOSCOPY,DIAGNOSTIC Right 1/16/2019    Procedure: BRONCHOSCOPY FLEXIBLE,;  Surgeon: Filiberto Mccabe MD;  [facility];  Service: Thoracic    OR MEDIASTINOSCOPY WITH LYMPH NODE BIOPSY/IES Right 1/16/2019    Procedure: MEDIASTINOSCOPY;  Surgeon: Filiberto Mccabe MD;  [facility];  Service: Thoracic    OR THORACOSCOPY SURG LOBECTOMY Right 1/16/2019    Procedure: LOBECTOMY LUNG;  Surgeon: Filiberto Mccabe MD;  [facility];  Service: Thoracic        01/17/19 1045   Note Type   Note type Eval/Treat   Pain Assessment   Pain Assessment No/denies pain   Home Living   Type of [facility] Two level  (5 FARZAD w/ hand rail and flight of steps to 2nd floor w/ HR)   Additional Comments Reports her dtr lives next door (works from home) and pt goes there often to eat: 1st floor set-up w/ 4-5 FARZAD w/ hand rail   Prior Function   Level of [place] Independent with ADLs and functional mobility  (amb w/o AD)   Lives With Alone  (family is next door)   Receives Help From Family   Restrictions/Precautions   Braces or Orthoses (denies)   Other Precautions Multiple lines;Telemetry; Fall Risk;Cognitive; Chair Alarm  (CT in place; chair alarm activated at the end of session)   General   Additional Pertinent History cleared for assessment (spoke to barbra)   Cognition   Overall Cognitive Status [facility]   Arousal/Participation Alert   Attention Attends with cues to redirect   Orientation Level Oriented to person;Oriented to place;Oriented to situation   Memory Decreased recall of recent events   Following Commands Follows one step commands without difficulty   Comments Pt is observed in the chair; responds to questions appropriately; pleasant and cooperative; agreeable to mobilize   RUE Assessment   RUE Assessment WFL  (AROM)   LUE Assessment   LUE Assessment WFL  (AROM)   RLE Assessment   RLE Assessment WFL  (AROM)   Strength RLE   RLE Overall Strength (fair +/ good - (grossly))   LLE Assessment   LLE Assessment WFL  (AROM)   Strength LLE   LLE Overall Strength (fair +/ good - (grossly))   Transfers   Sit to Stand 4  Minimal assistance   Additional items Assist x 1;Verbal cues  (reviewed the technique)   Stand to Sit 4  Minimal assistance   Additional items Assist x 1;Verbal cues  (reviewed the technique)   Ambulation/Elevation   Gait pattern Excessively slow; Short stride; Inconsistent lissy   Gait Assistance 4  Minimal assist   Additional items Assist x 1;Verbal cues; Tactile cues  (stand by (A) of 2nd for lines)   Assistive Device Rolling walker   Distance 220 ft   Balance   Static Sitting Fair   Static Standing Fair -   Ambulatory Poor +   Activity Tolerance   Activity Tolerance Patient tolerated treatment well  (O2 sat at 100 % during amb on RA)   Nurse Made Aware spoke to [facility]   Assessment   Prognosis Good   Problem List Decreased strength;Decreased endurance; Impaired balance;Decreased mobility; Decreased cognition;Decreased safety awareness   Assessment Pt is 70 y o  efmale admitted with Dx of Lung nodule and underwent BRONCHOSCOPY FLEXIBLE, (Right), MEDIASTINOSCOPY (Right), LOBECTOMY LUNG (Right) on 1/16/2019  Pt 's comorbidities affecting POC include: dementia, CKD (per current notes) and personal factors of: living alone, FARZAD and steps in the house  Pt's clinical presentation is currently unstable/unpredictable which is evident in ongoing telem monitoring, CT in place ("large air leak" per CT sx note), need for input for task focus and mobility technique/safety and need for min assist w/ all phases of mobility incl amb w/ rw when usually mobilizing independently and w/o AD  Pt presents w/ postop guarding, generalized weakness, incl min decreased LE strength, decreased functional endurance, impaired standing balance w/ associated gait deviations (rw is being used) and fall risk  Will cont to follow pt in PT for progressive mobilization to address above functional deficits and to max level of (I), endurance, and safety  Otherwise, anticipate pt will return home w/ available family support upon D/C provided she cont improving w/ mobility skills, safety, and endurance (incl on the steps) and when medically cleared; home PT follow up is recommended; will follow  Barriers to Discharge Inaccessible home environment;Decreased caregiver support   Goals   Patient Goals to return home   STG Expiration Date 01/27/19   Short Term Goal #1 7-10 days  Pt will amb 300 ft w/ least restrictive assistive device PRN, mod (I) in order to facilitate safe return to premorbid environment and community amb status  Pt will negotiate 5 steps --> 14 steps w/ hand rail and SPC PRN, mod (I) in order to navigate in and out of the house and between levels of home environment safely   Pt will achieve (I) level w/ bed mob in order to facilitate safety with OOB and back to bed transitions in own living environment  Pt will perform transfers w/ mod (I) to assure (I) and safety w/ functional mobility/transitions w/ all aspects of mobility/locomotion  Pt will participate in LE therex and balance activities to max progression w/ mobility skills  Treatment Day 1  (follow up Tx session)   Plan   Treatment/Interventions Functional transfer training;LE strengthening/ROM; Elevations; Therapeutic exercise; Endurance training;Equipment eval/education; Bed mobility;Gait training;Spoke to nursing;OT   PT Frequency Other (Comment)  (4-6x/wk)   Recommendation   Recommendation Home PT; Home with family support  (r/o 1st floor set-up)   Equipment Recommended Walker  (at this time)   Modified Olivia Scale   Modified Lawrence Scale 4   Barthel Index   Feeding 10   Bathing 0   Grooming Score 5   Dressing Score 5   Bladder Score 10   Bowels Score 10   Toilet Use Score 5   Transfers (Bed/Chair) Score 10   Mobility (Level Surface) Score 10   Stairs Score 0   Barthel Index Score 65     Norris Bob, PT    PT Tx note    Time In: 10:45  Time Out: 10:55  Total Time: 10 min      S:  Pt is in the chair; agreeable to LE therex;     O:  (B) LE therex performed in the chair as following: (B) ankle pumps 2 x 10 reps, AROM; (B) LAQ 2 x 10 reps, AROM; (B) hip abd/add 2 x 10 reps, AAROM; pillows placed for (B) UE support; call bell w/in reach; (B) SCDs back on; chair alarm on     A:  Additional follow up consecutive session performed to initiate LE therex in order to max strength and to facilitate progression w/ functional mobility skills and overall level of (I)  Pt was able to complete the program w/ no excessive fatigue or SOB expressed; O2 sat remained in the 90s %; currently, cont to recommend home PT follow up upon returning home w/ family support pending progress; will follow      P:  Cont to follow pt 4-6x/week for LE therex, functional mobility training, endurance training and pt education per POC to max functional (I) and safety          Roya Brantley, PT

## 2019-01-17 NOTE — SOCIAL WORK
76yo female is POD#1 right VATS with RLL lobectomy  She is alert and oriented, independent ADLs, retired, no longer drives  Her daughter lives next door and transports: Alex Sheridan at [phone] or cell [phone]  Pt  Lives in  [place], alone in 2 story home with 4 FARZAD and bathroom on each floor  She has no hx of DME, HHC, or rehab  She is unsure if she completed her POA and will check with her daughter  She denies hx mental illness or D&A  Her PCP is Dr Nikhil Littlejohn and she uses [employer] in [place]  Family will transport at discharge  No HHC needs at this time  PT still evaluating  CM reviewed d/c planning process including the following: identifying help at home, patient preference for d/c planning needs, Discharge Lounge, [facility] Meds to Bed program, availability of treatment team to discuss questions or concerns patient and/or family may have regarding understanding medications and recognizing signs and symptoms once discharged  CM also encouraged patient to follow up with all recommended appointments after discharge  Patient advised of importance for patient and family to participate in managing patients medical well being  Patient/caregiver received discharge checklist  Content reviewed  Patient/caregiver encouraged to participate in discharge plan of care prior to discharge home

## 2019-01-17 NOTE — PROGRESS NOTES
Post op check - Thoracic Surgery  [place] Cords 70 y o  female MRN: [id number]  Unit/Bed#: [facility] 409-01 Encounter: [id number]    Assessment:  70 y o  female now Day of Surgery s/p Procedure(s) (LRB):  BRONCHOSCOPY FLEXIBLE, (Right)  MEDIASTINOSCOPY (Right)  LOBECTOMY LUNG (Right)    Plan:  - Diet Regular; Regular House   - CT to sxn, will place to water seal in AM  - AM CXR  - Sched Tylenol, gabapentin, PRN Pain meds  - Incentive spirometry  - OOB, ambulate  - PPx: LVX, Colace, PPI, Miralax      Subjective/Objective   Subjective: Went to evaluate the patient after arrival to the floor  The patients pain is well controlled and the patient denies any chest pain or SOB  Objective:   Vitals: Blood pressure 104/69, pulse 70, temperature (!) 97 1 °F (36 2 °C), resp  rate 16, height 5' 5" (1 651 m), weight 59 kg (130 lb), SpO2 100 %, not currently breastfeeding  ,Body mass index is 21 63 kg/m²  I/O       01/15 0701 - 01/16 0700 01/16 0701 - 01/17 0700    I V  (mL/kg)  1300 (22)    Total Intake(mL/kg)  1300 (22)    Urine (mL/kg/hr)  325    Blood  100    Total Output   425    Net   +875                Physical Exam:  Gen: NAD  Chest: Normal work of breathing, 100% on 1L NC, CT to -20cm H2O, 1200ml/min airleak  Abd: Soft, ND, NT  Ext: No CCE      Lab, Imaging and other studies:   CBC with diff:   Lab Results   Component Value Date    WBC 10 84 (H) 01/16/2019    HGB 13 0 01/16/2019    HCT 39 7 01/16/2019    MCV 94 01/16/2019     01/16/2019    MCH 30 9 01/16/2019    MCHC 32 7 01/16/2019    RDW 13 9 01/16/2019    MPV 10 2 01/16/2019   , BMP/CMP:   Lab Results   Component Value Date    SODIUM 137 01/16/2019    K 3 9 01/16/2019     01/16/2019    CO2 24 01/16/2019    BUN 20 01/16/2019    CREATININE 1 06 01/16/2019    CALCIUM 8 6 01/16/2019    EGFR 51 01/16/2019   , Magnesium: No components found for: MAG, Coags: No results found for: PT, PTT, INR, Blood Culture: No results found for: BLOODCX, Urine Culture:  No results found for: URINECX, Wound Culure: No results found for: WOUNDCULT  VTE Pharmacologic Prophylaxis: Heparin  VTE Mechanical Prophylaxis: sequential compression deviceer

## 2019-01-17 NOTE — OCCUPATIONAL THERAPY NOTE
[address]  Evaluation     Patient Name: Carolina Pap  [id number] Date: 1/17/2019  Problem List  Patient Active Problem List   Diagnosis    Atypical ductal hyperplasia of left breast    Papilloma of left breast    Lung nodule, solitary    Right lower lobe lung mass    Lung nodule     Past Medical History  Past Medical History:   Diagnosis Date    Abnormal chest x-ray     Abnormal mammogram     Dementia     Early stage     Ductal hyperplasia of breast     Left     Hyperlipidemia     diet controled    Lung nodule     Right Lower Lobe      Past Surgical History  Past Surgical History:   Procedure Laterality Date    BREAST LUMPECTOMY Left 11/13/2018    Procedure: BREAST LUMPECTOMY; BREAST NEEDLE LOCALIZATION (NEEDLE LOC AT 0830); Surgeon: Thaddeus Mclean MD;  [facility];  Service: Surgical Oncology    COLONOSCOPY      LAPAROSCOPY      MAMMO NEEDLE LOCALIZATION LEFT (ALL INC) Left 11/13/2018    MAMMO STEREOTACTIC BREAST BIOPSY LEFT (ALL INC) Left 10/16/2018    CO BRONCHOSCOPY,DIAGNOSTIC Right 1/16/2019    Procedure: BRONCHOSCOPY FLEXIBLE,;  Surgeon: Tatiana Maxwell MD;  [facility];  Service: Thoracic    CO MEDIASTINOSCOPY WITH LYMPH NODE BIOPSY/IES Right 1/16/2019    Procedure: MEDIASTINOSCOPY;  Surgeon: Tatiana Maxwell MD;  [facility];  Service: Thoracic    CO THORACOSCOPY SURG LOBECTOMY Right 1/16/2019    Procedure: LOBECTOMY LUNG;  Surgeon: Tatiana Maxwell MD;  [facility];  Service: Thoracic         01/17/19 0920   Note Type   Note type Eval only   Restrictions/Precautions   Weight Bearing Precautions Per Order No   Other Precautions Multiple lines  (CT to water seal)   Pain Assessment   Pain Assessment No/denies pain   Pain Score No Pain   Home Living   Type of Home House   Home Layout Two level;Bed/bath upstairs; Able to live on main level with bedroom/bathroom   Bathroom Shower/Tub Tub/shower unit   Bathroom Toilet Standard   Bathroom Equipment (none)   [address] (none)   Prior Function   Level of [place] Independent with ADLs and functional mobility   Lives With Alone   Receives Help From Family   ADL Assistance Independent   IADLs Independent   Falls in the last 6 months 0   Vocational Retired   Lifestyle   Autonomy Pt I w/ ADLs, IADLs, and mobility  No DME use  (-)    Reciprocal Relationships Lives alone but her dtr lives next door and can assist   Service to Others Retired   Intrinsic Gratification Takes care of her 5 dogs   Psychosocial   Psychosocial (WDL) WDL   ADL   Where Assessed Chair   Eating Assistance 6  Modified independent   [address]  [address] [facility] [address]  [address] [facility] 5  [facility] [address]  [facility] [address]  5  [address] 5  Supervision/Setup   Bed Mobility   Additional Comments Pt seated OOB in recliner upon arrival   Transfers   Sit to [address]   Additional items Increased time required   Stand to Sit 5  Supervision   Additional items Increased time required   Functional Mobility   Functional Mobility 5  Supervision   Additional Comments S w/ RW and CGA w/o   Pt attempted short ambulation within room w/o device, however she experienced mild LOB, therefore rec pt cont to use the walker   Additional items Rolling walker   Balance   Static Sitting Good   Dynamic Sitting Fair +   Static Standing Fair   Dynamic Standing Fair   Activity Tolerance   Activity Tolerance Patient tolerated treatment well   Nurse Made Aware Okay to see per RNMerna   RUE Assessment   RUE Assessment WFL   LUE Assessment   LUE Assessment WFL   Hand Function   Gross Motor Coordination Functional   Fine Motor Coordination Functional   Cognition   Overall Cognitive Status [facility] Arousal/Participation Alert; Responsive; Cooperative   Attention Within functional limits   Orientation Level Oriented X4   Memory Within functional limits   Following Commands Follows all commands and directions without difficulty   Comments pleasant and cooperative   Assessment   Limitation Decreased endurance;Decreased high-level ADLs   Prognosis Good   Assessment Pt is a 70 y o  female who was admitted to [facility] on 1/16/2019 with Lung nodule  Pt seen in outpatient thoracic office to discuss findings on a recent PET scan, which revealed a R lower lobe mass  After further w/u, pt is now s/p flex bronch, mediastinoscopy, R VATS, lower lobectomy on 1/15  Pt's comorbidities include papilloma of L breast  At baseline pt was I w/ ADLs, IADLs, and mobility  No DME use  (-)   Pt lives alone in a 2 SH w/ basement and has a 1st floor s/u if needed  Pt's dtr lives next door and is able to assist  Currently pt requires S for overall ADLS and  S-CGA for functional mobility/transfers w/ RW  Pt's limitations include decreased endurance and activity tolerance from baseline, however her dtr will be able to assist as needed  Educated pt on energy conservation strategies and she verbalized understanding  Pt denies any further questions or concerns from an OT standpoint  Rec she cont participation in self care and mobility w/ non OT staff while in the hospital  No further acute OT needs identified at this time  D/C OT      Goals   Patient Goals to get better and return home   Plan   OT Frequency Eval only   Recommendation   OT Discharge Recommendation Home with family support   OT - OK to Discharge Yes  (when medically stable)   Barthel Index   Feeding 10   Bathing 0   Grooming Score 5   Dressing Score 10   Bladder Score 0   Bowels Score 10   Toilet Use Score 10   Transfers (Bed/Chair) Score 10   Mobility (Level Surface) Score 10   Stairs Score 0   Barthel Index Score 65   Modified [place] Scale   Modified [place] Scale 3       Kandra Dandy, OTR/L

## 2019-01-17 NOTE — PROGRESS NOTES
NEPHROLOGY PROGRESS NOTE   Julieta Vega 70 y o  female MRN: [id number]  Unit/Bed#: [facility] 409-01 Encounter: [id number]      ASSESSMENT & PLAN:  1  Chronic kidney disease stage IIIA with previous creatinine around 1 1-1 23 with an estimated GFR around 43-46 in October 2018 and January 2019  Renal function post surgery stable  Continue with intravenously fluids until good p o  Tolerance  Patient is not on any antihypertensive medication  Avoid hypotension, avoid nephrotoxic, no NSAIDs  Okay to remove Stiles catheter from renal standpoint of view, follow bladder scans after as per voiding trial    2  Right lower lung mass status bronchoscopy, mediastinoscopy, right VATS, lower lobectomy on 01/16  Postoperative care as per thoracic surgery team     3  Hemodynamics and volume status, patient seems euvolemic  Blood pressure in the upper side, she does not have any history hypertension, will follow for now  4  Hemoglobin normal at 11 6 but noted was 13 on admission  SUBJECTIVE:  Patient seen and examined, feeling okay, denies any shortness of breath or chest pain, expected postoperative pain with deep inspiration  Denies any nausea or vomiting  OBJECTIVE:  Current Weight: Weight - Scale: 59 2 kg (130 lb 8 2 oz)  Vitals:    01/17/19 0700   BP: 141/71   Pulse: 75   Resp: 20   Temp: 98 7 °F (37 1 °C)   SpO2: 95%       Intake/Output Summary (Last 24 hours) at 01/17/19 0819  Last data filed at 01/17/19 0600   Gross per 24 hour   Intake             1500 ml   Output             1275 ml   Net              225 ml     General: conscious, cooperative, in not acute distress  Eyes: conjunctivae pink, anicteric sclerae  ENT: lips and mucous membranes moist  Neck: supple  Chest: clear breath sounds bilateral, no crackles, ronchus or wheezings, right chest tube in place    CVS: distinct S1 & S2, normal rate, regular rhythm  Abdomen: soft, non-tender, non-distended, normoactive bowel sounds  Extremities: no edema of both legs  Skin: no rash  Neuro: awake, alert, oriented        Medications:    Current Facility-Administered Medications:     acetaminophen (TYLENOL) tablet 975 mg, 975 mg, Oral, Q6H, Patricia Umanzor PA-C, 975 mg at 01/16/19 2052    docusate sodium (COLACE) capsule 100 mg, 100 mg, Oral, BID, Farhad Dewitt PA-C, 100 mg at 01/16/19 2052    enoxaparin (LOVENOX) subcutaneous injection 40 mg, 40 mg, Subcutaneous, Daily, Farhad Dewitt PA-C    gabapentin (NEURONTIN) capsule 300 mg, 300 mg, Oral, TID, Farhad Dewitt PA-C, 300 mg at 01/16/19 2054    HYDROmorphone (DILAUDID) injection 0 5 mg, 0 5 mg, Intravenous, Q3H PRN, Farhad Dewitt PA-C    ondansetron (ZOFRAN) injection 4 mg, 4 mg, Intravenous, Q6H PRN, Farhad Dewitt PA-C    oxyCODONE (ROXICODONE) IR tablet 5 mg, 5 mg, Oral, Q4H PRN, Farhad Dewitt PA-C    pantoprazole (PROTONIX) EC tablet 40 mg, 40 mg, Oral, Early Morning, Patricia Page PA-C, 40 mg at 01/17/19 2180    polyethylene glycol (MIRALAX) packet 17 g, 17 g, Oral, Daily, Farhad Dewitt PA-C    Invasive Devices:   Urethral Catheter Latex 16 Fr   (Active)   Site Assessment Clean;Skin intact 1/17/2019  4:00 AM   Collection Container Standard drainage bag 1/17/2019  4:00 AM   Securement Method Securing device (Describe) 1/17/2019  4:00 AM   Output (mL) 350 mL 1/17/2019  6:00 AM       Lab Results:     Results from last 7 days  Lab Units 01/17/19  0432 01/17/19  0038 01/17/19  0037 01/16/19  1759 01/10/19  0847   WBC Thousand/uL 9 63  --   --  10 84* 7 87   HEMOGLOBIN g/dL 11 6  --   --  13 0 13 7   HEMATOCRIT % 35 2  --   --  39 7 41 7   PLATELETS Thousands/uL 224  --  212 210 235   SODIUM mmol/L  --  135*  --  137 141   POTASSIUM mmol/L  --  4 0  --  3 9 4 7   CHLORIDE mmol/L  --  105  --  107 106   CO2 mmol/L  --  23  --  24 27   BUN mg/dL  --  19  --  20 22   CREATININE mg/dL  --  0 93  --  1 06 1 15   CALCIUM mg/dL  --  8 3  --  8 6 9 4   MAGNESIUM mg/dL  --   --   --  2 1  -- Portions of the record may have been created with voice recognition software  Occasional wrong word or "sound a like" substitutions may have occurred due to the inherent limitations of voice recognition software  Read the chart carefully and recognize, using context, where substitutions have occurred  If you have any questions, please contact the dictating provider

## 2019-01-18 ENCOUNTER — APPOINTMENT (INPATIENT)
Dept: RADIOLOGY | Facility: HOSPITAL | Age: 79
DRG: 164 | End: 2019-01-18
Payer: COMMERCIAL

## 2019-01-18 LAB
ANION GAP SERPL CALCULATED.3IONS-SCNC: 4 MMOL/L (ref 4–13)
BUN SERPL-MCNC: 25 MG/DL (ref 5–25)
CALCIUM SERPL-MCNC: 8.1 MG/DL (ref 8.3–10.1)
CHLORIDE SERPL-SCNC: 105 MMOL/L (ref 100–108)
CO2 SERPL-SCNC: 27 MMOL/L (ref 21–32)
CREAT SERPL-MCNC: 1.04 MG/DL (ref 0.6–1.3)
ERYTHROCYTE [DISTWIDTH] IN BLOOD BY AUTOMATED COUNT: 14.2 % (ref 11.6–15.1)
GFR SERPL CREATININE-BSD FRML MDRD: 52 ML/MIN/1.73SQ M
GLUCOSE SERPL-MCNC: 96 MG/DL (ref 65–140)
HCT VFR BLD AUTO: 35.2 % (ref 34.8–46.1)
HGB BLD-MCNC: 11.3 G/DL (ref 11.5–15.4)
MCH RBC QN AUTO: 30.1 PG (ref 26.8–34.3)
MCHC RBC AUTO-ENTMCNC: 32.1 G/DL (ref 31.4–37.4)
MCV RBC AUTO: 94 FL (ref 82–98)
PLATELET # BLD AUTO: 210 THOUSANDS/UL (ref 149–390)
PMV BLD AUTO: 10.4 FL (ref 8.9–12.7)
POTASSIUM SERPL-SCNC: 3.9 MMOL/L (ref 3.5–5.3)
RBC # BLD AUTO: 3.75 MILLION/UL (ref 3.81–5.12)
SODIUM SERPL-SCNC: 136 MMOL/L (ref 136–145)
WBC # BLD AUTO: 8.75 THOUSAND/UL (ref 4.31–10.16)

## 2019-01-18 PROCEDURE — 97116 GAIT TRAINING THERAPY: CPT

## 2019-01-18 PROCEDURE — 99024 POSTOP FOLLOW-UP VISIT: CPT | Performed by: THORACIC SURGERY (CARDIOTHORACIC VASCULAR SURGERY)

## 2019-01-18 PROCEDURE — 99232 SBSQ HOSP IP/OBS MODERATE 35: CPT | Performed by: INTERNAL MEDICINE

## 2019-01-18 PROCEDURE — 85027 COMPLETE CBC AUTOMATED: CPT | Performed by: PHYSICIAN ASSISTANT

## 2019-01-18 PROCEDURE — 94760 N-INVAS EAR/PLS OXIMETRY 1: CPT

## 2019-01-18 PROCEDURE — 71045 X-RAY EXAM CHEST 1 VIEW: CPT

## 2019-01-18 PROCEDURE — 80048 BASIC METABOLIC PNL TOTAL CA: CPT | Performed by: PHYSICIAN ASSISTANT

## 2019-01-18 PROCEDURE — 97530 THERAPEUTIC ACTIVITIES: CPT

## 2019-01-18 RX ADMIN — OXYCODONE HYDROCHLORIDE 5 MG: 5 TABLET ORAL at 20:14

## 2019-01-18 RX ADMIN — PANTOPRAZOLE SODIUM 40 MG: 40 TABLET, DELAYED RELEASE ORAL at 07:01

## 2019-01-18 RX ADMIN — GABAPENTIN 300 MG: 300 CAPSULE ORAL at 17:22

## 2019-01-18 RX ADMIN — ENOXAPARIN SODIUM 40 MG: 40 INJECTION SUBCUTANEOUS at 08:48

## 2019-01-18 RX ADMIN — GABAPENTIN 300 MG: 300 CAPSULE ORAL at 20:14

## 2019-01-18 RX ADMIN — OXYCODONE HYDROCHLORIDE 5 MG: 5 TABLET ORAL at 13:54

## 2019-01-18 RX ADMIN — ACETAMINOPHEN 975 MG: 325 TABLET ORAL at 07:01

## 2019-01-18 RX ADMIN — POLYETHYLENE GLYCOL 3350 17 G: 17 POWDER, FOR SOLUTION ORAL at 08:46

## 2019-01-18 RX ADMIN — DOCUSATE SODIUM 100 MG: 100 CAPSULE, LIQUID FILLED ORAL at 17:22

## 2019-01-18 RX ADMIN — ACETAMINOPHEN 975 MG: 325 TABLET ORAL at 20:14

## 2019-01-18 RX ADMIN — ACETAMINOPHEN 975 MG: 325 TABLET ORAL at 13:52

## 2019-01-18 RX ADMIN — GABAPENTIN 300 MG: 300 CAPSULE ORAL at 08:48

## 2019-01-18 RX ADMIN — DOCUSATE SODIUM 100 MG: 100 CAPSULE, LIQUID FILLED ORAL at 08:48

## 2019-01-18 RX ADMIN — OXYCODONE HYDROCHLORIDE 5 MG: 5 TABLET ORAL at 08:46

## 2019-01-18 NOTE — PROGRESS NOTES
Progress Note - Thoracic Surgery   Carline Elder 70 y o  female MRN: [id number]  Unit/Bed#: [facility] 409-01 Encounter: [id number]    Assessment:  71 yo F w/ RLL mass s/p VATS resection    CT 50 ml, +  (decreased) with spikes to 400 in last 8 hrs    Plan:  F/U AM CXR  Continue to -8cm  (water seal)  Keep CT in place until AL <20-40  PRN pain control  Encourage IS, OOB, ambulation      Subjective/Objective   Chief Complaint: Pain    Subjective: Has back pain this AM and right sided rib pain w/ movement  Got a good amount of sleep last night  Slightly confused this AM    Objective:     Blood pressure 121/56, pulse 82, temperature 99 °F (37 2 °C), temperature source Oral, resp  rate 19, height 5' 5" (1 651 m), weight 59 2 kg (130 lb 8 2 oz), SpO2 97 %, not currently breastfeeding  ,Body mass index is 21 72 kg/m²  Intake/Output Summary (Last 24 hours) at 01/18/19 5997  Last data filed at 01/18/19 0405   Gross per 24 hour   Intake             2122 ml   Output             1300 ml   Net              822 ml       Invasive Devices     Peripheral Intravenous Line            Peripheral IV 01/16/19 Left Forearm 1 day    Peripheral IV 01/16/19 Left Hand 1 day          Drain            Chest Tube 1 Right 28 Fr  1 day                Physical Exam:   Gen: A&O, mild distress  Cardio: RRR  Lungs: Decreased effort   R CT with , spikes to around 400 over last 12 hrs  Abd: Soft, non distended, non tender      Lab, Imaging and other studies:  CBC:   Lab Results   Component Value Date    WBC 8 75 01/18/2019    HGB 11 3 (L) 01/18/2019    HCT 35 2 01/18/2019    MCV 94 01/18/2019     01/18/2019    MCH 30 1 01/18/2019    MCHC 32 1 01/18/2019    RDW 14 2 01/18/2019    MPV 10 4 01/18/2019   , CMP:   Lab Results   Component Value Date    SODIUM 136 01/18/2019    K 3 9 01/18/2019     01/18/2019    CO2 27 01/18/2019    BUN 25 01/18/2019    CREATININE 1 04 01/18/2019    CALCIUM 8 1 (L) 01/18/2019    EGFR 52 01/18/2019     VTE Pharmacologic Prophylaxis: Enoxaparin (Lovenox)  VTE Mechanical Prophylaxis: sequential compression device

## 2019-01-18 NOTE — DISCHARGE INSTRUCTIONS
Gently wash incisions with soap and water  Do not apply any creams/lotions/ or ointments  Do not soak incisions  You may shower  Please obtain CXR at any [facility] within three days of your appointment  At that time, the blue stitch will be removed  Do not drive until cleared at your follow up appointment

## 2019-01-18 NOTE — PROGRESS NOTES
NEPHROLOGY PROGRESS NOTE   Coni Henao 70 y o  female MRN: [id number]  Unit/Bed#: [facility] 431-01 Encounter: [id number]      ASSESSMENT & PLAN:  1  Chronic kidney disease stage IIIA with previous creatinine around 1 1-1 23 with an estimated GFR around 43-46 in October 2018 and January 2019  Renal function is stable with serum creatinine of 1 04  Patient is not on any antihypertensive medication  Avoid hypotension, avoid nephrotoxic, no NSAIDs  2  Right lower lung mass status post bronchoscopy, mediastinoscopy, right VATS, lower lobectomy on 01/16  Postoperative care as per thoracic surgery team     3  Hemodynamics and volume status, patient seems euvolemic  Blood pressure normal     4  Hemoglobin, low but is stable at 11 3, noted was 13 on admission  SUBJECTIVE:  Patient seen and examined, complaining of back as well as right-sided rib pain with movement, denies significant shortness of breath      OBJECTIVE:  Current Weight: Weight - Scale: 59 2 kg (130 lb 8 oz)  Vitals:    01/18/19 0724   BP: 126/78   Pulse: 88   Resp: 18   Temp: 97 5 °F (36 4 °C)   SpO2: 95%       Intake/Output Summary (Last 24 hours) at 01/18/19 1103  Last data filed at 01/18/19 9053   Gross per 24 hour   Intake             1200 ml   Output             1825 ml   Net             -625 ml       General: conscious, cooperative, in not acute distress  Eyes: conjunctivae pink, anicteric sclerae  ENT: lips and mucous membranes moist  Neck: supple, no JVD  Chest: clear breath sounds bilateral, few crackles at right base, right chest tube in place  CVS: distinct S1 & S2, normal rate, regular rhythm  Abdomen: soft, non-tender, non-distended, normoactive bowel sounds  Extremities: no edema of both legs  Skin: no rash  Neuro: awake, alert, oriented        Medications:    Current Facility-Administered Medications:     acetaminophen (TYLENOL) tablet 975 mg, 975 mg, Oral, Q6H, Patricia Page PA-C, 975 mg at 01/18/19 0701    docusate sodium (COLACE) capsule 100 mg, 100 mg, Oral, BID, Patricia Page PA-C, 100 mg at 01/18/19 0848    enoxaparin (LOVENOX) subcutaneous injection 40 mg, 40 mg, Subcutaneous, Daily, Elein Goodman PA-C, 40 mg at 01/18/19 0848    gabapentin (NEURONTIN) capsule 300 mg, 300 mg, Oral, TID, Eleni Goodman PA-C, 300 mg at 01/18/19 0848    HYDROmorphone (DILAUDID) injection 0 5 mg, 0 5 mg, Intravenous, Q3H PRN, Eleni Goodman PA-C    ondansetron (ZOFRAN) injection 4 mg, 4 mg, Intravenous, Q6H PRN, Eleni Goodman PA-C    oxyCODONE (ROXICODONE) IR tablet 5 mg, 5 mg, Oral, Q4H PRN, Eleni Goodman PA-C, 5 mg at 01/18/19 0846    pantoprazole (PROTONIX) EC tablet 40 mg, 40 mg, Oral, Early Morning, Patricia Page PA-C, 40 mg at 01/18/19 0701    polyethylene glycol (MIRALAX) packet 17 g, 17 g, Oral, Daily, Patricia Page PA-C, 17 g at 01/18/19 0846    Invasive Devices:   Urethral Catheter Latex 16 Fr  (Active)   Site Assessment Clean;Skin intact 1/17/2019  4:00 AM   Collection Container Standard drainage bag 1/17/2019  4:00 AM   Securement Method Securing device (Describe) 1/17/2019  4:00 AM   Output (mL) 350 mL 1/17/2019  6:00 AM       Lab Results:     Results from last 7 days  Lab Units 01/18/19  0435 01/17/19  0432 01/17/19  0038 01/17/19  0037 01/16/19  1759   WBC Thousand/uL 8 75 9 63  --   --  10 84*   HEMOGLOBIN g/dL 11 3* 11 6  --   --  13 0   HEMATOCRIT % 35 2 35 2  --   --  39 7   PLATELETS Thousands/uL 210 224  --  212 210   SODIUM mmol/L 136  --  135*  --  137   POTASSIUM mmol/L 3 9  --  4 0  --  3 9   CHLORIDE mmol/L 105  --  105  --  107   CO2 mmol/L 27  --  23  --  24   BUN mg/dL 25  --  19  --  20   CREATININE mg/dL 1 04  --  0 93  --  1 06   CALCIUM mg/dL 8 1*  --  8 3  --  8 6   MAGNESIUM mg/dL  --   --   --   --  2 1       Portions of the record may have been created with voice recognition software   Occasional wrong word or "sound a like" substitutions may have occurred due to the inherent limitations of voice recognition software  Read the chart carefully and recognize, using context, where substitutions have occurred  If you have any questions, please contact the dictating provider

## 2019-01-18 NOTE — PHYSICAL THERAPY NOTE
Physical Therapy Progress Note     01/18/19 1220   Pain Assessment   Pain Assessment 0-10   Pain Score 6   Pain Type Acute pain   Pain Location Incision   Hospital Pain Intervention(s) Repositioned   Response to Interventions Satisfied  Restrictions/Precautions   Other Precautions Fall Risk;Telemetry; Chair Alarm;Cognitive  (Alarm active post session )   Subjective   Subjective The pt  states that she is agreeable to trial the stairs today  Transfers   Sit to Stand 5  Supervision   Stand to Sit 5  Supervision   Ambulation/Elevation   Gait pattern Inconsistent lissy; Excessively slow   Gait Assistance 5  Supervision   Additional items Verbal cues   Assistive Device Rolling walker   Distance 280 feet  Stair Management Assistance 5  Supervision   Balance   Static Sitting Good   Dynamic Sitting Fair   Static Standing Fair   Ambulatory Fair -   Activity Tolerance   Activity Tolerance Patient tolerated treatment well   Nurse Doris Chaney RN  Assessment   Prognosis Good   Problem List Decreased strength;Decreased endurance; Impaired balance;Decreased mobility; Decreased cognition;Decreased safety awareness   Assessment The pt  has improving balance and activity tolerance  She does continue to require redirection to task at times, but she readily followed all commands  She was able to complete stairs without difficulty as well  She has demonstrated the necessary mobility in order to safely return home at discharge  Barriers to Discharge None   Goals   Patient Goals To go home  STG Expiration Date 01/27/19   Treatment Day 2   Plan   Treatment/Interventions Functional transfer training;LE strengthening/ROM; Elevations; Therapeutic exercise; Endurance training;Patient/family training;Cognitive reorientation; Bed mobility;Gait training   Progress Progressing toward goals   PT Frequency (4-6x a week )   Recommendation   Recommendation Home PT; Home with family support   Equipment Recommended Pete Mccormick   PT - OK to Discharge Yes     Mary Noel, PTA    Late addendum: The pt  Completed 7 steps supervision with R hand rail nonreciprocal gait

## 2019-01-18 NOTE — PLAN OF CARE
Problem: PHYSICAL THERAPY ADULT  Goal: Performs mobility at highest level of function for planned discharge setting  See evaluation for individualized goals  Treatment/Interventions: Functional transfer training, LE strengthening/ROM, Elevations, Therapeutic exercise, Endurance training, Equipment eval/education, Bed mobility, Gait training, Spoke to nursing, OT  Equipment Recommended: Alice Saavedra (at this time)       See flowsheet documentation for full assessment, interventions and recommendations  Outcome: Adequate for Discharge  Prognosis: Good  Problem List: Decreased strength, Decreased endurance, Impaired balance, Decreased mobility, Decreased cognition, Decreased safety awareness  Assessment: The pt  has improving balance and activity tolerance  She does continue to require redirection to task at times, but she readily followed all commands  She was able to complete stairs without difficulty as well  She has demonstrated the necessary mobility in order to safely return home at discharge  Barriers to Discharge: None     Recommendation: Home PT, Home with family support     PT - OK to Discharge: Yes    See flowsheet documentation for full assessment

## 2019-01-19 ENCOUNTER — APPOINTMENT (INPATIENT)
Dept: RADIOLOGY | Facility: HOSPITAL | Age: 79
DRG: 164 | End: 2019-01-19
Payer: COMMERCIAL

## 2019-01-19 PROCEDURE — 94760 N-INVAS EAR/PLS OXIMETRY 1: CPT

## 2019-01-19 PROCEDURE — 99232 SBSQ HOSP IP/OBS MODERATE 35: CPT | Performed by: INTERNAL MEDICINE

## 2019-01-19 PROCEDURE — 99024 POSTOP FOLLOW-UP VISIT: CPT | Performed by: THORACIC SURGERY (CARDIOTHORACIC VASCULAR SURGERY)

## 2019-01-19 PROCEDURE — 71046 X-RAY EXAM CHEST 2 VIEWS: CPT

## 2019-01-19 RX ORDER — ACETAMINOPHEN 325 MG/1
975 TABLET ORAL EVERY 8 HOURS SCHEDULED
Status: DISCONTINUED | OUTPATIENT
Start: 2019-01-19 | End: 2019-01-25 | Stop reason: HOSPADM

## 2019-01-19 RX ADMIN — ACETAMINOPHEN 975 MG: 325 TABLET, FILM COATED ORAL at 15:10

## 2019-01-19 RX ADMIN — OXYCODONE HYDROCHLORIDE 5 MG: 5 TABLET ORAL at 21:39

## 2019-01-19 RX ADMIN — DOCUSATE SODIUM 100 MG: 100 CAPSULE, LIQUID FILLED ORAL at 17:17

## 2019-01-19 RX ADMIN — OXYCODONE HYDROCHLORIDE 5 MG: 5 TABLET ORAL at 05:10

## 2019-01-19 RX ADMIN — PANTOPRAZOLE SODIUM 40 MG: 40 TABLET, DELAYED RELEASE ORAL at 05:10

## 2019-01-19 RX ADMIN — GABAPENTIN 300 MG: 300 CAPSULE ORAL at 08:06

## 2019-01-19 RX ADMIN — POLYETHYLENE GLYCOL 3350 17 G: 17 POWDER, FOR SOLUTION ORAL at 08:06

## 2019-01-19 RX ADMIN — DOCUSATE SODIUM 100 MG: 100 CAPSULE, LIQUID FILLED ORAL at 08:06

## 2019-01-19 RX ADMIN — ACETAMINOPHEN 975 MG: 325 TABLET, FILM COATED ORAL at 05:10

## 2019-01-19 RX ADMIN — ACETAMINOPHEN 975 MG: 325 TABLET, FILM COATED ORAL at 21:39

## 2019-01-19 RX ADMIN — ENOXAPARIN SODIUM 40 MG: 40 INJECTION SUBCUTANEOUS at 08:06

## 2019-01-19 RX ADMIN — GABAPENTIN 300 MG: 300 CAPSULE ORAL at 21:39

## 2019-01-19 RX ADMIN — GABAPENTIN 300 MG: 300 CAPSULE ORAL at 15:10

## 2019-01-19 NOTE — PROGRESS NOTES
NEPHROLOGY PROGRESS NOTE   Maurice De Los Santos 70 y o  female MRN: [id number]  Unit/Bed#: [facility] 431-01 Encounter: [id number]      ASSESSMENT & PLAN:  1  Chronic kidney disease stage IIIA with previous creatinine around 1 1-1 23 with an estimated GFR around 43-46 in October 2018 and January 2019  Renal function is stable, last creatinine 1 04 yesterday  Check BMP in the morning  Patient is not on any antihypertensive medication  Avoid hypotension, avoid nephrotoxic, no NSAIDs  2  Right lower lung mass status post bronchoscopy, mediastinoscopy, right VATS, lower lobectomy on 01/16  Postoperative care as per thoracic surgery team     3  Hemodynamics and volume status, patient seems euvolemic  Blood pressure normal     4  Hemoglobin, low but is stable at 11 3 yesterday, noted was 13 on admission  SUBJECTIVE:  Patient seen and examined, feeling okay, denies any shortness of breath chest pain other than some chest discomfort with movement due to chest tube      OBJECTIVE:  Current Weight: Weight - Scale: 58 7 kg (129 lb 6 6 oz)  Vitals:    01/19/19 0718   BP: 121/57   Pulse: 77   Resp: 18   Temp: 98 4 °F (36 9 °C)   SpO2: 98%       Intake/Output Summary (Last 24 hours) at 01/19/19 0950  Last data filed at 01/19/19 0530   Gross per 24 hour   Intake              660 ml   Output              875 ml   Net             -215 ml     General: conscious, cooperative, in not acute distress  Eyes: conjunctivae pink, anicteric sclerae  ENT: lips and mucous membranes moist  Neck: supple, no JVD  Chest:  Coarse breath sounds over right side, no crackles, ronchus or wheezings, right chest tube in place  CVS: distinct S1 & S2, normal rate, regular rhythm  Abdomen: soft, non-tender, non-distended, normoactive bowel sounds  Extremities: no edema of both legs  Skin: no rash  Neuro: awake, alert, oriented    Medications:    Current Facility-Administered Medications:     acetaminophen (TYLENOL) tablet 975 mg, 975 mg, Oral, Q8H [facility] Anika Squires MD, 975 mg at 01/19/19 0510    docusate sodium (COLACE) capsule 100 mg, 100 mg, Oral, BID, Rancho Nevarez PA-C, 100 mg at 01/19/19 0806    enoxaparin (LOVENOX) subcutaneous injection 40 mg, 40 mg, Subcutaneous, Daily, Rancho Nevarez PA-C, 40 mg at 01/19/19 0806    gabapentin (NEURONTIN) capsule 300 mg, 300 mg, Oral, TID, Rancho Nevarez PA-C, 300 mg at 01/19/19 0806    HYDROmorphone (DILAUDID) injection 0 5 mg, 0 5 mg, Intravenous, Q3H PRN, Rancho Nevarez PA-C    ondansetron (ZOFRAN) injection 4 mg, 4 mg, Intravenous, Q6H PRN, Ranhco Nevarez PA-C    oxyCODONE (ROXICODONE) IR tablet 5 mg, 5 mg, Oral, Q4H PRN, Rancho Nevarez PA-C, 5 mg at 01/19/19 0510    pantoprazole (PROTONIX) EC tablet 40 mg, 40 mg, Oral, Early Morning, Patricia Page PA-C, 40 mg at 01/19/19 0510    polyethylene glycol (MIRALAX) packet 17 g, 17 g, Oral, Daily, BRENNEN Damon-RIGO, 17 g at 01/19/19 0806    Invasive Devices:   Urethral Catheter Latex 16 Fr  (Active)   Site Assessment Clean;Skin intact 1/17/2019  4:00 AM   Collection Container Standard drainage bag 1/17/2019  4:00 AM   Securement Method Securing device (Describe) 1/17/2019  4:00 AM   Output (mL) 350 mL 1/17/2019  6:00 AM       Lab Results:     Results from last 7 days  Lab Units 01/18/19  0435 01/17/19  0432 01/17/19  0038 01/17/19  0037 01/16/19  1759   WBC Thousand/uL 8 75 9 63  --   --  10 84*   HEMOGLOBIN g/dL 11 3* 11 6  --   --  13 0   HEMATOCRIT % 35 2 35 2  --   --  39 7   PLATELETS Thousands/uL 210 224  --  212 210   SODIUM mmol/L 136  --  135*  --  137   POTASSIUM mmol/L 3 9  --  4 0  --  3 9   CHLORIDE mmol/L 105  --  105  --  107   CO2 mmol/L 27  --  23  --  24   BUN mg/dL 25  --  19  --  20   CREATININE mg/dL 1 04  --  0 93  --  1 06   CALCIUM mg/dL 8 1*  --  8 3  --  8 6   MAGNESIUM mg/dL  --   --   --   --  2 1       Portions of the record may have been created with voice recognition software   Occasional wrong word or "sound a like" substitutions may have occurred due to the inherent limitations of voice recognition software  Read the chart carefully and recognize, using context, where substitutions have occurred  If you have any questions, please contact the dictating provider

## 2019-01-19 NOTE — PROGRESS NOTES
Progress Note - Thoracic Surgery   Maira Kennard 70 y o  female MRN: [id number]  Unit/Bed#: [facility] 431-01 Encounter: [id number]    Assessment:  69 yo F w/ RLL mass s/p VATS resection     ml, +  (decreased) with spikes of 200-800    Plan:  F/U AM CXR  Continue to -8cm  (water seal)  Keep CT in place until AL <20-40  PRN pain control  Encourage IS, OOB, ambulation      Subjective/Objective   Chief Complaint: Pain    Subjective:  No acute events overnight  Patient is sleeping comfortable this morning  Patient endorsed minor pain of her right chest wall near incisions  No other complaints  Objective:     Blood pressure 123/57, pulse 82, temperature 97 6 °F (36 4 °C), temperature source Oral, resp  rate 18, height 5' 5" (1 651 m), weight 58 7 kg (129 lb 6 6 oz), SpO2 97 %, not currently breastfeeding  ,Body mass index is 21 53 kg/m²        Intake/Output Summary (Last 24 hours) at 01/19/19 0547  Last data filed at 01/19/19 0518   Gross per 24 hour   Intake              740 ml   Output             1500 ml   Net             -760 ml       Invasive Devices     Peripheral Intravenous Line            Peripheral IV 01/16/19 Left Forearm 2 days    Peripheral IV 01/16/19 Left Hand 2 days          Drain            Chest Tube 1 Right 28 Fr  2 days                Physical Exam:   Gen: NAD, A&O, Comfortable   Chest: Normal work of breathing, no resp distress, R CT with , spikes 200-800 last 24 hrs, incisions cdi  Abd: S, ND, NT  Ext: No edema  Skin: warm, dry, intact      Lab, Imaging and other studies:  CBC:   No results found for: WBC, HGB, HCT, MCV, PLT, ADJUSTEDWBC, MCH, MCHC, RDW, MPV, NRBC, CMP:   No results found for: SODIUM, K, CL, CO2, ANIONGAP, BUN, CREATININE, GLUCOSE, CALCIUM, AST, ALT, ALKPHOS, PROT, BILITOT, EGFR  VTE Pharmacologic Prophylaxis: Enoxaparin (Lovenox)  VTE Mechanical Prophylaxis: sequential compression device

## 2019-01-20 LAB
ANION GAP SERPL CALCULATED.3IONS-SCNC: 4 MMOL/L (ref 4–13)
BUN SERPL-MCNC: 17 MG/DL (ref 5–25)
CALCIUM SERPL-MCNC: 8.9 MG/DL (ref 8.3–10.1)
CHLORIDE SERPL-SCNC: 103 MMOL/L (ref 100–108)
CO2 SERPL-SCNC: 28 MMOL/L (ref 21–32)
CREAT SERPL-MCNC: 0.81 MG/DL (ref 0.6–1.3)
GFR SERPL CREATININE-BSD FRML MDRD: 71 ML/MIN/1.73SQ M
GLUCOSE SERPL-MCNC: 98 MG/DL (ref 65–140)
POTASSIUM SERPL-SCNC: 4.4 MMOL/L (ref 3.5–5.3)
SODIUM SERPL-SCNC: 135 MMOL/L (ref 136–145)

## 2019-01-20 PROCEDURE — 94760 N-INVAS EAR/PLS OXIMETRY 1: CPT

## 2019-01-20 PROCEDURE — 99024 POSTOP FOLLOW-UP VISIT: CPT | Performed by: THORACIC SURGERY (CARDIOTHORACIC VASCULAR SURGERY)

## 2019-01-20 PROCEDURE — 99232 SBSQ HOSP IP/OBS MODERATE 35: CPT | Performed by: INTERNAL MEDICINE

## 2019-01-20 PROCEDURE — 80048 BASIC METABOLIC PNL TOTAL CA: CPT | Performed by: INTERNAL MEDICINE

## 2019-01-20 RX ADMIN — ACETAMINOPHEN 975 MG: 325 TABLET, FILM COATED ORAL at 22:17

## 2019-01-20 RX ADMIN — DOCUSATE SODIUM 100 MG: 100 CAPSULE, LIQUID FILLED ORAL at 17:16

## 2019-01-20 RX ADMIN — DOCUSATE SODIUM 100 MG: 100 CAPSULE, LIQUID FILLED ORAL at 08:25

## 2019-01-20 RX ADMIN — PANTOPRAZOLE SODIUM 40 MG: 40 TABLET, DELAYED RELEASE ORAL at 05:34

## 2019-01-20 RX ADMIN — ENOXAPARIN SODIUM 40 MG: 40 INJECTION SUBCUTANEOUS at 08:25

## 2019-01-20 RX ADMIN — OXYCODONE HYDROCHLORIDE 5 MG: 5 TABLET ORAL at 08:28

## 2019-01-20 RX ADMIN — ACETAMINOPHEN 975 MG: 325 TABLET, FILM COATED ORAL at 05:34

## 2019-01-20 RX ADMIN — GABAPENTIN 300 MG: 300 CAPSULE ORAL at 08:25

## 2019-01-20 RX ADMIN — GABAPENTIN 300 MG: 300 CAPSULE ORAL at 22:17

## 2019-01-20 RX ADMIN — OXYCODONE HYDROCHLORIDE 5 MG: 5 TABLET ORAL at 18:24

## 2019-01-20 RX ADMIN — POLYETHYLENE GLYCOL 3350 17 G: 17 POWDER, FOR SOLUTION ORAL at 08:26

## 2019-01-20 RX ADMIN — ACETAMINOPHEN 975 MG: 325 TABLET, FILM COATED ORAL at 15:05

## 2019-01-20 RX ADMIN — OXYCODONE HYDROCHLORIDE 5 MG: 5 TABLET ORAL at 22:21

## 2019-01-20 RX ADMIN — GABAPENTIN 300 MG: 300 CAPSULE ORAL at 15:07

## 2019-01-20 NOTE — PROGRESS NOTES
NEPHROLOGY PROGRESS NOTE   Maritza Beasley 70 y o  female MRN: [id number]  Unit/Bed#: [facility] 431-01 Encounter: [id number]      ASSESSMENT & PLAN:  1  Chronic kidney disease stage IIIA with previous creatinine around 1 1-1 23 with an estimated GFR around 43-46 in October 2018 and January 2019  Renal function is stable, serum creatinine 0 81 today  Patient is not on any antihypertensive medication  Avoid hypotension, avoid nephrotoxic, no NSAIDs  2  Right lower lung mass status post bronchoscopy, mediastinoscopy, right VATS, lower lobectomy on 01/16  Postoperative care as per thoracic surgery team     3  Hemodynamics and volume status, patient seems euvolemic  Blood pressure normal     4  Hemoglobin, low but is stable at 11 3 on 01/18, noted was 13 on admission  SUBJECTIVE:  Patient seen and examined, in general feeling better, denies any shortness of breath or chest pain, continue with same chest tube area discomfort but seems to be improving      OBJECTIVE:  Current Weight: Weight - Scale: 59 3 kg (130 lb 11 7 oz)  Vitals:    01/20/19 0750   BP:    Pulse:    Resp:    Temp:    SpO2: 96%       Intake/Output Summary (Last 24 hours) at 01/20/19 0834  Last data filed at 01/20/19 0601   Gross per 24 hour   Intake             1220 ml   Output             1400 ml   Net             -180 ml     General: conscious, cooperative, in not acute distress  Eyes: conjunctivae pink, anicteric sclerae  ENT: lips and mucous membranes moist  Neck: supple, no JVD  Chest:  Decreased breath sounds over right base, no crackles, ronchus or wheezings, right chest tube in place  CVS: distinct S1 & S2, normal rate, regular rhythm  Abdomen: soft, non-tender, non-distended, normoactive bowel sounds  Extremities: no edema of both legs  Skin: no rash  Neuro: awake, alert, oriented      Medications:    Current Facility-Administered Medications:     acetaminophen (TYLENOL) tablet 975 mg, 975 mg, Oral, Q8H [facility], Gema Edwards MD, 975 mg at 01/20/19 0534    docusate sodium (COLACE) capsule 100 mg, 100 mg, Oral, BID, Patricia Page PA-C, 100 mg at 01/20/19 0825    enoxaparin (LOVENOX) subcutaneous injection 40 mg, 40 mg, Subcutaneous, Daily, Evelia Bai PA-C, 40 mg at 01/20/19 0825    gabapentin (NEURONTIN) capsule 300 mg, 300 mg, Oral, TID, Evelia Bai PA-C, 300 mg at 01/20/19 0825    HYDROmorphone (DILAUDID) injection 0 5 mg, 0 5 mg, Intravenous, Q3H PRN, Evelia Bai PA-C    ondansetron (ZOFRAN) injection 4 mg, 4 mg, Intravenous, Q6H PRN, Evelia Bai PA-C    oxyCODONE (ROXICODONE) IR tablet 5 mg, 5 mg, Oral, Q4H PRN, Evelai Bai PA-C, 5 mg at 01/20/19 0828    pantoprazole (PROTONIX) EC tablet 40 mg, 40 mg, Oral, Early Morning, Patricia Page PA-C, 40 mg at 01/20/19 0534    polyethylene glycol (MIRALAX) packet 17 g, 17 g, Oral, Daily, Patricia Page PA-C, 17 g at 01/20/19 0826    Invasive Devices:   Urethral Catheter Latex 16 Fr  (Active)   Site Assessment Clean;Skin intact 1/17/2019  4:00 AM   Collection Container Standard drainage bag 1/17/2019  4:00 AM   Securement Method Securing device (Describe) 1/17/2019  4:00 AM   Output (mL) 350 mL 1/17/2019  6:00 AM       Lab Results:     Results from last 7 days  Lab Units 01/20/19  0520 01/18/19  0435 01/17/19  0432 01/17/19  0038 01/17/19  0037 01/16/19  1759   WBC Thousand/uL  --  8 75 9 63  --   --  10 84*   HEMOGLOBIN g/dL  --  11 3* 11 6  --   --  13 0   HEMATOCRIT %  --  35 2 35 2  --   --  39 7   PLATELETS Thousands/uL  --  210 224  --  212 210   SODIUM mmol/L 135* 136  --  135*  --  137   POTASSIUM mmol/L 4 4 3 9  --  4 0  --  3 9   CHLORIDE mmol/L 103 105  --  105  --  107   CO2 mmol/L 28 27  --  23  --  24   BUN mg/dL 17 25  --  19  --  20   CREATININE mg/dL 0 81 1 04  --  0 93  --  1 06   CALCIUM mg/dL 8 9 8 1*  --  8 3  --  8 6   MAGNESIUM mg/dL  --   --   --   --   --  2 1       Portions of the record may have been created with voice recognition software  Occasional wrong word or "sound a like" substitutions may have occurred due to the inherent limitations of voice recognition software  Read the chart carefully and recognize, using context, where substitutions have occurred  If you have any questions, please contact the dictating provider

## 2019-01-20 NOTE — PROGRESS NOTES
Progress Note - Thoracic Surgery   Carline Elder 70 y o  female MRN: [id number]  Unit/Bed#: [facility] 431-01 Encounter: [id number]    Assessment:  71 yo F w/ RLL mass now 4 Days Post-Op s/p VATS resection     ml, + AL  with only a couple spikes to 200 in the last 12 hrs    Plan:  Continue to -8cm  (water seal)  Keep CT in place until AL <20-40  PRN pain control  Encourage IS, OOB, ambulation      Subjective/Objective   Chief Complaint:  None    Subjective:  Nothing acute overnight  Patient denied any complaints this morning  Objective:     Blood pressure 123/58, pulse 68, temperature 98 6 °F (37 °C), temperature source Oral, resp  rate 20, height 5' 5" (1 651 m), weight 59 3 kg (130 lb 11 7 oz), SpO2 96 %, not currently breastfeeding  ,Body mass index is 21 76 kg/m²        Intake/Output Summary (Last 24 hours) at 01/20/19 6215  Last data filed at 01/19/19 2145   Gross per 24 hour   Intake             1220 ml   Output             1350 ml   Net             -130 ml       Invasive Devices     Peripheral Intravenous Line            Peripheral IV 01/20/19 Right Forearm less than 1 day          Drain            Chest Tube 1 Right 28 Fr  3 days                Physical Exam:   Gen: NAD, A&O, Comfortable   Chest: Normal work of breathing, no resp distress, R CT to water seal w/ 100 ml out serosang, + AL  with only a couple spikes to 200 in the last 12 hrs, incisions cdi  Abd: S, ND, NT  Ext: No edema  Skin: warm, dry, intact      Lab, Imaging and other studies:  CBC:   No results found for: WBC, HGB, HCT, MCV, PLT, ADJUSTEDWBC, MCH, MCHC, RDW, MPV, NRBC, CMP:   Lab Results   Component Value Date    SODIUM 135 (L) 01/20/2019    K 4 4 01/20/2019     01/20/2019    CO2 28 01/20/2019    BUN 17 01/20/2019    CREATININE 0 81 01/20/2019    CALCIUM 8 9 01/20/2019    EGFR 71 01/20/2019     VTE Pharmacologic Prophylaxis: Enoxaparin (Lovenox)  VTE Mechanical Prophylaxis: sequential compression device

## 2019-01-21 ENCOUNTER — APPOINTMENT (INPATIENT)
Dept: RADIOLOGY | Facility: HOSPITAL | Age: 79
DRG: 164 | End: 2019-01-21
Payer: COMMERCIAL

## 2019-01-21 LAB
BACTERIA UR QL AUTO: NORMAL /HPF
BILIRUB UR QL STRIP: NEGATIVE
CLARITY UR: CLEAR
COLOR UR: YELLOW
GLUCOSE UR STRIP-MCNC: NEGATIVE MG/DL
HGB UR QL STRIP.AUTO: NEGATIVE
KETONES UR STRIP-MCNC: NEGATIVE MG/DL
LEUKOCYTE ESTERASE UR QL STRIP: NEGATIVE
NITRITE UR QL STRIP: NEGATIVE
NON-SQ EPI CELLS URNS QL MICRO: NORMAL /HPF
PH UR STRIP.AUTO: 5.5 [PH] (ref 4.5–8)
PROT UR STRIP-MCNC: NEGATIVE MG/DL
RBC #/AREA URNS AUTO: NORMAL /HPF
SP GR UR STRIP.AUTO: 1.01 (ref 1–1.03)
UROBILINOGEN UR QL STRIP.AUTO: 0.2 E.U./DL
WBC #/AREA URNS AUTO: NORMAL /HPF

## 2019-01-21 PROCEDURE — 99232 SBSQ HOSP IP/OBS MODERATE 35: CPT | Performed by: INTERNAL MEDICINE

## 2019-01-21 PROCEDURE — 99024 POSTOP FOLLOW-UP VISIT: CPT | Performed by: THORACIC SURGERY (CARDIOTHORACIC VASCULAR SURGERY)

## 2019-01-21 PROCEDURE — 71046 X-RAY EXAM CHEST 2 VIEWS: CPT

## 2019-01-21 PROCEDURE — 81001 URINALYSIS AUTO W/SCOPE: CPT | Performed by: INTERNAL MEDICINE

## 2019-01-21 RX ADMIN — ACETAMINOPHEN 975 MG: 325 TABLET, FILM COATED ORAL at 05:41

## 2019-01-21 RX ADMIN — ENOXAPARIN SODIUM 40 MG: 40 INJECTION SUBCUTANEOUS at 09:09

## 2019-01-21 RX ADMIN — GABAPENTIN 300 MG: 300 CAPSULE ORAL at 20:52

## 2019-01-21 RX ADMIN — ACETAMINOPHEN 975 MG: 325 TABLET, FILM COATED ORAL at 13:03

## 2019-01-21 RX ADMIN — DOCUSATE SODIUM 100 MG: 100 CAPSULE, LIQUID FILLED ORAL at 18:19

## 2019-01-21 RX ADMIN — PANTOPRAZOLE SODIUM 40 MG: 40 TABLET, DELAYED RELEASE ORAL at 05:41

## 2019-01-21 RX ADMIN — ACETAMINOPHEN 975 MG: 325 TABLET, FILM COATED ORAL at 21:01

## 2019-01-21 RX ADMIN — POLYETHYLENE GLYCOL 3350 17 G: 17 POWDER, FOR SOLUTION ORAL at 09:10

## 2019-01-21 RX ADMIN — GABAPENTIN 300 MG: 300 CAPSULE ORAL at 18:19

## 2019-01-21 RX ADMIN — DOCUSATE SODIUM 100 MG: 100 CAPSULE, LIQUID FILLED ORAL at 09:10

## 2019-01-21 RX ADMIN — GABAPENTIN 300 MG: 300 CAPSULE ORAL at 09:10

## 2019-01-21 NOTE — RESTORATIVE TECHNICIAN NOTE
Restorative Specialist Mobility Note       Activity: Ambulate in trotter, [place] privileges, Chair     Assistive Device: Front wheel walker

## 2019-01-21 NOTE — PROGRESS NOTES
Progress Note - Thoracic Surgery   Mukul Mead 70 y o  female MRN: [id number]  Unit/Bed#: [facility] 431-01 Encounter: [id number]    Assessment:  69 yo F w/ RLL mass now 5 Days Post-Op s/p VATS resection     ml, + AL 30 this morning on -8, had increasing spikes overnight while on -[id number]     Plan:  Continue to -8cm  (water seal)  Keep CT in place until AL <20-40  PRN pain control  Encourage IS, OOB, ambulation      Subjective/Objective   Chief Complaint:  Pain with sleeping on her right side    Subjective:  No acute events overnight  Patients chest tube was placed back to suction yesterday which was tolerated well initially, however overnight the air leak spikes climbed so her chest tube was placed back to -8 early this morning  Now reading about a 30 air leak  Objective:     Blood pressure 145/69, pulse 85, temperature 98 5 °F (36 9 °C), temperature source Oral, resp  rate 16, height 5' 5" (1 651 m), weight 59 3 kg (130 lb 11 7 oz), SpO2 97 %, not currently breastfeeding  ,Body mass index is 21 76 kg/m²        Intake/Output Summary (Last 24 hours) at 01/21/19 0518  Last data filed at 01/21/19 0510   Gross per 24 hour   Intake              880 ml   Output             1975 ml   Net            -1095 ml       Invasive Devices     Peripheral Intravenous Line            Peripheral IV 01/20/19 Right Forearm less than 1 day          Drain            Chest Tube 1 Right 28 Fr  4 days                Physical Exam:   Gen: NAD, A&O, Comfortably sleeping in bed  Chest: Normal work of breathing, no resp distress  R CT to water seal w/ 175 ml out serosang, + AL 30  Abd: S, ND, NT  Ext: No edema  Skin: warm, dry, intact      Lab, Imaging and other studies:  CBC:   No results found for: WBC, HGB, HCT, MCV, PLT, ADJUSTEDWBC, MCH, MCHC, RDW, MPV, NRBC, CMP:   Lab Results   Component Value Date    SODIUM 135 (L) 01/20/2019    K 4 4 01/20/2019     01/20/2019    CO2 28 01/20/2019    BUN 17 01/20/2019    CREATININE 0 81 01/20/2019    CALCIUM 8 9 01/20/2019    EGFR 71 01/20/2019     VTE Pharmacologic Prophylaxis: Enoxaparin (Lovenox)  VTE Mechanical Prophylaxis: sequential compression device

## 2019-01-21 NOTE — PROGRESS NOTES
NEPHROLOGY PROGRESS NOTE   America Late 70 y o  female MRN: [id number]  Unit/Bed#: [facility] 431-01 Encounter: [id number]  Reason for Consult: CKD    ASSESSMENT AND PLAN:  CKD stage 3 with baseline creatinine 1 1 to 1 2  -creatinine 0 8 overall improved and stable  -no BMP results available today  Check BMP in a m   -avoid nephrotoxins or NSAIDs  -patient denies any obvious history of hypertension or diabetes  -check urinalysis  Exact etiology for CKD somewhat unclear, she may have some age-related nephron loss  Borderline hyponatremia, serum sodium 135 overall stable   -if dropping further, will do further evaluation  Right-sided lung mass, status post VATS, lower lobectomy on 1/16/19  -surgery follow-up  Clinically seems euvolemic  Continue monitor hemoglobin overall stable  Stable from renal standpoint  Will discuss with primary team     SUBJECTIVE:  Patient seen and examined at bedside  No chest pain, shortness of breath, nausea, vomiting, abdominal pain or diarrhea  No urinary complaints       OBJECTIVE:  Current Weight: Weight - Scale: 58 kg (127 lb 13 9 oz)  Vitals:    01/21/19 0714   BP: 121/58   Pulse: 68   Resp: 16   Temp: 98 6 °F (37 °C)   SpO2: 94%       Intake/Output Summary (Last 24 hours) at 01/21/19 1117  Last data filed at 01/21/19 1018   Gross per 24 hour   Intake              880 ml   Output             2400 ml   Net            -1520 ml       Physical Examination:  General:  Sitting in chair, no acute distress   Eyes:  No conjunctival pallor  ENT:  External examination of ears and nose unremarkable  Neck:  Supple  Respiratory:  Bilateral air entry present  CVS:  S1, S2 present  GI:  Soft, nontender, nondistended  CNS:  Active alert oriented x3  Extremities:  No significant edema in lower extremities  Skin:  No new rash in legs    Medications:    Current Facility-Administered Medications:     acetaminophen (TYLENOL) tablet 975 mg, 975 mg, Oral, Q8H [address], Brad Torre MD, 975 mg at 01/21/19 0541    docusate sodium (COLACE) capsule 100 mg, 100 mg, Oral, BID, BRENNEN Damon-C, 100 mg at 01/21/19 0910    enoxaparin (LOVENOX) subcutaneous injection 40 mg, 40 mg, Subcutaneous, Daily, BRENNEN Manrique-C, 40 mg at 01/21/19 0909    gabapentin (NEURONTIN) capsule 300 mg, 300 mg, Oral, TID, BRENNEN Manrique-C, 300 mg at 01/21/19 0910    HYDROmorphone (DILAUDID) injection 0 5 mg, 0 5 mg, Intravenous, Q3H PRN, Lavern Alexander PA-C    ondansetron (ZOFRAN) injection 4 mg, 4 mg, Intravenous, Q6H PRN, BRENNEN Manrique-C    oxyCODONE (ROXICODONE) IR tablet 5 mg, 5 mg, Oral, Q4H PRN, BRENNEN Manrique-C, 5 mg at 01/20/19 2221    pantoprazole (PROTONIX) EC tablet 40 mg, 40 mg, Oral, Early Morning, BRENNEN Damon-C, 40 mg at 01/21/19 0541    polyethylene glycol (MIRALAX) packet 17 g, 17 g, Oral, Daily, BRENNEN Manrique-C, 17 g at 01/21/19 2800    Laboratory Results:    Results from last 7 days  Lab Units 01/20/19  0520 01/18/19  0435 01/17/19  0432 01/17/19  0038 01/17/19  0037 01/16/19  1759   WBC Thousand/uL  --  8 75 9 63  --   --  10 84*   HEMOGLOBIN g/dL  --  11 3* 11 6  --   --  13 0   HEMATOCRIT %  --  35 2 35 2  --   --  39 7   PLATELETS Thousands/uL  --  210 224  --  212 210   POTASSIUM mmol/L 4 4 3 9  --  4 0  --  3 9   CHLORIDE mmol/L 103 105  --  105  --  107   CO2 mmol/L 28 27  --  23  --  24   BUN mg/dL 17 25  --  19  --  20   CREATININE mg/dL 0 81 1 04  --  0 93  --  1 06   CALCIUM mg/dL 8 9 8 1*  --  8 3  --  8 6   MAGNESIUM mg/dL  --   --   --   --   --  2 1       Results for orders placed during the hospital encounter of 01/16/19   XR chest portable    Narrative CHEST     INDICATION:   s/p lobectomy  COMPARISON:  Chest radiograph 1/17/2019    EXAM PERFORMED/VIEWS:  XR CHEST PORTABLE      FINDINGS:  There is a right-sided chest tube with tip directed towards the right lung apex    Persistent right-sided pneumothorax similar to prior exam     Cardiomediastinal silhouette appears unremarkable  Linear atelectasis versus scarring at the right lung base  No large effusion  Osseous structures appear within normal limits for patient age  There is subcutaneous emphysema in the right chest and right neck  Impression Persistent right-sided pneumothorax with right apical chest tube in place  Workstation performed: [id number]       Results for orders placed during the hospital encounter of 01/16/19   XR chest pa & lateral    Narrative CHEST     INDICATION:   persistent air leak  RIGHT SIDE CHEST TUBE, PERSISTENT AIR LEAK    COMPARISON:  1/19/2019; 11/18/2018; 10/21/2015    EXAM PERFORMED/VIEWS:  XR CHEST PA & LATERAL      FINDINGS:  Right-sided chest tube noted  Diminishing but persistent small right apical pneumothorax  Extensive right chest wall subcutaneous emphysema noted  Heart shadow appears unremarkable  Atherosclerotic vascular calcifications are noted  Right basilar opacification with blunting of the right costophrenic sulcus  Left basilar nodule measuring approximately 6 mm is similar to the prior chest CT from 2015  Osseous structures appear within normal limits for patient age  Impression 1  Diminishing, small right apical pneumothorax with right-sided chest tube and extensive chest wall emphysema redemonstrated  2   6 mm left lower lobe nodule is unchanged from prior chest CT from 2000  Workstation performed: [id number]       No results found for this or any previous visit  No results found for this or any previous visit  No results found for this or any previous visit  No results found for this or any previous visit  Portions of the record may have been created with voice recognition software  Occasional wrong word or "sound a like" substitutions may have occurred due to the inherent limitations of voice recognition software  Read the chart carefully and recognize, using context, where substitutions have occurred

## 2019-01-21 NOTE — UTILIZATION REVIEW
Continued Stay Review    Date: 01/21/19  Vital Signs: /58 (BP Location: Left arm)   Pulse 68   Temp 98 6 °F (37 °C) (Oral)   Resp 16   Ht 5' 5" (1 651 m)   Wt 58 kg (127 lb 13 9 oz)   SpO2 94%   BMI 21 28 kg/m²   Assessment/Plan: 69 yo F w/ RLL mass now 5 Days Post-Op s/p VATS resection   ml, + AL 30 this morning on -8, had increasing spikes overnight while on -[id number]   Plan:  Continue to -8cm  (water seal)  Keep CT in place until AL <20-40  PRN pain control  Encourage IS, OOB, ambulation     Medications:   Scheduled Meds:   Current Facility-Administered Medications:  acetaminophen 975 mg Oral [facility] Carlos Apple MD   docusate sodium 100 mg Oral BID Lucho Ada, PA-C   enoxaparin 40 mg Subcutaneous Daily Lucho Ada, PA-C   gabapentin 300 mg Oral TID Lucho Ada, PA-C   HYDROmorphone 0 5 mg Intravenous Q3H PRN Lucho Rubishop, PA-C   ondansetron 4 mg Intravenous Q6H PRN Lucho Ada, PA-C   oxyCODONE 5 mg Oral Q4H PRN Lucho Ada, PA-RIGO   pantoprazole 40 mg Oral Early Morning Lucho Ada, PA-C   polyethylene glycol 17 g Oral Daily Lucho Ada, PA-RIGO     Continuous Infusions:    PRN Meds: HYDROmorphone    ondansetron    oxyCODONE  Pertinent Labs/Diagnostic Results:   Age/Sex: 70 y o  female   Discharge Plan: [address] [place] [address]

## 2019-01-22 LAB
ANION GAP SERPL CALCULATED.3IONS-SCNC: 4 MMOL/L (ref 4–13)
BUN SERPL-MCNC: 18 MG/DL (ref 5–25)
CALCIUM SERPL-MCNC: 8.8 MG/DL (ref 8.3–10.1)
CHLORIDE SERPL-SCNC: 102 MMOL/L (ref 100–108)
CO2 SERPL-SCNC: 29 MMOL/L (ref 21–32)
CREAT SERPL-MCNC: 0.85 MG/DL (ref 0.6–1.3)
GFR SERPL CREATININE-BSD FRML MDRD: 67 ML/MIN/1.73SQ M
GLUCOSE SERPL-MCNC: 89 MG/DL (ref 65–140)
POTASSIUM SERPL-SCNC: 4.3 MMOL/L (ref 3.5–5.3)
SODIUM SERPL-SCNC: 135 MMOL/L (ref 136–145)

## 2019-01-22 PROCEDURE — 99232 SBSQ HOSP IP/OBS MODERATE 35: CPT | Performed by: INTERNAL MEDICINE

## 2019-01-22 PROCEDURE — 97530 THERAPEUTIC ACTIVITIES: CPT

## 2019-01-22 PROCEDURE — 97116 GAIT TRAINING THERAPY: CPT

## 2019-01-22 PROCEDURE — 99024 POSTOP FOLLOW-UP VISIT: CPT | Performed by: THORACIC SURGERY (CARDIOTHORACIC VASCULAR SURGERY)

## 2019-01-22 PROCEDURE — 80048 BASIC METABOLIC PNL TOTAL CA: CPT | Performed by: INTERNAL MEDICINE

## 2019-01-22 PROCEDURE — 94760 N-INVAS EAR/PLS OXIMETRY 1: CPT

## 2019-01-22 RX ADMIN — ENOXAPARIN SODIUM 40 MG: 40 INJECTION SUBCUTANEOUS at 09:46

## 2019-01-22 RX ADMIN — PANTOPRAZOLE SODIUM 40 MG: 40 TABLET, DELAYED RELEASE ORAL at 06:11

## 2019-01-22 RX ADMIN — POLYETHYLENE GLYCOL 3350 17 G: 17 POWDER, FOR SOLUTION ORAL at 09:47

## 2019-01-22 RX ADMIN — ACETAMINOPHEN 975 MG: 325 TABLET, FILM COATED ORAL at 13:45

## 2019-01-22 RX ADMIN — OXYCODONE HYDROCHLORIDE 5 MG: 5 TABLET ORAL at 06:14

## 2019-01-22 RX ADMIN — ACETAMINOPHEN 975 MG: 325 TABLET, FILM COATED ORAL at 22:11

## 2019-01-22 RX ADMIN — GABAPENTIN 300 MG: 300 CAPSULE ORAL at 09:46

## 2019-01-22 RX ADMIN — DOCUSATE SODIUM 100 MG: 100 CAPSULE, LIQUID FILLED ORAL at 18:20

## 2019-01-22 RX ADMIN — GABAPENTIN 300 MG: 300 CAPSULE ORAL at 18:20

## 2019-01-22 RX ADMIN — ACETAMINOPHEN 975 MG: 325 TABLET, FILM COATED ORAL at 06:11

## 2019-01-22 RX ADMIN — GABAPENTIN 300 MG: 300 CAPSULE ORAL at 22:11

## 2019-01-22 RX ADMIN — DOCUSATE SODIUM 100 MG: 100 CAPSULE, LIQUID FILLED ORAL at 09:45

## 2019-01-22 NOTE — PROGRESS NOTES
Progress Note - Thoracic Surgery   Linnette Mendoza 70 y o  female MRN: [id number]  Unit/Bed#: [facility] 431-01 Encounter: [id number]    Assessment:  69 yo F s/p RLL VATS resection for RLL mass    - CT 50 ml, current AL 0 but with spikes >800 in past 12 hrs  - CXR yesterday improved, diminished ptx    Plan: Will need to continue CT due to high AL spikes  May be able to use mini-express tube and dispo plan  Continue IS, pulm toilet, ambulation  Frequent re-orientation for confusion        Subjective/Objective   Chief Complaint:     Subjective: Confused this AM but has no complaints  Current AL 0 however there were several spikes >800 in the past 12 hours    Objective:     Blood pressure 139/65, pulse 72, temperature 97 7 °F (36 5 °C), temperature source Oral, resp  rate 16, height 5' 5" (1 651 m), weight 57 3 kg (126 lb 5 2 oz), SpO2 97 %, not currently breastfeeding  ,Body mass index is 21 02 kg/m²  Intake/Output Summary (Last 24 hours) at 01/22/19 0550  Last data filed at 01/22/19 0546   Gross per 24 hour   Intake             1060 ml   Output             1825 ml   Net             -765 ml       Invasive Devices     Peripheral Intravenous Line            Peripheral IV 01/20/19 Right Forearm 2 days          Drain            Chest Tube 1 Right 28 Fr  5 days                Physical Exam:   Gen: A&O, NAD  Cardio: RRR  Lungs: CTAB   CT -8 cm, no AL currently  Abd: Soft, non distended, non tender      Lab, Imaging and other studies:  CBC: No results found for: WBC, HGB, HCT, MCV, PLT, ADJUSTEDWBC, MCH, MCHC, RDW, MPV, NRBC, CMP:   Lab Results   Component Value Date    SODIUM 135 (L) 01/22/2019    K 4 3 01/22/2019     01/22/2019    CO2 29 01/22/2019    BUN 18 01/22/2019    CREATININE 0 85 01/22/2019    CALCIUM 8 8 01/22/2019    EGFR 67 01/22/2019   , Coagulation: No results found for: PT, INR, APTT  VTE Pharmacologic Prophylaxis: Enoxaparin (Lovenox)  VTE Mechanical Prophylaxis: sequential compression device

## 2019-01-22 NOTE — PROGRESS NOTES
NEPHROLOGY PROGRESS NOTE   Danny Rust 70 y o  female MRN: [id number]  Unit/Bed#: [facility] 431-01 Encounter: [id number]  Reason for Consult: CKD    ASSESSMENT AND PLAN:  CKD stage 3 with baseline creatinine 1 1 to 1 2  -creatinine 0 8 overall improved and stable   -avoid nephrotoxins or NSAIDs  -patient denies any obvious history of hypertension or diabetes  -urinalysis bland without hematuria or proteinuria  Exact etiology for CKD somewhat unclear, she may have some age-related nephron loss  Given bland urinalysis, doubt any intrinsic GN process  As long as renal function remains stable, no further intervention or invasive workup needed      Borderline hyponatremia, serum sodium 135 overall stable   -if dropping further, will do further evaluation   -would recommend 1 5 L moderate fluid restriction      Right-sided lung mass, status post VATS, lower lobectomy on 1/16/19  -surgery follow-up      Clinically seems euvolemic  Continue monitor hemoglobin overall stable      Stable from renal standpoint for discharge  We will sign off  Please call with any questions  Discussed with primary team     SUBJECTIVE:  Patient seen and examined at bedside  No chest pain, shortness of breath, nausea, vomiting, abdominal pain or diarrhea  No urinary complaints       OBJECTIVE:  Current Weight: Weight - Scale: 57 3 kg (126 lb 5 2 oz)  Vitals:    01/22/19 1125   BP: 131/67   Pulse: 78   Resp: 16   Temp: 99 2 °F (37 3 °C)   SpO2: 98%       Intake/Output Summary (Last 24 hours) at 01/22/19 1145  Last data filed at 01/22/19 2765   Gross per 24 hour   Intake             1360 ml   Output             1650 ml   Net             -290 ml       Physical Examination:  General:  Sitting in chair, no acute distress   Eyes:  No conjunctival pallor present  ENT:  External examination of ears and nose unremarkable  Neck:  Supple  Respiratory:  Bilateral air entry present  CVS:  S1, S2 present  GI:  Soft, nontender, nondistended  CNS:  Active alert oriented x3  Extremities:  No edema in lower extremities  Skin:  No new rash in legs    Medications:    Current Facility-Administered Medications:     acetaminophen (TYLENOL) tablet 975 mg, 975 mg, Oral, Q8H [address], Toby Lu MD, 975 mg at 01/22/19 1008    docusate sodium (COLACE) capsule 100 mg, 100 mg, Oral, BID, Jannette Rajan PA-C, 100 mg at 01/22/19 0945    enoxaparin (LOVENOX) subcutaneous injection 40 mg, 40 mg, Subcutaneous, Daily, Jannette Rajan PA-C, 40 mg at 01/22/19 0946    gabapentin (NEURONTIN) capsule 300 mg, 300 mg, Oral, TID, Jannette Rajan PA-C, 300 mg at 01/22/19 0946    HYDROmorphone (DILAUDID) injection 0 5 mg, 0 5 mg, Intravenous, Q3H PRN, Jannette Rajan PA-C    ondansetron (ZOFRAN) injection 4 mg, 4 mg, Intravenous, Q6H PRN, Jannette Rajan PA-C    oxyCODONE (ROXICODONE) IR tablet 5 mg, 5 mg, Oral, Q4H PRN, Jannette Rajan PA-C, 5 mg at 01/22/19 9124    pantoprazole (PROTONIX) EC tablet 40 mg, 40 mg, Oral, Early Morning, Patricia Page PA-C, 40 mg at 01/22/19 9040    polyethylene glycol (MIRALAX) packet 17 g, 17 g, Oral, Daily, Jannette Rajan PA-C, 17 g at 01/22/19 0656    Laboratory Results:    Results from last 7 days  Lab Units 01/22/19 01/20/19  0520 01/18/19  0435 01/17/19  0432 01/17/19  0038 01/17/19  0037 01/16/19  1759   WBC Thousand/uL  --   --  8 75 9 63  --   --  10 84*   HEMOGLOBIN g/dL  --   --  11 3* 11 6  --   --  13 0   HEMATOCRIT %  --   --  35 2 35 2  --   --  39 7   PLATELETS Thousands/uL  --   --  210 224  --  212 210   POTASSIUM mmol/L 4 3 4 4 3 9  --  4 0  --  3 9   CHLORIDE mmol/L 102 103 105  --  105  --  107   CO2 mmol/L 29 28 27  --  23  --  24   BUN mg/dL 18 17 25  --  19  --  20   CREATININE mg/dL 0 85 0 81 1 04  --  0 93  --  1 06   CALCIUM mg/dL 8 8 8 9 8 1*  --  8 3  --  8 6   MAGNESIUM mg/dL  --   --   --   --   --   --  2 1       Results for orders placed during the hospital encounter of 01/16/19   XR chest portable    Narrative CHEST INDICATION:   s/p lobectomy  COMPARISON:  Chest radiograph 1/17/2019    EXAM PERFORMED/VIEWS:  XR CHEST PORTABLE      FINDINGS:  There is a right-sided chest tube with tip directed towards the right lung apex  Persistent right-sided pneumothorax similar to prior exam     Cardiomediastinal silhouette appears unremarkable  Linear atelectasis versus scarring at the right lung base  No large effusion  Osseous structures appear within normal limits for patient age  There is subcutaneous emphysema in the right chest and right neck  Impression Persistent right-sided pneumothorax with right apical chest tube in place  Workstation performed: [id number]       Results for orders placed during the hospital encounter of 01/16/19   XR chest pa & lateral    Narrative CHEST     INDICATION:   persistent air leak  RIGHT SIDE CHEST TUBE, PERSISTENT AIR LEAK    COMPARISON:  1/19/2019; 11/18/2018; 10/21/2015    EXAM PERFORMED/VIEWS:  XR CHEST PA & LATERAL      FINDINGS:  Right-sided chest tube noted  Diminishing but persistent small right apical pneumothorax  Extensive right chest wall subcutaneous emphysema noted  Heart shadow appears unremarkable  Atherosclerotic vascular calcifications are noted  Right basilar opacification with blunting of the right costophrenic sulcus  Left basilar nodule measuring approximately 6 mm is similar to the prior chest CT from 2015  Osseous structures appear within normal limits for patient age  Impression 1  Diminishing, small right apical pneumothorax with right-sided chest tube and extensive chest wall emphysema redemonstrated  2   6 mm left lower lobe nodule is unchanged from prior chest CT from 2000  Workstation performed: [id number]       No results found for this or any previous visit  No results found for this or any previous visit  No results found for this or any previous visit    No results found for this or any previous visit  Portions of the record may have been created with voice recognition software  Occasional wrong word or "sound a like" substitutions may have occurred due to the inherent limitations of voice recognition software  Read the chart carefully and recognize, using context, where substitutions have occurred

## 2019-01-22 NOTE — PHYSICAL THERAPY NOTE
Physical Therapy Progress Note     01/22/19 1015   Pain Assessment   Pain Assessment No/denies pain   Pain Score No Pain   Hospital Pain Intervention(s) Repositioned; Ambulation/increased activity; Emotional support   Response to Interventions Good  Restrictions/Precautions   Other Precautions Chair Alarm;Cognitive; Fall Risk;Multiple lines  (Alarm active post session )   Subjective   Subjective The pt  states that she would like to go for a walk  Transfers   Sit to Stand 5  Supervision   Additional items Verbal cues; Impulsive   Stand to Sit 5  Supervision   Additional items Verbal cues   Ambulation/Elevation   Gait pattern Excessively slow; Inconsistent lissy   Gait Assistance 5  Supervision   Additional items Verbal cues   Assistive Device Rolling walker   Distance 220 feet, 200 feet, 150 feet x 2  Balance   Static Sitting Good   Dynamic Sitting Good   Static Standing Fair +   Ambulatory Fair   Activity Tolerance   Activity Tolerance Patient tolerated treatment well   Nurse Doris Kahn RN  Assessment   Prognosis Good   Problem List Decreased strength;Decreased endurance; Impaired balance;Decreased mobility; Decreased cognition;Decreased safety awareness   Assessment The pt  is progressing with her ambulatory distance, and she does have improving balance  She does require occasional redirection to task and frequent instruction during transfers as she impulsively stood twice during the session  She will benefit from continued physical therapy to maximize her balance, strength, and activity tolerance  Barriers to Discharge None   Goals   Patient Goals To go for a walk  STG Expiration Date 01/27/19   Treatment Day 3   Plan   Treatment/Interventions Functional transfer training;LE strengthening/ROM; Elevations; Therapeutic exercise; Endurance training;Patient/family training;Bed mobility;Gait training;Cognitive reorientation   Progress Progressing toward goals   PT Frequency (4-6x a week )   Recommendation Recommendation Home PT; Home with family support   Equipment Recommended Elliot Mayen   PT - OK to Discharge Yes     Francisco Lopez, PTA

## 2019-01-23 PROCEDURE — 97110 THERAPEUTIC EXERCISES: CPT

## 2019-01-23 PROCEDURE — 97116 GAIT TRAINING THERAPY: CPT

## 2019-01-23 PROCEDURE — 99024 POSTOP FOLLOW-UP VISIT: CPT | Performed by: THORACIC SURGERY (CARDIOTHORACIC VASCULAR SURGERY)

## 2019-01-23 RX ORDER — KETOROLAC TROMETHAMINE 30 MG/ML
15 INJECTION, SOLUTION INTRAMUSCULAR; INTRAVENOUS ONCE
Status: COMPLETED | OUTPATIENT
Start: 2019-01-23 | End: 2019-01-23

## 2019-01-23 RX ADMIN — ENOXAPARIN SODIUM 40 MG: 40 INJECTION SUBCUTANEOUS at 10:02

## 2019-01-23 RX ADMIN — ACETAMINOPHEN 975 MG: 325 TABLET, FILM COATED ORAL at 05:48

## 2019-01-23 RX ADMIN — POLYETHYLENE GLYCOL 3350 17 G: 17 POWDER, FOR SOLUTION ORAL at 10:02

## 2019-01-23 RX ADMIN — PANTOPRAZOLE SODIUM 40 MG: 40 TABLET, DELAYED RELEASE ORAL at 05:49

## 2019-01-23 RX ADMIN — GABAPENTIN 300 MG: 300 CAPSULE ORAL at 17:31

## 2019-01-23 RX ADMIN — DOCUSATE SODIUM 100 MG: 100 CAPSULE, LIQUID FILLED ORAL at 17:31

## 2019-01-23 RX ADMIN — GABAPENTIN 300 MG: 300 CAPSULE ORAL at 21:23

## 2019-01-23 RX ADMIN — KETOROLAC TROMETHAMINE 15 MG: 30 INJECTION, SOLUTION INTRAMUSCULAR at 11:25

## 2019-01-23 RX ADMIN — ACETAMINOPHEN 975 MG: 325 TABLET, FILM COATED ORAL at 21:23

## 2019-01-23 RX ADMIN — ACETAMINOPHEN 975 MG: 325 TABLET, FILM COATED ORAL at 13:08

## 2019-01-23 RX ADMIN — GABAPENTIN 300 MG: 300 CAPSULE ORAL at 10:03

## 2019-01-23 RX ADMIN — DOCUSATE SODIUM 100 MG: 100 CAPSULE, LIQUID FILLED ORAL at 10:03

## 2019-01-23 NOTE — PROGRESS NOTES
Progress Note - Thoracic Surgery   Woodlake Cords 70 y o  female MRN: [id number]  Unit/Bed#: [facility] 431-01 Encounter: [id number]    Assessment:  69 yo F s/p RLL VATS resection for RLL mass    - CT 50 ml, current AL 0-20 but with spike to >800 in past 4 hrs    Plan:  Diet Regular; Regular House; Fluid Restriction 1500 ML  Will need to continue CT due to high AL spikes  May be able to use mini-express tube and dispo plan  Continue IS, pulm toilet, ambulation      Subjective/Objective     Subjective:   Nothing acute overnight  Patient denies any complaints this morning  Current AL 0-20 however there was at least one spike >800 in the past 4 hours  Objective:   Blood pressure 146/65, pulse 70, temperature 98 2 °F (36 8 °C), temperature source Oral, resp  rate 18, height 5' 5" (1 651 m), weight 57 3 kg (126 lb 5 2 oz), SpO2 97 %, not currently breastfeeding  ,Body mass index is 21 02 kg/m²  Intake/Output Summary (Last 24 hours) at 01/23/19 0556  Last data filed at 01/23/19 0500   Gross per 24 hour   Intake             1820 ml   Output             1200 ml   Net              620 ml       Invasive Devices     Peripheral Intravenous Line            Peripheral IV 01/20/19 Right Forearm 3 days          Drain            Chest Tube 1 Right 28 Fr  6 days                Physical Exam:   Gen: NAD, A&O, Comfortable   Chest: Normal work of breathing, no resp distress  R CT: 50 cc/24hrs, -8 cm, AL 0-20 currently  Abd: S, ND, NT  Ext: No edema  Skin: warm, dry, intact      Lab, Imaging and other studies:  CBC: No results found for: WBC, HGB, HCT, MCV, PLT, ADJUSTEDWBC, MCH, MCHC, RDW, MPV, NRBC, CMP:   No results found for: SODIUM, K, CL, CO2, ANIONGAP, BUN, CREATININE, GLUCOSE, CALCIUM, AST, ALT, ALKPHOS, PROT, BILITOT, EGFR, Coagulation: No results found for: PT, INR, APTT   Procedure: Xr Chest Pa & Lateral  Result Date: 1/21/2019  Impression: 1    Diminishing, small right apical pneumothorax with right-sided chest tube and extensive chest wall emphysema redemonstrated  2   6 mm left lower lobe nodule is unchanged from prior chest CT from 2000      VTE Pharmacologic Prophylaxis: Enoxaparin (Lovenox)  VTE Mechanical Prophylaxis: sequential compression device

## 2019-01-23 NOTE — PHYSICAL THERAPY NOTE
Physical Therapy Progress Note     01/23/19 1550   Pain Assessment   Pain Assessment No/denies pain   Pain Score No Pain   Hospital Pain Intervention(s) Repositioned; Ambulation/increased activity; Emotional support   Response to Interventions Good  Restrictions/Precautions   Other Precautions Cognitive; Chair Alarm  (Alarm active post session )   Subjective   Subjective The pt  states that she is not sure if she walked earlier today or not  Transfers   Sit to Stand 5  Supervision   Additional items Impulsive;Verbal cues   Stand to Sit 5  Supervision   Additional items Verbal cues; Impulsive   Ambulation/Elevation   Gait pattern Excessively slow   Gait Assistance 5  Supervision   Additional items Verbal cues   Assistive Device Rolling walker   Distance 600 feet  Balance   Static Sitting Normal   Dynamic Sitting Normal   Static Standing Good   Ambulatory Fair   Activity Tolerance   Activity Tolerance Patient tolerated treatment well   Nurse Made Aware [address] , RN  Assessment   Prognosis Good   Problem List Decreased strength;Decreased endurance; Impaired balance;Decreased mobility; Decreased cognition;Decreased safety awareness   Assessment The pt  has improving activity tolerance, and she was able to ambulate without any rests today  She had no loss of balance even with frequent dynamic head and trunk movements  She does continue to demonstrate impulsivity with transfers as she attempts to transfer quickly prior to management of lines  Barriers to Discharge None   Goals   Patient Goals To read the newspaper  STG Expiration Date 01/27/19   Treatment Day 4   Plan   Treatment/Interventions Functional transfer training;LE strengthening/ROM; Therapeutic exercise; Endurance training;Elevations;Cognitive reorientation;Patient/family training;Bed mobility;Gait training   Progress Improving as expected   PT Frequency (4-6x a week )   Recommendation   Recommendation Home PT; Home with family support   Equipment Recommended Walker   PT - OK to Discharge Yes     Era Man, PTA    Additionally the pt   Completed TKR exercise seated 1 set x 20 repetitions: (ankle pumps, long-arc quads, gluteal sets, hip flexion, abduction, and adduction )

## 2019-01-23 NOTE — PLAN OF CARE
Present in room with Dr Isidro James to discuss plan of care to discharge home on Friday  No questions and concerns addressed from patient at this time

## 2019-01-23 NOTE — PLAN OF CARE
Problem: PHYSICAL THERAPY ADULT  Goal: Performs mobility at highest level of function for planned discharge setting  See evaluation for individualized goals  Treatment/Interventions: Functional transfer training, LE strengthening/ROM, Elevations, Therapeutic exercise, Endurance training, Equipment eval/education, Bed mobility, Gait training, Spoke to nursing, OT  Equipment Recommended: Estefania You (at this time)       See flowsheet documentation for full assessment, interventions and recommendations  Outcome: Adequate for Discharge  Prognosis: Good  Problem List: Decreased strength, Decreased endurance, Impaired balance, Decreased mobility, Decreased cognition, Decreased safety awareness  Assessment: The pt  has improving activity tolerance, and she was able to ambulate without any rests today  She had no loss of balance even with frequent dynamic head and trunk movements  She does continue to demonstrate impulsivity with transfers as she attempts to transfer quickly prior to management of lines  Barriers to Discharge: None     Recommendation: Home PT, Home with family support     PT - OK to Discharge: Yes    See flowsheet documentation for full assessment

## 2019-01-24 ENCOUNTER — APPOINTMENT (INPATIENT)
Dept: RADIOLOGY | Facility: HOSPITAL | Age: 79
DRG: 164 | End: 2019-01-24
Payer: COMMERCIAL

## 2019-01-24 PROCEDURE — 71046 X-RAY EXAM CHEST 2 VIEWS: CPT

## 2019-01-24 PROCEDURE — 99024 POSTOP FOLLOW-UP VISIT: CPT | Performed by: THORACIC SURGERY (CARDIOTHORACIC VASCULAR SURGERY)

## 2019-01-24 RX ADMIN — ACETAMINOPHEN 975 MG: 325 TABLET, FILM COATED ORAL at 06:24

## 2019-01-24 RX ADMIN — GABAPENTIN 300 MG: 300 CAPSULE ORAL at 17:55

## 2019-01-24 RX ADMIN — GABAPENTIN 300 MG: 300 CAPSULE ORAL at 08:57

## 2019-01-24 RX ADMIN — ENOXAPARIN SODIUM 40 MG: 40 INJECTION SUBCUTANEOUS at 08:57

## 2019-01-24 RX ADMIN — GABAPENTIN 300 MG: 300 CAPSULE ORAL at 21:19

## 2019-01-24 RX ADMIN — PANTOPRAZOLE SODIUM 40 MG: 40 TABLET, DELAYED RELEASE ORAL at 06:24

## 2019-01-24 RX ADMIN — DOCUSATE SODIUM 100 MG: 100 CAPSULE, LIQUID FILLED ORAL at 17:55

## 2019-01-24 RX ADMIN — ACETAMINOPHEN 975 MG: 325 TABLET, FILM COATED ORAL at 21:19

## 2019-01-24 RX ADMIN — ACETAMINOPHEN 975 MG: 325 TABLET, FILM COATED ORAL at 13:43

## 2019-01-24 NOTE — PROGRESS NOTES
Progress Note - Thoracic Surgery   Farooq Dawn 70 y o  female MRN: [id number]  Unit/Bed#: [facility] 431-01 Encounter: [id number]    Assessment:  71 yo F s/p RLL VATS resection for RLL mass    - CT 0 ml, current AL 0 this morning with 2 spikes to 100 in the past 12 hrs    Plan:  Diet Regular; Regular House; Fluid Restriction 1500 ML  Continue CT for AL spikes, trending down overall  Continue IS, pulm toilet, ambulation  PT      Subjective/Objective     Subjective:     No acute events overnight  I did receive a page early yesterday morning, spoke to a nurse who informed me that the patient's daughter would like update  I attempted to call the patient's daughter however the call due to voicemail  Patient did not have any complaints this morning  [place] unit was switched out yesterday  Of this morning there was 0 air leak   And only a couple max air leak spikes to 100  Objective:   Blood pressure 128/61, pulse 71, temperature 98 °F (36 7 °C), temperature source Oral, resp  rate 18, height 5' 5" (1 651 m), weight 58 3 kg (128 lb 8 5 oz), SpO2 97 %, not currently breastfeeding  ,Body mass index is 21 39 kg/m²        Intake/Output Summary (Last 24 hours) at 01/24/19 0504  Last data filed at 01/24/19 0300   Gross per 24 hour   Intake             1380 ml   Output              625 ml   Net              755 ml       Invasive Devices     Peripheral Intravenous Line            Peripheral IV 01/20/19 Right Forearm 3 days          Drain            Chest Tube 1 Right 28 Fr  7 days                Physical Exam:   Gen: NAD, A&O, Comfortable   Chest: Normal work of breathing, no resp distress  CT 0 ml, current AL 0 this morning with 2 spikes to 100 in the past 12 hrs  Abd: S, ND, NT  Ext: No edema  Skin: warm, dry, intact      Lab, Imaging and other studies:  CBC: No results found for: WBC, HGB, HCT, MCV, PLT, ADJUSTEDWBC, MCH, MCHC, RDW, MPV, NRBC, CMP:   No results found for: SODIUM, K, CL, CO2, ANIONGAP, BUN, CREATININE, GLUCOSE, CALCIUM, AST, ALT, ALKPHOS, PROT, BILITOT, EGFR, Coagulation: No results found for: PT, INR, APTT   Procedure: Xr Chest Pa & Lateral  Result Date: 1/21/2019  Impression: 1  Diminishing, small right apical pneumothorax with right-sided chest tube and extensive chest wall emphysema redemonstrated  2   6 mm left lower lobe nodule is unchanged from prior chest CT from 2000      VTE Pharmacologic Prophylaxis: Enoxaparin (Lovenox)  VTE Mechanical Prophylaxis: sequential compression device

## 2019-01-24 NOTE — PROGRESS NOTES
01/24/19    Procedure: Chest tube removal    Right chest tube removed in routine fashion without incident  The patient tolerated the procedure well  A dry, sterile dressing was placed  Will check a pa/lat chest x-ray       Willow Hines PA-C

## 2019-01-24 NOTE — SOCIAL WORK
Pt is recommended to have in-home skilled nursing and in-home PT, both via [place] [address] services for her aftercare plan  CM spoke w/ pt's daughter Vernel Heimlich (c: [phone]) to discuss recommendation  Daughter is agreeable  CM provided daughter w/ freedom of choice for [place] [address] providers  Daughter requested referral to [facility]  CM made Ecin referral to [facility]  CM to follow

## 2019-01-25 VITALS
HEIGHT: 65 IN | RESPIRATION RATE: 18 BRPM | OXYGEN SATURATION: 97 % | DIASTOLIC BLOOD PRESSURE: 63 MMHG | TEMPERATURE: 97.9 F | WEIGHT: 127.21 LBS | SYSTOLIC BLOOD PRESSURE: 127 MMHG | HEART RATE: 79 BPM | BODY MASS INDEX: 21.19 KG/M2

## 2019-01-25 PROCEDURE — 99024 POSTOP FOLLOW-UP VISIT: CPT | Performed by: THORACIC SURGERY (CARDIOTHORACIC VASCULAR SURGERY)

## 2019-01-25 RX ORDER — GABAPENTIN 300 MG/1
300 CAPSULE ORAL 3 TIMES DAILY
Qty: 39 CAPSULE | Refills: 0 | Status: SHIPPED | OUTPATIENT
Start: 2019-01-25 | End: 2019-07-19 | Stop reason: ALTCHOICE

## 2019-01-25 RX ORDER — OXYCODONE HYDROCHLORIDE 5 MG/1
5 TABLET ORAL EVERY 4 HOURS PRN
Qty: 30 TABLET | Refills: 0 | Status: SHIPPED | OUTPATIENT
Start: 2019-01-25 | End: 2019-02-04

## 2019-01-25 RX ORDER — DOCUSATE SODIUM 100 MG/1
100 CAPSULE, LIQUID FILLED ORAL 2 TIMES DAILY
Qty: 10 CAPSULE | Refills: 0 | Status: SHIPPED | OUTPATIENT
Start: 2019-01-25 | End: 2019-07-19 | Stop reason: ALTCHOICE

## 2019-01-25 RX ORDER — ACETAMINOPHEN 325 MG/1
TABLET ORAL
Qty: 84 TABLET | Refills: 0 | Status: SHIPPED | OUTPATIENT
Start: 2019-01-25 | End: 2019-07-19 | Stop reason: ALTCHOICE

## 2019-01-25 RX ADMIN — ENOXAPARIN SODIUM 40 MG: 40 INJECTION SUBCUTANEOUS at 09:17

## 2019-01-25 RX ADMIN — GABAPENTIN 300 MG: 300 CAPSULE ORAL at 09:17

## 2019-01-25 RX ADMIN — DOCUSATE SODIUM 100 MG: 100 CAPSULE, LIQUID FILLED ORAL at 09:17

## 2019-01-25 RX ADMIN — ACETAMINOPHEN 975 MG: 325 TABLET, FILM COATED ORAL at 05:57

## 2019-01-25 RX ADMIN — PANTOPRAZOLE SODIUM 40 MG: 40 TABLET, DELAYED RELEASE ORAL at 05:57

## 2019-01-25 NOTE — PROGRESS NOTES
Progress Note - Thoracic Surgery   Raya Listen 70 y o  female MRN: [id number]  Unit/Bed#: [facility] 431-01 Encounter: [id number]    Assessment:  71 yo F s/p RLL VATS resection for RLL mass    - Post pull CXR: Small apical R ptx    Plan:  Regular diet  PRN pain control w/ oral agents  May repeat CXR today to ensure stability of ptx s/p CT removal  Discharge today    Subjective/Objective   Chief Complaint:     Subjective: Having back pain this AM, difficult to get comfortable  CT removed last night without incident    Objective:     Blood pressure 126/60, pulse 75, temperature 97 8 °F (36 6 °C), temperature source Oral, resp  rate 20, height 5' 5" (1 651 m), weight 57 7 kg (127 lb 3 3 oz), SpO2 96 %, not currently breastfeeding  ,Body mass index is 21 17 kg/m²  Intake/Output Summary (Last 24 hours) at 01/25/19 0625  Last data filed at 01/25/19 0313   Gross per 24 hour   Intake             1400 ml   Output             2125 ml   Net             -725 ml       Invasive Devices     Peripheral Intravenous Line            Peripheral IV 01/24/19 Left Forearm less than 1 day                Physical Exam:   Gen: A&O, NAD  Cardio: RRR  Lungs: CTAB   Dressing to R chest c/d/i  Abd: Soft, non distended, non tender      Lab, Imaging and other studies:CBC: No results found for: WBC, HGB, HCT, MCV, PLT, ADJUSTEDWBC, MCH, MCHC, RDW, MPV, NRBC, CMP: No results found for: SODIUM, K, CL, CO2, ANIONGAP, BUN, CREATININE, GLUCOSE, CALCIUM, AST, ALT, ALKPHOS, PROT, BILITOT, EGFR  VTE Pharmacologic Prophylaxis: Heparin  VTE Mechanical Prophylaxis: sequential compression device

## 2019-01-25 NOTE — PLAN OF CARE
Present in room with Dr Alexa Maldonado to discuss plan of care to discharge home later today  Spoke with patient's daughter Bryce Davey at this time via telephone  Questions and concerns addressed via telephone at this time

## 2019-01-25 NOTE — RESTORATIVE TECHNICIAN NOTE
Restorative Specialist Mobility Note       Activity: Ambulate in trotter, Chair     Assistive Device: None

## 2019-01-25 NOTE — DISCHARGE SUMMARY
Discharge Summary - Thoracic Surgery   Clive Mtz 70 y o  female MRN: [id number]  Unit/Bed#: [facility] 431-01 Encounter: [id number]    Admission Date:   Admission Orders     Ordered        01/16/19 1716  Inpatient Admission  Once                Discharge Date: 1/25/19    Admitting Diagnosis: Lung nodule [R91 1]    Discharge Diagnosis: lung nodule     Resolved Problems  Date Reviewed: 1/16/2019    None          Attending: Dr Conway Deacon Physician(s): none    Procedures Performed: Right Lower lobectomy with mediastinal lymph node dissection , Mediastinoscopy     Pathology: 1  Procedure: Lobectomy   2  Specimen laterality: Right   3  Tumor     - Tumor site: Lower lobe of lung     - Tumor Size (pT2a): Greatest dimension 3 6 cm     - Tumor focality: Single tumor      - Histologic type: Invasive adenocarcinoma, acinar predominant     - Histologic grade (G1): Well-differentiated     - Visceral pleura invasion: Not identified     - Lymph-vascular invasion: Not identified     - Direct Invasion of Adjacent Structures: No adjacent structures present   4  Margins:     - All margins are uninvolved by tumor   5  Treatment effect: No known presurgical therapy   6  Lymph nodes (pN0):     - Number of lymph nodes examined: 16     - Number of lymph nodes involved: 0   7  Ancillary studies: Immunohistochemistry positive for TTF-1 in the tumor cells and negative for p40  VVG elastin stain demonstrates intact visceral pleura  8  8th Ed AJCC Tumor Stage:  at least Stage IB  - pT2a, pN0, G1  Hospital Course: Ms Warden Levine is a 70year old female who electively underwent RLLobectomy , MLND, and mediastinoscopy  on 1/16/19 for a suspicious lung nodule  After tolerating the procedure well, she was transferred to the PACU with a right sided chest tube  She was then transferred to [facility]  Her hospital stay was extended due to persistent air leak   On POD #8, her chest tube exhibited no air leak for over 8 hours, so the tube was removed  Post-pull CXR revealed a small right apical pneumothorax, stable from previous imaging  She stayed over night for observation, and she remained stable  She is ready for discharge today  Condition at Discharge: good     Discharge instructions/Information to patient and family:   See after visit summary for information provided to patient and family  Provisions for Follow-Up Care:  See after visit summary for information related to follow-up care and any pertinent home health orders  Disposition: Home        Planned Readmission: No    Discharge Statement   I spent 30 minutes discharging the patient  This time was spent on the day of discharge  I had direct contact with the patient on the day of discharge  Additional documentation is required if more than 30 minutes were spent on discharge  Discharge Medications:  See after visit summary for reconciled discharge medications provided to patient and family

## 2019-01-25 NOTE — PLAN OF CARE
DISCHARGE PLANNING     Discharge to home or other facility with appropriate resources Adequate for Discharge        DISCHARGE PLANNING - CARE MANAGEMENT     Discharge to post-acute care or home with appropriate resources Adequate for Discharge        INFECTION - ADULT     Absence or prevention of progression during hospitalization Adequate for Discharge     Absence of fever/infection during neutropenic period Adequate for Discharge        Knowledge Deficit     Patient/family/caregiver demonstrates understanding of disease process, treatment plan, medications, and discharge instructions Adequate for Discharge        PAIN - ADULT     Verbalizes/displays adequate comfort level or baseline comfort level Adequate for Discharge        Potential for Falls     Patient will remain free of falls Adequate for Discharge        Prexisting or High Potential for Compromised Skin Integrity     Skin integrity is maintained or improved Adequate for Discharge        SAFETY ADULT     Maintain or return to baseline ADL function Adequate for Discharge     Maintain or return mobility status to optimal level Adequate for Discharge     Patient will remain free of falls Adequate for Discharge

## 2019-01-25 NOTE — SOCIAL WORK
Pt is cleared for d/c by Thoracic Surgery REUBEN Kwok was notified of d/c order  Pt is accepted for services by [facility]  Pt will also receive a rolling walker from [facility] prior to d/c  The pt and her daughter Jose Sarah were both informed of d/c  Daughter will transport pt home later this day, pickup time TBD  IMM signed by pt  No chart copy required  CM to follow

## 2019-01-29 ENCOUNTER — HOSPITAL ENCOUNTER (OUTPATIENT)
Dept: RADIOLOGY | Facility: HOSPITAL | Age: 79
Discharge: HOME | End: 2019-01-29
Payer: COMMERCIAL

## 2019-01-29 DIAGNOSIS — R91.8 RIGHT LOWER LOBE LUNG MASS: ICD-10-CM

## 2019-01-29 PROCEDURE — 71046 X-RAY EXAM CHEST 2 VIEWS: CPT

## 2019-02-01 ENCOUNTER — TELEPHONE (OUTPATIENT)
Dept: CARDIAC SURGERY | Facility: CLINIC | Age: 79
End: 2019-02-01

## 2019-02-01 NOTE — TELEPHONE ENCOUNTER
Contacted Aurelia's daughter Divine Brooks [phone] she reports her mother is doing very well  She denies having pain, fever,cough or constipation  She has some SOB with stairs  Appetite is good  Visiting nurse has been out to check incisions  They're healing without redness, warmth or drainage  Ambulating well and using incentive spirometry  Reviewed post-op appointment for 2/4/19 @ 9:30 in [facility] office  They had no further questions or concerns at this time

## 2019-02-04 ENCOUNTER — OFFICE VISIT (OUTPATIENT)
Dept: CARDIAC SURGERY | Facility: CLINIC | Age: 79
End: 2019-02-04

## 2019-02-04 VITALS
TEMPERATURE: 97.9 F | OXYGEN SATURATION: 98 % | HEART RATE: 80 BPM | WEIGHT: 124.8 LBS | BODY MASS INDEX: 20.79 KG/M2 | SYSTOLIC BLOOD PRESSURE: 140 MMHG | DIASTOLIC BLOOD PRESSURE: 65 MMHG | HEIGHT: 65 IN

## 2019-02-04 DIAGNOSIS — C34.91 PRIMARY ADENOCARCINOMA OF RIGHT LUNG (HCC): Primary | ICD-10-CM

## 2019-02-04 PROCEDURE — 99024 POSTOP FOLLOW-UP VISIT: CPT | Performed by: THORACIC SURGERY (CARDIOTHORACIC VASCULAR SURGERY)

## 2019-02-04 NOTE — ASSESSMENT & PLAN NOTE
Ms Katelyn Collins is a very pleasant 33-year-old female who is known to me as she is status post VATS right lower lobectomy and mediastinal scope ER on 01/16/2019  Overall, I am very happy with Ms Jaime progress  Today in the office we discussed her pathology  She is a stage IB, T2a N0 M0  I explained to her that this means that she does not need any chemotherapy or radiation moving forward but will require regular surveillance and follow-up with me  At this time, she will come back and see me in 4 weeks for another postoperative visit  If she is doing well at that time, we will schedule her for CT scan 6 months from surgery      Luli Graham MD  Thoracic Surgery  (Available on [facility] FOR [facility] Text)

## 2019-02-04 NOTE — PROGRESS NOTES
Thoracic Follow-Up  Assessment/Plan:    Primary adenocarcinoma of right lung [facility])  Ms Liz Campbell is a very pleasant 53-year-old female who is known to me as she is status post VATS right lower lobectomy and mediastinal scope ER on 01/16/2019  Overall, I am very happy with Ms Jaime progress  Today in the office we discussed her pathology  She is a stage IB, T2a N0 M0  I explained to her that this means that she does not need any chemotherapy or radiation moving forward but will require regular surveillance and follow-up with me  At this time, she will come back and see me in 4 weeks for another postoperative visit  If she is doing well at that time, we will schedule her for CT scan 6 months from surgery  Eran Griffin MD  Thoracic Surgery  (Available on [employer])         There are no diagnoses linked to this encounter  Thoracic History     Procedure: 1/16/2019 VATS RLLobectomy and mediastinoscopy  Pathology: Invasive adenocarcinoma, Stage IB, [id number]  Treatment: resection, no adjuvant     Subjective:    Patient ID: Irving Santiago is a 70 y o  female  HPI  Ms Liz Campbell is a very pleasant 53-year-old female who is known to me as she is status post VATS right lower lobectomy and mediastinal scope ER on 01/16/2019  She presents today for her 1st postoperative visit  Her postoperative course was complicated by a prolonged air leak requiring the surgical chest tube to remain in place for 8 days after surgery  Once the chest tube was removed, she went home without complication  She reports today that she is doing well  She denies any shortness of breath and chest pain  She denies any fevers and chills  She is having normal bowel movements and her postoperative pain is well controlled and is not requiring any pain medication at this time  I personally reviewed her chest x-ray which demonstrates some fluid at the right costophrenic angle    This is a normal postoperative appearance after a large lobe is removed  The following portions of the patient's history were reviewed and updated as appropriate: allergies, current medications, past family history, past medical history, past social history, past surgical history and problem list     Review of Systems   Constitutional: Negative for activity change, chills, fever and unexpected weight change  HENT: Negative  Eyes: Negative  Negative for visual disturbance  Respiratory: Negative for cough, shortness of breath and stridor  Cardiovascular: Negative for chest pain  Gastrointestinal: Negative  Endocrine: Negative  Genitourinary: Negative  Musculoskeletal: Negative  Neurological: Negative for dizziness and headaches  Hematological: Negative for adenopathy  Psychiatric/Behavioral: Negative  Objective:   Physical Exam   Constitutional: She is oriented to person, place, and time  She appears well-developed and well-nourished  HENT:   Head: Normocephalic and atraumatic  Eyes: Pupils are equal, round, and reactive to light  Neck: Normal range of motion  No tracheal deviation present  Cardiovascular: Normal rate, regular rhythm and normal heart sounds  No murmur heard  Pulmonary/Chest: Effort normal and breath sounds normal  No stridor  No respiratory distress  She has no wheezes  She has no rales  She exhibits no tenderness  Incisions CDI and healing well   Abdominal: Soft  Bowel sounds are normal  She exhibits no distension  There is no tenderness  There is no rebound  Musculoskeletal: Normal range of motion  She exhibits no edema  Lymphadenopathy:     She has no cervical adenopathy  Neurological: She is alert and oriented to person, place, and time  Skin: Skin is warm and dry  No rash noted  She is not diaphoretic  No erythema  No pallor  Psychiatric: She has a normal mood and affect  Her behavior is normal  Judgment and thought content normal    Nursing note and vitals reviewed    /65 (BP Location: Right arm, Patient Position: Sitting, Cuff Size: Adult)   Pulse 80   Temp 97 9 °F (36 6 °C)   Ht 5' 5" (1 651 m)   Wt 56 6 kg (124 lb 12 8 oz)   SpO2 98%   BMI 20 77 kg/m²     Xr Chest Pa & Lateral    Result Date: 1/29/2019  Impression Trace right apical pneumothorax and moderate-sized right effusion No worsening seen Left lower lung nodule, stable Recent is known to have additional small nodules based on CT from November 18, 2018 which are not visible Workstation performed: [id number]     Xr Chest Pa & Lateral    Result Date: 1/24/2019  Impression Status post chest tube removal with small apical pneumothorax  Suspected right-sided pleural effusion status post lower lobectomy  Workstation performed: [id number]     Xr Chest Pa & Lateral    Result Date: 1/21/2019  Impression 1  Diminishing, small right apical pneumothorax with right-sided chest tube and extensive chest wall emphysema redemonstrated  2   6 mm left lower lobe nodule is unchanged from prior chest CT from 2000  Workstation performed: [id number]     Xr Chest Pa & Lateral    Result Date: 1/19/2019  Impression Stable postoperative changes, chest tube and stable mild right pneumothorax present  Increasing soft tissue chest wall emphysema  Workstation performed: [id number]     Xr Chest Pa & Lateral    Addendum Date: 11/6/2018 Addendum   ADDENDUM: Comparison to prior CT scan of the chest is obtained from outside facility 10/21/2015  The opacity noted in the right midlung field likely corresponds to a spiculated nodule seen on the CT scan in the superior segment of the right lower lobe  The craniocaudal dimension appears increased since the previous CT scan and therefore a repeat CT  scan is recommended  Result Date: 11/6/2018  Impression Unusual vertically oriented linear opacity in the right midlung field measuring 3 4 x 1 0 cm of uncertain etiology  CT scan of chest recommended for further evaluation   7 mm left lower lobe nodular density  The study was marked in EPIC for significant notification  Workstation performed: [id number]      No CT Chest results available for this patient  No CT Chest,Abdomen,Pelvis results available for this patient  Nm Pet Ct Skull Base To Mid Thigh    Result Date: 12/31/2018  Narrative PET/CT SCAN INDICATION:  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, bronchus and lung   abnormal chest CT, enlarging right lower lung nodule, history of left breast lumpectomy 11/13/2018, atypical ductal hyperplasia MODIFIER: PI COMPARISON: CT 11/18/2018 and priors CELL TYPE:  None TECHNIQUE:   8 2 mCi F-18-FDG administered IV  Multiplanar attenuation corrected and non attenuation corrected PET images are available for interpretation, and contiguous, low dose, axial CT sections were obtained from the skull base through the femurs   Intravenous contrast material was not utilized  This examination, like all CT scans performed in the [facility], was performed utilizing techniques to minimize radiation dose exposure, including the use of iterative reconstruction and automated exposure control  Fasting serum glucose: 84 mg/dl FINDINGS: VISUALIZED BRAIN:   No acute abnormalities are seen  HEAD/NECK: Minimal nodular right carotid activity, SUV 2 9, is nonspecific  No discrete CT abnormality  There is otherwise a physiologic distribution of FDG  No FDG avid cervical adenopathy is seen  CT images: Thyroid calcifications  CHEST: Enlarging 1 7 x 2 8 x 3 6 cm superior right lower lung nodule demonstrates mild FDG activity, SUV 2 3  This is most concerning for malignancy  Faint groundglass density in the superior right upper lung measuring approximately 9 mm, series 3 image 83 does not demonstrate appreciable FDG activity  Faint groundglass density left lower lung series 3 image 101, 4 mm left lower lung nodule image 106, and 5 mm left upper lung nodule image 85, are too small for accurate PET evaluation   Mild left breast activity, SUV 1 7, is likely inflammatory from recent lumpectomy  No additional FDG avid soft tissue lesions are seen  1 6 x 1 1 cm precarinal node does not demonstrate significant FDG activity, SUV 2 1, similar to background soft tissue activity  On the prior CT of 10/21/2015, this measured 1 7 x 1 1 cm  CT images: Biapical pleural parenchymal scarring  Left lower granuloma  ABDOMEN:   No FDG avid soft tissue lesions are seen  Left hepatic hypodensity without significant FDG activity  CT images: Unremarkable  PELVIS: No FDG avid soft tissue lesions are seen  CT images: Unremarkable  OSSEOUS STRUCTURES: No FDG avid lesions are seen  CT images: No significant findings  Impression 1  Enlarging 1 7 x 2 8 x 3 6 cm superior right lower lung nodule demonstrates mild FDG activity, SUV 2 3  This is most concerning for malignancy until proven otherwise  Tissue sampling recommended  No hypermetabolic hilar or mediastinal adenopathy  2   Minimal milder right parotid gland activity is nonspecific, without discrete CT abnormality  This may be reassessed on follow-up exam  3   No hypermetabolic metastases visualized in the abdomen, pelvis or osseous structures  4   Continued CT follow-up recommended for additional nodular lung densities  Workstation performed: [id number]      No Barium Swallow results available for this patient

## 2019-02-04 NOTE — LETTER
February 4, 2019     [address]  [address] [place] [address]    Patient: Francisco Varela   YOB: 1940   Date of Visit: 2/4/2019     Dear Dr Marley Tariq      Thank you for referring Fay Winklers to me for evaluation  Below are the relevant portions of my assessment and plan of care  Ms Yanci Price is a very pleasant 53-year-old female who is known to me as she is status post VATS right lower lobectomy and mediastinal scope ER on 01/16/2019  Overall, I am very happy with Ms Jaime progress  Today in the office we discussed her pathology  She is a stage IB, T2a N0 M0  I explained to her that this is a surgical cure and that this means that she does not need any chemotherapy or radiation moving forward but will require regular surveillance and follow-up with me  At this time, she will come back and see me in 4 weeks for another postoperative visit  If she is doing well at that time, we will schedule her for CT scan 6 months from surgery  Lexii Morris MD  Thoracic Surgery  (Available on [facility] Text)        If you have questions, please do not hesitate to call me  I look forward to following [facility] along with you           Sincerely,        Uziel Partida MD        CC: No Recipients    Progress Notes:

## 2019-02-12 ENCOUNTER — DOCUMENTATION (OUTPATIENT)
Dept: INFUSION CENTER | Facility: HOSPITAL | Age: 79
End: 2019-02-12

## 2019-02-12 NOTE — SOCIAL WORK
MSW received a Distress Thermometer from Surg Onc, where the pt self scored a 0 to indicate her level of stress  The pt did not alfred off  psychosocial problems  She did alfred off 3 out of 21 physical problem areas  Due to the pt's score, no further MSW intervention is warranted at this time  If the pt requests or is referred, a Cancer Counselor will be referred to her while she is receiving her oncology care

## 2019-03-07 ENCOUNTER — ONCOLOGY SURVIVORSHIP (OUTPATIENT)
Dept: CARDIAC SURGERY | Facility: CLINIC | Age: 79
End: 2019-03-07

## 2019-03-07 ENCOUNTER — OFFICE VISIT (OUTPATIENT)
Dept: CARDIAC SURGERY | Facility: CLINIC | Age: 79
End: 2019-03-07

## 2019-03-07 VITALS
DIASTOLIC BLOOD PRESSURE: 98 MMHG | SYSTOLIC BLOOD PRESSURE: 136 MMHG | HEART RATE: 85 BPM | OXYGEN SATURATION: 98 % | WEIGHT: 127.8 LBS | TEMPERATURE: 97.9 F | HEIGHT: 65 IN | BODY MASS INDEX: 21.29 KG/M2

## 2019-03-07 DIAGNOSIS — C34.91 PRIMARY ADENOCARCINOMA OF RIGHT LUNG (HCC): Primary | ICD-10-CM

## 2019-03-07 PROCEDURE — 99024 POSTOP FOLLOW-UP VISIT: CPT | Performed by: THORACIC SURGERY (CARDIOTHORACIC VASCULAR SURGERY)

## 2019-03-07 NOTE — ASSESSMENT & PLAN NOTE
Ms Marshal Wya presents to my office for a follow-up after her VATS RLLobectomy on 1/16/2019  Today in the office, I am very happy with her progress  She is doing very well after surgery and has recovered well  We discussed moving forward at this time with surveillance  I explained that she will undergo surveillance every 6 months with a chest CT for the 1st 2 years after surgery and then as long as everything remains stable, we will extend her surveillance to annual   She will follow up with me in the office in 6 months time with a chest CT      Enoch Pike MD  Thoracic Surgery  (Available on [employer] Text)  Office: [phone]

## 2019-03-07 NOTE — PROGRESS NOTES
Thoracic Follow-Up  Assessment/Plan:    No problem-specific Assessment & Plan notes found for this encounter  Diagnoses and all orders for this visit:    Primary adenocarcinoma of right lung [facility])  -     CT chest wo contrast; Future          Thoracic History     Thoracic History     Procedure: 1/16/2019 VATS RLLobectomy and mediastinoscopy  Pathology: Invasive adenocarcinoma, Stage IB, [id number]  Treatment: resection, no adjuvant         Subjective:    Patient ID: Kylah Rocha is a 70 y o  female  HPI  Ms Winter Morrison presents to my office for a follow-up after her VATS RLLobectomy  She is doing well  Reports that her energy is back to baseline  She reports some exertional SOB, but she had this pre-operatively also  She reports an occasional cough that she attributes to her PND  She denies having any pain and she is not taking any pain medications  The following portions of the patient's history were reviewed and updated as appropriate: allergies, current medications, past family history, past medical history, past social history, past surgical history and problem list     Review of Systems   Constitutional: Negative for chills, fatigue, fever and unexpected weight change  HENT: Positive for postnasal drip  Eyes: Negative  Negative for visual disturbance  Respiratory: Negative for cough, shortness of breath and stridor  Cardiovascular: Negative for chest pain  Gastrointestinal: Negative  Endocrine: Negative  Genitourinary: Negative  Musculoskeletal: Negative  Skin: Negative  Neurological: Negative for dizziness, light-headedness and headaches  Hematological: Negative for adenopathy  Psychiatric/Behavioral: Negative  Objective:   Physical Exam   Constitutional: She is oriented to person, place, and time  She appears well-developed and well-nourished  HENT:   Head: Normocephalic and atraumatic  Eyes: Pupils are equal, round, and reactive to light     Neck: Normal range of motion  No tracheal deviation present  Cardiovascular: Normal rate, regular rhythm and normal heart sounds  No murmur heard  Pulmonary/Chest: Effort normal and breath sounds normal  No stridor  No respiratory distress  She has no wheezes  She has no rales  She exhibits no tenderness  Incisions CDI and healing well   Abdominal: Soft  Bowel sounds are normal  She exhibits no distension  There is no tenderness  There is no rebound  Musculoskeletal: Normal range of motion  She exhibits no edema  Lymphadenopathy:     She has no cervical adenopathy  Neurological: She is alert and oriented to person, place, and time  Skin: Skin is warm and dry  No rash noted  She is not diaphoretic  No erythema  No pallor  Psychiatric: She has a normal mood and affect  Her behavior is normal  Judgment and thought content normal    Nursing note and vitals reviewed  /98 (BP Location: Left arm, Patient Position: Sitting, Cuff Size: Adult)   Pulse 85   Temp 97 9 °F (36 6 °C)   Ht 5' 5" (1 651 m)   Wt 58 kg (127 lb 12 8 oz)   SpO2 98%   BMI 21 27 kg/m²     Xr Chest Pa & Lateral    Result Date: 1/29/2019  Impression Trace right apical pneumothorax and moderate-sized right effusion No worsening seen Left lower lung nodule, stable Recent is known to have additional small nodules based on CT from November 18, 2018 which are not visible Workstation performed: [id number]     Xr Chest Pa & Lateral    Result Date: 1/24/2019  Impression Status post chest tube removal with small apical pneumothorax  Suspected right-sided pleural effusion status post lower lobectomy  Workstation performed: [id number]     Xr Chest Pa & Lateral    Result Date: 1/21/2019  Impression 1  Diminishing, small right apical pneumothorax with right-sided chest tube and extensive chest wall emphysema redemonstrated  2   6 mm left lower lobe nodule is unchanged from prior chest CT from 2000   Workstation performed: [id number]     Xr Chest Pa & Lateral    Result Date: 1/19/2019  Impression Stable postoperative changes, chest tube and stable mild right pneumothorax present  Increasing soft tissue chest wall emphysema  Workstation performed: [id number]     Xr Chest Pa & Lateral    Addendum Date: 11/6/2018 Addendum   ADDENDUM: Comparison to prior CT scan of the chest is obtained from outside facility 10/21/2015  The opacity noted in the right midlung field likely corresponds to a spiculated nodule seen on the CT scan in the superior segment of the right lower lobe  The craniocaudal dimension appears increased since the previous CT scan and therefore a repeat CT  scan is recommended  Result Date: 11/6/2018  Impression Unusual vertically oriented linear opacity in the right midlung field measuring 3 4 x 1 0 cm of uncertain etiology  CT scan of chest recommended for further evaluation  7 mm left lower lobe nodular density  The study was marked in EPIC for significant notification  Workstation performed: [id number]      No CT Chest results available for this patient  No CT Chest,Abdomen,Pelvis results available for this patient  Nm Pet Ct Skull Base To Mid Thigh    Result Date: 12/31/2018  Narrative PET/CT SCAN INDICATION:  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, bronchus and lung   abnormal chest CT, enlarging right lower lung nodule, history of left breast lumpectomy 11/13/2018, atypical ductal hyperplasia MODIFIER: PI COMPARISON: CT 11/18/2018 and priors CELL TYPE:  None TECHNIQUE:   8 2 mCi F-18-FDG administered IV  Multiplanar attenuation corrected and non attenuation corrected PET images are available for interpretation, and contiguous, low dose, axial CT sections were obtained from the skull base through the femurs   Intravenous contrast material was not utilized    This examination, like all CT scans performed in the [facility], was performed utilizing techniques to minimize radiation dose exposure, including the use of iterative reconstruction and automated exposure control  Fasting serum glucose: 84 mg/dl FINDINGS: VISUALIZED BRAIN:   No acute abnormalities are seen  HEAD/NECK: Minimal nodular right carotid activity, SUV 2 9, is nonspecific  No discrete CT abnormality  There is otherwise a physiologic distribution of FDG  No FDG avid cervical adenopathy is seen  CT images: Thyroid calcifications  CHEST: Enlarging 1 7 x 2 8 x 3 6 cm superior right lower lung nodule demonstrates mild FDG activity, SUV 2 3  This is most concerning for malignancy  Faint groundglass density in the superior right upper lung measuring approximately 9 mm, series 3 image 83 does not demonstrate appreciable FDG activity  Faint groundglass density left lower lung series 3 image 101, 4 mm left lower lung nodule image 106, and 5 mm left upper lung nodule image 85, are too small for accurate PET evaluation  Mild left breast activity, SUV 1 7, is likely inflammatory from recent lumpectomy  No additional FDG avid soft tissue lesions are seen  1 6 x 1 1 cm precarinal node does not demonstrate significant FDG activity, SUV 2 1, similar to background soft tissue activity  On the prior CT of 10/21/2015, this measured 1 7 x 1 1 cm  CT images: Biapical pleural parenchymal scarring  Left lower granuloma  ABDOMEN:   No FDG avid soft tissue lesions are seen  Left hepatic hypodensity without significant FDG activity  CT images: Unremarkable  PELVIS: No FDG avid soft tissue lesions are seen  CT images: Unremarkable  OSSEOUS STRUCTURES: No FDG avid lesions are seen  CT images: No significant findings  Impression 1  Enlarging 1 7 x 2 8 x 3 6 cm superior right lower lung nodule demonstrates mild FDG activity, SUV 2 3  This is most concerning for malignancy until proven otherwise  Tissue sampling recommended  No hypermetabolic hilar or mediastinal adenopathy  2   Minimal milder right parotid gland activity is nonspecific, without discrete CT abnormality    This may be reassessed on follow-up exam  3   No hypermetabolic metastases visualized in the abdomen, pelvis or osseous structures  4   Continued CT follow-up recommended for additional nodular lung densities  Workstation performed: [id number]      No Barium Swallow results available for this patient

## 2019-03-07 NOTE — PROGRESS NOTES
Survivorship treatment summary and care plan provided to patient and reviewed  All questions were answered and form signed  Copies made and sent to PCP on record and to [employer] right fax for scanning to be placed into electronic chart

## 2019-07-18 ENCOUNTER — HOSPITAL ENCOUNTER (OUTPATIENT)
Dept: CT IMAGING | Facility: CLINIC | Age: 79
Discharge: HOME | End: 2019-07-18
Payer: COMMERCIAL

## 2019-07-18 DIAGNOSIS — C34.91 PRIMARY ADENOCARCINOMA OF RIGHT LUNG (HCC): ICD-10-CM

## 2019-07-18 PROCEDURE — 71250 CT THORAX DX C-: CPT

## 2019-07-19 ENCOUNTER — OFFICE VISIT (OUTPATIENT)
Dept: CARDIAC SURGERY | Facility: CLINIC | Age: 79
End: 2019-07-19
Payer: COMMERCIAL

## 2019-07-19 VITALS
TEMPERATURE: 98 F | HEIGHT: 65 IN | SYSTOLIC BLOOD PRESSURE: 108 MMHG | HEART RATE: 76 BPM | OXYGEN SATURATION: 98 % | WEIGHT: 123 LBS | DIASTOLIC BLOOD PRESSURE: 60 MMHG | BODY MASS INDEX: 20.49 KG/M2

## 2019-07-19 DIAGNOSIS — Z08 ENCOUNTER FOR FOLLOW-UP SURVEILLANCE OF LUNG CANCER: Primary | ICD-10-CM

## 2019-07-19 DIAGNOSIS — Z85.118 ENCOUNTER FOR FOLLOW-UP SURVEILLANCE OF LUNG CANCER: Primary | ICD-10-CM

## 2019-07-19 DIAGNOSIS — Z85.118 HISTORY OF LUNG CANCER: ICD-10-CM

## 2019-07-19 DIAGNOSIS — R91.1 LUNG NODULE: ICD-10-CM

## 2019-07-19 PROCEDURE — 99214 OFFICE O/P EST MOD 30 MIN: CPT | Performed by: THORACIC SURGERY (CARDIOTHORACIC VASCULAR SURGERY)

## 2019-07-19 RX ORDER — DONEPEZIL HYDROCHLORIDE 10 MG/1
TABLET, FILM COATED ORAL
COMMUNITY
End: 2019-09-24

## 2019-07-19 RX ORDER — CETIRIZINE HYDROCHLORIDE 10 MG/1
10 TABLET ORAL DAILY
COMMUNITY
End: 2020-01-03 | Stop reason: ALTCHOICE

## 2019-07-19 NOTE — PROGRESS NOTES
Thoracic Follow-Up  Assessment/Plan:    History of lung cancer  Ms Kelly Shell is a very pleasant 17-year-old female who presents to my office for routine surveillance status post right VATS lower lobectomy on 01/16/2019  I personally reviewed her CT scan in PACS  She has multiple stable pulmonary nodules and no signs of cancer recurrence  At this time, we will continue surveillance on this patient  She will come back and see me in 6 months with a chest CT  Pk Mayorga MD  Thoracic Surgery  (Available on [employer])  Office: [phone]         Diagnoses and all orders for this visit:    Encounter for follow-up surveillance of lung cancer    History of lung cancer  -     CT chest wo contrast; Future    Lung nodule    Other orders  -     donepezil (ARICEPT) 10 mg tablet; donepezil 10 mg tablet  -     cetirizine (ZyrTEC) 10 mg tablet; Take 10 mg by mouth daily          Thoracic History    Procedure: 1/16/2019 VATS RLLobectomy and mediastinoscopy  Pathology: Invasive adenocarcinoma, 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, 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, Stage IB, [id number]  Treatment: resection, no adjuvant      Subjective:    Patient ID: Mary Ashley is a 70 y o  female  HPI  Ms Kelly Shell is a very pleasant 17-year-old female who presents to my office for routine surveillance status post right VATS lower lobectomy on 01/16/2019  Today in the office, she states that she is doing well  She is walking 1 to 1/2 miles every day and this is mostly up hill  She reports that she gets occasional shortness of breath at the [place] of the [place]  She denies any chest pain  She denies any fevers or chills  She denies any unexplained weight loss  She denies a cough  She is otherwise doing very well and recovered nicely from her surgery        The following portions of the patient's history were reviewed and updated as appropriate: allergies, current medications, past family history, past medical history, past social history, past surgical history and problem list     Review of Systems   Constitutional: Negative for chills, fatigue, fever and unexpected weight change  HENT: Negative  Eyes: Negative  Negative for visual disturbance  Respiratory: Negative for cough, chest tightness, shortness of breath, wheezing and stridor  Cardiovascular: Negative for chest pain  Gastrointestinal: Negative  Endocrine: Negative  Genitourinary: Negative  Musculoskeletal: Negative  Skin: Negative  Neurological: Negative for dizziness, light-headedness and headaches  Hematological: Negative for adenopathy  Psychiatric/Behavioral: Negative  Objective:   Physical Exam   Constitutional: She is oriented to person, place, and time  She appears well-developed and well-nourished  HENT:   Head: Normocephalic and atraumatic  Eyes: Pupils are equal, round, and reactive to light  Neck: Normal range of motion  No tracheal deviation present  Cardiovascular: Normal rate, regular rhythm and normal heart sounds  No murmur heard  Pulmonary/Chest: Effort normal and breath sounds normal  No stridor  No respiratory distress  She has no wheezes  She has no rales  She exhibits no tenderness  Well healed incisions   Abdominal: Soft  Bowel sounds are normal  She exhibits no distension  There is no tenderness  There is no rebound  Musculoskeletal: Normal range of motion  She exhibits no edema  Lymphadenopathy:     She has no cervical adenopathy  Neurological: She is alert and oriented to person, place, and time  Skin: Skin is warm and dry  No rash noted  She is not diaphoretic  No erythema  No pallor  Psychiatric: She has a normal mood and affect  Her behavior is normal  Judgment and thought content normal    Nursing note and vitals reviewed    /60 (BP Location: Left arm, Patient Position: Sitting, Cuff Size: Adult)   Pulse 76   Temp 98 °F (36 7 °C) (Oral)   Ht 5' 5" (1 651 m)   Wt 55 8 kg (123 lb)   SpO2 98%   BMI 20 47 kg/m² Xr Chest Pa & Lateral    Result Date: 1/29/2019  Impression Trace right apical pneumothorax and moderate-sized right effusion No worsening seen Left lower lung nodule, stable Recent is known to have additional small nodules based on CT from November 18, 2018 which are not visible Workstation performed: [id number]     Xr Chest Pa & Lateral    Result Date: 1/24/2019  Impression Status post chest tube removal with small apical pneumothorax  Suspected right-sided pleural effusion status post lower lobectomy  Workstation performed: [id number]     Xr Chest Pa & Lateral    Result Date: 1/21/2019  Impression 1  Diminishing, small right apical pneumothorax with right-sided chest tube and extensive chest wall emphysema redemonstrated  2   6 mm left lower lobe nodule is unchanged from prior chest CT from 2000  Workstation performed: [id number]     Xr Chest Pa & Lateral    Result Date: 1/19/2019  Impression Stable postoperative changes, chest tube and stable mild right pneumothorax present  Increasing soft tissue chest wall emphysema  Workstation performed: [id number]     Xr Chest Pa & Lateral    Addendum Date: 11/6/2018 Addendum   ADDENDUM: Comparison to prior CT scan of the chest is obtained from outside facility 10/21/2015  The opacity noted in the right midlung field likely corresponds to a spiculated nodule seen on the CT scan in the superior segment of the right lower lobe  The craniocaudal dimension appears increased since the previous CT scan and therefore a repeat CT  scan is recommended  Result Date: 11/6/2018  Impression Unusual vertically oriented linear opacity in the right midlung field measuring 3 4 x 1 0 cm of uncertain etiology  CT scan of chest recommended for further evaluation  7 mm left lower lobe nodular density  The study was marked in EPIC for significant notification   Workstation performed: [id number]      Ct Chest Wo Contrast    Result Date: 7/18/2019  Narrative CT CHEST WITHOUT IV CONTRAST INDICATION:   History of adenocarcinoma of the right lung  Reassessment  COMPARISON:  Outside institution chest CT 10/21/2015 TECHNIQUE: CT examination of the chest was performed without intravenous contrast   Axial, sagittal, and coronal 2D reformatted images were created from the source data and submitted for interpretation  Radiation dose length product (DLP) for this visit:  267 mGy-cm   This examination, like all CT scans performed in the [facility], was performed utilizing techniques to minimize radiation dose exposure, including the use of iterative reconstruction and automated exposure control  FINDINGS: LUNGS:  Left lung: Left lower lobe pulmonary nodule image 206S 71 measures 4 mm, unchanged from prior study  Left lower lobe nodule on image 206/87 contains central calcification and measures 7 mm, appears somewhat smaller in size than prior study  2 mm nodule  image 206/106 in the left lower lobe is unchanged  6 mm groundglass opacity in the left lower lobe on image 206S 60 is again unchanged  4 mm nodule left upper lobe image 206 was 34 is unchanged as is a very faint groundglass opacity in the medial left  upper lobe on image 206/30 4     Additional very faint groundglass densities are seen within the left upper lung field on image 32 and image 30 also likely unchanged  Right lung: Patient is status post right VATS lower lobectomy with resection of previously seen right lower lobe lesion  Anticipated postoperative changes are noted  Extremely faint groundglass density image 2/60 minutes 58 in the right upper lobe measuring 6 mm, unchanged  Groundglass opacity within the right upper lobe image 206/40, unchanged  Smaller more faint groundglass opacities are seen close to the apex for example on image 32, again without significant change  PLEURA:  Unremarkable  HEART/GREAT VESSELS:  Scattered coronary artery calcifications    No pericardial effusion MEDIASTINUM AND ETTA: Unremarkable  CHEST WALL AND LOWER NECK: Incidental discovery of one or more thyroid nodule(s) measuring less than 1 5 cm and without suspicious features is noted in this patient who is above 28years old; according to guidelines published in the February 2015 white paper on incidental thyroid nodules in the Journal of the [employer] of Radiology [facility]), no further evaluation is recommended  VISUALIZED STRUCTURES IN THE UPPER ABDOMEN:  Stable hypodense nodule in the medial segment of the left hepatic lobe  Right kidney 12 mm hyperdense nodule compatible with hemorrhagic cyst or cyst containing milk of calcium  OSSEOUS STRUCTURES:  No acute fracture or destructive osseous lesion  Impression 1  Status post right lower lobe VATS resection of lesion with anticipated postoperative changes in the right hemithorax 2  Multiple bilateral groundglass opacities as well as solid nodules without suspicious changes since 2015  Based on current Fleischner Society 2017 Guidelines on incidental pulmonary nodule, patients with a known malignancy are at increased risk of metastasis and should receive followup CT at intervals appropriate for the type of cancer and its risk of pulmonary metastases  3   No evidence of suspicious adenopathy, pleural effusion, or pericardial effusion Workstation performed: [id number]     No CT Chest,Abdomen,Pelvis results available for this patient  Nm Pet Ct Skull Base To Mid Thigh    Result Date: 12/31/2018  Narrative PET/CT SCAN INDICATION:  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, bronchus and lung   abnormal chest CT, enlarging right lower lung nodule, history of left breast lumpectomy 11/13/2018, atypical ductal hyperplasia MODIFIER: PI COMPARISON: CT 11/18/2018 and priors CELL TYPE:  None TECHNIQUE:   8 2 mCi F-18-FDG administered IV   Multiplanar attenuation corrected and non attenuation corrected PET images are available for interpretation, and contiguous, low dose, axial CT sections were obtained from the skull base through the femurs   Intravenous contrast material was not utilized  This examination, like all CT scans performed in the [facility], was performed utilizing techniques to minimize radiation dose exposure, including the use of iterative reconstruction and automated exposure control  Fasting serum glucose: 84 mg/dl FINDINGS: VISUALIZED BRAIN:   No acute abnormalities are seen  HEAD/NECK: Minimal nodular right carotid activity, SUV 2 9, is nonspecific  No discrete CT abnormality  There is otherwise a physiologic distribution of FDG  No FDG avid cervical adenopathy is seen  CT images: Thyroid calcifications  CHEST: Enlarging 1 7 x 2 8 x 3 6 cm superior right lower lung nodule demonstrates mild FDG activity, SUV 2 3  This is most concerning for malignancy  Faint groundglass density in the superior right upper lung measuring approximately 9 mm, series 3 image 83 does not demonstrate appreciable FDG activity  Faint groundglass density left lower lung series 3 image 101, 4 mm left lower lung nodule image 106, and 5 mm left upper lung nodule image 85, are too small for accurate PET evaluation  Mild left breast activity, SUV 1 7, is likely inflammatory from recent lumpectomy  No additional FDG avid soft tissue lesions are seen  1 6 x 1 1 cm precarinal node does not demonstrate significant FDG activity, SUV 2 1, similar to background soft tissue activity  On the prior CT of 10/21/2015, this measured 1 7 x 1 1 cm  CT images: Biapical pleural parenchymal scarring  Left lower granuloma  ABDOMEN:   No FDG avid soft tissue lesions are seen  Left hepatic hypodensity without significant FDG activity  CT images: Unremarkable  PELVIS: No FDG avid soft tissue lesions are seen  CT images: Unremarkable  OSSEOUS STRUCTURES: No FDG avid lesions are seen  CT images: No significant findings  Impression 1    Enlarging 1 7 x 2 8 x 3 6 cm superior right lower lung nodule demonstrates mild FDG activity, SUV 2 3  This is most concerning for malignancy until proven otherwise  Tissue sampling recommended  No hypermetabolic hilar or mediastinal adenopathy  2   Minimal milder right parotid gland activity is nonspecific, without discrete CT abnormality  This may be reassessed on follow-up exam  3   No hypermetabolic metastases visualized in the abdomen, pelvis or osseous structures  4   Continued CT follow-up recommended for additional nodular lung densities  Workstation performed: [id number]      No Barium Swallow results available for this patient

## 2019-07-19 NOTE — ASSESSMENT & PLAN NOTE
Ms Simon Campoverde is a very pleasant 27-year-old female who presents to my office for routine surveillance status post right VATS lower lobectomy on 01/16/2019  I personally reviewed her CT scan in PACS  She has multiple stable pulmonary nodules and no signs of cancer recurrence  At this time, we will continue surveillance on this patient  She will come back and see me in 6 months with a chest CT      Allen Luque MD  Thoracic Surgery  (Available on [employer])  Office: [phone]

## 2019-09-24 ENCOUNTER — CONSULT (OUTPATIENT)
Dept: NEUROLOGY | Facility: CLINIC | Age: 79
End: 2019-09-24
Payer: COMMERCIAL

## 2019-09-24 VITALS
SYSTOLIC BLOOD PRESSURE: 130 MMHG | BODY MASS INDEX: 20.99 KG/M2 | HEIGHT: 65 IN | WEIGHT: 126 LBS | DIASTOLIC BLOOD PRESSURE: 80 MMHG | HEART RATE: 74 BPM

## 2019-09-24 DIAGNOSIS — F02.80 ALZHEIMER'S DEMENTIA WITHOUT BEHAVIORAL DISTURBANCE, UNSPECIFIED TIMING OF DEMENTIA ONSET (HCC): Primary | ICD-10-CM

## 2019-09-24 DIAGNOSIS — G30.9 ALZHEIMER'S DEMENTIA WITHOUT BEHAVIORAL DISTURBANCE, UNSPECIFIED TIMING OF DEMENTIA ONSET (HCC): Primary | ICD-10-CM

## 2019-09-24 PROCEDURE — 99204 OFFICE O/P NEW MOD 45 MIN: CPT | Performed by: PSYCHIATRY & NEUROLOGY

## 2019-09-24 NOTE — PROGRESS NOTES
Sarahi David is a 70 y o  female presents today with complaints of memory difficulty    Assessment:  1  Alzheimer's dementia without behavioral disturbance, unspecified timing of dementia onset ([facility] [address] )        Plan:  Discontinue Aricept  Initiate Namzaric titrating up to full strength  Follow-up 3 months    Discussion:  Shavonne Little has dementia Alzheimer's type  She has been on Aricept for almost a year and tolerates it well  Have recommended adding Namenda in the formulation of Namzaric  If she has problems with the medicine he will notify me otherwise I will see her back in follow-up in 3 months      Subjective:    HPI  Zulma Sharpe is a right-handed woman who presents today with her daughter with the above complaints  She was not able to give me much of a history other than tell me that she has some memory difficulty  She could not tell me her correct age and told me that she moved up from [place] but could not tell me when or where she lived in [place]  Her daughter reports that her   about 6 years ago and she started to get phone calls from her mother's friends concerned about cognitive difficulties about a year so later  She also received phone calls from the bank that she was bouncing checks which did not happen  At that point her daughter brought her to live close to her (across the street)  When she got here she stop driving and turned her finances over to her daughter  Her daughter reports that she frequently asks the same question  She needs to be reminded to do things around the house  She states for a while she was not breathing for a week and was wearing the same clothes every day  She now has left reminders to remove her clothes and put them in the hamper every day and take a shower and this seems to be working  She also leaves reminders for her to prepare her breakfast and feet her dogs  Her daughter reports that she has lunch and dinner with her every day    She has become more withdrawn and is not interested in meeting new people  Sometimes she is a bit irritable  She will occasionally lose track of time and call her daughter confused about time of day  She was seen by another neurologist about a year ago and had blood work and an MRI done which were unremarkable and at that point was started on Aricept  She has been on that medicine since that time and tolerated it well  Past Medical History:   Diagnosis Date    Abnormal chest x-ray     Abnormal mammogram     Dementia     Early stage     Ductal hyperplasia of breast     Left     Hyperlipidemia     diet controled    Lung nodule     Right Lower Lobe        Family History:  Family History   Problem Relation Age of Onset    Prostate cancer Father     Hypertension Mother    Elle Birch Glaucoma Mother     Macular degeneration Mother        Past Surgical History:  Past Surgical History:   Procedure Laterality Date    BREAST LUMPECTOMY Left 11/13/2018    Procedure: BREAST LUMPECTOMY; BREAST NEEDLE LOCALIZATION (NEEDLE LOC AT 0830); Surgeon: Roseanne Todd MD;  Location[facility];  Service: Surgical Oncology    COLONOSCOPY      LAPAROSCOPY      MAMMO NEEDLE LOCALIZATION LEFT (ALL INC) Left 11/13/2018    MAMMO STEREOTACTIC BREAST BIOPSY LEFT (ALL INC) Left 10/16/2018    SC BRONCHOSCOPY,DIAGNOSTIC Right 1/16/2019    Procedure: BRONCHOSCOPY FLEXIBLE,;  Surgeon: Saint Cancer, MD;  [facility];  Service: Thoracic    SC MEDIASTINOSCOPY WITH LYMPH NODE BIOPSY/IES Right 1/16/2019    Procedure: MEDIASTINOSCOPY;  Surgeon: Saint Cancer, MD;  [facility];  Service: Thoracic    SC THORACOSCOPY SURG LOBECTOMY Right 1/16/2019    Procedure: LOBECTOMY LUNG;  Surgeon: Saint Cancer, MD;  [facility];  Service: Thoracic       Social History:   reports that she quit smoking about 3 years ago  She has a 60 00 pack-year smoking history   She has never used smokeless tobacco  She reports that she drinks about 7 0 standard drinks of alcohol per week  She reports that she does not use drugs  Allergies:  Patient has no known allergies  Current Outpatient Medications:     cetirizine (ZyrTEC) 10 mg tablet, Take 10 mg by mouth daily, Disp: , Rfl:     Memantine HCl-Donepezil HCl (NAMZARIC) 28-10 MG CP24, One p o  Q day, start after titration pack it, Disp: 30 capsule, Rfl: 5    Memantine HCl-Donepezil HCl (NAMZARIC) 7 & 14 & 21 &28 -10 MG C4PK, Take as directed, Disp: 1 each, Rfl: 0    I have reviewed the past medical, social and family history, current medications, allergies, vitals, review of systems and updated this information as appropriate today     Objective:    Vitals:  Blood pressure 130/80, pulse 74, height 5' 5" (1 651 m), weight 57 2 kg (126 lb), not currently breastfeeding  Physical Exam    Neurological Exam    GENERAL:  Cooperative in no acute distress  Well-developed and well-nourished    HEAD and NECK   Head is atraumatic normocephalic with no lesions or masses  Neck is supple with full range of motion    CARDIOVASCULAR  Carotid Arteries-no carotid bruits  NEUROLOGIC:  Mental Status-the patient is awake alert and oriented without aphasia or apraxia  She scored an being oriented 1/6 and recalling 0 of 5 objects after a few minutes  18+ 1/30 on Shubert  Cranial Nerves: Visual fields are full to confrontation  Discs are flat  Extraocular movements are full without nystagmus  Pupils are 2-1/2 mm and reactive  Face is symmetrical to light touch  Movements of facial expression move symmetrically  Hearing is normal to finger rub bilaterally  Soft palate lifts symmetrically  Shoulder shrug is symmetrical  Tongue is midline without atrophy  Motor: No drift is noted on arm extension  Strength is full in the upper and lower extremities with normal bulk and tone  Sensory: Intact to temperature and vibratory sensation in the upper and lower extremities bilaterally   Cortical function is intact  Coordination: Finger to nose testing is performed accurately  Romberg is remarkable for mild sway with eyes closed  Gait reveals a normal base with symmetrical arm swing  Tandem walk is normal   Reflexes:  1+ and symmetrical in the biceps triceps brachioradialis knee jerk and ankle jerk regions  Toes are downgoing            ROS:    Review of Systems   Constitutional: Negative  Negative for appetite change and fever  HENT: Negative  Negative for hearing loss, tinnitus, trouble swallowing and voice change  Eyes: Negative  Negative for photophobia and pain  Respiratory: Negative  Negative for shortness of breath  Cardiovascular: Negative  Negative for palpitations  Gastrointestinal: Negative  Negative for nausea and vomiting  Endocrine: Negative  Negative for cold intolerance and heat intolerance  Genitourinary: Negative  Negative for dysuria, frequency and urgency  Musculoskeletal: Negative  Negative for myalgias and neck pain  Skin: Negative  Negative for rash  Neurological: Negative  Negative for dizziness, tremors, seizures, syncope, facial asymmetry, speech difficulty, weakness, light-headedness, numbness and headaches  Hematological: Negative  Does not bruise/bleed easily  Psychiatric/Behavioral: Positive for decreased concentration  Negative for confusion, hallucinations and sleep disturbance  Patient states her memory is off

## 2019-09-30 ENCOUNTER — TELEPHONE (OUTPATIENT)
Dept: CARDIAC SURGERY | Facility: CLINIC | Age: 79
End: 2019-09-30

## 2019-09-30 NOTE — TELEPHONE ENCOUNTER
Pts daughter, Bob Landon, called in and stataed that her mom, the pt, is scheduled to have surgery on her elbow  They ordered a cxr and found that there is something showing up on the chest  Pts daughter would like for Dr Gold Mead to look at it and confirm its nothing to worry about  Pt had the cxr done at [facility] [address] in [place]  We have requested the imaging from them and will give the pts daughter a call back when we have it and have talked to Dr Gold Mead  Bob Landon can be reached at [phone]

## 2019-10-01 ENCOUNTER — TELEPHONE (OUTPATIENT)
Dept: NEUROLOGY | Facility: CLINIC | Age: 79
End: 2019-10-01

## 2019-10-01 NOTE — TELEPHONE ENCOUNTER
Patient's daughter states Mayra needs a PA  Submitted PA for titration pack (Key: [id number]) and maintenance dose (Key: [id number])  Awaiting determination

## 2019-10-02 DIAGNOSIS — G30.9 ALZHEIMER'S DEMENTIA WITHOUT BEHAVIORAL DISTURBANCE, UNSPECIFIED TIMING OF DEMENTIA ONSET (HCC): Primary | ICD-10-CM

## 2019-10-02 DIAGNOSIS — F02.80 ALZHEIMER'S DEMENTIA WITHOUT BEHAVIORAL DISTURBANCE, UNSPECIFIED TIMING OF DEMENTIA ONSET (HCC): Primary | ICD-10-CM

## 2019-10-02 RX ORDER — DONEPEZIL HYDROCHLORIDE 10 MG/1
10 TABLET, FILM COATED ORAL
Qty: 30 TABLET | Refills: 5 | Status: SHIPPED | OUTPATIENT
Start: 2019-10-02 | End: 2020-04-27 | Stop reason: SDUPTHER

## 2019-10-02 RX ORDER — MEMANTINE HYDROCHLORIDE 5 MG-10 MG
KIT ORAL
Qty: 1 PACKAGE | Refills: 0 | Status: SHIPPED | OUTPATIENT
Start: 2019-10-02 | End: 2019-10-07

## 2019-10-02 RX ORDER — MEMANTINE HYDROCHLORIDE 10 MG/1
10 TABLET ORAL 2 TIMES DAILY
Qty: 60 TABLET | Refills: 5 | Status: SHIPPED | OUTPATIENT
Start: 2019-10-02 | End: 2020-04-27 | Stop reason: SDUPTHER

## 2019-10-02 NOTE — TELEPHONE ENCOUNTER
Called and advised pt's dtr Shazia Lentz of the below  She verbalized clear understanding of all instructions

## 2019-10-02 NOTE — TELEPHONE ENCOUNTER
Prescription for Namenda titration pack and the 10 mg dose twice daily was sent to the pharmacy  Please notify patient of this and that she should restart Aricept with this medication    Thank you

## 2019-10-07 ENCOUNTER — TELEPHONE (OUTPATIENT)
Dept: NEUROLOGY | Facility: CLINIC | Age: 79
End: 2019-10-07

## 2019-10-07 ENCOUNTER — TELEPHONE (OUTPATIENT)
Dept: CARDIAC SURGERY | Facility: CLINIC | Age: 79
End: 2019-10-07

## 2019-10-07 DIAGNOSIS — G30.9 ALZHEIMER'S DEMENTIA WITHOUT BEHAVIORAL DISTURBANCE, UNSPECIFIED TIMING OF DEMENTIA ONSET (HCC): Primary | ICD-10-CM

## 2019-10-07 DIAGNOSIS — F02.80 ALZHEIMER'S DEMENTIA WITHOUT BEHAVIORAL DISTURBANCE, UNSPECIFIED TIMING OF DEMENTIA ONSET (HCC): Primary | ICD-10-CM

## 2019-10-07 RX ORDER — MEMANTINE HYDROCHLORIDE 5 MG/1
TABLET ORAL
Qty: 21 TABLET | Refills: 0 | Status: SHIPPED | OUTPATIENT
Start: 2019-10-07 | End: 2020-01-03 | Stop reason: DRUGHIGH

## 2019-10-07 NOTE — TELEPHONE ENCOUNTER
Patient's daughter Jazmine Cervantes stated the namenda titration pack is $95 and doesn't want to pay this much  She did  the second rx for 10 mg BID, this was only $40  She is questioning if she can cut the 10 mg tabs in half  Please advise    [phone]

## 2019-10-07 NOTE — TELEPHONE ENCOUNTER
Spoke with Aziza Rivero (patient's daughter) to let her know that we are still working on reviewing her mother's CXR  I let her know that we just received it this morning 10/7  Dr Kimberly Duckworth is out of the office until 10/14/19  Will have Dr Kimberly Duckworth review when she gets in and will contact the patient

## 2019-12-30 ENCOUNTER — HOSPITAL ENCOUNTER (OUTPATIENT)
Dept: CT IMAGING | Facility: HOSPITAL | Age: 79
Discharge: HOME | End: 2019-12-30
Attending: THORACIC SURGERY (CARDIOTHORACIC VASCULAR SURGERY)
Payer: COMMERCIAL

## 2019-12-30 DIAGNOSIS — Z85.118 HISTORY OF LUNG CANCER: ICD-10-CM

## 2019-12-30 PROCEDURE — 71250 CT THORAX DX C-: CPT

## 2020-01-03 ENCOUNTER — OFFICE VISIT (OUTPATIENT)
Dept: CARDIAC SURGERY | Facility: CLINIC | Age: 80
End: 2020-01-03
Payer: COMMERCIAL

## 2020-01-03 VITALS
HEART RATE: 76 BPM | HEIGHT: 65 IN | SYSTOLIC BLOOD PRESSURE: 142 MMHG | TEMPERATURE: 98.9 F | BODY MASS INDEX: 21.66 KG/M2 | OXYGEN SATURATION: 98 % | WEIGHT: 130 LBS | DIASTOLIC BLOOD PRESSURE: 82 MMHG

## 2020-01-03 DIAGNOSIS — Z85.118 HISTORY OF LUNG CANCER: Primary | ICD-10-CM

## 2020-01-03 DIAGNOSIS — R91.8 MULTIPLE LUNG NODULES: ICD-10-CM

## 2020-01-03 DIAGNOSIS — C34.91 PRIMARY ADENOCARCINOMA OF RIGHT LUNG (HCC): ICD-10-CM

## 2020-01-03 DIAGNOSIS — Z85.118 ENCOUNTER FOR FOLLOW-UP SURVEILLANCE OF LUNG CANCER: ICD-10-CM

## 2020-01-03 DIAGNOSIS — Z08 ENCOUNTER FOR FOLLOW-UP SURVEILLANCE OF LUNG CANCER: ICD-10-CM

## 2020-01-03 PROCEDURE — 99214 OFFICE O/P EST MOD 30 MIN: CPT | Performed by: THORACIC SURGERY (CARDIOTHORACIC VASCULAR SURGERY)

## 2020-01-03 NOTE — ASSESSMENT & PLAN NOTE
Ms Ashleigh Anne is a very pleasant 53-year-old female who is well known to me  She is status post VATS right lower lobectomy on 01/16/2019  She presents today in the office for routine surveillance  I personally reviewed her CT scan in PACS and agree with the findings  She demonstrates no signs of lung cancer recurrence  She has multiple other lung nodules that have been stable since 2015  We will continue to follow these  She is overall doing well  We discussed her shortness of breath and I explained to her that this is likely not related to the surgery since it has been about 1 year since her surgery  I recommended that she follow up with her primary care doctor in order to discuss her increasing shortness of breath  She will continue to follow up with me on a routine surveillance schedule    She will come back and see me in 6 months time with a CT scan of the chest     Rodolfo Flor MD  Thoracic Surgery  (Available on [employer] Text)  Office: [phone]

## 2020-01-03 NOTE — PROGRESS NOTES
Thoracic Follow-Up  Assessment/Plan:    History of lung cancer  Ms Mk Tomlinson is a very pleasant 61-year-old female who is well known to me  She is status post VATS right lower lobectomy on 01/16/2019  She presents today in the office for routine surveillance  I personally reviewed her CT scan in PACS and agree with the findings  She demonstrates no signs of lung cancer recurrence  She has multiple other lung nodules that have been stable since 2015  We will continue to follow these  She is overall doing well  We discussed her shortness of breath and I explained to her that this is likely not related to the surgery since it has been about 1 year since her surgery  I recommended that she follow up with her primary care doctor in order to discuss her increasing shortness of breath  She will continue to follow up with me on a routine surveillance schedule  She will come back and see me in 6 months time with a CT scan of the chest     Jonathon Stallings MD  Thoracic Surgery  (Available on [place] Text)  Office: [phone]      Multiple lung nodules  Her CT scan demonstrates multiple stable pulmonary nodules  We will continue to follow these with her surveillance CT scans  Diagnoses and all orders for this visit:    History of lung cancer  -     CT chest wo contrast; Future    Primary adenocarcinoma of right lung ([address] )    Encounter for follow-up surveillance of lung cancer    Multiple lung nodules          Thoracic History     Procedure: 1/16/2019 VATS RLLobectomy and mediastinoscopy  Pathology: Invasive adenocarcinoma, 3 6cm, 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, Stage IB, [id number]  Treatment: resection, no adjuvant      Subjective:    Patient ID: Murphy Salinas is a 70 y o  female  HPI  Ms Mk Tomlinson is a very pleasant 61-year-old female who is well known to me  She is status post VATS right lower lobectomy on 01/16/2019  She presents today in the office for routine surveillance    Today in the office, she states that she is doing pretty well  She has noticed that she has some increased shortness of breath over the past months  She states that this is mostly with exertion while walking up a flight of stairs  She denies any recent upper respiratory infection or pneumonia  She denies fevers or chills  She reports no unexplained weight loss  She reports a chronic cough that she has always had  The following portions of the patient's history were reviewed and updated as appropriate: allergies, current medications, past family history, past medical history, past social history, past surgical history and problem list     Review of Systems      Objective:   Physical ExamBP 142/82 (BP Location: Right arm, Patient Position: Sitting, Cuff Size: Adult)   Pulse 76   Temp 98 9 °F (37 2 °C) (Oral)   Ht 5' 5" (1 651 m)   Wt 59 kg (130 lb)   SpO2 98%   BMI 21 63 kg/m²     Xr Chest Pa & Lateral    Result Date: 1/29/2019  Impression Trace right apical pneumothorax and moderate-sized right effusion No worsening seen Left lower lung nodule, stable Recent is known to have additional small nodules based on CT from November 18, 2018 which are not visible Workstation performed: [id number]     Xr Chest Pa & Lateral    Result Date: 1/24/2019  Impression Status post chest tube removal with small apical pneumothorax  Suspected right-sided pleural effusion status post lower lobectomy  Workstation performed: [id number]     Xr Chest Pa & Lateral    Result Date: 1/21/2019  Impression 1  Diminishing, small right apical pneumothorax with right-sided chest tube and extensive chest wall emphysema redemonstrated  2   6 mm left lower lobe nodule is unchanged from prior chest CT from 2000  Workstation performed: [id number]     Xr Chest Pa & Lateral    Result Date: 1/19/2019  Impression Stable postoperative changes, chest tube and stable mild right pneumothorax present  Increasing soft tissue chest wall emphysema   Workstation performed: [id number]      Ct Chest Wo Contrast    Result Date: 1/2/2020  Narrative CT CHEST WITHOUT IV CONTRAST INDICATION:   Z85 118: Personal history of other malignant neoplasm of bronchus and lung  Right lower lobectomy on 1/16/2019 for invasive adenocarcinoma  Follow-up lung nodules  COMPARISON:  Chest CT from 7/18/2019 and 10/21/2015  PET CT from 12/31/2018  TECHNIQUE: CT examination of the chest was performed without intravenous contrast   Axial, sagittal, and coronal 2D reformatted images were created from the source data and submitted for interpretation  Radiation dose length product (DLP) for this visit:  210 mGy-cm   This examination, like all CT scans performed in the [facility], was performed utilizing techniques to minimize radiation dose exposure, including the use of iterative reconstruction and automated exposure control  FINDINGS: LUNGS:  Multiple bilateral subcentimeter solid and groundglass nodules (marked on series 3), more numerous on the left, unchanged since 2015 with the exception of a benign left lower lobe hamartoma which has developed central calcification over time  Right lower lobectomy with nothing to indicate recurrent tumor  No acute pulmonary disease  No significant filling defects in the trachea and bronchi  PLEURA:  Unremarkable  HEART/GREAT VESSELS:  Moderate coronary artery calcification indicating atherosclerotic heart disease  Normal heart size  No pericardial effusion  MEDIASTINUM AND ETTA:  Unremarkable  CHEST WALL AND LOWER NECK:   Multinodular goiter, unchanged since October 2015  VISUALIZED STRUCTURES IN THE UPPER ABDOMEN:  Hepatic cyst   Adenoma in the apex of the left adrenal gland, unchanged since October 2015  OSSEOUS STRUCTURES:  Mild degenerative disease in the spine  Impression No change since 2015 in several bilateral subcentimeter solid and groundglass nodules, more numerous on the left    Continued follow-up is recommended to exclude multifocal adenocarcinoma  Right lower lobectomy with nothing to indicate recurrent tumor  Workstation performed: [id number]     Ct Chest Wo Contrast    Result Date: 7/18/2019  Narrative CT CHEST WITHOUT IV CONTRAST INDICATION:   History of adenocarcinoma of the right lung  Reassessment  COMPARISON:  Outside institution chest CT 10/21/2015 TECHNIQUE: CT examination of the chest was performed without intravenous contrast   Axial, sagittal, and coronal 2D reformatted images were created from the source data and submitted for interpretation  Radiation dose length product (DLP) for this visit:  267 mGy-cm   This examination, like all CT scans performed in the [facility], was performed utilizing techniques to minimize radiation dose exposure, including the use of iterative reconstruction and automated exposure control  FINDINGS: LUNGS:  Left lung: Left lower lobe pulmonary nodule image 206S 71 measures 4 mm, unchanged from prior study  Left lower lobe nodule on image 206/87 contains central calcification and measures 7 mm, appears somewhat smaller in size than prior study  2 mm nodule  image 206/106 in the left lower lobe is unchanged  6 mm groundglass opacity in the left lower lobe on image 206S 60 is again unchanged  4 mm nodule left upper lobe image 206 was 34 is unchanged as is a very faint groundglass opacity in the medial left  upper lobe on image 206/30 4     Additional very faint groundglass densities are seen within the left upper lung field on image 32 and image 30 also likely unchanged  Right lung: Patient is status post right VATS lower lobectomy with resection of previously seen right lower lobe lesion  Anticipated postoperative changes are noted  Extremely faint groundglass density image 2/60 minutes 58 in the right upper lobe measuring 6 mm, unchanged  Groundglass opacity within the right upper lobe image 206/40, unchanged    Smaller more faint groundglass opacities are seen close to the apex for example on image 32, again without significant change  PLEURA:  Unremarkable  HEART/GREAT VESSELS:  Scattered coronary artery calcifications  No pericardial effusion MEDIASTINUM AND ETTA:  Unremarkable  CHEST WALL AND LOWER NECK: Incidental discovery of one or more thyroid nodule(s) measuring less than 1 5 cm and without suspicious features is noted in this patient who is above 28years old; according to guidelines published in the February 2015 white paper on incidental thyroid nodules in the Journal of the [employer] of Radiology [facility]), no further evaluation is recommended  VISUALIZED STRUCTURES IN THE UPPER ABDOMEN:  Stable hypodense nodule in the medial segment of the left hepatic lobe  Right kidney 12 mm hyperdense nodule compatible with hemorrhagic cyst or cyst containing milk of calcium  OSSEOUS STRUCTURES:  No acute fracture or destructive osseous lesion  Impression 1  Status post right lower lobe VATS resection of lesion with anticipated postoperative changes in the right hemithorax 2  Multiple bilateral groundglass opacities as well as solid nodules without suspicious changes since 2015  Based on current Fleischner Society 2017 Guidelines on incidental pulmonary nodule, patients with a known malignancy are at increased risk of metastasis and should receive followup CT at intervals appropriate for the type of cancer and its risk of pulmonary metastases  3   No evidence of suspicious adenopathy, pleural effusion, or pericardial effusion Workstation performed: [id number]     No CT Chest,Abdomen,Pelvis results available for this patient  No NM PET CT results available for this patient  No Barium Swallow results available for this patient  I personally reviewed all of the available and relevant imaging, including the most recent chest CT, in PACS and agree with the findings as included above

## 2020-01-03 NOTE — ASSESSMENT & PLAN NOTE
Her CT scan demonstrates multiple stable pulmonary nodules  We will continue to follow these with her surveillance CT scans

## 2020-01-14 ENCOUNTER — OFFICE VISIT (OUTPATIENT)
Dept: NEUROLOGY | Facility: CLINIC | Age: 80
End: 2020-01-14
Payer: COMMERCIAL

## 2020-01-14 VITALS
HEIGHT: 65 IN | SYSTOLIC BLOOD PRESSURE: 142 MMHG | HEART RATE: 70 BPM | WEIGHT: 130 LBS | BODY MASS INDEX: 21.66 KG/M2 | DIASTOLIC BLOOD PRESSURE: 90 MMHG

## 2020-01-14 DIAGNOSIS — F02.80 ALZHEIMER'S DEMENTIA WITHOUT BEHAVIORAL DISTURBANCE, UNSPECIFIED TIMING OF DEMENTIA ONSET (HCC): Primary | ICD-10-CM

## 2020-01-14 DIAGNOSIS — G30.9 ALZHEIMER'S DEMENTIA WITHOUT BEHAVIORAL DISTURBANCE, UNSPECIFIED TIMING OF DEMENTIA ONSET (HCC): Primary | ICD-10-CM

## 2020-01-14 PROCEDURE — 99213 OFFICE O/P EST LOW 20 MIN: CPT | Performed by: PSYCHIATRY & NEUROLOGY

## 2020-01-14 NOTE — PROGRESS NOTES
Renee Chairez is a 70 y o  female returns in follow-up with history of Alzheimer's disease    Assessment:  1  Alzheimer's dementia without behavioral disturbance, unspecified timing of dementia onset ([facility] [address] )        Plan:  Continue Aricept and Namenda  Follow-up 6 months    Discussion:  Pham Alexander is tolerated the addition of Namenda to her current dose of Aricept  Her daughter has noted in recent past some progression of illness including difficulties with some of her ADLs and some issues with paranoia  She understands that at some point she may need to be under full-time supervision  I will plan to see her back in follow-up in 6 months      Subjective:    HPI  Pham Alexander returns in follow-up today accompanied by her daughter  She has been able to tolerate the initiation of the Namenda to the Aricept without adverse effect  Her daughter reports over the last few months she has noted some changes  She states that her mother is a little more confused in the morning  She has difficulty dressing herself without some cues and has also been having some difficulty taking care of her housework including cleaning and laundry  At nighttime she becomes a bit paranoid and props things against the doors and tends to sleep in her living room on her couch because there 2 doors there and she feels that of someone comes in she could run out the other door        Past Medical History:   Diagnosis Date    Abnormal chest x-ray     Abnormal mammogram     Dementia (HCC)     Early stage     Ductal hyperplasia of breast     Left     Hyperlipidemia     diet controled    Lung nodule     Right Lower Lobe        Family History:  Family History   Problem Relation Age of Onset    Prostate cancer Father     Hypertension Mother    Lup[place] Drop Glaucoma Mother     Macular degeneration Mother     Heart attack Brother        Past Surgical History:  Past Surgical History:   Procedure Laterality Date    BREAST LUMPECTOMY Left 11/13/2018    Procedure: BREAST LUMPECTOMY; BREAST NEEDLE LOCALIZATION (NEEDLE LOC AT 0830); Surgeon: Lucas Shafer MD;  [facility];  Service: Surgical Oncology    COLONOSCOPY      LAPAROSCOPY      MAMMO NEEDLE LOCALIZATION LEFT (ALL INC) Left 11/13/2018    MAMMO STEREOTACTIC BREAST BIOPSY LEFT (ALL INC) Left 10/16/2018    HI BRONCHOSCOPY,DIAGNOSTIC Right 1/16/2019    Procedure: BRONCHOSCOPY FLEXIBLE,;  Surgeon: Jennifer Orellana MD;  [facility];  Service: Thoracic    HI MEDIASTINOSCOPY WITH LYMPH NODE BIOPSY/IES Right 1/16/2019    Procedure: MEDIASTINOSCOPY;  Surgeon: Jennifer Orellana MD;  [facility];  Service: Thoracic    HI THORACOSCOPY SURG LOBECTOMY Right 1/16/2019    Procedure: LOBECTOMY LUNG;  Surgeon: Jennifer Orellana MD;  [facility];  Service: Thoracic    [address] Right 10/10/2019    ULNAR NERVE TRANSPOSITION Right 10/10/2019       Social History:   reports that she quit smoking about 3 years ago  She has a 60 00 pack-year smoking history  She has never used smokeless tobacco  She reports that she drinks about 1 0 standard drinks of alcohol per week  She reports that she does not use drugs  Allergies:  Patient has no known allergies  Current Outpatient Medications:     donepezil (ARICEPT) 10 mg tablet, Take 1 tablet (10 mg total) by mouth daily at bedtime, Disp: 30 tablet, Rfl: 5    memantine (NAMENDA) 10 mg tablet, Take 1 tablet (10 mg total) by mouth 2 (two) times a day Start after titration package, Disp: 60 tablet, Rfl: 5    I have reviewed the past medical, social and family history, current medications, allergies, vitals, review of systems and updated this information as appropriate today     Objective:    Vitals:  Blood pressure 142/90, pulse 70, height 5' 5" (1 651 m), weight 59 kg (130 lb), not currently breastfeeding      Physical Exam    Neurological Exam  GENERAL:  Well-developed well-nourished woman in no acute distress  HEENT/NECK: Head is atraumatic normocephalic, neck is supple  NEUROLOGIC:  Mental Status: Awake and alert without aphasia  Cranial Nerves: Extraocular movements are full  Face is symmetrical  Coordination:  Gait is stable            ROS:    Review of Systems   Constitutional: Negative  Negative for appetite change and fever  HENT: Positive for rhinorrhea  Negative for hearing loss, tinnitus, trouble swallowing and voice change  Eyes: Negative  Negative for photophobia and pain  Respiratory: Positive for shortness of breath  Cardiovascular: Negative  Negative for palpitations  Gastrointestinal: Positive for diarrhea  Negative for nausea and vomiting  Endocrine: Negative  Negative for cold intolerance and heat intolerance  Genitourinary: Positive for urgency  Negative for dysuria, enuresis and frequency  Musculoskeletal: Negative  Negative for back pain, gait problem, myalgias and neck pain  Skin: Negative  Negative for rash  Neurological: Positive for dizziness, numbness (right hand) and headaches  Negative for tremors, seizures, syncope, facial asymmetry, speech difficulty, weakness and light-headedness  Hematological: Negative  Does not bruise/bleed easily  Psychiatric/Behavioral: Positive for agitation and confusion  Negative for hallucinations and sleep disturbance

## 2020-04-27 DIAGNOSIS — F02.80 ALZHEIMER'S DEMENTIA WITHOUT BEHAVIORAL DISTURBANCE, UNSPECIFIED TIMING OF DEMENTIA ONSET (HCC): ICD-10-CM

## 2020-04-27 DIAGNOSIS — G30.9 ALZHEIMER'S DEMENTIA WITHOUT BEHAVIORAL DISTURBANCE, UNSPECIFIED TIMING OF DEMENTIA ONSET (HCC): ICD-10-CM

## 2020-04-27 RX ORDER — DONEPEZIL HYDROCHLORIDE 10 MG/1
10 TABLET, FILM COATED ORAL
Qty: 30 TABLET | Refills: 5 | Status: SHIPPED | OUTPATIENT
Start: 2020-04-27 | End: 2020-04-30

## 2020-04-27 RX ORDER — MEMANTINE HYDROCHLORIDE 10 MG/1
10 TABLET ORAL 2 TIMES DAILY
Qty: 60 TABLET | Refills: 5 | Status: SHIPPED | OUTPATIENT
Start: 2020-04-27 | End: 2020-04-30

## 2020-04-30 DIAGNOSIS — F02.80 ALZHEIMER'S DEMENTIA WITHOUT BEHAVIORAL DISTURBANCE, UNSPECIFIED TIMING OF DEMENTIA ONSET (HCC): ICD-10-CM

## 2020-04-30 DIAGNOSIS — G30.9 ALZHEIMER'S DEMENTIA WITHOUT BEHAVIORAL DISTURBANCE, UNSPECIFIED TIMING OF DEMENTIA ONSET (HCC): ICD-10-CM

## 2020-04-30 RX ORDER — MEMANTINE HYDROCHLORIDE 10 MG/1
TABLET ORAL
Qty: 60 TABLET | Refills: 5 | Status: SHIPPED | OUTPATIENT
Start: 2020-04-30 | End: 2020-10-22 | Stop reason: SDUPTHER

## 2020-04-30 RX ORDER — DONEPEZIL HYDROCHLORIDE 10 MG/1
TABLET, FILM COATED ORAL
Qty: 30 TABLET | Refills: 5 | Status: SHIPPED | OUTPATIENT
Start: 2020-04-30 | End: 2020-10-22 | Stop reason: SDUPTHER

## 2020-06-30 ENCOUNTER — HOSPITAL ENCOUNTER (OUTPATIENT)
Dept: CT IMAGING | Facility: HOSPITAL | Age: 80
Discharge: HOME | End: 2020-06-30
Attending: THORACIC SURGERY (CARDIOTHORACIC VASCULAR SURGERY)
Payer: COMMERCIAL

## 2020-06-30 DIAGNOSIS — Z85.118 HISTORY OF LUNG CANCER: ICD-10-CM

## 2020-06-30 PROCEDURE — 71250 CT THORAX DX C-: CPT

## 2020-07-14 ENCOUNTER — OFFICE VISIT (OUTPATIENT)
Dept: NEUROLOGY | Facility: CLINIC | Age: 80
End: 2020-07-14
Payer: COMMERCIAL

## 2020-07-14 VITALS
DIASTOLIC BLOOD PRESSURE: 80 MMHG | HEART RATE: 68 BPM | WEIGHT: 128 LBS | BODY MASS INDEX: 21.33 KG/M2 | SYSTOLIC BLOOD PRESSURE: 128 MMHG | HEIGHT: 65 IN

## 2020-07-14 DIAGNOSIS — G30.9 ALZHEIMER'S DEMENTIA WITHOUT BEHAVIORAL DISTURBANCE, UNSPECIFIED TIMING OF DEMENTIA ONSET (HCC): Primary | ICD-10-CM

## 2020-07-14 DIAGNOSIS — F02.80 ALZHEIMER'S DEMENTIA WITHOUT BEHAVIORAL DISTURBANCE, UNSPECIFIED TIMING OF DEMENTIA ONSET (HCC): Primary | ICD-10-CM

## 2020-07-14 PROCEDURE — 99213 OFFICE O/P EST LOW 20 MIN: CPT | Performed by: PSYCHIATRY & NEUROLOGY

## 2020-07-14 RX ORDER — UMECLIDINIUM BROMIDE AND VILANTEROL TRIFENATATE 62.5; 25 UG/1; UG/1
POWDER RESPIRATORY (INHALATION)
COMMUNITY
Start: 2020-07-07 | End: 2021-05-04 | Stop reason: ALTCHOICE

## 2020-07-14 NOTE — PROGRESS NOTES
Villatoro Pamela is a 70 y o  female returns in follow-up today with history of dementia    Assessment:  1  Alzheimer's dementia without behavioral disturbance, unspecified timing of dementia onset ([facility] [address] )        Plan:  Continue Aricept and Namenda  Follow-up 6 months    Discussion:  Nereida Quintero is tolerated her medications without adverse effect and overall there has not been a significant decline  She will continue these medications in follow-up with me in 6 months      Subjective:    HPI  Nereida Quintero returns in follow-up today accompanied by her daughter  Her daughter reports that she is having no adverse effects from the medications  She states she has noticed a little bit of a decline cognitively  She is having a little more difficulty remembering how to do the laundry or make a cup of coffee  She tends to spend most of her time in 1 room of the house and sometimes gets a little confused if she wanders and other areas  She is also having difficulty picking out what she wants to wear but has no difficulty dressing herself  She continues to have some paranoia especially at nighttime where she blocks the doors that come into the but this does not persist through the day  Her COPD may have gotten a little bit worse but otherwise no new medical issues      Past Medical History:   Diagnosis Date    Abnormal chest x-ray     Abnormal mammogram     Dementia (HCC)     Early stage     Ductal hyperplasia of breast     Left     Hyperlipidemia     diet controled    Lung nodule     Right Lower Lobe        Family History:  Family History   Problem Relation Age of Onset    Prostate cancer Father     Hypertension Mother    Cipraino Ayersyovani Glaucoma Mother     Macular degeneration Mother     Heart attack Brother        Past Surgical History:  Past Surgical History:   Procedure Laterality Date    BREAST LUMPECTOMY Left 11/13/2018    Procedure: BREAST LUMPECTOMY; BREAST NEEDLE LOCALIZATION (NEEDLE LOC AT 0830);   Surgeon: Ella Sousa MD; [facility];  Service: Surgical Oncology    COLONOSCOPY      LAPAROSCOPY      MAMMO NEEDLE LOCALIZATION LEFT (ALL INC) Left 11/13/2018    MAMMO STEREOTACTIC BREAST BIOPSY LEFT (ALL INC) Left 10/16/2018    RI BRONCHOSCOPY,DIAGNOSTIC Right 1/16/2019    Procedure: BRONCHOSCOPY FLEXIBLE,;  Surgeon: Ld Villafana MD;  [facility];  Service: Thoracic    RI MEDIASTINOSCOPY WITH LYMPH NODE BIOPSY/IES Right 1/16/2019    Procedure: MEDIASTINOSCOPY;  Surgeon: Ld Villafana MD;  [facility];  Service: Thoracic    RI THORACOSCOPY SURG LOBECTOMY Right 1/16/2019    Procedure: LOBECTOMY LUNG;  Surgeon: Ld Villafana MD;  [facility];  Service: Thoracic    [address] 10/10/2019    ULNAR NERVE TRANSPOSITION Right 10/10/2019       Social History:   reports that she quit smoking about 4 years ago  She has a 60 00 pack-year smoking history  She has never used smokeless tobacco  She reports that she drinks about 1 0 standard drinks of alcohol per week  She reports that she does not use drugs  Allergies:  Patient has no known allergies  Current Outpatient Medications:     ANORO ELLIPTA 62 5-25 MCG/INH inhaler, INHALE 1 PUFF EVERY DAY AT THE SAME TIME EACH DAY, Disp: , Rfl:     donepezil (ARICEPT) 10 mg tablet, TAKE ONE TABLET BY MOUTH NIGHTLY AT BEDTIME , Disp: 30 tablet, Rfl: 5    memantine (NAMENDA) 10 mg tablet, TAKE ONE TABLET BY MOUTH TWICE DAILY  to start after titration package  , Disp: 60 tablet, Rfl: 5    I have reviewed the past medical, social and family history, current medications, allergies, vitals, review of systems and updated this information as appropriate today     Objective:    Vitals:  Blood pressure 128/80, pulse 68, height 5' 5" (1 651 m), weight 58 1 kg (128 lb), not currently breastfeeding      Physical Exam    Neurological Exam  GENERAL:  Well-developed well-nourished woman in no acute distress  HEENT/NECK: Head is atraumatic normocephalic, neck is supple  NEUROLOGIC:  Mental Status: Awake and alert without aphasia  She scored 29+ 1/30 on [place]  Cranial Nerves: Extraocular movements are full  Face is symmetrical  Coordination:  Gait is stable            ROS:    Review of Systems   Unable to perform ROS: Dementia   Constitutional: Negative  Negative for appetite change and fever  HENT: Negative  Negative for hearing loss, tinnitus, trouble swallowing and voice change  Eyes: Negative  Negative for photophobia and pain  Respiratory: Positive for shortness of breath  Cardiovascular: Negative  Negative for palpitations  Gastrointestinal: Negative  Negative for nausea and vomiting  Endocrine: Negative  Negative for cold intolerance  Genitourinary: Negative  Negative for dysuria, enuresis, frequency and urgency  Musculoskeletal: Positive for back pain (upon waking)  Negative for myalgias and neck pain  Skin: Negative  Negative for rash  Neurological: Positive for dizziness and numbness (right hand)  Negative for tremors, seizures, syncope, facial asymmetry, speech difficulty, weakness, light-headedness and headaches  Hematological: Negative  Does not bruise/bleed easily  Psychiatric/Behavioral: Positive for agitation, confusion, decreased concentration and dysphoric mood  Negative for hallucinations and sleep disturbance

## 2020-07-16 ENCOUNTER — TELEPHONE (OUTPATIENT)
Dept: CARDIAC SURGERY | Facility: CLINIC | Age: 80
End: 2020-07-16

## 2020-07-16 NOTE — TELEPHONE ENCOUNTER
Pt is screened for her apt  Pt does not have any symptoms and knows to wear a mask  Her daughter will be coming with her

## 2020-07-17 ENCOUNTER — OFFICE VISIT (OUTPATIENT)
Dept: CARDIAC SURGERY | Facility: CLINIC | Age: 80
End: 2020-07-17
Payer: COMMERCIAL

## 2020-07-17 VITALS
HEIGHT: 65 IN | HEART RATE: 84 BPM | SYSTOLIC BLOOD PRESSURE: 130 MMHG | WEIGHT: 130 LBS | RESPIRATION RATE: 18 BRPM | TEMPERATURE: 98.9 F | OXYGEN SATURATION: 98 % | DIASTOLIC BLOOD PRESSURE: 84 MMHG | BODY MASS INDEX: 21.66 KG/M2

## 2020-07-17 DIAGNOSIS — R91.8 MULTIPLE LUNG NODULES: ICD-10-CM

## 2020-07-17 DIAGNOSIS — Z08 ENCOUNTER FOR FOLLOW-UP SURVEILLANCE OF LUNG CANCER: ICD-10-CM

## 2020-07-17 DIAGNOSIS — Z85.118 HISTORY OF LUNG CANCER: Primary | ICD-10-CM

## 2020-07-17 DIAGNOSIS — Z85.118 ENCOUNTER FOR FOLLOW-UP SURVEILLANCE OF LUNG CANCER: ICD-10-CM

## 2020-07-17 DIAGNOSIS — R06.02 SOB (SHORTNESS OF BREATH) ON EXERTION: ICD-10-CM

## 2020-07-17 PROCEDURE — 99214 OFFICE O/P EST MOD 30 MIN: CPT | Performed by: THORACIC SURGERY (CARDIOTHORACIC VASCULAR SURGERY)

## 2020-07-17 NOTE — ASSESSMENT & PLAN NOTE
Ms Luis Chambers has a history of stage IB adenocarcinoma  She is status post VATS right lower lobectomy in January 2019  She is doing well at this time  She will continue to follow up with me for her routine surveillance  Her neck CT scan will be scheduled for 6 months from now  If that CT scan demonstrates stability, she will be 2 years out from her resection and she will space out her surveillance CT scans to annually      Seferino Spurling, MD  Thoracic Surgery  (Available on [place] Text)  Office: [phone]

## 2020-07-17 NOTE — ASSESSMENT & PLAN NOTE
We will continue to watch these lung nodules on her surveillance CT scans  They are all stable at this time

## 2020-07-17 NOTE — ASSESSMENT & PLAN NOTE
The patient states that she is seeing her primary care doctor next week to discuss her shortness of breath  I told her that I would place a referral to pulmonology in case she would like to see a physician that specializes in lung medicine

## 2020-10-22 DIAGNOSIS — F02.80 ALZHEIMER'S DEMENTIA WITHOUT BEHAVIORAL DISTURBANCE, UNSPECIFIED TIMING OF DEMENTIA ONSET (HCC): ICD-10-CM

## 2020-10-22 DIAGNOSIS — G30.9 ALZHEIMER'S DEMENTIA WITHOUT BEHAVIORAL DISTURBANCE, UNSPECIFIED TIMING OF DEMENTIA ONSET (HCC): ICD-10-CM

## 2020-10-22 RX ORDER — DONEPEZIL HYDROCHLORIDE 10 MG/1
10 TABLET, FILM COATED ORAL
Qty: 30 TABLET | Refills: 5 | Status: SHIPPED | OUTPATIENT
Start: 2020-10-22 | End: 2021-04-27

## 2020-10-22 RX ORDER — MEMANTINE HYDROCHLORIDE 10 MG/1
10 TABLET ORAL 2 TIMES DAILY
Qty: 60 TABLET | Refills: 5 | Status: SHIPPED | OUTPATIENT
Start: 2020-10-22 | End: 2021-05-25

## 2020-12-01 ENCOUNTER — ANNUAL EXAM (OUTPATIENT)
Dept: OBGYN CLINIC | Facility: CLINIC | Age: 80
End: 2020-12-01
Payer: COMMERCIAL

## 2020-12-01 VITALS
HEIGHT: 67 IN | SYSTOLIC BLOOD PRESSURE: 122 MMHG | WEIGHT: 130 LBS | DIASTOLIC BLOOD PRESSURE: 70 MMHG | BODY MASS INDEX: 20.4 KG/M2

## 2020-12-01 DIAGNOSIS — Z12.39 ENCOUNTER FOR SCREENING BREAST EXAMINATION: ICD-10-CM

## 2020-12-01 DIAGNOSIS — N95.2 VAGINAL ATROPHY: Primary | ICD-10-CM

## 2020-12-01 PROCEDURE — S0612 ANNUAL GYNECOLOGICAL EXAMINA: HCPCS | Performed by: OBSTETRICS & GYNECOLOGY

## 2020-12-01 RX ORDER — GABAPENTIN 100 MG/1
100 CAPSULE ORAL ONCE AS NEEDED
COMMUNITY
End: 2021-08-03

## 2020-12-01 RX ORDER — MECLIZINE HYDROCHLORIDE 25 MG/1
25 TABLET ORAL
COMMUNITY
Start: 2020-11-19 | End: 2021-08-03

## 2021-01-12 ENCOUNTER — HOSPITAL ENCOUNTER (OUTPATIENT)
Dept: CT IMAGING | Facility: CLINIC | Age: 81
Discharge: HOME | End: 2021-01-12
Payer: COMMERCIAL

## 2021-01-12 DIAGNOSIS — Z85.118 HISTORY OF LUNG CANCER: ICD-10-CM

## 2021-01-12 PROCEDURE — 71250 CT THORAX DX C-: CPT

## 2021-01-18 ENCOUNTER — OFFICE VISIT (OUTPATIENT)
Dept: NEUROLOGY | Facility: CLINIC | Age: 81
End: 2021-01-18
Payer: COMMERCIAL

## 2021-01-18 VITALS
HEART RATE: 66 BPM | WEIGHT: 130 LBS | BODY MASS INDEX: 21.66 KG/M2 | HEIGHT: 65 IN | SYSTOLIC BLOOD PRESSURE: 138 MMHG | DIASTOLIC BLOOD PRESSURE: 86 MMHG

## 2021-01-18 DIAGNOSIS — G30.9 ALZHEIMER'S DEMENTIA WITHOUT BEHAVIORAL DISTURBANCE, UNSPECIFIED TIMING OF DEMENTIA ONSET (HCC): Primary | ICD-10-CM

## 2021-01-18 DIAGNOSIS — F02.80 ALZHEIMER'S DEMENTIA WITHOUT BEHAVIORAL DISTURBANCE, UNSPECIFIED TIMING OF DEMENTIA ONSET (HCC): Primary | ICD-10-CM

## 2021-01-18 PROCEDURE — 99213 OFFICE O/P EST LOW 20 MIN: CPT | Performed by: PSYCHIATRY & NEUROLOGY

## 2021-01-18 RX ORDER — MAG HYDROX/ALUMINUM HYD/SIMETH 400-400-40
2000 SUSPENSION, ORAL (FINAL DOSE FORM) ORAL DAILY
COMMUNITY

## 2021-01-18 NOTE — PROGRESS NOTES
Fernando Keith is a 68 y o  female Returns in follow-up today with history of Alzheimer's disease    Assessment:  1  Alzheimer's dementia without behavioral disturbance, unspecified timing of dementia onset ([facility] [address] )        Plan:  Continue Aricept and Namenda   Follow-up 6 months    Discussion:  Overall Veto Levi has been fairly stable cognitively over the last 6 months  She will continue with the Aricept and Namenda and I will see her back in follow-up in 6 months      Subjective:    HPI   Veto Levi returns in follow-up today accompanied by her daughter  Her daughter reports that since here last there has been some subtle decline cognitively  She has some difficulty picking out what she is going to wear each day  She continues to do most of her tasks around the house, however needs some additional direction to get them done properly  She was having some symptoms of vertigo and this seems to be doing better with meclizine  Past Medical History:   Diagnosis Date    Abnormal chest x-ray     Abnormal mammogram     Dementia (HCC)     Early stage     Ductal hyperplasia of breast     Left     Hyperlipidemia     diet controled    Nodule of lower lobe of right lung     Right Lower Lobe        Family History:  Family History   Problem Relation Age of Onset    Prostate cancer Father     Hypertension Mother    Elijah Flower Glaucoma Mother     Macular degeneration Mother     Heart attack Brother        Past Surgical History:  Past Surgical History:   Procedure Laterality Date    BREAST LUMPECTOMY Left 11/13/2018    Procedure: BREAST LUMPECTOMY; BREAST NEEDLE LOCALIZATION (NEEDLE LOC AT 0830);   Surgeon: Trini Guidry MD;  [facility];  Service: Surgical Oncology    COLONOSCOPY      LAPAROSCOPY      MAMMO NEEDLE LOCALIZATION LEFT (ALL INC) Left 11/13/2018    MAMMO STEREOTACTIC BREAST BIOPSY LEFT (ALL INC) Left 10/16/2018    [place] BRONCHOSCOPY,DIAGNOSTIC Right 1/16/2019    Procedure: Familia Otto,;  Surgeon: Shayy Padilla MD;  [facility];  Service: Thoracic    GA MEDIASTINOSCOPY WITH LYMPH NODE BIOPSY/IES Right 1/16/2019    Procedure: MEDIASTINOSCOPY;  Surgeon: Shayy Padilla MD;  [facility];  Service: Thoracic    GA THORACOSCOPY SURG LOBECTOMY Right 1/16/2019    Procedure: LOBECTOMY LUNG;  Surgeon: Shayy Padilla MD;  [facility];  Service: Thoracic    [address] Right 10/10/2019    ULNAR NERVE TRANSPOSITION Right 10/10/2019       Social History:   reports that she quit smoking about 4 years ago  She has a 60 00 pack-year smoking history  She has never used smokeless tobacco  She reports current alcohol use of about 1 0 standard drinks of alcohol per week  She reports that she does not use drugs  Allergies:  Patient has no known allergies  Current Outpatient Medications:     ANORO ELLIPTA 62 5-25 MCG/INH inhaler, INHALE 1 PUFF EVERY DAY AT THE SAME TIME EACH DAY, Disp: , Rfl:     Cholecalciferol (Vitamin D3) 125 MCG (5000 UT) CAPS, Take by mouth daily, Disp: , Rfl:     donepezil (ARICEPT) 10 mg tablet, Take 1 tablet (10 mg total) by mouth daily at bedtime, Disp: 30 tablet, Rfl: 5    gabapentin (NEURONTIN) 100 mg capsule, Take 100 mg by mouth once as needed, Disp: , Rfl:     meclizine (ANTIVERT) 25 mg tablet, Take 25 mg by mouth daily at bedtime, Disp: , Rfl:     memantine (NAMENDA) 10 mg tablet, Take 1 tablet (10 mg total) by mouth 2 (two) times a day, Disp: 60 tablet, Rfl: 5      I have reviewed the past medical, social and family history, current medications, allergies, vitals, review of systems and updated this information as appropriate today     Objective:    Vitals:  Blood pressure 138/86, pulse 66, height 5' 5" (1 651 m), weight 59 kg (130 lb), not currently breastfeeding      Physical Exam    Neurological Exam   GENERAL:  Well-developed well-nourished woman in no acute distress  HEENT/NECK: Head is atraumatic normocephalic, neck is supple  NEUROLOGIC:  Mental Status: Awake and alert without aphasia  She scored a 17+ 1/30 on [place]  Cranial Nerves: Extraocular movements are full  Face is symmetrical  Coordination:  Gait is stable            ROS:    Review of Systems   Constitutional: Negative  Negative for appetite change and fever  HENT: Negative  Negative for hearing loss, tinnitus, trouble swallowing and voice change  Eyes: Negative  Negative for photophobia and pain  Respiratory: Negative  Negative for shortness of breath  Cardiovascular: Negative  Negative for palpitations  Gastrointestinal: Negative  Negative for nausea and vomiting  Endocrine: Negative  Negative for cold intolerance  Genitourinary: Negative  Negative for dysuria, frequency and urgency  Musculoskeletal: Negative  Negative for myalgias and neck pain  Skin: Negative  Negative for rash  Neurological: Negative  Negative for dizziness, tremors, seizures, syncope, facial asymmetry, speech difficulty, weakness, light-headedness, numbness and headaches  Hematological: Negative  Does not bruise/bleed easily  Psychiatric/Behavioral: Positive for confusion and decreased concentration  Negative for hallucinations and sleep disturbance  The patient is not nervous/anxious

## 2021-01-19 ENCOUNTER — OFFICE VISIT (OUTPATIENT)
Dept: CARDIAC SURGERY | Facility: CLINIC | Age: 81
End: 2021-01-19
Payer: COMMERCIAL

## 2021-01-19 VITALS
WEIGHT: 134 LBS | BODY MASS INDEX: 22.33 KG/M2 | HEART RATE: 78 BPM | SYSTOLIC BLOOD PRESSURE: 130 MMHG | TEMPERATURE: 97.8 F | DIASTOLIC BLOOD PRESSURE: 90 MMHG | RESPIRATION RATE: 16 BRPM | HEIGHT: 65 IN | OXYGEN SATURATION: 97 %

## 2021-01-19 DIAGNOSIS — Z85.118 HISTORY OF LUNG CANCER: Primary | ICD-10-CM

## 2021-01-19 DIAGNOSIS — R91.8 MULTIPLE LUNG NODULES: ICD-10-CM

## 2021-01-19 PROCEDURE — 1036F TOBACCO NON-USER: CPT | Performed by: THORACIC SURGERY (CARDIOTHORACIC VASCULAR SURGERY)

## 2021-01-19 PROCEDURE — 99214 OFFICE O/P EST MOD 30 MIN: CPT | Performed by: THORACIC SURGERY (CARDIOTHORACIC VASCULAR SURGERY)

## 2021-01-19 PROCEDURE — 1160F RVW MEDS BY RX/DR IN RCRD: CPT | Performed by: THORACIC SURGERY (CARDIOTHORACIC VASCULAR SURGERY)

## 2021-01-19 NOTE — PROGRESS NOTES
Thoracic Follow-Up  Assessment/Plan:    Multiple lung nodules  Multiple lung nodules that remains stable  We will continue to watch these on her CT scans for surveillance    History of lung cancer  Ms Nate Acosta is a very pleasant 79-year-old female who is well known to me  She underwent a VATS lobectomy 2 years ago  She presents today for routine surveillance  She is doing well  Her CT scan demonstrates stability and no signs of any recurrence  She will stay on her normal surveillance schedule  I will repeat a CT scan in 6 months and she will follow up with me at that time  We will plan to do 3 years total of CT scans at 6 month intervals  Hina Colon MD  Thoracic Surgery  (Available on [place] Text)  Office: [phone]         Diagnoses and all orders for this visit:    History of lung cancer  -     CT chest wo contrast; Future    Multiple lung nodules          Thoracic History     Procedure: 1/16/2019 VATS RLLobectomy and mediastinoscopy  Pathology: Invasive adenocarcinoma, 3 6cm, 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, Stage IB, [id number]  Treatment: resection, no adjuvant      Subjective:    Patient ID: Taylor Layne is a 68 y o  female  HPI  Ms Nate Acosta is a 79-year-old female who is well known to me  She underwent a VATS right lower lobectomy in January of 2019  She presents today for routine surveillance  Today in the office, she states that she is doing well  She denies any chest pain  She reports that she has shortness of breath and dyspnea on exertion but that this is stable and not worsening  She attributes this to her COPD  She has an occasional cough he  She denies any weight loss  She denies any recent fevers or chills or upper respiratory infection or pneumonia  She denies any seizures, syncope or new unexplained headaches  I personally reviewed her CT scan in PACS  This demonstrates stability and no signs of any recurrence of her lung cancer    She does have a few small nodules that have all been stable for multiple years    The following portions of the patient's history were reviewed and updated as appropriate: allergies, current medications, past family history, past medical history, past social history, past surgical history and problem list     Review of Systems   Constitutional: Negative for chills, fatigue, fever and unexpected weight change  HENT: Negative  Negative for sore throat  Eyes: Negative  Negative for visual disturbance  Respiratory: Positive for cough and shortness of breath  Negative for stridor  Cardiovascular: Negative for chest pain  Gastrointestinal: Negative  Endocrine: Negative  Genitourinary: Negative  Musculoskeletal: Negative  Skin: Negative  Neurological: Negative for dizziness, light-headedness and headaches  Hematological: Negative for adenopathy  Psychiatric/Behavioral: Negative  years    Objective:   Physical Exam  Vitals signs and nursing note reviewed  Constitutional:       General: She is not in acute distress  Appearance: Normal appearance  She is well-developed  She is not diaphoretic  HENT:      Head: Normocephalic and atraumatic  Nose: Nose normal  No congestion or rhinorrhea  Mouth/Throat:      Mouth: Mucous membranes are moist       Pharynx: Oropharynx is clear  No oropharyngeal exudate  Eyes:      General: No scleral icterus  Pupils: Pupils are equal, round, and reactive to light  Neck:      Musculoskeletal: Normal range of motion and neck supple  No muscular tenderness  Trachea: No tracheal deviation  Cardiovascular:      Rate and Rhythm: Normal rate and regular rhythm  Pulses: Normal pulses  Heart sounds: Normal heart sounds  No murmur  Pulmonary:      Effort: Pulmonary effort is normal  No respiratory distress  Breath sounds: Normal breath sounds  No stridor  No wheezing or rales  Chest:      Chest wall: No tenderness     Abdominal:      General: Bowel sounds are normal  There is no distension  Palpations: Abdomen is soft  Tenderness: There is no abdominal tenderness  There is no rebound  Musculoskeletal: Normal range of motion  Lymphadenopathy:      Cervical: No cervical adenopathy  Skin:     General: Skin is warm and dry  Coloration: Skin is not jaundiced or pale  Findings: No erythema or rash  Neurological:      General: No focal deficit present  Mental Status: She is alert and oriented to person, place, and time  Psychiatric:         Mood and Affect: Mood normal          Behavior: Behavior normal          Thought Content: Thought content normal          Judgment: Judgment normal      /90 (BP Location: Left arm, Patient Position: Sitting, Cuff Size: Standard)   Pulse 78   Temp 97 8 °F (36 6 °C)   Resp 16   Ht 5' 5" (1 651 m)   Wt 60 8 kg (134 lb)   SpO2 97%   BMI 22 30 kg/m²     No Chest XR results available for this patient  Ct Chest Wo Contrast    Result Date: 1/15/2021  Narrative CT CHEST WITHOUT IV CONTRAST INDICATION:   Z85 118: Personal history of other malignant neoplasm of bronchus and lung  History of lung cancer  Follow-up evaluation  COMPARISON:  6/30/2020; 12/30/2019 ; 10/21/2015 TECHNIQUE: CT examination of the chest was performed without intravenous contrast   Axial, sagittal, and coronal 2D reformatted images were created from the source data and submitted for interpretation  Radiation dose length product (DLP) for this visit:  279 mGy-cm   This examination, like all CT scans performed in the [facility], was performed utilizing techniques to minimize radiation dose exposure, including the use of iterative reconstruction and automated exposure control  FINDINGS: LUNGS:  The lungs is stable  Right lower lobectomy with associated mediastinal shift is unchanged    Rounded nodule with central popcorn calcifications measuring 0 8 cm in the left lower lobe image 79, series 206 is stable 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, retrospectively present on the prior 2015 CT with evolving/developing central benign-appearing calcifications  There are other small scattered groundglass opacities which are stable  A 0 3 cm left lower lobe pulmonary nodule image 65, series 206 is noted  PLEURA:  Unremarkable  HEART/GREAT VESSELS:  Atherosclerotic calcifications noted  Mediastinal shift to the right noted, expected postoperative changes in the patient with prior right lower lobectomy  MEDIASTINUM AND ETTA:  Unremarkable  CHEST WALL AND LOWER NECK:   Unremarkable  VISUALIZED STRUCTURES IN THE UPPER ABDOMEN:  Rounded hypodensity in the left lobe of liver is likely a cyst, similar to the 2019 CT, measuring approximately 0 9 cm on image 53, series 201  OSSEOUS STRUCTURES:  Mild spondylotic changes noted  Impression 1  Stable postoperative changes status post right lower lobectomy  2   Scattered groundglass and solid pulmonary nodules are stable  Continued clinical and imaging surveillance recommended  Workstation performed: [id number]     Ct Chest Wo Contrast    Result Date: 7/6/2020  Narrative CT CHEST WITHOUT IV CONTRAST INDICATION:  Z85 118: Personal history of other malignant neoplasm of bronchus and lung  COMPARISON:  CT chest 12/30/2019, 7/18/2019 and PET/CT 12/31/2018 TECHNIQUE: CT examination of the chest was performed without intravenous contrast   Axial, sagittal, and coronal 2D reformatted images were created from the source data and submitted for interpretation  Radiation dose length product (DLP) for this visit:  217 mGy-cm  This examination, like all CT scans performed in the [facility], was performed utilizing techniques to minimize radiation dose exposure, including the use of iterative reconstruction and automated exposure control  FINDINGS: Study is limited without IV contrast  LUNGS:  Stable right lower lobectomy  Anterior right upper lobe scarring, unchanged  Multiple bilateral subcentimeter solid and groundglass nodules, similar  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  No new nodules  No endobronchial lesions  PLEURA:  Mild biapical pleural thickening, unchanged  No pleural effusions  HEART/GREAT VESSELS:  The heart is normal size  Atherosclerotic changes thoracic aorta and coronary arteries  MEDIASTINUM AND ETTA:  Limited evaluation without IV contrast, stable  CHEST WALL AND LOWER NECK:   Multinodular thyroid with coarse calcifications, unchanged  VISUALIZED STRUCTURES IN THE UPPER ABDOMEN:  Small left hepatic lobe cyst, unchanged  Stable mild thickening of the left adrenal gland, likely benign  OSSEOUS STRUCTURES:  No acute fracture or destructive osseous lesion  Degenerative changes of the spine with scattered endplate Schmorl's nodes mid to lower thoracic spine  Impression Stable CT chest status post right lower lobectomy without evidence for local tumor recurrence  No change in several bilateral subcentimeter solid and groundglass nodules  Continued CT follow-up is recommended in 1 year  The study was marked in [facility] for follow-up  Workstation performed: [id number]     No CT Chest,Abdomen,Pelvis results available for this patient  No NM PET CT results available for this patient  No Barium Swallow results available for this patient

## 2021-01-19 NOTE — ASSESSMENT & PLAN NOTE
Multiple lung nodules that remains stable    We will continue to watch these on her CT scans for surveillance

## 2021-01-19 NOTE — ASSESSMENT & PLAN NOTE
Ms Krista Nur is a very pleasant 19-year-old female who is well known to me  She underwent a VATS lobectomy 2 years ago  She presents today for routine surveillance  She is doing well  Her CT scan demonstrates stability and no signs of any recurrence  She will stay on her normal surveillance schedule  I will repeat a CT scan in 6 months and she will follow up with me at that time  We will plan to do 3 years total of CT scans at 6 month intervals      Ella Modi MD  Thoracic Surgery  (Available on [place] Text)  Office: [phone]

## 2021-01-22 DIAGNOSIS — Z23 ENCOUNTER FOR IMMUNIZATION: ICD-10-CM

## 2021-01-28 ENCOUNTER — IMMUNIZATIONS (OUTPATIENT)
Dept: FAMILY MEDICINE CLINIC | Facility: HOSPITAL | Age: 81
End: 2021-01-28

## 2021-01-28 DIAGNOSIS — Z23 ENCOUNTER FOR IMMUNIZATION: Primary | ICD-10-CM

## 2021-01-28 PROCEDURE — 91301 SARS-COV-2 / COVID-19 MRNA VACCINE (MODERNA) 100 MCG: CPT

## 2021-01-28 PROCEDURE — 0011A SARS-COV-2 / COVID-19 MRNA VACCINE (MODERNA) 100 MCG: CPT

## 2021-02-08 ENCOUNTER — HOSPITAL ENCOUNTER (OUTPATIENT)
Dept: MAMMOGRAPHY | Facility: CLINIC | Age: 81
Discharge: HOME | End: 2021-02-08
Payer: COMMERCIAL

## 2021-02-08 VITALS — HEIGHT: 65 IN | WEIGHT: 134 LBS | BODY MASS INDEX: 22.33 KG/M2

## 2021-02-08 DIAGNOSIS — Z12.39 ENCOUNTER FOR SCREENING BREAST EXAMINATION: ICD-10-CM

## 2021-02-08 DIAGNOSIS — Z12.31 VISIT FOR SCREENING MAMMOGRAM: ICD-10-CM

## 2021-02-08 PROCEDURE — 77063 BREAST TOMOSYNTHESIS BI: CPT

## 2021-02-08 PROCEDURE — 77067 SCR MAMMO BI INCL CAD: CPT

## 2021-03-01 ENCOUNTER — IMMUNIZATIONS (OUTPATIENT)
Dept: FAMILY MEDICINE CLINIC | Facility: HOSPITAL | Age: 81
End: 2021-03-01

## 2021-03-01 DIAGNOSIS — Z23 ENCOUNTER FOR IMMUNIZATION: Primary | ICD-10-CM

## 2021-03-01 PROCEDURE — 0012A SARS-COV-2 / COVID-19 MRNA VACCINE (MODERNA) 100 MCG: CPT

## 2021-03-01 PROCEDURE — 91301 SARS-COV-2 / COVID-19 MRNA VACCINE (MODERNA) 100 MCG: CPT

## 2021-03-24 ENCOUNTER — TELEPHONE (OUTPATIENT)
Dept: CARDIOLOGY CLINIC | Facility: CLINIC | Age: 81
End: 2021-03-24

## 2021-03-24 ENCOUNTER — CONSULT (OUTPATIENT)
Dept: PULMONOLOGY | Facility: CLINIC | Age: 81
End: 2021-03-24
Payer: COMMERCIAL

## 2021-03-24 VITALS
WEIGHT: 134 LBS | TEMPERATURE: 97.7 F | BODY MASS INDEX: 22.3 KG/M2 | OXYGEN SATURATION: 99 % | HEART RATE: 72 BPM | DIASTOLIC BLOOD PRESSURE: 80 MMHG | SYSTOLIC BLOOD PRESSURE: 124 MMHG

## 2021-03-24 DIAGNOSIS — J41.0 SIMPLE CHRONIC BRONCHITIS (HCC): ICD-10-CM

## 2021-03-24 DIAGNOSIS — C34.91 PRIMARY ADENOCARCINOMA OF RIGHT LUNG (HCC): ICD-10-CM

## 2021-03-24 DIAGNOSIS — R06.02 SOB (SHORTNESS OF BREATH) ON EXERTION: Primary | ICD-10-CM

## 2021-03-24 DIAGNOSIS — R91.1 LUNG NODULE, SOLITARY: ICD-10-CM

## 2021-03-24 PROCEDURE — 1036F TOBACCO NON-USER: CPT | Performed by: INTERNAL MEDICINE

## 2021-03-24 PROCEDURE — 99204 OFFICE O/P NEW MOD 45 MIN: CPT | Performed by: INTERNAL MEDICINE

## 2021-03-24 RX ORDER — PREDNISONE 1 MG/1
5 TABLET ORAL DAILY
COMMUNITY
End: 2021-04-15

## 2021-03-24 NOTE — PROGRESS NOTES
Assessment/Plan:     Diagnoses and all orders for this visit:    SOB (shortness of breath) on exertion  -     Ambulatory referral to Pulmonology  -     Complete PFT with post Bronchodilator and Six Minute walk; Future  -     Echo complete with contrast if indicated; Future  -     Ambulatory referral to Cardiology; Future    Simple chronic bronchitis (HCC)    Primary adenocarcinoma of right lung (HCC)    Lung nodule, solitary    Other orders  -     predniSONE 5 mg tablet; Take 5 mg by mouth daily         shortness of breath in this patient with 60 pack-year smoking history who quit in 2016 and also diagnosed with stage I B lung cancer status post VATS lobectomy having surveillance CT chest for being followed up by thoracic surgery  Shortness of breath and dyspnea on exertion likely multifactorial secondary to COPD,? Pulmonary hypertension and rule out cardiac etiology given her age and risk factors  She did have a PFT done in 2018 prior to the surgery in which the spirometry was normal but will repeat it with a 6 minutes walk test     She has been on Anoro 1 puff once a day I did ask her to continue that for now  She does not have a rescue inhaler  Also will get an echocardiogram to see if there is any evidence of pulmonary hypertension   Also placed in an referral for cardiology for   Ruling out the cardiac etiology for the shortness of breath    Follow-up with thoracic surgery as scheduled for surveillance for the lung cancer  Will see her back in 6 weeks or p r n  earlier as needed  Return in about 6 weeks (around 5/5/2021)  All questions are answered to the patient's satisfaction and understanding  She verbalizes understanding  She is encouraged to call with any further questions or concerns  Portions of the record may have been created with voice recognition software    Occasional wrong word or "sound a like" substitutions may have occurred due to the inherent limitations of voice recognition software  Read the chart carefully and recognize, using context, where substitutions have occurred  a    Electronically Signed by Kal Murphy MD    ______________________________________________________________________    Chief Complaint:   Chief Complaint   Patient presents with    COPD    Shortness of Breath        Patient ID: Marzella Kawasaki is a 68 y o  y o  female has a past medical history of Abnormal chest x-ray, Abnormal mammogram, COPD (chronic obstructive pulmonary disease) ([facility] [address] ), Dementia ([facility] [address] ), Ductal hyperplasia of breast, Hyperlipidemia, Nodule of lower lobe of right lung, SOB (shortness of breath), SOB (shortness of breath), and Wheezing     3/24/2021  Patient presents today for initial visit  Marzella Kawasaki is a very pleasant 77-year-old lady former smoker with 60 pack-year smoking history who quit in 2016, diagnosed with stage I B lung cancer in 2018 status post VATS lobectomy and being followed up by thoracic surgery is with surveillance CT chest and has been doing well      she does not remember if she was diagnosed with COPD but has been on Anoro 1 puff daily and has been continuing to take it she does not have any rescue inhaler  Daughter here with the patient for the appointment  Stating she does have shortness of breath and does have sent puff sometimes, also has some cough does not bring phlegm, and does not wheeze      Occupational/Exposure history: retired  Pets/Enviroment: dogs at home  Travel history: none  Review of Systems   Constitutional: Negative  HENT: Negative  Eyes: Negative  Respiratory: Positive for cough and shortness of breath  Cardiovascular: Negative  Gastrointestinal: Negative  Endocrine: Negative  Genitourinary: Negative  Musculoskeletal: Negative  Allergic/Immunologic: Negative  Neurological: Negative  Psychiatric/Behavioral: Negative  Social history: She reports that she quit smoking about 4 years ago   She started smoking about 63 years ago  She has a 60 00 pack-year smoking history  She has never used smokeless tobacco  She reports current alcohol use of about 1 0 standard drinks of alcohol per week  She reports that she does not use drugs  Past surgical history:   Past Surgical History:   Procedure Laterality Date    BREAST BIOPSY Left 10/12/2018    BREAST CYST EXCISION Left 10/31/2018    BREAST LUMPECTOMY Left 11/13/2018    Procedure: BREAST LUMPECTOMY; BREAST NEEDLE LOCALIZATION (NEEDLE LOC AT 0830);   Surgeon: Sushma Fuentes MD;  [facility];  Service: Surgical Oncology    COLONOSCOPY      LAPAROSCOPY      MAMMO NEEDLE LOCALIZATION LEFT (ALL INC) Left 11/13/2018    MAMMO STEREOTACTIC BREAST BIOPSY LEFT (ALL INC) Left 10/16/2018    [place] BRONCHOSCOPY,DIAGNOSTIC Right 1/16/2019    Procedure: BRONCHOSCOPY FLEXIBLE,;  Surgeon: Shawn Montanez MD;  [facility];  Service: Thoracic    [place] MEDIASTINOSCOPY WITH LYMPH NODE BIOPSY/IES Right 1/16/2019    Procedure: MEDIASTINOSCOPY;  Surgeon: Shawn Montanez MD;  [facility];  Service: Thoracic    IL THORACOSCOPY SURG LOBECTOMY Right 1/16/2019    Procedure: LOBECTOMY LUNG;  Surgeon: Shawn Montanez MD;  [facility];  Service: Thoracic    TRIGGER FINGER RELEASE Right 10/10/2019    ULNAR NERVE TRANSPOSITION Right 10/10/2019     Family history:   Family History   Problem Relation Age of Onset    Prostate cancer Father     Hypertension Mother     Glaucoma Mother     Macular degeneration Mother     Heart attack Brother        Immunization History   Administered Date(s) Administered    SARS-CoV-2 / COVID-19 mRNA IM (Moderna) 01/28/2021, 03/01/2021     Current Outpatient Medications   Medication Sig Dispense Refill    ANORO ELLIPTA 62 5-25 MCG/INH inhaler INHALE 1 PUFF EVERY DAY AT THE SAME TIME EACH DAY      Cholecalciferol (Vitamin D3) 125 MCG (5000 UT) CAPS Take by mouth daily      donepezil (ARICEPT) 10 mg tablet Take 1 tablet (10 mg total) by mouth daily at bedtime 30 tablet 5    gabapentin (NEURONTIN) 100 mg capsule Take 100 mg by mouth once as needed      meclizine (ANTIVERT) 25 mg tablet Take 25 mg by mouth daily at bedtime      memantine (NAMENDA) 10 mg tablet Take 1 tablet (10 mg total) by mouth 2 (two) times a day 60 tablet 5    predniSONE 5 mg tablet Take 5 mg by mouth daily       No current facility-administered medications for this visit  Allergies: Patient has no known allergies  Objective:  Vitals:    03/24/21 0805   BP: 124/80   Pulse: 72   Temp: 97 7 °F (36 5 °C)   SpO2: 99%   Weight: 60 8 kg (134 lb)   Oxygen Therapy  SpO2: 99 %    Wt Readings from Last 3 Encounters:   03/24/21 60 8 kg (134 lb)   02/08/21 60 8 kg (134 lb)   01/19/21 60 8 kg (134 lb)     Body mass index is 22 3 kg/m²  Physical Exam  Vitals signs and nursing note reviewed  Constitutional:       Appearance: She is well-developed  HENT:      Head: Normocephalic and atraumatic  Eyes:      Conjunctiva/sclera: Conjunctivae normal       Pupils: Pupils are equal, round, and reactive to light  Neck:      Musculoskeletal: Normal range of motion and neck supple  Thyroid: No thyromegaly  Vascular: No JVD  Cardiovascular:      Rate and Rhythm: Normal rate and regular rhythm  Heart sounds: Normal heart sounds  No murmur  No friction rub  No gallop  Pulmonary:      Effort: Pulmonary effort is normal  No respiratory distress  Breath sounds: Normal breath sounds  No wheezing or rales  Chest:      Chest wall: No tenderness  Musculoskeletal: Normal range of motion  General: No tenderness or deformity  Lymphadenopathy:      Cervical: No cervical adenopathy  Skin:     General: Skin is warm and dry  Neurological:      Mental Status: She is alert and oriented to person, place, and time             Diagnostics:  I have reviewed the CT chest  Images and report

## 2021-03-24 NOTE — TELEPHONE ENCOUNTER
----- Message from Bhupinder Tavera MD sent at 3/24/2021  2:46 PM EDT -----   Can you get her in to see me 2 days after her echo is done   Thanks    ----- Message -----  From: Sarmad Rodriguez MD  Sent: 3/24/2021   8:33 AM EDT  To: Bhupinder Tavera MD    Referral palced for sob, rule out cardiac etiology ordered echo   Has not had a stress test before   Thanks

## 2021-03-25 ENCOUNTER — TELEPHONE (OUTPATIENT)
Dept: CARDIOLOGY CLINIC | Facility: CLINIC | Age: 81
End: 2021-03-25

## 2021-03-25 NOTE — TELEPHONE ENCOUNTER
----- Message from Jerry Crandall MD sent at 3/24/2021  2:46 PM EDT -----   Can you get her in to see me 2 days after her echo is done   Thanks    ----- Message -----  From: Judge Alex MD  Sent: 3/24/2021   8:33 AM EDT  To: Jerry Crandall MD    Referral palced for sob, rule out cardiac etiology ordered echo   Has not had a stress test before   Thanks

## 2021-04-07 NOTE — TELEPHONE ENCOUNTER
[facility] called regarding namzaric   pt must try and fail memantine and donepezil  I do not see that pt has tried memantine  Gave her this info  She will process PA and will await determination  She did states that this will most likely be denied as memantine and donepezil are both formulary medications  Will await determination  There are 4 steps, c far emeterio rails, at the entry of the house and 13 steps, c R rail up and also c chair lift, to negotiate at home.  Pt has a walk in shower c fixed and retractable shower heads, grab bar, and regular toilet seat in BR. 3-1 commode will fit in BR. Average pain level at rest is 0/10. Pain increases to 8/10 c  ambulation. Pt takes Celebrex for pain. Pt has experience of adverse effects (nausea) with Tramadol, Hydrocodone, and Morphine. Pt wears glasses for reading and no need for hearing aid. Pt is R handed and drives.

## 2021-04-13 ENCOUNTER — HOSPITAL ENCOUNTER (OUTPATIENT)
Dept: NON INVASIVE DIAGNOSTICS | Facility: CLINIC | Age: 81
Discharge: HOME | End: 2021-04-13
Payer: COMMERCIAL

## 2021-04-13 DIAGNOSIS — R06.02 SOB (SHORTNESS OF BREATH) ON EXERTION: ICD-10-CM

## 2021-04-13 PROCEDURE — 93306 TTE W/DOPPLER COMPLETE: CPT | Performed by: INTERNAL MEDICINE

## 2021-04-13 PROCEDURE — 93306 TTE W/DOPPLER COMPLETE: CPT

## 2021-04-15 ENCOUNTER — OFFICE VISIT (OUTPATIENT)
Dept: CARDIOLOGY CLINIC | Facility: CLINIC | Age: 81
End: 2021-04-15
Payer: COMMERCIAL

## 2021-04-15 VITALS
HEIGHT: 65 IN | WEIGHT: 133 LBS | DIASTOLIC BLOOD PRESSURE: 80 MMHG | BODY MASS INDEX: 22.16 KG/M2 | SYSTOLIC BLOOD PRESSURE: 134 MMHG | OXYGEN SATURATION: 97 % | HEART RATE: 75 BPM

## 2021-04-15 DIAGNOSIS — I05.9 MITRAL ANNULAR CALCIFICATION: ICD-10-CM

## 2021-04-15 DIAGNOSIS — I34.0 NONRHEUMATIC MITRAL VALVE REGURGITATION: ICD-10-CM

## 2021-04-15 DIAGNOSIS — I51.89 DIASTOLIC DYSFUNCTION: ICD-10-CM

## 2021-04-15 DIAGNOSIS — R06.02 SOB (SHORTNESS OF BREATH) ON EXERTION: ICD-10-CM

## 2021-04-15 DIAGNOSIS — R06.02 SHORTNESS OF BREATH ON EXERTION: Primary | ICD-10-CM

## 2021-04-15 PROCEDURE — 1036F TOBACCO NON-USER: CPT | Performed by: INTERNAL MEDICINE

## 2021-04-15 PROCEDURE — 99204 OFFICE O/P NEW MOD 45 MIN: CPT | Performed by: INTERNAL MEDICINE

## 2021-04-15 PROCEDURE — 1160F RVW MEDS BY RX/DR IN RCRD: CPT | Performed by: INTERNAL MEDICINE

## 2021-04-15 RX ORDER — FEXOFENADINE HCL 180 MG/1
180 TABLET ORAL AS NEEDED
COMMUNITY
Start: 2021-03-15

## 2021-04-15 RX ORDER — TRIAMCINOLONE ACETONIDE 5 MG/G
OINTMENT TOPICAL
COMMUNITY
Start: 2021-03-22 | End: 2021-08-03

## 2021-04-15 NOTE — PROGRESS NOTES
Cardiology Consult  [facility]  [address], [place], [facility]  Tel: [phone]  [id number]  1940  [address] [place] [address]    1  Shortness of breath on exertion  NM myocardial perfusion spect (stress and/or rest)   2  Diastolic dysfunction     3  Nonrheumatic mitral valve regurgitation     4  Mitral annular calcification         History of Present Illness:   Ref by Dr Maryse Alcocer  72-year-old female with an extensive smoking history (she finally quit 5 years ago) who is referred for evaluation for ruling out cardiovascular causes of her shortness of breath  Patient's shortness of breath started a few years ago and is slightly worse than before  Denies associated chest pain, nausea, diaphoresis  Patient also denies palpitations, lightheadedness, syncope, swelling feet, orthopnea, PND, claudication  Past Medical History:  Past Medical History:   Diagnosis Date    Abnormal chest x-ray     Abnormal mammogram     COPD (chronic obstructive pulmonary disease) (HCC)     Dementia (HCC)     Early stage     Ductal hyperplasia of breast     Left     Hyperlipidemia     diet controled    Nodule of lower lobe of right lung     Right Lower Lobe     SOB (shortness of breath)     SOB (shortness of breath)     Wheezing          Past Surgical History:  Past Surgical History:   Procedure Laterality Date    BREAST BIOPSY Left 10/12/2018    BREAST CYST EXCISION Left 10/31/2018    BREAST LUMPECTOMY Left 11/13/2018    Procedure: BREAST LUMPECTOMY; BREAST NEEDLE LOCALIZATION (NEEDLE LOC AT 0830);   Surgeon: Timoteo Chacon MD;  [facility];  Service: Surgical Oncology    COLONOSCOPY      LAPAROSCOPY      MAMMO NEEDLE LOCALIZATION LEFT (ALL INC) Left 11/13/2018    MAMMO STEREOTACTIC BREAST BIOPSY LEFT (ALL INC) Left 10/16/2018    TN BRONCHOSCOPY,DIAGNOSTIC Right 2019    Procedure: BRONCHOSCOPY FLEXIBLE,;  Surgeon: Kyle Luna MD;  [facility];  Service: Thoracic    TN MEDIASTINOSCOPY WITH LYMPH NODE BIOPSY/IES Right 2019    Procedure: MEDIASTINOSCOPY;  Surgeon: Kyle Luna MD;  [facility];  Service: Thoracic    TN THORACOSCOPY SURG LOBECTOMY Right 2019    Procedure: LOBECTOMY LUNG;  Surgeon: Kyle Luna MD;  [facility];  Service: Thoracic    TRIGGER FINGER RELEASE Right 10/10/2019    ULNAR NERVE TRANSPOSITION Right 10/10/2019         Family History:  Family History   Problem Relation Age of Onset   Daniel Brown Prostate cancer Father     Hypertension Mother     Glaucoma Mother     Macular degeneration Mother     Heart attack Brother          Social History:  Social History     Socioeconomic History    Marital status:      Spouse name: None    Number of children: None    Years of education: None    Highest education level: None   Occupational History    None   Social Needs    Financial resource strain: None    Food insecurity     Worry: None     Inability: None    Transportation needs     Medical: None     Non-medical: None   Tobacco Use    Smoking status: Former Smoker     Packs/day: 1 00     Years: 60 00     Pack years: 60 00     Start date: 1958     Quit date: 2016     Years since quittin 9    Smokeless tobacco: Never Used   Substance and Sexual Activity    Alcohol use:  Yes     Alcohol/week: 1 0 standard drinks     Types: 1 Cans of beer per week     Frequency: 2-3 times a week     Drinks per session: 1 or 2    Drug use: No    Sexual activity: Never   Lifestyle    Physical activity     Days per week: None     Minutes per session: None    Stress: None   Relationships    Social connections     Talks on phone: None     Gets together: None     Attends Islam service: None     Active member of club or organization: None Attends meetings of clubs or organizations: None     Relationship status: None    Intimate partner violence     Fear of current or ex partner: None     Emotionally abused: None     Physically abused: None     Forced sexual activity: None   Other Topics Concern    None   Social History Narrative    None         Active Problems:  Patient Active Problem List   Diagnosis    Atypical ductal hyperplasia of left breast    Papilloma of left breast    Lung nodule, solitary    Right lower lobe lung mass    Lung nodule    Primary adenocarcinoma of right lung ([address] )    History of lung cancer    Encounter for follow-up surveillance of lung cancer    Multiple lung nodules    SOB (shortness of breath) on exertion         The following portions of the patient's history were reviewed and updated as appropriate: past medical history, past surgical history, past family history,  past social history, current medications, allergies and problem list       Review of Systems:  Constitutional: Denies fever, chills  Eyes: Denies eye redness, eye discharge  ENT: Denies hearing loss, tinnitus, sneezing, nasal discharge, sore throat   Respiratory: +shortness of breath  Cardiovascular: Denies chest pain, palpitations, orthopnea, PND, lower extremity swelling  Gastrointestinal: Denies abdominal pain, nausea, vomiting, hematemesis, diarrhea, bloody stools  Genito-Urinary: Denies dysuria, incontinence  Musculoskeletal: Denies back pain, joint pain, muscle pain  Neurologic: Denies lightheadedness, syncope, headache, seizures  Endocrine: Denies polydipsia, temperature intolerance  Allergy and Immunology: Denies hives, insect bite sensitivity  Hematological and Lymphatic: Denies bleeding problems, swollen glands   Psychological: Denies depression, suicidal ideation, anxiety, panic  Dermatological: Denies pruritus, rash, skin lesion changes      Vitals:  Vitals:    04/15/21 0823   BP: 134/80   Pulse: 75   SpO2: 97%       Body mass index is 22 13 kg/m²  Weight (last 2 days)     Date/Time   Weight    04/15/21 0823   60 3 (133)                Physical Examination:  General: Patient is not in acute distress  Awake, alert, oriented in time, place and person  Responding to commands  Head: Normocephalic  Atraumatic  Eyes: Both pupils normal sized, round and reactive to light  Nonicteric  ENT: Normal external ear canals  Neck: Supple  JVP not raised  Trachea central  No thyromegaly  Lungs: Bilateral bronchovascular breath sounds with no crackles or rhonchi  Chest wall: No tenderness  Cardiovascular: RRR  S1 and S2 normal  No murmur, rub or gallop  Gastrointestinal: Abdomen soft, nontender  No guarding or rigidity  Liver and spleen not palpable  Bowel sounds present  Neurologic: Patient is awake, alert, oriented in time, place and person  Responding to command  Moving all extremities  Integumentary:  No skin rash  Lymphatic: No cervical lymphadenopathy  Back: Symmetric   No CVA tenderness  Extremities: No clubbing, cyanosis or edema      Laboratory Results:  CBC with diff:   Lab Results   Component Value Date    WBC 8 75 01/18/2019    RBC 3 75 (L) 01/18/2019    HGB 11 3 (L) 01/18/2019    HCT 35 2 01/18/2019    MCV 94 01/18/2019    MCH 30 1 01/18/2019    RDW 14 2 01/18/2019     01/18/2019       CMP:  Lab Results   Component Value Date    CREATININE 0 85 01/22/2019    BUN 18 01/22/2019    K 4 3 01/22/2019     01/22/2019    CO2 29 01/22/2019    ALKPHOS 77 10/31/2018    ALT 26 10/31/2018    AST 18 10/31/2018       Lab Results   Component Value Date    MG 2 1 01/16/2019       Cardiac testing:   Results for orders placed during the hospital encounter of 04/13/21   Echo complete with contrast if indicated    Narrative [facility], [facility]  [phone]    Transthoracic Echocardiogram  2D, M-mode, Doppler, and Color Doppler    Study date:  13-Apr-2021    Patient: Radha Domingo  MR number: [id number]  Account number: [de-identified]  : 1940  Age: 68 years  Gender: Female  Status: Outpatient  Location: [facility]  Height: 65 in  Weight: 134 lb  BP: 124/ 80 mmHg    Indications: Shortness of breath  Diagnoses: R06 02 - Shortness of breath    Sonographer:  CORIN Live  Primary Physician:  Axel López MD  Referring Physician:  Glory Mcburney, MD  Group:  [facility]  Interpreting Physician:  Iftikhar Poon MD    SUMMARY    LEFT VENTRICLE:  Systolic function was normal  LVEF >60%  Although no diagnostic regional wall motion abnormality was identified, this possibility cannot be completely excluded on the basis of this study  Wall thickness was mildly increased  There was moderate assymetrical hypertrophy of the septum  Doppler parameters were consistent with abnormal left ventricular relaxation (grade 1 diastolic dysfunction)  MITRAL VALVE:  There was mild annular calcification  There was mild regurgitation  TRICUSPID VALVE:  There was mild regurgitation  HISTORY: PRIOR HISTORY: No Cardiovascular History    PROCEDURE: The study was performed in the [address] [place] [address]  This was a routine study  The transthoracic approach was used  The study included complete 2D imaging, M-mode, complete spectral Doppler, and color Doppler  The  heart rate was 73 bpm, at the start of the study  Images were obtained from the parasternal, apical, subcostal, and suprasternal notch acoustic windows  Echocardiographic views were limited due to poor acoustic window availability  Image  quality was adequate  LEFT VENTRICLE: Size was normal  Systolic function was normal  LVEF >60% Although no diagnostic regional wall motion abnormality was identified, this possibility cannot be completely excluded on the basis of this study  Wall thickness was  mildly increased  There was moderate assymetrical hypertrophy of the septum   DOPPLER: Doppler parameters were consistent with abnormal left ventricular relaxation (grade 1 diastolic dysfunction)  RIGHT VENTRICLE: The size was normal  Systolic function was normal     LEFT ATRIUM: Size was at the upper limits of normal     RIGHT ATRIUM: Size was normal     MITRAL VALVE: There was mild annular calcification  DOPPLER: There was no evidence for stenosis  There was mild regurgitation  AORTIC VALVE: The valve was probably trileaflet  Leaflets exhibited mildly increased thickness  DOPPLER: There was no evidence for stenosis  There was no regurgitation  TRICUSPID VALVE: DOPPLER: There was mild regurgitation  PULMONIC VALVE: Not well visualized  PERICARDIUM: There was no pericardial effusion  AORTA: The root exhibited normal size  SYSTEMIC VEINS: IVC: The inferior vena cava was not well visualized      SYSTEM MEASUREMENT TABLES    2D  %FS: 28 63 %  Ao Diam: 3 48 cm  EDV(Teich): 62 77 ml  EF(Teich): 55 89 %  ESV(Teich): 27 68 ml  IVSd: 1 38 cm  LA Area: 13 56 cm2  LA Diam: 4 3 cm  LVEDV MOD A4C: 53 43 ml  LVEF MOD A4C: 72 06 %  LVESV MOD A4C: 14 93 ml  LVIDd: 3 82 cm  LVIDs: 2 73 cm  LVLd A4C: 7 1 cm  LVLs A4C: 6 08 cm  LVOT Diam: 2 17 cm  LVPWd: 0 93 cm  RA Area: 14 38 cm2  RVIDd: 3 99 cm  SV MOD A4C: 38 5 ml  SV(Teich): 35 08 ml    CW  AV MaxP 14 mmHg  AV Vmax: 1 24 m/s  TR MaxP 48 mmHg  TR Vmax: 2 03 m/s    MM  TAPSE: 2 19 cm    PW  NIESHA Vmax, Pt: 3 6 cm2  E' Sept: 0 08 m/s  E/E' Sept: 8 63  LVOT Vmax: 1 21 m/s  LVOT maxP 87 mmHg  MV A Stevo: 0 86 m/s  MV Dec [place]: 3 33 m/s2  MV DecT: 201 48 ms  MV E Stevo: 0 67 m/s  MV E/A Ratio: 0 78  MV PHT: 58 43 ms  MVA By PHT: 3 77 cm2    [employer] Accredited Echocardiography Laboratory    Prepared and electronically signed by    Eduardo Herzog MD  Signed 2021 14:39:02         Medications:    Current Outpatient Medications:     ANORO ELLIPTA 62 5-25 MCG/INH inhaler, INHALE 1 PUFF EVERY DAY AT THE SAME TIME EACH DAY, Disp: , Rfl:     Cholecalciferol (Vitamin D3) 125 MCG (5000 UT) CAPS, Take by mouth daily, Disp: , Rfl:     donepezil (ARICEPT) 10 mg tablet, Take 1 tablet (10 mg total) by mouth daily at bedtime, Disp: 30 tablet, Rfl: 5    fexofenadine (Allegra Allergy) 180 MG tablet, , Disp: , Rfl:     gabapentin (NEURONTIN) 100 mg capsule, Take 100 mg by mouth once as needed, Disp: , Rfl:     meclizine (ANTIVERT) 25 mg tablet, Take 25 mg by mouth daily at bedtime, Disp: , Rfl:     memantine (NAMENDA) 10 mg tablet, Take 1 tablet (10 mg total) by mouth 2 (two) times a day, Disp: 60 tablet, Rfl: 5    triamcinolone (KENALOG) 0 5 % ointment, APPLY THIN COAT TO AFFECTED AREA TWICE A DAY, Disp: , Rfl:     predniSONE 5 mg tablet, Take 5 mg by mouth daily, Disp: , Rfl:       Allergies:  No Known Allergies      Assessment and Plan:  1  Shortness of breath on exertion  2D echocardiogram findings discussed with the patient and daughter in detail  Exercise nuclear stress test ordered to rule out underlying CAD as a cause of her NUNEZ which could be an anginal equivalent  - NM myocardial perfusion spect (stress and/or rest); Future    2  Diastolic dysfunction, Nonrheumatic mitral valve regurgitation, Mitral annular calcification   to be followed up with echocardiograms at recommended intervals    Recommend aggressive risk factor modification and therapeutic lifestyle changes  Low-salt, low-calorie, low-fat, low-cholesterol diet with regular exercise and to optimize weight  I will defer the ordering and monitoring of necessity lab studies to you, but I am available and happy to review and manage any of the data at your request in the future  Discussed concepts of atherosclerosis, including signs and symptoms of cardiac disease  Previous studies were reviewed  Safety measures were reviewed  Questions were entertained and answered  Patient was advised to report any problems requiring medical attention      Follow-up with PCP and appropriate specialist and lab work as discussed  Return for follow up visit as scheduled or earlier, if needed  Thank you for allowing me to participate in the care and evaluation of your patient  Should you have any questions, please feel free to contact me        Niko Mendez MD  1/26/3240,2:50 AM

## 2021-04-27 DIAGNOSIS — G30.9 ALZHEIMER'S DEMENTIA WITHOUT BEHAVIORAL DISTURBANCE, UNSPECIFIED TIMING OF DEMENTIA ONSET (HCC): ICD-10-CM

## 2021-04-27 DIAGNOSIS — F02.80 ALZHEIMER'S DEMENTIA WITHOUT BEHAVIORAL DISTURBANCE, UNSPECIFIED TIMING OF DEMENTIA ONSET (HCC): ICD-10-CM

## 2021-04-27 RX ORDER — DONEPEZIL HYDROCHLORIDE 10 MG/1
TABLET, FILM COATED ORAL
Qty: 30 TABLET | Refills: 0 | Status: SHIPPED | OUTPATIENT
Start: 2021-04-27 | End: 2021-06-01

## 2021-04-29 ENCOUNTER — HOSPITAL ENCOUNTER (OUTPATIENT)
Dept: PULMONOLOGY | Facility: HOSPITAL | Age: 81
Discharge: HOME | End: 2021-04-29
Attending: INTERNAL MEDICINE
Payer: COMMERCIAL

## 2021-04-29 DIAGNOSIS — R06.02 SOB (SHORTNESS OF BREATH) ON EXERTION: ICD-10-CM

## 2021-04-29 PROCEDURE — 94060 EVALUATION OF WHEEZING: CPT | Performed by: INTERNAL MEDICINE

## 2021-04-29 PROCEDURE — 94618 PULMONARY STRESS TESTING: CPT | Performed by: INTERNAL MEDICINE

## 2021-04-29 PROCEDURE — 94060 EVALUATION OF WHEEZING: CPT

## 2021-04-29 PROCEDURE — 94729 DIFFUSING CAPACITY: CPT

## 2021-04-29 PROCEDURE — 94761 N-INVAS EAR/PLS OXIMETRY MLT: CPT

## 2021-04-29 PROCEDURE — 94729 DIFFUSING CAPACITY: CPT | Performed by: INTERNAL MEDICINE

## 2021-04-29 PROCEDURE — 94727 GAS DIL/WSHOT DETER LNG VOL: CPT | Performed by: INTERNAL MEDICINE

## 2021-04-29 PROCEDURE — 94727 GAS DIL/WSHOT DETER LNG VOL: CPT

## 2021-04-29 RX ORDER — ALBUTEROL SULFATE 2.5 MG/3ML
2.5 SOLUTION RESPIRATORY (INHALATION) ONCE
Status: COMPLETED | OUTPATIENT
Start: 2021-04-29 | End: 2021-04-29

## 2021-04-29 RX ADMIN — ALBUTEROL SULFATE 2.5 MG: 2.5 SOLUTION RESPIRATORY (INHALATION) at 10:46

## 2021-04-30 ENCOUNTER — HOSPITAL ENCOUNTER (OUTPATIENT)
Dept: NON INVASIVE DIAGNOSTICS | Facility: CLINIC | Age: 81
Discharge: HOME | End: 2021-04-30
Payer: COMMERCIAL

## 2021-04-30 DIAGNOSIS — R06.02 SHORTNESS OF BREATH ON EXERTION: ICD-10-CM

## 2021-04-30 PROCEDURE — 93018 CV STRESS TEST I&R ONLY: CPT | Performed by: INTERNAL MEDICINE

## 2021-04-30 PROCEDURE — G1004 CDSM NDSC: HCPCS

## 2021-04-30 PROCEDURE — 93017 CV STRESS TEST TRACING ONLY: CPT

## 2021-04-30 PROCEDURE — 93016 CV STRESS TEST SUPVJ ONLY: CPT | Performed by: INTERNAL MEDICINE

## 2021-04-30 PROCEDURE — 78452 HT MUSCLE IMAGE SPECT MULT: CPT | Performed by: INTERNAL MEDICINE

## 2021-04-30 PROCEDURE — 78452 HT MUSCLE IMAGE SPECT MULT: CPT

## 2021-04-30 PROCEDURE — A9502 TC99M TETROFOSMIN: HCPCS

## 2021-05-03 ENCOUNTER — TELEPHONE (OUTPATIENT)
Dept: CARDIOLOGY CLINIC | Facility: CLINIC | Age: 81
End: 2021-05-03

## 2021-05-03 NOTE — TELEPHONE ENCOUNTER
----- Message from Jay Cornelius MD sent at 5/1/2021  1:46 PM EDT -----  Please call and inform the patient that the stress test was normal

## 2021-05-04 ENCOUNTER — OFFICE VISIT (OUTPATIENT)
Dept: PULMONOLOGY | Facility: CLINIC | Age: 81
End: 2021-05-04
Payer: COMMERCIAL

## 2021-05-04 VITALS
OXYGEN SATURATION: 98 % | TEMPERATURE: 97.7 F | SYSTOLIC BLOOD PRESSURE: 110 MMHG | BODY MASS INDEX: 22.33 KG/M2 | HEART RATE: 77 BPM | DIASTOLIC BLOOD PRESSURE: 84 MMHG | HEIGHT: 65 IN | WEIGHT: 134 LBS

## 2021-05-04 DIAGNOSIS — C34.91 PRIMARY ADENOCARCINOMA OF RIGHT LUNG (HCC): ICD-10-CM

## 2021-05-04 DIAGNOSIS — J41.0 SIMPLE CHRONIC BRONCHITIS (HCC): Primary | ICD-10-CM

## 2021-05-04 DIAGNOSIS — R06.02 SOB (SHORTNESS OF BREATH) ON EXERTION: ICD-10-CM

## 2021-05-04 PROCEDURE — 99214 OFFICE O/P EST MOD 30 MIN: CPT | Performed by: INTERNAL MEDICINE

## 2021-05-04 PROCEDURE — 1036F TOBACCO NON-USER: CPT | Performed by: INTERNAL MEDICINE

## 2021-05-04 PROCEDURE — 1160F RVW MEDS BY RX/DR IN RCRD: CPT | Performed by: INTERNAL MEDICINE

## 2021-05-04 RX ORDER — ALBUTEROL SULFATE 90 UG/1
2 AEROSOL, METERED RESPIRATORY (INHALATION) EVERY 6 HOURS PRN
Qty: 18 G | Refills: 1 | Status: SHIPPED | OUTPATIENT
Start: 2021-05-04

## 2021-05-04 RX ORDER — CETIRIZINE HYDROCHLORIDE 10 MG/1
10 TABLET ORAL DAILY
COMMUNITY
End: 2021-08-03

## 2021-05-04 NOTE — PROGRESS NOTES
Assessment/Plan:   [facility]}      No follow-ups on file  All questions are answered to the patient's satisfaction and understanding  She verbalizes understanding  She is encouraged to call with any further questions or concerns  Portions of the record may have been created with voice recognition software  Occasional wrong word or "sound a like" substitutions may have occurred due to the inherent limitations of voice recognition software  Read the chart carefully and recognize, using context, where substitutions have occurred  Electronically Signed by Scott Easton MD    ______________________________________________________________________    Chief Complaint:   Chief Complaint   Patient presents with    Follow-up       Patient ID: Richard James is a 68 y o  y o  female has a past medical history of Abnormal chest x-ray, Abnormal mammogram, [facility]) [facility] ), Dementia [facility] ), Ductal hyperplasia of breast, Hyperlipidemia, Nodule of lower lobe of right lung, SOB (shortness of breath), SOB (shortness of breath), and Wheezing  5/4/2021  Patient presents today for follow-up visit  HPI    Review of Systems    Smoking history: She reports that she quit smoking about 5 years ago  She started smoking about 63 years ago  She has a 60 00 pack-year smoking history   She has never used smokeless tobacco     {Common ambulatory SmartLinks:79703}    Immunization History   Administered Date(s) Administered    SARS-CoV-2 / COVID-19 mRNA IM (Moderna) 01/28/2021, 03/01/2021     Current Outpatient Medications   Medication Sig Dispense Refill    cetirizine (ZyrTEC) 10 mg tablet Take 10 mg by mouth daily      Cholecalciferol (Vitamin D3) 125 MCG (5000 UT) CAPS Take by mouth daily      donepezil (ARICEPT) 10 mg tablet TAKE ONE TABLET BY MOUTH NIGHTLY AT BEDTIME  30 tablet 0    gabapentin (NEURONTIN) 100 mg capsule Take 100 mg by mouth once as needed      meclizine (ANTIVERT) 25 mg tablet Take 25 mg by mouth daily at bedtime      memantine (NAMENDA) 10 mg tablet Take 1 tablet (10 mg total) by mouth 2 (two) times a day 60 tablet 5    albuterol (Ventolin HFA) 90 mcg/act inhaler Inhale 2 puffs every 6 (six) hours as needed for wheezing 18 g 1    fexofenadine (Allegra Allergy) 180 MG tablet       triamcinolone (KENALOG) 0 5 % ointment APPLY THIN COAT TO AFFECTED AREA TWICE A DAY       No current facility-administered medications for this visit  Allergies: Patient has no known allergies  Objective:  Vitals:    05/04/21 0844   BP: 110/84   Pulse: 77   Temp: 97 7 °F (36 5 °C)   SpO2: 98%   Weight: 60 8 kg (134 lb)   Height: 5' 5" (1 651 m)   Oxygen Therapy  SpO2: 98 %    Wt Readings from Last 3 Encounters:   05/04/21 60 8 kg (134 lb)   04/15/21 60 3 kg (133 lb)   03/24/21 60 8 kg (134 lb)     Body mass index is 22 3 kg/m²  Physical Exam    Lab Review:   {Recent RJLI:02197::"IR applicable"}    Diagnostics:  {Results Review Statement:52391}  {DIAGNOSTICS:16473}  Office Spirometry Results:     ESS:    No results found    Answers for HPI/ROS submitted by the patient on 5/4/2021   Primary symptoms  Chronicity: recurrent  When did you first notice your symptoms?: more than 1 year ago  How often do your symptoms occur?: 2 to 4 times per day  Since you first noticed this problem, how has it changed?: unchanged  Do you have shortness of breath that occurs with effort or exertion?: Yes  Do you have ear congestion?: No  Do you have heartburn?: No  Do you have fatigue?: No  Do you have nasal congestion?: Yes  Do you have shortness of breath when lying flat?: No  Do you have shortness of breath when you wake up?: No  Do you have sweats?: No  Have you experienced weight loss?: No  Which of the following makes your symptoms worse?: any activity, change in weather  Risk factors for lung disease: smoking/tobacco exposure

## 2021-05-04 NOTE — PROGRESS NOTES
Assessment/Plan:   Diagnoses and all orders for this visit:    Simple chronic bronchitis (HCC)  -     albuterol (Ventolin HFA) 90 mcg/act inhaler; Inhale 2 puffs every 6 (six) hours as needed for wheezing    SOB (shortness of breath) on exertion    Primary adenocarcinoma of right lung ([address] )    Other orders  -     cetirizine (ZyrTEC) 10 mg tablet; Take 10 mg by mouth daily          Shortness of breath in this patient with 60 pack-year smoking history who quit in 2016 and also diagnosed with stage I B lung cancer status post VATS lobectomy having surveillance CT chest being followed up by thoracic surgery  Shortness of breath and dyspnea on exertion likely multifactorial currently not much limitation of her daily current activities  She had a PFT done in 2018 prior to the surgery in which the spirometry was normal   Repeat PFT done recently also demonstrating normal spirometry and moderately decreased DLCO not significantly changed from 2018  She did have a 6 minutes walk test after a 3-4 minutes she did have dizziness and had to stop she is being treated for the dizziness with her primary care doctor with meclizine and she states the dizziness is currently better  Also she was referred to Cardiology due to rule out cardiac etiology for which a stress test was done and has been normal   Echocardiogram left ventricular ejection fraction greater than 60% ,  No evidence of any pulmonary hypertension noted  The right atrium and the right ventricle size is normal with a normal right ventricular systolic function  She has been on Anoro 1 puff daily   Given normal spirometry I do not think she needs to long-acting bronchodilators  Discussed with the patient's daughter and DC did Anoro   Albuterol MDI 2 puffs 4 times daily as needed also given a spacer to use along with the MDI  Call if any increased use of the rescue MDI otherwise will see her back in 3 months or p r n  earlier as needed    Return in about 3 months (around 8/4/2021)  All questions are answered to the patient's satisfaction and understanding  She verbalizes understanding  She is encouraged to call with any further questions or concerns  Portions of the record may have been created with voice recognition software  Occasional wrong word or "sound a like" substitutions may have occurred due to the inherent limitations of voice recognition software  Read the chart carefully and recognize, using context, where substitutions have occurred  Electronically Signed by Kylee Grossman MD    ______________________________________________________________________    Chief Complaint:   Chief Complaint   Patient presents with    Follow-up       Patient ID: Keturah Gonzalez is a 68 y o  y o  female has a past medical history of Abnormal chest x-ray, Abnormal mammogram, COPD (chronic obstructive pulmonary disease) ([facility] [address] ), Dementia ([facility] [address] ), Ductal hyperplasia of breast, Hyperlipidemia, Nodule of lower lobe of right lung, SOB (shortness of breath), SOB (shortness of breath), and Wheezing  5/4/2021  Patient presents today for follow-up visit  Keturah Gonzalez is a very pleasant 68-year-old lady former smoker with 60 pack-year smoking history who quit in 2016, diagnosed with stage I B lung cancer in 2018 status post VATS lobectomy and being followed up by thoracic surgery is with surveillance CT chest and has been doing well      she does not remember if she was diagnosed with COPD but has been on Anoro 1 puff daily and has been continuing to take it she does not have any rescue inhaler  Daughter here with the patient for the appointment  Stating she does have shortness of breath and does huffs and  puff sometimes as per ashleighter , also has some cough does not bring phlegm, and does not wheeze    primary symptoms  Pertinent negatives include no chest pain, fever, headaches, myalgias or sore throat         Review of Systems   Constitutional: Negative for appetite change and fever  HENT: Positive for rhinorrhea  Negative for ear pain, postnasal drip, sneezing, sore throat and trouble swallowing  Eyes: Negative  Respiratory: Positive for shortness of breath  Cardiovascular: Negative for chest pain  Endocrine: Negative  Genitourinary: Negative  Musculoskeletal: Negative  Negative for myalgias  Allergic/Immunologic: Negative  Neurological: Negative for headaches  Hematological: Negative  Psychiatric/Behavioral: Negative  Smoking history: She reports that she quit smoking about 5 years ago  She started smoking about 63 years ago  She has a 60 00 pack-year smoking history  She has never used smokeless tobacco     The following portions of the patient's history were reviewed and updated as appropriate: allergies, current medications, past family history, past medical history, past social history, past surgical history and problem list     Immunization History   Administered Date(s) Administered    SARS-CoV-2 / COVID-19 mRNA IM (Moderna) 01/28/2021, 03/01/2021     Current Outpatient Medications   Medication Sig Dispense Refill    cetirizine (ZyrTEC) 10 mg tablet Take 10 mg by mouth daily      Cholecalciferol (Vitamin D3) 125 MCG (5000 UT) CAPS Take by mouth daily      donepezil (ARICEPT) 10 mg tablet TAKE ONE TABLET BY MOUTH NIGHTLY AT BEDTIME  30 tablet 0    gabapentin (NEURONTIN) 100 mg capsule Take 100 mg by mouth once as needed      meclizine (ANTIVERT) 25 mg tablet Take 25 mg by mouth daily at bedtime      memantine (NAMENDA) 10 mg tablet Take 1 tablet (10 mg total) by mouth 2 (two) times a day 60 tablet 5    albuterol (Ventolin HFA) 90 mcg/act inhaler Inhale 2 puffs every 6 (six) hours as needed for wheezing 18 g 1    fexofenadine (Allegra Allergy) 180 MG tablet       triamcinolone (KENALOG) 0 5 % ointment APPLY THIN COAT TO AFFECTED AREA TWICE A DAY       No current facility-administered medications for this visit        Allergies: Patient has no known allergies  Objective:  Vitals:    05/04/21 0844   BP: 110/84   Pulse: 77   Temp: 97 7 °F (36 5 °C)   SpO2: 98%   Weight: 60 8 kg (134 lb)   Height: 5' 5" (1 651 m)   Oxygen Therapy  SpO2: 98 %    Wt Readings from Last 3 Encounters:   05/04/21 60 8 kg (134 lb)   04/15/21 60 3 kg (133 lb)   03/24/21 60 8 kg (134 lb)     Body mass index is 22 3 kg/m²  Physical Exam  Vitals signs and nursing note reviewed  Constitutional:       Appearance: She is well-developed  HENT:      Head: Normocephalic and atraumatic  Eyes:      Conjunctiva/sclera: Conjunctivae normal       Pupils: Pupils are equal, round, and reactive to light  Neck:      Musculoskeletal: Normal range of motion and neck supple  Thyroid: No thyromegaly  Vascular: No JVD  Cardiovascular:      Rate and Rhythm: Normal rate and regular rhythm  Heart sounds: Normal heart sounds  No murmur  No friction rub  No gallop  Pulmonary:      Effort: Pulmonary effort is normal  No respiratory distress  Breath sounds: Normal breath sounds  No wheezing or rales  Chest:      Chest wall: No tenderness  Musculoskeletal: Normal range of motion  General: No tenderness or deformity  Lymphadenopathy:      Cervical: No cervical adenopathy  Skin:     General: Skin is warm and dry  Neurological:      Mental Status: She is alert and oriented to person, place, and time  Diagnostics:  I have personally reviewed pertinent reports        Answers for HPI/ROS submitted by the patient on 5/4/2021   Primary symptoms  Chronicity: recurrent  When did you first notice your symptoms?: more than 1 year ago  How often do your symptoms occur?: 2 to 4 times per day  Since you first noticed this problem, how has it changed?: unchanged  Do you have shortness of breath that occurs with effort or exertion?: Yes  Do you have ear congestion?: No  Do you have heartburn?: No  Do you have fatigue?: No  Do you have nasal congestion?: Yes  Do you have shortness of breath when lying flat?: No  Do you have shortness of breath when you wake up?: No  Do you have sweats?: No  Have you experienced weight loss?: No  Which of the following makes your symptoms worse?: any activity, change in weather  Risk factors for lung disease: smoking/tobacco exposure

## 2021-05-25 DIAGNOSIS — G30.9 ALZHEIMER'S DEMENTIA WITHOUT BEHAVIORAL DISTURBANCE, UNSPECIFIED TIMING OF DEMENTIA ONSET (HCC): ICD-10-CM

## 2021-05-25 DIAGNOSIS — F02.80 ALZHEIMER'S DEMENTIA WITHOUT BEHAVIORAL DISTURBANCE, UNSPECIFIED TIMING OF DEMENTIA ONSET (HCC): ICD-10-CM

## 2021-05-25 RX ORDER — MEMANTINE HYDROCHLORIDE 10 MG/1
TABLET ORAL
Qty: 60 TABLET | Refills: 5 | Status: SHIPPED | OUTPATIENT
Start: 2021-05-25 | End: 2021-12-02

## 2021-05-31 DIAGNOSIS — F02.80 ALZHEIMER'S DEMENTIA WITHOUT BEHAVIORAL DISTURBANCE, UNSPECIFIED TIMING OF DEMENTIA ONSET (HCC): ICD-10-CM

## 2021-05-31 DIAGNOSIS — G30.9 ALZHEIMER'S DEMENTIA WITHOUT BEHAVIORAL DISTURBANCE, UNSPECIFIED TIMING OF DEMENTIA ONSET (HCC): ICD-10-CM

## 2021-06-01 RX ORDER — DONEPEZIL HYDROCHLORIDE 10 MG/1
TABLET, FILM COATED ORAL
Qty: 30 TABLET | Refills: 5 | Status: SHIPPED | OUTPATIENT
Start: 2021-06-01 | End: 2021-12-06

## 2021-07-19 ENCOUNTER — OFFICE VISIT (OUTPATIENT)
Dept: NEUROLOGY | Facility: CLINIC | Age: 81
End: 2021-07-19
Payer: COMMERCIAL

## 2021-07-19 VITALS
HEIGHT: 65 IN | SYSTOLIC BLOOD PRESSURE: 142 MMHG | WEIGHT: 130 LBS | DIASTOLIC BLOOD PRESSURE: 74 MMHG | BODY MASS INDEX: 21.66 KG/M2 | HEART RATE: 74 BPM

## 2021-07-19 DIAGNOSIS — F02.80 ALZHEIMER'S DEMENTIA WITHOUT BEHAVIORAL DISTURBANCE, UNSPECIFIED TIMING OF DEMENTIA ONSET (HCC): Primary | ICD-10-CM

## 2021-07-19 DIAGNOSIS — G30.9 ALZHEIMER'S DEMENTIA WITHOUT BEHAVIORAL DISTURBANCE, UNSPECIFIED TIMING OF DEMENTIA ONSET (HCC): Primary | ICD-10-CM

## 2021-07-19 PROCEDURE — 99213 OFFICE O/P EST LOW 20 MIN: CPT | Performed by: PSYCHIATRY & NEUROLOGY

## 2021-07-19 NOTE — PROGRESS NOTES
Richard Rousseau is a 68 y o  female Returns in follow-up today with history of memory difficulty    Assessment:  1  Alzheimer's dementia without behavioral disturbance, unspecified timing of dementia onset ([facility] [address] )        Plan:   continue Aricept and Namenda   Follow-up 6 months    Discussion:   Mallissa Goodpasture is been stable overall with current management  Will continue Aricept and Namenda and I will see her back in follow-up in 6 months      Subjective:    HPI   Mallissa Goodpasture returns in follow-up today accompanied by her daughter  Her daughter reports overall things have been fairly stable  She does at times get a little agitated in the morning but this usually passes through the day  Her son recently moved back in with her  Possibly contributing to this due to change in routine  She relies on her daughter to assist her with dressing however when her daughter is not around she is able to dress on her own  She continues on Aricept and Namenda and tolerates these well  She has not had any other medical issues      Past Medical History:   Diagnosis Date    Abnormal chest x-ray     Abnormal mammogram     COPD (chronic obstructive pulmonary disease) (HCC)     Dementia (HCC)     Early stage     Ductal hyperplasia of breast     Left     Hyperlipidemia     diet controled    Nodule of lower lobe of right lung     Right Lower Lobe     SOB (shortness of breath)     SOB (shortness of breath)     Wheezing        Family History:  Family History   Problem Relation Age of Onset    Prostate cancer Father     Hypertension Mother    [facility] Glaucoma Mother     Macular degeneration Mother     Heart attack Brother        Past Surgical History:  Past Surgical History:   Procedure Laterality Date    BREAST BIOPSY Left 10/12/2018    BREAST CYST EXCISION Left 10/31/2018    BREAST LUMPECTOMY Left 11/13/2018    Procedure: BREAST LUMPECTOMY; BREAST NEEDLE LOCALIZATION (NEEDLE LOC AT 0830);   Surgeon: Marisol Molina MD;  [facility]: [facility]; Service: Surgical Oncology    COLONOSCOPY      LAPAROSCOPY      MAMMO NEEDLE LOCALIZATION LEFT (ALL INC) Left 11/13/2018    MAMMO STEREOTACTIC BREAST BIOPSY LEFT (ALL INC) Left 10/16/2018    MI BRONCHOSCOPY,DIAGNOSTIC Right 1/16/2019    Procedure: BRONCHOSCOPY FLEXIBLE,;  Surgeon: Karley Duran MD;  [facility]: [facility];  Service: Thoracic    MI MEDIASTINOSCOPY WITH LYMPH NODE BIOPSY/IES Right 1/16/2019    Procedure: MEDIASTINOSCOPY;  Surgeon: Karley Duran MD;  [facility];  Service: Thoracic    MI THORACOSCOPY SURG LOBECTOMY Right 1/16/2019    Procedure: LOBECTOMY LUNG;  Surgeon: Karley Duran MD;  [facility];  Service: Thoracic    [address] Right 10/10/2019    ULNAR NERVE TRANSPOSITION Right 10/10/2019       Social History:   reports that she quit smoking about 5 years ago  She started smoking about 63 years ago  She has a 60 00 pack-year smoking history  She has never used smokeless tobacco  She reports previous alcohol use  She reports that she does not use drugs  Allergies:  Patient has no known allergies        Current Outpatient Medications:     Cholecalciferol (Vitamin D3) 125 MCG (5000 UT) CAPS, Take by mouth daily, Disp: , Rfl:     donepezil (ARICEPT) 10 mg tablet, TAKE ONE TABLET BY MOUTH NIGHTLY AT BEDTIME , Disp: 30 tablet, Rfl: 5    fexofenadine (Allegra Allergy) 180 MG tablet, , Disp: , Rfl:     gabapentin (NEURONTIN) 100 mg capsule, Take 100 mg by mouth once as needed, Disp: , Rfl:     meclizine (ANTIVERT) 25 mg tablet, Take 25 mg by mouth daily at bedtime, Disp: , Rfl:     memantine (NAMENDA) 10 mg tablet, TAKE ONE TABLET BY MOUTH TWICE DAILY , Disp: 60 tablet, Rfl: 5    albuterol (Ventolin HFA) 90 mcg/act inhaler, Inhale 2 puffs every 6 (six) hours as needed for wheezing (Patient not taking: Reported on 7/19/2021), Disp: 18 g, Rfl: 1    cetirizine (ZyrTEC) 10 mg tablet, Take 10 mg by mouth daily (Patient not taking: Reported on 7/19/2021), Disp: , Rfl:     triamcinolone (KENALOG) 0 5 % ointment, APPLY THIN COAT TO AFFECTED AREA TWICE A DAY, Disp: , Rfl:      I have reviewed the past medical, social and family history, current medications, allergies, vitals, review of systems and updated this information as appropriate today     Objective:    Vitals:  Blood pressure 142/74, pulse 74, height 5' 5" (1 651 m), weight 59 kg (130 lb), not currently breastfeeding  Physical Exam    Neurological Exam   GENERAL:  Well-developed well-nourished woman in no acute distress  HEENT/NECK: Head is atraumatic normocephalic, neck is supple  NEUROLOGIC:  Mental Status: Awake and alert without aphasia  She scored an 18+ 1/30 on [place]  Cranial Nerves: Extraocular movements are full  Face is symmetrical  Coordination: gait is stable            ROS:    Review of Systems   Constitutional: Negative  Negative for appetite change and fever  HENT: Negative  Negative for hearing loss, tinnitus, trouble swallowing and voice change  Eyes: Negative  Negative for photophobia and pain  Respiratory: Negative  Negative for shortness of breath  Cardiovascular: Negative  Negative for palpitations  Gastrointestinal: Negative  Negative for nausea and vomiting  Endocrine: Negative  Negative for cold intolerance  Genitourinary: Negative  Negative for dysuria, frequency and urgency  Musculoskeletal: Negative  Negative for myalgias and neck pain  Skin: Negative  Negative for rash  Neurological: Negative  Negative for dizziness, tremors, seizures, syncope, facial asymmetry, speech difficulty, weakness, light-headedness, numbness and headaches  Hematological: Negative  Does not bruise/bleed easily  Psychiatric/Behavioral: Positive for agitation, confusion and decreased concentration  Negative for hallucinations and sleep disturbance

## 2021-07-27 ENCOUNTER — HOSPITAL ENCOUNTER (OUTPATIENT)
Dept: CT IMAGING | Facility: CLINIC | Age: 81
Discharge: HOME | End: 2021-07-27
Payer: COMMERCIAL

## 2021-07-27 DIAGNOSIS — Z85.118 HISTORY OF LUNG CANCER: ICD-10-CM

## 2021-07-27 PROCEDURE — 71250 CT THORAX DX C-: CPT

## 2021-07-27 PROCEDURE — G1004 CDSM NDSC: HCPCS

## 2021-08-03 ENCOUNTER — OFFICE VISIT (OUTPATIENT)
Dept: CARDIAC SURGERY | Facility: CLINIC | Age: 81
End: 2021-08-03
Payer: COMMERCIAL

## 2021-08-03 VITALS
RESPIRATION RATE: 12 BRPM | TEMPERATURE: 97.4 F | BODY MASS INDEX: 21.66 KG/M2 | DIASTOLIC BLOOD PRESSURE: 78 MMHG | WEIGHT: 130 LBS | SYSTOLIC BLOOD PRESSURE: 130 MMHG | OXYGEN SATURATION: 99 % | HEART RATE: 64 BPM | HEIGHT: 65 IN

## 2021-08-03 DIAGNOSIS — Z85.118 HISTORY OF LUNG CANCER: Primary | ICD-10-CM

## 2021-08-03 DIAGNOSIS — R91.8 MULTIPLE LUNG NODULES: ICD-10-CM

## 2021-08-03 PROCEDURE — 1036F TOBACCO NON-USER: CPT | Performed by: THORACIC SURGERY (CARDIOTHORACIC VASCULAR SURGERY)

## 2021-08-03 PROCEDURE — 1160F RVW MEDS BY RX/DR IN RCRD: CPT | Performed by: THORACIC SURGERY (CARDIOTHORACIC VASCULAR SURGERY)

## 2021-08-03 PROCEDURE — 99214 OFFICE O/P EST MOD 30 MIN: CPT | Performed by: THORACIC SURGERY (CARDIOTHORACIC VASCULAR SURGERY)

## 2021-08-03 NOTE — ASSESSMENT & PLAN NOTE
Ms Elias Jade is a very pleasant   79-year-old female who is well known to me  She underwent a VATS right lower lobectomy on January 16, 2019  She presents today for routine follow-up  Today in the office, she is doing very well  Her CT scan demonstrates stability  We will repeat her CT scan in 6 months time  That will put her 3 years out from resection  If that CT scan demonstrates stability, we will space out her surveillance to once a year      Teri Ny MD  Thoracic Surgery  (Available on [employer])  Office: [phone]

## 2021-08-03 NOTE — PROGRESS NOTES
Thoracic Follow-Up  Assessment/Plan:    Multiple lung nodules    These all demonstrate stability  Will continue to follow  History of lung cancer  Ms Charmayne Newton is a very pleasant   74-year-old female who is well known to me  She underwent a VATS right lower lobectomy on January 16, 2019  She presents today for routine follow-up  Today in the office, she is doing very well  Her CT scan demonstrates stability  We will repeat her CT scan in 6 months time  That will put her 3 years out from resection  If that CT scan demonstrates stability, we will space out her surveillance to once a year  Jana Cervantes MD  Thoracic Surgery  (Available on [employer])  Office: [phone]         Diagnoses and all orders for this visit:    History of lung cancer  -     CT chest wo contrast; Future    Multiple lung nodules          Thoracic History     Procedure: 1/16/2019 VATS RLLobectomy and mediastinoscopy  Pathology: Invasive adenocarcinoma, 3 6cm, 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, Stage IB, [id number]  Treatment: resection, no adjuvant      Subjective:    Patient ID: eBrtha White is a 68 y o  female  HPI  Ms Charmayne Newton is a very pleasant   74-year-old female who is well known to me  She underwent a VATS right lower lobectomy on January 16, 2019  She presents today for routine follow-up  Today in the office, she states that she is doing well  She denies any changes in her symptoms  She has stable shortness of breath  This is not worsening  She denies any chest pain  She denies any recent fevers or chills or upper respiratory infection  She reports that her cough has nearly resolved  She has started taking and Allegra which has helped  I personally reviewed her images in PACS  This demonstrates stability and no signs of any recurrence of her cancer  She does have some stable pulmonary nodules that I will continue to follow        I have reviewed the most recent note from her visit to her cardiologist and pulmonologist       She has received both doses of COVID vaccine  The following portions of the patient's history were reviewed and updated as appropriate: allergies, current medications, past family history, past medical history, past social history, past surgical history and problem list     Review of Systems   Constitutional: Negative for chills, fatigue, fever and unexpected weight change  HENT: Negative  Eyes: Negative  Negative for visual disturbance  Respiratory: Positive for shortness of breath  Negative for cough and stridor  Cardiovascular: Negative for chest pain  Gastrointestinal: Negative  Endocrine: Negative  Genitourinary: Negative  Musculoskeletal: Negative  Skin: Negative  Neurological: Negative for dizziness, light-headedness and headaches  Hematological: Negative for adenopathy  Psychiatric/Behavioral: Negative  Objective:   Physical Exam  Vitals and nursing note reviewed  Constitutional:       General: She is not in acute distress  Appearance: Normal appearance  She is well-developed  She is not diaphoretic  HENT:      Head: Normocephalic and atraumatic  Nose: Nose normal  No congestion or rhinorrhea  Mouth/Throat:      Mouth: Mucous membranes are moist       Pharynx: Oropharynx is clear  No oropharyngeal exudate  Eyes:      General: No scleral icterus  Pupils: Pupils are equal, round, and reactive to light  Neck:      Trachea: No tracheal deviation  Cardiovascular:      Rate and Rhythm: Normal rate and regular rhythm  Pulses: Normal pulses  Heart sounds: Normal heart sounds  No murmur heard  Pulmonary:      Effort: Pulmonary effort is normal  No respiratory distress  Breath sounds: Normal breath sounds  No stridor  No wheezing or rales  Chest:      Chest wall: No tenderness  Abdominal:      General: Bowel sounds are normal  There is no distension  Palpations: Abdomen is soft  Tenderness: There is no abdominal tenderness  There is no rebound  Musculoskeletal:         General: Normal range of motion  Cervical back: Normal range of motion and neck supple  No muscular tenderness  Lymphadenopathy:      Cervical: No cervical adenopathy  Skin:     General: Skin is warm and dry  Coloration: Skin is not jaundiced or pale  Findings: No erythema or rash  Neurological:      General: No focal deficit present  Mental Status: She is alert and oriented to person, place, and time  Psychiatric:         Mood and Affect: Mood normal          Behavior: Behavior normal          Thought Content: Thought content normal          Judgment: Judgment normal      /78 (BP Location: Left arm, Patient Position: Sitting)   Pulse 64   Temp (!) 97 4 °F (36 3 °C) (Tympanic)   Resp 12   Ht 5' 5" (1 651 m)   Wt 59 kg (130 lb)   SpO2 99%   BMI 21 63 kg/m²     No Chest XR results available for this patient  CT chest wo contrast    Result Date: 7/29/2021  Narrative CT CHEST WITHOUT IV CONTRAST INDICATION:   Z85 118: Personal history of other malignant neoplasm of bronchus and lung  COMPARISON:  CT dated 1/12/2021 TECHNIQUE: CT examination of the chest was performed without intravenous contrast   Axial, sagittal, and coronal 2D reformatted images were created from the source data and submitted for interpretation  Radiation dose length product (DLP) for this visit:  280 mGy-cm   This examination, like all CT scans performed in the [facility], was performed utilizing techniques to minimize radiation dose exposure, including the use of iterative reconstruction and automated exposure control  FINDINGS: LUNGS:  Status post right lower lobectomy with associated volume loss and rightward mediastinal shift, stable in appearance compared to the previous study  Again seen is an 8 mm calcified nodule in the left lower lobe, stable in appearance   Biapical pleural parenchymal scarring  Again seen is a stable appearing 4 mm left lower lobe nodule on series 206, image #63  Stable focal area of pleural parenchymal scarring in the anterior right upper lobe  Stable focus of groundglass opacity in the right upper lobe on image #39  No new findings  PLEURA:  Unremarkable  HEART/GREAT VESSELS:  Moderate coronary artery calcification  Rightward mediastinal shift secondary to volume loss in the right lung  No pericardial effusion  MEDIASTINUM AND ETTA:  Unremarkable  CHEST WALL AND LOWER NECK:   Unremarkable  VISUALIZED STRUCTURES IN THE UPPER ABDOMEN:  Partially visualized right upper pole renal hemorrhagic cyst, stable in appearance  Stable 9 mm hypodense lesion in the left hepatic lobe  OSSEOUS STRUCTURES:  No acute fracture or destructive osseous lesion  Impression Stable postoperative changes status post right lower lobectomy  Stable lung nodules and groundglass opacity as described above  Recommend continued surveillance  Workstation performed: [id number]     CT chest wo contrast    Result Date: 1/15/2021  Narrative CT CHEST WITHOUT IV CONTRAST INDICATION:   Z85 118: Personal history of other malignant neoplasm of bronchus and lung  History of lung cancer  Follow-up evaluation  COMPARISON:  6/30/2020; 12/30/2019 ; 10/21/2015 TECHNIQUE: CT examination of the chest was performed without intravenous contrast   Axial, sagittal, and coronal 2D reformatted images were created from the source data and submitted for interpretation  Radiation dose length product (DLP) for this visit:  279 mGy-cm   This examination, like all CT scans performed in the [facility], was performed utilizing techniques to minimize radiation dose exposure, including the use of iterative reconstruction and automated exposure control  FINDINGS: LUNGS:  The lungs is stable  Right lower lobectomy with associated mediastinal shift is unchanged    Rounded nodule with central popcorn calcifications measuring 0 8 cm in the left lower lobe image 79, series 206 is stable 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, retrospectively present on the prior 2015 CT with evolving/developing central benign-appearing calcifications  There are other small scattered groundglass opacities which are stable  A 0 3 cm left lower lobe pulmonary nodule image 65, series 206 is noted  PLEURA:  Unremarkable  HEART/GREAT VESSELS:  Atherosclerotic calcifications noted  Mediastinal shift to the right noted, expected postoperative changes in the patient with prior right lower lobectomy  MEDIASTINUM AND ETTA:  Unremarkable  CHEST WALL AND LOWER NECK:   Unremarkable  VISUALIZED STRUCTURES IN THE UPPER ABDOMEN:  Rounded hypodensity in the left lobe of liver is likely a cyst, similar to the 2019 CT, measuring approximately 0 9 cm on image 53, series 201  OSSEOUS STRUCTURES:  Mild spondylotic changes noted  Impression 1  Stable postoperative changes status post right lower lobectomy  2   Scattered groundglass and solid pulmonary nodules are stable  Continued clinical and imaging surveillance recommended  Workstation performed: [id number]     No CT Chest,Abdomen,Pelvis results available for this patient  No NM PET CT results available for this patient  No Barium Swallow results available for this patient

## 2021-12-02 DIAGNOSIS — G30.9 ALZHEIMER'S DEMENTIA WITHOUT BEHAVIORAL DISTURBANCE, UNSPECIFIED TIMING OF DEMENTIA ONSET (HCC): ICD-10-CM

## 2021-12-02 DIAGNOSIS — F02.80 ALZHEIMER'S DEMENTIA WITHOUT BEHAVIORAL DISTURBANCE, UNSPECIFIED TIMING OF DEMENTIA ONSET (HCC): ICD-10-CM

## 2021-12-02 RX ORDER — MEMANTINE HYDROCHLORIDE 10 MG/1
TABLET ORAL
Qty: 60 TABLET | Refills: 0 | Status: SHIPPED | OUTPATIENT
Start: 2021-12-02 | End: 2022-01-19 | Stop reason: SDUPTHER

## 2021-12-05 DIAGNOSIS — F02.80 ALZHEIMER'S DEMENTIA WITHOUT BEHAVIORAL DISTURBANCE, UNSPECIFIED TIMING OF DEMENTIA ONSET (HCC): ICD-10-CM

## 2021-12-05 DIAGNOSIS — G30.9 ALZHEIMER'S DEMENTIA WITHOUT BEHAVIORAL DISTURBANCE, UNSPECIFIED TIMING OF DEMENTIA ONSET (HCC): ICD-10-CM

## 2021-12-06 RX ORDER — DONEPEZIL HYDROCHLORIDE 10 MG/1
TABLET, FILM COATED ORAL
Qty: 30 TABLET | Refills: 0 | Status: SHIPPED | OUTPATIENT
Start: 2021-12-06 | End: 2022-01-03

## 2022-01-17 ENCOUNTER — TELEPHONE (OUTPATIENT)
Dept: NEUROLOGY | Facility: CLINIC | Age: 82
End: 2022-01-17

## 2022-01-19 ENCOUNTER — OFFICE VISIT (OUTPATIENT)
Dept: OBGYN CLINIC | Facility: CLINIC | Age: 82
End: 2022-01-19
Payer: COMMERCIAL

## 2022-01-19 ENCOUNTER — OFFICE VISIT (OUTPATIENT)
Dept: NEUROLOGY | Facility: CLINIC | Age: 82
End: 2022-01-19
Payer: COMMERCIAL

## 2022-01-19 VITALS — BODY MASS INDEX: 22.57 KG/M2 | SYSTOLIC BLOOD PRESSURE: 132 MMHG | WEIGHT: 135.6 LBS | DIASTOLIC BLOOD PRESSURE: 82 MMHG

## 2022-01-19 VITALS
BODY MASS INDEX: 22.49 KG/M2 | DIASTOLIC BLOOD PRESSURE: 80 MMHG | WEIGHT: 135 LBS | TEMPERATURE: 97.2 F | HEART RATE: 66 BPM | OXYGEN SATURATION: 98 % | HEIGHT: 65 IN | SYSTOLIC BLOOD PRESSURE: 125 MMHG

## 2022-01-19 DIAGNOSIS — Z12.31 ENCOUNTER FOR SCREENING MAMMOGRAM FOR MALIGNANT NEOPLASM OF BREAST: Primary | ICD-10-CM

## 2022-01-19 DIAGNOSIS — N95.2 VAGINAL ATROPHY: ICD-10-CM

## 2022-01-19 DIAGNOSIS — F02.80 ALZHEIMER'S DEMENTIA WITHOUT BEHAVIORAL DISTURBANCE, UNSPECIFIED TIMING OF DEMENTIA ONSET (HCC): Primary | ICD-10-CM

## 2022-01-19 DIAGNOSIS — G30.9 ALZHEIMER'S DEMENTIA WITHOUT BEHAVIORAL DISTURBANCE, UNSPECIFIED TIMING OF DEMENTIA ONSET (HCC): Primary | ICD-10-CM

## 2022-01-19 DIAGNOSIS — Z01.419 ENCOUNTER FOR ANNUAL ROUTINE GYNECOLOGICAL EXAMINATION: ICD-10-CM

## 2022-01-19 PROCEDURE — 99213 OFFICE O/P EST LOW 20 MIN: CPT | Performed by: PSYCHIATRY & NEUROLOGY

## 2022-01-19 PROCEDURE — 99213 OFFICE O/P EST LOW 20 MIN: CPT | Performed by: OBSTETRICS & GYNECOLOGY

## 2022-01-19 RX ORDER — MECLIZINE HYDROCHLORIDE 25 MG/1
TABLET ORAL
COMMUNITY

## 2022-01-19 RX ORDER — MEMANTINE HYDROCHLORIDE 10 MG/1
10 TABLET ORAL 2 TIMES DAILY
Qty: 60 TABLET | Refills: 5 | Status: SHIPPED | OUTPATIENT
Start: 2022-01-19

## 2022-01-19 RX ORDER — DONEPEZIL HYDROCHLORIDE 10 MG/1
10 TABLET, FILM COATED ORAL
Qty: 30 TABLET | Refills: 5 | Status: SHIPPED | OUTPATIENT
Start: 2022-01-19

## 2022-01-19 NOTE — PROGRESS NOTES
Vikash Dunn is a 80 y o  female returns in follow-up today with history of dementia    Assessment:  1  Alzheimer's dementia without behavioral disturbance, unspecified timing of dementia onset ([facility] [address] )        Plan:  Continue Aricept and Namenda   Follow-up 6 months    Discussion:  Brandon Gauthier is daughter reports overall she has been stable on a day-to-day basis  She remains on Aricept and Namenda and tolerates these well  She did not scores well on [place] testing today which may in part be related to change in wilder  She will continue current management and I will see her back in follow-up in 6 months      Subjective:    HPI  Brandon Gauthier returns in follow-up today accompanied by her daughter  Her daughter reports overall things seem to be fairly stable over the last 6 months  She remains on Aricept and Namenda and tolerates these well  Her son continues to live with her and she benefits from having his companionship and supervision  She remains independent in most of her ADLs, dressing (but will often put on the same outfit is the day before) making her breakfast including coffee and taking care of her dog  There are no new health issues        Past Medical History:   Diagnosis Date    Abnormal chest x-ray     Abnormal mammogram     COPD (chronic obstructive pulmonary disease) (HCC)     Dementia (HCC)     Early stage     Ductal hyperplasia of breast     Left     Hyperlipidemia     diet controled    Nodule of lower lobe of right lung     Right Lower Lobe     SOB (shortness of breath)     SOB (shortness of breath)     Wheezing        Family History:  Family History   Problem Relation Age of Onset    Prostate cancer Father     Hypertension Mother    [place] Glaucoma Mother     Macular degeneration Mother     Heart attack Brother        Past Surgical History:  Past Surgical History:   Procedure Laterality Date    BREAST BIOPSY Left 10/12/2018    BREAST CYST EXCISION Left 10/31/2018    BREAST LUMPECTOMY Left 11/13/2018    Procedure: BREAST LUMPECTOMY; BREAST NEEDLE LOCALIZATION (NEEDLE LOC AT 0830); Surgeon: Oniel Han MD;  [facility];  Service: Surgical Oncology    COLONOSCOPY      LAPAROSCOPY      MAMMO NEEDLE LOCALIZATION LEFT (ALL INC) Left 11/13/2018    MAMMO STEREOTACTIC BREAST BIOPSY LEFT (ALL INC) Left 10/16/2018    NV BRONCHOSCOPY,DIAGNOSTIC Right 1/16/2019    Procedure: BRONCHOSCOPY FLEXIBLE,;  Surgeon: Mesfin Crowley MD;  [facility];  Service: Thoracic    NV MEDIASTINOSCOPY WITH LYMPH NODE BIOPSY/IES Right 1/16/2019    Procedure: MEDIASTINOSCOPY;  Surgeon: Mesfin Crowley MD;  [facility];  Service: Thoracic    NV THORACOSCOPY SURG LOBECTOMY Right 1/16/2019    Procedure: LOBECTOMY LUNG;  Surgeon: Mesfin Crowley MD;  [facility];  Service: Thoracic    [address] Right 10/10/2019    ULNAR NERVE TRANSPOSITION Right 10/10/2019       Social History:   reports that she quit smoking about 5 years ago  She started smoking about 64 years ago  She has a 60 00 pack-year smoking history  She has never used smokeless tobacco  She reports previous alcohol use  She reports that she does not use drugs  Allergies:  Patient has no known allergies        Current Outpatient Medications:     albuterol (Ventolin HFA) 90 mcg/act inhaler, Inhale 2 puffs every 6 (six) hours as needed for wheezing, Disp: 18 g, Rfl: 1    Cholecalciferol (Vitamin D3) 125 MCG (5000 UT) CAPS, Take by mouth daily, Disp: , Rfl:     donepezil (ARICEPT) 10 mg tablet, TAKE ONE TABLET BY MOUTH NIGHTLY AT BEDTIME, Disp: 30 tablet, Rfl: 0    meclizine (ANTIVERT) 25 mg tablet, , Disp: , Rfl:     memantine (NAMENDA) 10 mg tablet, TAKE ONE TABLET BY MOUTH TWICE DAILY, Disp: 60 tablet, Rfl: 0    fexofenadine (Allegra Allergy) 180 MG tablet, , Disp: , Rfl:     I have reviewed the past medical, social and family history, current medications, allergies, vitals, review of systems and updated this information as appropriate today     Objective:    Vitals:  Blood pressure 125/80, pulse 66, temperature (!) 97 2 °F (36 2 °C), temperature source Temporal, height 5' 5" (1 651 m), weight 61 2 kg (135 lb), SpO2 98 %, not currently breastfeeding  Physical Exam    Neurological Exam  GENERAL:  Well-developed well-nourished woman in no acute distress  HEENT/NECK: Head is atraumatic normocephalic, neck is supple  NEUROLOGIC:  Mental Status: Awake and alert without aphasia  She scored a 9+ 1/30 on [place]  Cranial Nerves: Extraocular movements are full  Face is symmetrical  Coordination:  Gait is stable        ROS:    Review of Systems   Constitutional: Negative  Negative for appetite change and fever  HENT: Negative  Negative for hearing loss, tinnitus, trouble swallowing and voice change  Eyes: Negative  Negative for photophobia and pain  Respiratory: Positive for shortness of breath  Cardiovascular: Negative  Negative for palpitations  Gastrointestinal: Negative  Negative for nausea and vomiting  Endocrine: Positive for cold intolerance  Genitourinary: Positive for frequency and urgency  Negative for dysuria  Musculoskeletal: Negative  Negative for myalgias and neck pain  Skin: Negative  Negative for rash  Neurological: Positive for dizziness and light-headedness  Negative for tremors, seizures, syncope, facial asymmetry, speech difficulty, weakness, numbness and headaches  Hematological: Bruises/bleeds easily  Psychiatric/Behavioral: Positive for confusion  Negative for hallucinations and sleep disturbance

## 2022-01-19 NOTE — PROGRESS NOTES
Assessment/Plan:    Gyn exam except for vaginal atrophy   History of lung cancer  History of normal colonoscopy  No further colonoscopies recommended by her GI doctor  Status post COVID vaccine and booster  Normal mammogram     Plan:  Rx mammogram   Continue healthy diet exercise  Continue vitamin D3  Recommend multivitamin  Subjective:      Patient ID: Jane Jefferson is a 80 y o  female with early onset of dementia presents to the office with no complaints  Patient was accompanied by her daughter today  Patient denies any pelvic pain vaginal bleeding breast bowel or bladder issues  No change in family history  Medications reviewed  Review of Systems   Constitutional: Negative  Negative for fatigue, fever and unexpected weight change  HENT: Negative  Eyes: Negative  Respiratory: Negative  Negative for chest tightness, shortness of breath, wheezing and stridor  Cardiovascular: Negative  Negative for chest pain, palpitations and leg swelling  Gastrointestinal: Negative  Negative for abdominal pain, blood in stool, diarrhea, nausea, rectal pain and vomiting  Endocrine: Negative  Genitourinary: Negative for dysuria, frequency, vaginal bleeding, vaginal discharge and vaginal pain  Musculoskeletal: Negative  Skin: Negative  Allergic/Immunologic: Negative  Neurological: Negative  Hematological: Negative  Psychiatric/Behavioral: Negative  All other systems reviewed and are negative  Objective:      /82   Wt 61 5 kg (135 lb 9 6 oz)   BMI 22 57 kg/m²          Physical Exam  Constitutional:       Appearance: She is well-developed  HENT:      Head: Normocephalic and atraumatic  Neck:      Thyroid: No thyromegaly  Trachea: No tracheal deviation  Cardiovascular:      Rate and Rhythm: Normal rate and regular rhythm  Heart sounds: Normal heart sounds  Pulmonary:      Effort: Pulmonary effort is normal  No respiratory distress  Breath sounds: Normal breath sounds  No stridor  No wheezing or rales  Chest:      Chest wall: No tenderness  Breasts: Breasts are symmetrical       Right: No inverted nipple, mass, nipple discharge, skin change or tenderness  Left: No inverted nipple, mass, nipple discharge, skin change or tenderness  Abdominal:      General: Bowel sounds are normal  There is no distension  Palpations: Abdomen is soft  There is no mass  Tenderness: There is no abdominal tenderness  There is no guarding or rebound  Hernia: No hernia is present  There is no hernia in the left inguinal area  Genitourinary:     Labia:         Right: No rash, tenderness, lesion or injury  Left: No rash, tenderness, lesion or injury  Vagina: Normal  No signs of injury and foreign body  No vaginal discharge, erythema, tenderness or bleeding  Cervix: No cervical motion tenderness, discharge or friability  Uterus: Not deviated, not enlarged, not fixed and not tender  Adnexa:         Right: No mass, tenderness or fullness  Left: No mass, tenderness or fullness  Rectum: No mass, anal fissure, external hemorrhoid or internal hemorrhoid  Comments: Vaginal atrophy  Normal urethra and [place]'s glands  No uterine prolapse  No cystocele or rectocele  Musculoskeletal:      Cervical back: Normal range of motion and neck supple  Lymphadenopathy:      Lower Body: No right inguinal adenopathy  No left inguinal adenopathy  Skin:     General: Skin is warm and dry  Neurological:      Mental Status: She is alert and oriented to person, place, and time  Psychiatric:         Behavior: Behavior normal          Thought Content:  Thought content normal          Judgment: Judgment normal

## 2022-01-20 ENCOUNTER — TELEPHONE (OUTPATIENT)
Dept: NEUROLOGY | Facility: CLINIC | Age: 82
End: 2022-01-20

## 2022-01-20 NOTE — TELEPHONE ENCOUNTER
Pt's dtr [employer] called and states that pt sees Dr Rhae Fothergill and gets memory test every 6 months  The staff that usually does pt's memory test was not avail  Another staff who performed the test seems irritated  Pt seems really rushed  Staff was questioning if she has to do memory test or not  Staff then placed the paper in front of her mom  "Did not explain and she spoke very fast"  Memory result was not as good bec of the way the test was administered  She expressed her concern w/ Dr Rhae Fothergill about this yesterday  States that Dr Rhae Fothergill said, "that explain why she did not do well"  States that she filled out the survey yesterday as well  States that she wants to let one of our supervisor know of this issue  She does not want the same staff to perform pt's memory test next time  Unfortunately, she does not know the name of the staff but she describe her as "tall, young black girl"                 [phone] ok to leave detailed message

## 2022-01-27 ENCOUNTER — HOSPITAL ENCOUNTER (OUTPATIENT)
Dept: CT IMAGING | Facility: CLINIC | Age: 82
Discharge: HOME | End: 2022-01-27
Payer: COMMERCIAL

## 2022-01-27 DIAGNOSIS — Z85.118 HISTORY OF LUNG CANCER: ICD-10-CM

## 2022-01-27 PROCEDURE — G1004 CDSM NDSC: HCPCS

## 2022-01-27 PROCEDURE — 71250 CT THORAX DX C-: CPT

## 2022-02-01 ENCOUNTER — OFFICE VISIT (OUTPATIENT)
Dept: CARDIAC SURGERY | Facility: CLINIC | Age: 82
End: 2022-02-01
Payer: COMMERCIAL

## 2022-02-01 VITALS
SYSTOLIC BLOOD PRESSURE: 128 MMHG | RESPIRATION RATE: 16 BRPM | TEMPERATURE: 98 F | BODY MASS INDEX: 22.99 KG/M2 | WEIGHT: 138 LBS | DIASTOLIC BLOOD PRESSURE: 70 MMHG | HEIGHT: 65 IN | OXYGEN SATURATION: 98 % | HEART RATE: 68 BPM

## 2022-02-01 DIAGNOSIS — Z85.118 HISTORY OF LUNG CANCER: Primary | ICD-10-CM

## 2022-02-01 PROCEDURE — 99214 OFFICE O/P EST MOD 30 MIN: CPT | Performed by: THORACIC SURGERY (CARDIOTHORACIC VASCULAR SURGERY)

## 2022-02-01 NOTE — ASSESSMENT & PLAN NOTE
Ms Nelson Calzada is a 73yo F who is well known to me  She underwent a VATS RLLobectomy in January 2019  She presents today for routine surveillance  Today in the office, I am overall happy with how she is doing  She has reached an incredible milestone of 3 years cancer free  Because of this, we can now space out her surveillance to once CT scan once a year  She understands and agrees with the plan  I have ordered a CT scan of her chest for 1 year from now she will follow up at that time  She knows of anything changes in the interim she should call      Amaris Slaughter MD  Thoracic Surgery  (Available on [employer] Text)  Office: [phone]

## 2022-02-01 NOTE — PROGRESS NOTES
Thoracic Follow-Up  Assessment/Plan:    Primary adenocarcinoma of right lung [facility])  Ms Lorie Hines is a 71yo F who is well known to me  She underwent a VATS RLLobectomy in January 2019  She presents today for routine surveillance  Today in the office, I am overall happy with how she is doing  She has reached an incredible milestone of 3 years cancer free  Because of this, we can now space out her surveillance to once CT scan once a year  She understands and agrees with the plan  I have ordered a CT scan of her chest for 1 year from now she will follow up at that time  She knows of anything changes in the interim she should call  Logan Askew MD  Thoracic Surgery  (Available on [employer] Text)  Office: [phone]         Diagnoses and all orders for this visit:    History of lung cancer  -     CT chest wo contrast; Future          Thoracic History     Procedure: 1/16/2019 VATS RLLobectomy and mediastinoscopy  Pathology: Invasive adenocarcinoma, 3 6cm, 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, Stage IB, [id number]  Treatment: resection, no adjuvant      Subjective:    Patient ID: Maira Franco is a 80 y o  female  HPI  Ms Lorie Hines is a 71yo F who is well known to me  She underwent a VATS RLLobectomy in January 2019  She presents today for routine surveillance  Today in the office, she reports that she is doing well  She has no changes in her health since her last visit  She denies any significant shortness of breath or chest pain  She was frequently walking in the warmer weather but has slowed down a little bit because of the cold weather  She is planning on picking back up the since the weather is nicer  She denies any significant weight loss  She denies a chronic cough  She does report occasional rhinorrhea  She denies any recent fevers or chills or upper respiratory infection or pneumonia  I personally reviewed her imaging in PACS    This demonstrates stability and no signs of any recurrence of her cancer  She does have a few stable pulmonary nodules that have not changed in size  I am also read all the most recent notes from her primary care physician as well as the neurologist     The following portions of the patient's history were reviewed and updated as appropriate: allergies, current medications, past family history, past medical history, past social history, past surgical history and problem list     Review of Systems   Constitutional: Negative for chills, fatigue, fever and unexpected weight change  HENT: Positive for rhinorrhea  Eyes: Negative  Negative for visual disturbance  Respiratory: Negative for cough, shortness of breath and stridor  Cardiovascular: Negative for chest pain  Gastrointestinal: Negative  Endocrine: Negative  Genitourinary: Negative  Musculoskeletal: Negative  Skin: Negative  Neurological: Negative for dizziness, light-headedness and headaches  Hematological: Negative for adenopathy  Psychiatric/Behavioral: Negative  Objective:   Physical Exam  Vitals and nursing note reviewed  Constitutional:       General: She is not in acute distress  Appearance: Normal appearance  She is well-developed  She is not diaphoretic  HENT:      Head: Normocephalic and atraumatic  Nose: Nose normal  No congestion or rhinorrhea  Mouth/Throat:      Mouth: Mucous membranes are moist       Pharynx: Oropharynx is clear  No oropharyngeal exudate  Eyes:      General: No scleral icterus  Pupils: Pupils are equal, round, and reactive to light  Neck:      Trachea: No tracheal deviation  Cardiovascular:      Rate and Rhythm: Normal rate and regular rhythm  Pulses: Normal pulses  Heart sounds: Normal heart sounds  No murmur heard  Pulmonary:      Effort: Pulmonary effort is normal  No respiratory distress  Breath sounds: Normal breath sounds  No stridor  No wheezing or rales  Chest:      Chest wall: No tenderness  Abdominal:      General: Bowel sounds are normal  There is no distension  Palpations: Abdomen is soft  Tenderness: There is no abdominal tenderness  There is no rebound  Musculoskeletal:         General: Normal range of motion  Cervical back: Normal range of motion and neck supple  No muscular tenderness  Lymphadenopathy:      Cervical: No cervical adenopathy  Skin:     General: Skin is warm and dry  Coloration: Skin is not jaundiced or pale  Findings: No erythema or rash  Neurological:      General: No focal deficit present  Mental Status: She is alert and oriented to person, place, and time  Psychiatric:         Mood and Affect: Mood normal          Behavior: Behavior normal          Thought Content: Thought content normal          Judgment: Judgment normal      /70 (BP Location: Right arm, Patient Position: Sitting, Cuff Size: Standard)   Pulse 68   Temp 98 °F (36 7 °C)   Resp 16   Ht 5' 5" (1 651 m)   Wt 62 6 kg (138 lb)   SpO2 98%   BMI 22 96 kg/m²     No Chest XR results available for this patient  CT chest wo contrast    Result Date: 1/31/2022  Narrative CT CHEST WITHOUT IV CONTRAST INDICATION:   Z85 118: Personal history of other malignant neoplasm of bronchus and lung  COMPARISON:  CT chest dated 7/27/2021  TECHNIQUE: CT examination of the chest was performed without intravenous contrast   Axial, sagittal, and coronal 2D reformatted images were created from the source data and submitted for interpretation  Radiation dose length product (DLP) for this visit:  322 mGy-cm   This examination, like all CT scans performed in the [facility], was performed utilizing techniques to minimize radiation dose exposure, including the use of iterative reconstruction and automated exposure control  FINDINGS: LUNGS:  Stable postsurgical changes of prior right lower lobectomy with associated volume loss and rightward mediastinal shift    Stable 5 mm left lower lobe groundglass nodule (image 50, series 206)  Stable 5 mm left lower lobe nodule (image 61, series 206)  6 mm calcified left lower lobe nodule is not significantly changed  Stable biapical pleural-parenchymal scarring  There is no tracheal or endobronchial lesion  PLEURA:  Unremarkable  HEART/GREAT VESSELS: Heart is unremarkable for patient's age  Atherosclerotic disease  No thoracic aortic aneurysm  MEDIASTINUM AND ETTA:  Unremarkable  CHEST WALL AND LOWER NECK:   Unremarkable  VISUALIZED STRUCTURES IN THE UPPER ABDOMEN:  Stable 8 mm hypodensity in the left hepatic lobe  Stable 1 1 cm right upper renal pole hemorrhagic/proteinaceous cyst  OSSEOUS STRUCTURES:  No acute fracture or destructive osseous lesion  Impression Stable postsurgical changes status post prior right lower lobectomy  No new or enlarging pulmonary nodules  Stable pulmonary nodules  Recommend continued surveillance  Workstation performed: [id number]     CT chest wo contrast    Result Date: 7/29/2021  Narrative CT CHEST WITHOUT IV CONTRAST INDICATION:   Z85 118: Personal history of other malignant neoplasm of bronchus and lung  COMPARISON:  CT dated 1/12/2021 TECHNIQUE: CT examination of the chest was performed without intravenous contrast   Axial, sagittal, and coronal 2D reformatted images were created from the source data and submitted for interpretation  Radiation dose length product (DLP) for this visit:  280 mGy-cm   This examination, like all CT scans performed in the [facility], was performed utilizing techniques to minimize radiation dose exposure, including the use of iterative reconstruction and automated exposure control  FINDINGS: LUNGS:  Status post right lower lobectomy with associated volume loss and rightward mediastinal shift, stable in appearance compared to the previous study  Again seen is an 8 mm calcified nodule in the left lower lobe, stable in appearance   Biapical pleural parenchymal scarring  Again seen is a stable appearing 4 mm left lower lobe nodule on series 206, image #63  Stable focal area of pleural parenchymal scarring in the anterior right upper lobe  Stable focus of groundglass opacity in the right upper lobe on image #39  No new findings  PLEURA:  Unremarkable  HEART/GREAT VESSELS:  Moderate coronary artery calcification  Rightward mediastinal shift secondary to volume loss in the right lung  No pericardial effusion  MEDIASTINUM AND ETTA:  Unremarkable  CHEST WALL AND LOWER NECK:   Unremarkable  VISUALIZED STRUCTURES IN THE UPPER ABDOMEN:  Partially visualized right upper pole renal hemorrhagic cyst, stable in appearance  Stable 9 mm hypodense lesion in the left hepatic lobe  OSSEOUS STRUCTURES:  No acute fracture or destructive osseous lesion  Impression Stable postoperative changes status post right lower lobectomy  Stable lung nodules and groundglass opacity as described above  Recommend continued surveillance  Workstation performed: [id number]     No CT Chest,Abdomen,Pelvis results available for this patient  No NM PET CT results available for this patient  No Barium Swallow results available for this patient

## 2022-02-22 ENCOUNTER — HOSPITAL ENCOUNTER (OUTPATIENT)
Dept: MAMMOGRAPHY | Facility: CLINIC | Age: 82
Discharge: HOME | End: 2022-02-22
Payer: COMMERCIAL

## 2022-02-22 VITALS — BODY MASS INDEX: 22.99 KG/M2 | WEIGHT: 138 LBS | HEIGHT: 65 IN

## 2022-02-22 DIAGNOSIS — Z12.31 ENCOUNTER FOR SCREENING MAMMOGRAM FOR MALIGNANT NEOPLASM OF BREAST: ICD-10-CM

## 2022-02-22 PROCEDURE — 77067 SCR MAMMO BI INCL CAD: CPT

## 2022-02-22 PROCEDURE — 77063 BREAST TOMOSYNTHESIS BI: CPT

## 2022-07-12 ENCOUNTER — OFFICE VISIT (OUTPATIENT)
Dept: NEUROLOGY | Facility: CLINIC | Age: 82
End: 2022-07-12
Payer: COMMERCIAL

## 2022-07-12 VITALS
DIASTOLIC BLOOD PRESSURE: 90 MMHG | BODY MASS INDEX: 21.99 KG/M2 | SYSTOLIC BLOOD PRESSURE: 140 MMHG | HEIGHT: 65 IN | WEIGHT: 132 LBS | HEART RATE: 67 BPM

## 2022-07-12 DIAGNOSIS — F02.80 ALZHEIMER'S DEMENTIA WITHOUT BEHAVIORAL DISTURBANCE, UNSPECIFIED TIMING OF DEMENTIA ONSET: Primary | ICD-10-CM

## 2022-07-12 DIAGNOSIS — G30.9 ALZHEIMER'S DEMENTIA WITHOUT BEHAVIORAL DISTURBANCE, UNSPECIFIED TIMING OF DEMENTIA ONSET: Primary | ICD-10-CM

## 2022-07-12 PROCEDURE — 99213 OFFICE O/P EST LOW 20 MIN: CPT | Performed by: PSYCHIATRY & NEUROLOGY

## 2022-07-12 NOTE — PROGRESS NOTES
Kelsey Burger is a 80 y o  female returns in follow-up today with history of memory difficulty    Assessment:  1  Alzheimer's dementia without behavioral disturbance, unspecified timing of dementia onset ([facility] [address] )        Plan:  Continue Aricept and Namenda   Follow-up 6 months    Discussion:  Overall Emily is been stable cognitively and her [place] testing is back up to what it had been prior to her last visit  She is tolerating her medication  They will limit Allegra use and I will see her back in follow-up in 6 months      Subjective:    ETHAN Diaz returns in follow-up today accompanied by her daughter  Her daughter reports overall things have been fairly stable cognitively  She sees be getting upset a little easier recently and daughter feels that this is because her brother has been living with his mother and the routine has been modified a bit  She remains on Aricept and Namenda and tolerates these well    She does use Allegra occasionally for allergies and discussed how this can affect acetylcholine in a negative fashion reflecting memory concerns and they will try to limit the use      Past Medical History:   Diagnosis Date    Abnormal chest x-ray     Abnormal mammogram     COPD (chronic obstructive pulmonary disease) (HCC)     Dementia (HCC)     Early stage     Ductal hyperplasia of breast     Left     Hyperlipidemia     diet controled    Nodule of lower lobe of right lung     Right Lower Lobe     SOB (shortness of breath)     SOB (shortness of breath)     Wheezing        Family History:  Family History   Problem Relation Age of Onset    Prostate cancer Father     Hypertension Mother    Lillie Rhody Glaucoma Mother     Macular degeneration Mother     Heart attack Brother        Past Surgical History:  Past Surgical History:   Procedure Laterality Date    BREAST BIOPSY Left 10/12/2018    BREAST CYST EXCISION Left 10/31/2018    BREAST LUMPECTOMY Left 11/13/2018    Procedure: BREAST LUMPECTOMY; BREAST NEEDLE LOCALIZATION (NEEDLE LOC AT 0830); Surgeon: Thaddeus Beck MD;  [facility];  Service: Surgical Oncology    COLONOSCOPY      LAPAROSCOPY      MAMMO NEEDLE LOCALIZATION LEFT (ALL INC) Left 11/13/2018    MAMMO STEREOTACTIC BREAST BIOPSY LEFT (ALL INC) Left 10/16/2018    [place] BRONCHOSCOPY,DIAGNOSTIC Right 1/16/2019    Procedure: BRONCHOSCOPY FLEXIBLE,;  Surgeon: Estefany Ventura MD;  [facility];  Service: Thoracic    [place] MEDIASTINOSCOPY WITH LYMPH NODE BIOPSY/IES Right 1/16/2019    Procedure: MEDIASTINOSCOPY;  Surgeon: Estefany Ventura MD;  [facility];  Service: Thoracic    W[place] THORACOSCOPY SURG LOBECTOMY Right 1/16/2019    Procedure: LOBECTOMY LUNG;  Surgeon: Estefany Ventura MD;  [facility];  Service: Thoracic    [address] Right 10/10/2019    ULNAR NERVE TRANSPOSITION Right 10/10/2019       Social History:   reports that she quit smoking about 6 years ago  She started smoking about 64 years ago  She has a 60 00 pack-year smoking history  She has never used smokeless tobacco  She reports current alcohol use  She reports that she does not use drugs  Allergies:  Patient has no known allergies        Current Outpatient Medications:     albuterol (Ventolin HFA) 90 mcg/act inhaler, Inhale 2 puffs every 6 (six) hours as needed for wheezing, Disp: 18 g, Rfl: 1    Cholecalciferol (Vitamin D3) 125 MCG (5000 UT) CAPS, Take 2,000 Units by mouth daily, Disp: , Rfl:     donepezil (ARICEPT) 10 mg tablet, Take 1 tablet (10 mg total) by mouth daily at bedtime, Disp: 30 tablet, Rfl: 5    fexofenadine (ALLEGRA) 180 MG tablet, Take 180 mg by mouth if needed, Disp: , Rfl:     memantine (NAMENDA) 10 mg tablet, Take 1 tablet (10 mg total) by mouth 2 (two) times a day, Disp: 60 tablet, Rfl: 5    meclizine (ANTIVERT) 25 mg tablet, , Disp: , Rfl:     I have reviewed the past medical, social and family history, current medications, allergies, vitals, review of systems and updated this information as appropriate today     Objective:    Vitals:  Blood pressure 140/90, pulse 67, height 5' 5" (1 651 m), weight 59 9 kg (132 lb), not currently breastfeeding  Physical Exam    Neurological Exam  GENERAL:  Well-developed well-nourished woman in no acute distress  HEENT/NECK: Head is atraumatic normocephalic, neck is supple  NEUROLOGIC:  Mental Status: Awake and alert without aphasia  She scored and 17+ 1/30 on [place]  Cranial Nerves: Extraocular movements are full  Face is symmetrical  Coordination:  Gait is stable            ROS:    Review of Systems   Constitutional: Negative  Negative for appetite change and fever  HENT: Negative  Negative for hearing loss, tinnitus, trouble swallowing and voice change  Eyes: Negative  Negative for photophobia and pain  Respiratory: Negative  Negative for shortness of breath  Cardiovascular: Negative  Negative for palpitations  Gastrointestinal: Negative  Negative for nausea and vomiting  Endocrine: Negative  Negative for cold intolerance  Genitourinary: Negative  Negative for dysuria, frequency and urgency  Musculoskeletal: Negative  Negative for myalgias and neck pain  Skin: Negative  Negative for rash  Neurological: Positive for dizziness  Negative for tremors, seizures, syncope, facial asymmetry, speech difficulty, weakness, light-headedness, numbness and headaches  Hematological: Negative  Does not bruise/bleed easily  Psychiatric/Behavioral: Positive for confusion  Negative for hallucinations and sleep disturbance

## 2022-08-11 DIAGNOSIS — G30.9 ALZHEIMER'S DEMENTIA WITHOUT BEHAVIORAL DISTURBANCE, UNSPECIFIED TIMING OF DEMENTIA ONSET: ICD-10-CM

## 2022-08-11 DIAGNOSIS — F02.80 ALZHEIMER'S DEMENTIA WITHOUT BEHAVIORAL DISTURBANCE, UNSPECIFIED TIMING OF DEMENTIA ONSET: ICD-10-CM

## 2022-08-11 RX ORDER — MEMANTINE HYDROCHLORIDE 10 MG/1
TABLET ORAL
Qty: 60 TABLET | Refills: 0 | Status: SHIPPED | OUTPATIENT
Start: 2022-08-11 | End: 2022-09-14

## 2022-08-11 RX ORDER — DONEPEZIL HYDROCHLORIDE 10 MG/1
TABLET, FILM COATED ORAL
Qty: 30 TABLET | Refills: 0 | Status: SHIPPED | OUTPATIENT
Start: 2022-08-11 | End: 2022-09-14

## 2022-09-14 DIAGNOSIS — G30.9 ALZHEIMER'S DEMENTIA WITHOUT BEHAVIORAL DISTURBANCE, UNSPECIFIED TIMING OF DEMENTIA ONSET: ICD-10-CM

## 2022-09-14 DIAGNOSIS — F02.80 ALZHEIMER'S DEMENTIA WITHOUT BEHAVIORAL DISTURBANCE, UNSPECIFIED TIMING OF DEMENTIA ONSET: ICD-10-CM

## 2022-09-14 RX ORDER — DONEPEZIL HYDROCHLORIDE 10 MG/1
TABLET, FILM COATED ORAL
Qty: 30 TABLET | Refills: 0 | Status: SHIPPED | OUTPATIENT
Start: 2022-09-14 | End: 2022-10-14

## 2022-09-14 RX ORDER — MEMANTINE HYDROCHLORIDE 10 MG/1
TABLET ORAL
Qty: 60 TABLET | Refills: 0 | Status: SHIPPED | OUTPATIENT
Start: 2022-09-14 | End: 2022-10-14

## 2022-09-14 NOTE — PROGRESS NOTES
Thoracic Follow-Up  Assessment/Plan:    SOB (shortness of breath) on exertion  The patient states that she is seeing her primary care doctor next week to discuss her shortness of breath  I told her that I would place a referral to pulmonology in case she would like to see a physician that specializes in lung medicine  Multiple lung nodules  We will continue to watch these lung nodules on her surveillance CT scans  They are all stable at this time  History of lung cancer  Ms Paloma Colvin has a history of stage IB adenocarcinoma  She is status post VATS right lower lobectomy in January 2019  She is doing well at this time  She will continue to follow up with me for her routine surveillance  Her neck CT scan will be scheduled for 6 months from now  If that CT scan demonstrates stability, she will be 2 years out from her resection and she will space out her surveillance CT scans to annually  Hermelindo Humphreys MD  Thoracic Surgery  (Available on [employer])  Office: [phone]         Diagnoses and all orders for this visit:    History of lung cancer  -     CT chest wo contrast; Future    SOB (shortness of breath) on exertion  -     Ambulatory referral to Pulmonology; Future    Encounter for follow-up surveillance of lung cancer    Multiple lung nodules          Thoracic History     Procedure: 1/16/2019 VATS RLLobectomy and mediastinoscopy  Pathology: Invasive adenocarcinoma, 3 6cm, 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, Stage IB, [id number]  Treatment: resection, no adjuvant      Subjective:    Patient ID: Evelia Grant is a 70 y o  female  HPI  Ms Paloma Colvin is a very pleasant 74-year-old female who is well known to me  She is status post VATS right lower lobectomy and mediastinoscopy on 01/16/2019  She is approximately 18 months out from her surgery and she is doing well  Today in the office, she states that she has started to experience more dyspnea on exertion 10 recently    She was previously able to walk 1 5 Verified with pt that ACP conversation on 9/8/22 is still valid this hospitalization.     Malcolm Andrews RN, BSN,   [phone]  Electronically signed by Malcolm Andrews RN on 9/14/2022 at 10:33 AM miles every day but she is no longer able to do this  She denies shortness of breath at baseline  She states that she has a chronic cough but that this has been stable  She denies hemoptysis  She denies any unexplained weight loss  She denies any fevers or chills, upper respiratory infection or any recent pneumonia  She denies diplopia  She denies seizures or syncope  She states that her primary care doctor tried to place her on an inhaler previously and she does not feel that this has helped her  I personally reviewed her CT scan in PACS  She has multiple solid and ground-glass nodules that are all stable  Additionally, she has a left lower lobe hamartoma that is stable  She has no signs of any type of recurrence of her tumor  The following portions of the patient's history were reviewed and updated as appropriate: allergies, current medications, past family history, past medical history, past social history, past surgical history and problem list     Review of Systems   Constitutional: Positive for activity change  Negative for chills, fatigue, fever and unexpected weight change  HENT: Negative  Negative for trouble swallowing and voice change  Eyes: Negative  Negative for visual disturbance  Respiratory: Positive for cough and shortness of breath  Negative for chest tightness, wheezing and stridor  Cardiovascular: Negative for chest pain  Gastrointestinal: Negative  Endocrine: Negative  Genitourinary: Negative  Musculoskeletal: Negative  Skin: Negative  Neurological: Negative for dizziness, light-headedness and headaches  Hematological: Negative for adenopathy  Psychiatric/Behavioral: Negative  Objective:   Physical Exam   Constitutional: She is oriented to person, place, and time  She appears well-developed and well-nourished  HENT:   Head: Normocephalic and atraumatic  Eyes: Pupils are equal, round, and reactive to light     Neck: Normal range of motion  No tracheal deviation present  Cardiovascular: Normal rate, regular rhythm and normal heart sounds  No murmur heard  Pulmonary/Chest: Effort normal and breath sounds normal  No stridor  No respiratory distress  She has no wheezes  She has no rales  She exhibits no tenderness  Abdominal: Soft  Bowel sounds are normal  She exhibits no distension  There is no tenderness  There is no rebound  Musculoskeletal: Normal range of motion  She exhibits no edema  Lymphadenopathy:     She has no cervical adenopathy  Neurological: She is alert and oriented to person, place, and time  Skin: Skin is warm and dry  No rash noted  She is not diaphoretic  No erythema  No pallor  Psychiatric: She has a normal mood and affect  Her behavior is normal  Judgment and thought content normal    Nursing note and vitals reviewed  /84 (BP Location: Left arm, Patient Position: Sitting, Cuff Size: Standard)   Pulse 84   Temp 98 9 °F (37 2 °C)   Resp 18   Ht 5' 5" (1 651 m)   Wt 59 kg (130 lb)   SpO2 98%   BMI 21 63 kg/m²     No Chest XR results available for this patient  Ct Chest Wo Contrast    Result Date: 7/6/2020  Narrative CT CHEST WITHOUT IV CONTRAST INDICATION:  Z85 118: Personal history of other malignant neoplasm of bronchus and lung  COMPARISON:  CT chest 12/30/2019, 7/18/2019 and PET/CT 12/31/2018 TECHNIQUE: CT examination of the chest was performed without intravenous contrast   Axial, sagittal, and coronal 2D reformatted images were created from the source data and submitted for interpretation  Radiation dose length product (DLP) for this visit:  217 mGy-cm  This examination, like all CT scans performed in the [facility], was performed utilizing techniques to minimize radiation dose exposure, including the use of iterative reconstruction and automated exposure control  FINDINGS: Study is limited without IV contrast  LUNGS:  Stable right lower lobectomy   Anterior right upper lobe scarring, unchanged  Multiple bilateral subcentimeter solid and groundglass nodules, similar  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  No new nodules  No endobronchial lesions  PLEURA:  Mild biapical pleural thickening, unchanged  No pleural effusions  HEART/GREAT VESSELS:  The heart is normal size  Atherosclerotic changes thoracic aorta and coronary arteries  MEDIASTINUM AND ETTA:  Limited evaluation without IV contrast, stable  CHEST WALL AND LOWER NECK:   Multinodular thyroid with coarse calcifications, unchanged  VISUALIZED STRUCTURES IN THE UPPER ABDOMEN:  Small left hepatic lobe cyst, unchanged  Stable mild thickening of the left adrenal gland, likely benign  OSSEOUS STRUCTURES:  No acute fracture or destructive osseous lesion  Degenerative changes of the spine with scattered endplate Schmorl's nodes mid to lower thoracic spine  Impression Stable CT chest status post right lower lobectomy without evidence for local tumor recurrence  No change in several bilateral subcentimeter solid and groundglass nodules  Continued CT follow-up is recommended in 1 year  The study was marked in [facility] for follow-up  Workstation performed: [id number]     Ct Chest Wo Contrast    Result Date: 1/2/2020  Narrative CT CHEST WITHOUT IV CONTRAST INDICATION:   Z85 118: Personal history of other malignant neoplasm of bronchus and lung  Right lower lobectomy on 1/16/2019 for invasive adenocarcinoma  Follow-up lung nodules  COMPARISON:  Chest CT from 7/18/2019 and 10/21/2015  PET CT from 12/31/2018  TECHNIQUE: CT examination of the chest was performed without intravenous contrast   Axial, sagittal, and coronal 2D reformatted images were created from the source data and submitted for interpretation  Radiation dose length product (DLP) for this visit:  210 mGy-cm     This examination, like all CT scans performed in the [facility], was performed utilizing techniques to minimize radiation dose exposure, including the use of iterative reconstruction and automated exposure control  FINDINGS: LUNGS:  Multiple bilateral subcentimeter solid and groundglass nodules (marked on series 3), more numerous on the left, unchanged since 2015 with the exception of a benign left lower lobe hamartoma which has developed central calcification over time  Right lower lobectomy with nothing to indicate recurrent tumor  No acute pulmonary disease  No significant filling defects in the trachea and bronchi  PLEURA:  Unremarkable  HEART/GREAT VESSELS:  Moderate coronary artery calcification indicating atherosclerotic heart disease  Normal heart size  No pericardial effusion  MEDIASTINUM AND ETTA:  Unremarkable  CHEST WALL AND LOWER NECK:   Multinodular goiter, unchanged since October 2015  VISUALIZED STRUCTURES IN THE UPPER ABDOMEN:  Hepatic cyst   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

## 2023-01-03 ENCOUNTER — OFFICE VISIT (OUTPATIENT)
Dept: NEUROLOGY | Facility: CLINIC | Age: 83
End: 2023-01-03

## 2023-01-03 VITALS
SYSTOLIC BLOOD PRESSURE: 132 MMHG | HEIGHT: 65 IN | BODY MASS INDEX: 23.16 KG/M2 | WEIGHT: 139 LBS | DIASTOLIC BLOOD PRESSURE: 78 MMHG

## 2023-01-03 DIAGNOSIS — G30.9 ALZHEIMER'S DEMENTIA WITH BEHAVIORAL DISTURBANCE (HCC): Primary | ICD-10-CM

## 2023-01-03 DIAGNOSIS — F02.818 ALZHEIMER'S DEMENTIA WITH BEHAVIORAL DISTURBANCE (HCC): Primary | ICD-10-CM

## 2023-01-03 RX ORDER — SERTRALINE HYDROCHLORIDE 25 MG/1
TABLET, FILM COATED ORAL
Qty: 30 TABLET | Refills: 5 | Status: SHIPPED | OUTPATIENT
Start: 2023-01-03

## 2023-01-03 NOTE — PROGRESS NOTES
Linnette Mendoza is a [de-identified] y o  female returns in follow-up with history of memory difficulty    Assessment:  1  Alzheimer's dementia without behavioral disturbance (HCC)        Plan:  Continue Aricept and Namenda  Initiate Zoloft 25 mg daily  Follow-up 6 months    Discussion:  Marissa Sharma has been stable from the standpoint of her dementia other than the fact that her daughter reports that she has been more irritable recently  She may try Zoloft in hopes to stabilize her moods and will continue with her current dose of Aricept and Namenda  We discussed potential adverse effects of the medication if she has problems she will notify me  She is having more pain in her arm today which may explain slightly lower score on MoCA testing  I will see her back in follow-up in 6 months      Subjective:    ETHAN Mcginnis returns in follow-up today accompanied by her daughter  Her daughter reports overall cognitively she has been fairly stable over the last 6 months  Her daughter reports that she has been more irritable recently slamming doors and sometimes threatening physical harm  She had a right ulnar nerve transposed that does not see any improvement in symptoms of numbness and occasional pain in the arm        Past Medical History:   Diagnosis Date   • Abnormal chest x-ray    • Abnormal mammogram    • COPD (chronic obstructive pulmonary disease) (HCC)    • Dementia (HCC)     Early stage    • Ductal hyperplasia of breast     Left    • Hyperlipidemia     diet controled   • Nodule of lower lobe of right lung     Right Lower Lobe    • SOB (shortness of breath)    • SOB (shortness of breath)    • Wheezing        Family History:  Family History   Problem Relation Age of Onset   • Prostate cancer Father    • Hypertension Mother    • Glaucoma Mother    • Macular degeneration Mother    • Heart attack Brother        Past Surgical History:  Past Surgical History:   Procedure Laterality Date   • BREAST BIOPSY Left 10/12/2018   • BREAST CYST EXCISION Left 10/31/2018   • BREAST LUMPECTOMY Left 11/13/2018    Procedure: BREAST LUMPECTOMY; BREAST NEEDLE LOCALIZATION (NEEDLE LOC AT 0830); Surgeon: Zunilda Lazaro MD;  [facility];  Service: Surgical Oncology   • COLONOSCOPY     • LAPAROSCOPY     • MAMMO NEEDLE LOCALIZATION LEFT (ALL INC) Left 11/13/2018   • MAMMO STEREOTACTIC BREAST BIOPSY LEFT (ALL INC) Left 10/16/2018   • [place] [address] INCL FLUOR GDNCE DX W/CELL WASHG SPX Right 1/16/2019    Procedure: Dominic Jin,;  Surgeon: Olayinka Sousa MD;  [facility];  Service: Thoracic   • [place] MEDIASTINOSCOPY WITH LYMPH NODE BIOPSY/IES Right 1/16/2019    Procedure: MEDIASTINOSCOPY;  Surgeon: Olayinka Sousa MD;  [facility];  Service: Thoracic   • [place] THORACOSCOPY W/LOBECTOMY SINGLE LOBE Right 1/16/2019    Procedure: LOBECTOMY LUNG;  Surgeon: Olayinka Sousa MD;  [facility];  Service: Thoracic   • TRIGGER FINGER RELEASE Right 10/10/2019   • ULNAR NERVE TRANSPOSITION Right 10/10/2019       Social History:   reports that she quit smoking about 6 years ago  Her smoking use included cigarettes  She started smoking about 65 years ago  She has a 60 00 pack-year smoking history  She has never used smokeless tobacco  She reports current alcohol use  She reports that she does not use drugs  Allergies:  Patient has no known allergies        Current Outpatient Medications:   •  albuterol (Ventolin HFA) 90 mcg/act inhaler, Inhale 2 puffs every 6 (six) hours as needed for wheezing, Disp: 18 g, Rfl: 1  •  Cholecalciferol (Vitamin D3) 125 MCG (5000 UT) CAPS, Take 2,000 Units by mouth daily, Disp: , Rfl:   •  donepezil (ARICEPT) 10 mg tablet, TAKE ONE TABLET BY MOUTH DAILY AT BEDTIME, Disp: 30 tablet, Rfl: 5  •  memantine (NAMENDA) 10 mg tablet, TAKE ONE TABLET BY MOUTH TWICE DAILY, Disp: 60 tablet, Rfl: 5  •  sertraline (Zoloft) 25 mg tablet, 1 p o  daily, Disp: 30 tablet, Rfl: 5  •  fexofenadine (ALLEGRA) 180 MG tablet, Take 180 mg by mouth if needed, Disp: , Rfl:   •  meclizine (ANTIVERT) 25 mg tablet, , Disp: , Rfl:     I have reviewed the past medical, social and family history, current medications, allergies, vitals, review of systems and updated this information as appropriate today     Objective:    Vitals:  Blood pressure 132/78, height 5' 5" (1 651 m), weight 63 kg (139 lb), not currently breastfeeding  Physical Exam    Neurological Exam  GENERAL: Well-developed well-nourished woman in no acute distress  HEENT/NECK: Head is atraumatic normocephalic, neck is supple  NEUROLOGIC:  Mental Status: Awake and alert without aphasia  She scored 13+1/30 on MoCA  Cranial Nerves: Extraocular movements are full  Face is symmetrical  Coordination: Gait is stable      ROS:    Review of Systems   Constitutional: Negative  Negative for appetite change and fever  HENT: Negative  Negative for hearing loss, tinnitus, trouble swallowing and voice change  Eyes: Negative  Negative for photophobia, pain and visual disturbance  Respiratory: Negative  Negative for shortness of breath  Cardiovascular: Negative  Negative for palpitations  Gastrointestinal: Negative  Negative for nausea and vomiting  Endocrine: Positive for cold intolerance  Genitourinary: Negative  Negative for dysuria, frequency and urgency  Musculoskeletal: Negative  Negative for gait problem, myalgias and neck pain  Skin: Negative  Negative for rash  Allergic/Immunologic: Negative  Neurological: Negative  Negative for dizziness, tremors, seizures, syncope, facial asymmetry, speech difficulty, weakness, light-headedness, numbness and headaches  Hematological: Negative  Does not bruise/bleed easily  Psychiatric/Behavioral: Positive for agitation (increase in agitation)  Negative for confusion, hallucinations and sleep disturbance

## 2023-01-26 ENCOUNTER — APPOINTMENT (EMERGENCY)
Dept: RADIOLOGY | Facility: HOSPITAL | Age: 83
End: 2023-01-26

## 2023-01-26 ENCOUNTER — APPOINTMENT (EMERGENCY)
Dept: CT IMAGING | Facility: HOSPITAL | Age: 83
End: 2023-01-26

## 2023-01-26 ENCOUNTER — HOSPITAL ENCOUNTER (EMERGENCY)
Facility: HOSPITAL | Age: 83
Discharge: HOME | End: 2023-01-26
Attending: EMERGENCY MEDICINE

## 2023-01-26 VITALS
TEMPERATURE: 97.9 F | OXYGEN SATURATION: 98 % | HEART RATE: 66 BPM | DIASTOLIC BLOOD PRESSURE: 72 MMHG | RESPIRATION RATE: 16 BRPM | SYSTOLIC BLOOD PRESSURE: 153 MMHG

## 2023-01-26 DIAGNOSIS — R53.1 WEAKNESS: Primary | ICD-10-CM

## 2023-01-26 LAB
2HR DELTA HS TROPONIN: -1 NG/L
ALBUMIN SERPL BCP-MCNC: 4.1 G/DL (ref 3.5–5)
ALP SERPL-CCNC: 102 U/L (ref 46–116)
ALT SERPL W P-5'-P-CCNC: 26 U/L (ref 12–78)
ANION GAP SERPL CALCULATED.3IONS-SCNC: 9 MMOL/L (ref 4–13)
AST SERPL W P-5'-P-CCNC: 25 U/L (ref 5–45)
BASOPHILS # BLD AUTO: 0.07 THOUSANDS/ÂΜL (ref 0–0.1)
BASOPHILS NFR BLD AUTO: 1 % (ref 0–1)
BILIRUB SERPL-MCNC: 0.32 MG/DL (ref 0.2–1)
BILIRUB UR QL STRIP: NEGATIVE
BUN SERPL-MCNC: 23 MG/DL (ref 5–25)
CALCIUM SERPL-MCNC: 9.1 MG/DL (ref 8.3–10.1)
CARDIAC TROPONIN I PNL SERPL HS: 6 NG/L
CARDIAC TROPONIN I PNL SERPL HS: 7 NG/L
CHLORIDE SERPL-SCNC: 99 MMOL/L (ref 96–108)
CLARITY UR: CLEAR
CO2 SERPL-SCNC: 26 MMOL/L (ref 21–32)
COLOR UR: COLORLESS
CREAT SERPL-MCNC: 1.16 MG/DL (ref 0.6–1.3)
EOSINOPHIL # BLD AUTO: 0.07 THOUSAND/ÂΜL (ref 0–0.61)
EOSINOPHIL NFR BLD AUTO: 1 % (ref 0–6)
ERYTHROCYTE [DISTWIDTH] IN BLOOD BY AUTOMATED COUNT: 13.5 % (ref 11.6–15.1)
FLUAV RNA RESP QL NAA+PROBE: NEGATIVE
FLUBV RNA RESP QL NAA+PROBE: NEGATIVE
GFR SERPL CREATININE-BSD FRML MDRD: 44 ML/MIN/1.73SQ M
GLUCOSE SERPL-MCNC: 104 MG/DL (ref 65–140)
GLUCOSE UR STRIP-MCNC: NEGATIVE MG/DL
HCT VFR BLD AUTO: 44 % (ref 34.8–46.1)
HGB BLD-MCNC: 14.3 G/DL (ref 11.5–15.4)
HGB UR QL STRIP.AUTO: NEGATIVE
IMM GRANULOCYTES # BLD AUTO: 0.05 THOUSAND/UL (ref 0–0.2)
IMM GRANULOCYTES NFR BLD AUTO: 1 % (ref 0–2)
KETONES UR STRIP-MCNC: NEGATIVE MG/DL
LEUKOCYTE ESTERASE UR QL STRIP: NEGATIVE
LYMPHOCYTES # BLD AUTO: 1.17 THOUSANDS/ÂΜL (ref 0.6–4.47)
LYMPHOCYTES NFR BLD AUTO: 11 % (ref 14–44)
MCH RBC QN AUTO: 29.9 PG (ref 26.8–34.3)
MCHC RBC AUTO-ENTMCNC: 32.5 G/DL (ref 31.4–37.4)
MCV RBC AUTO: 92 FL (ref 82–98)
MONOCYTES # BLD AUTO: 0.7 THOUSAND/ÂΜL (ref 0.17–1.22)
MONOCYTES NFR BLD AUTO: 7 % (ref 4–12)
NEUTROPHILS # BLD AUTO: 8.73 THOUSANDS/ÂΜL (ref 1.85–7.62)
NEUTS SEG NFR BLD AUTO: 79 % (ref 43–75)
NITRITE UR QL STRIP: NEGATIVE
NRBC BLD AUTO-RTO: 0 /100 WBCS
PH UR STRIP.AUTO: 5 [PH]
PLATELET # BLD AUTO: 276 THOUSANDS/UL (ref 149–390)
PMV BLD AUTO: 9.4 FL (ref 8.9–12.7)
POTASSIUM SERPL-SCNC: 4.2 MMOL/L (ref 3.5–5.3)
PROT SERPL-MCNC: 7.7 G/DL (ref 6.4–8.4)
PROT UR STRIP-MCNC: NEGATIVE MG/DL
RBC # BLD AUTO: 4.78 MILLION/UL (ref 3.81–5.12)
RSV RNA RESP QL NAA+PROBE: NEGATIVE
SARS-COV-2 RNA RESP QL NAA+PROBE: NEGATIVE
SODIUM SERPL-SCNC: 134 MMOL/L (ref 135–147)
SP GR UR STRIP.AUTO: 1 (ref 1–1.03)
UROBILINOGEN UR STRIP-ACNC: <2 MG/DL
WBC # BLD AUTO: 10.79 THOUSAND/UL (ref 4.31–10.16)

## 2023-01-26 RX ORDER — ONDANSETRON 2 MG/ML
4 INJECTION INTRAMUSCULAR; INTRAVENOUS ONCE
Status: COMPLETED | OUTPATIENT
Start: 2023-01-26 | End: 2023-01-26

## 2023-01-26 RX ORDER — LABETALOL HYDROCHLORIDE 5 MG/ML
10 INJECTION, SOLUTION INTRAVENOUS ONCE
Status: COMPLETED | OUTPATIENT
Start: 2023-01-26 | End: 2023-01-26

## 2023-01-26 RX ADMIN — ONDANSETRON 4 MG: 2 INJECTION INTRAMUSCULAR; INTRAVENOUS at 18:30

## 2023-01-26 RX ADMIN — LABETALOL HYDROCHLORIDE 10 MG: 5 INJECTION, SOLUTION INTRAVENOUS at 19:12

## 2023-01-29 LAB
ATRIAL RATE: 75 BPM
P AXIS: 72 DEGREES
PR INTERVAL: 178 MS
QRS AXIS: 74 DEGREES
QRSD INTERVAL: 80 MS
QT INTERVAL: 412 MS
QTC INTERVAL: 460 MS
T WAVE AXIS: 75 DEGREES
VENTRICULAR RATE: 75 BPM

## 2023-01-29 NOTE — ED PROVIDER NOTES
History  Chief Complaint   Patient presents with   • Weakness - Generalized     Pt reports generalized weakness starting around 1630, unable to get warm, faint feeling, sweating  Hypertension in 200s at home  224/100 in triage     74-year-old female presenting to the emergency department for evaluation of generalized weakness  She reports that since around 430 this afternoon, has felt generally weak, unable to get up without assistance, remembers her concern, took her blood pressure was noticed that it was high at home  She had an episode of diaphoresis at this time as well  She denies any chest pain at the time  She has had no shortness of breath, she has had no balance or gait instability, sense of disequilibrium, headache, visual changes, numbness weakness, tingling slurred speech speech difficulty  She has had no fevers or chills  Prior to Admission Medications   Prescriptions Last Dose Informant Patient Reported? Taking?    Cholecalciferol (Vitamin D3) 125 MCG (5000 UT) CAPS   Yes No   Sig: Take 2,000 Units by mouth daily   albuterol (Ventolin HFA) 90 mcg/act inhaler   No No   Sig: Inhale 2 puffs every 6 (six) hours as needed for wheezing   donepezil (ARICEPT) 10 mg tablet   No No   Sig: TAKE ONE TABLET BY MOUTH DAILY AT BEDTIME   fexofenadine (ALLEGRA) 180 MG tablet   Yes No   Sig: Take 180 mg by mouth if needed   meclizine (ANTIVERT) 25 mg tablet   Yes No   memantine (NAMENDA) 10 mg tablet   No No   Sig: TAKE ONE TABLET BY MOUTH TWICE DAILY   sertraline (Zoloft) 25 mg tablet   No No   Si p o  daily      Facility-Administered Medications: None       Past Medical History:   Diagnosis Date   • Abnormal chest x-ray    • Abnormal mammogram    • COPD (chronic obstructive pulmonary disease) (HCC)    • Dementia (HCC)     Early stage    • Ductal hyperplasia of breast     Left    • Hyperlipidemia     diet controled   • Nodule of lower lobe of right lung     Right Lower Lobe    • SOB (shortness of breath)    • SOB (shortness of breath)    • Wheezing        Past Surgical History:   Procedure Laterality Date   • BREAST BIOPSY Left 10/12/2018   • BREAST CYST EXCISION Left 10/31/2018   • BREAST LUMPECTOMY Left 2018    Procedure: BREAST LUMPECTOMY; BREAST NEEDLE LOCALIZATION (NEEDLE LOC AT 0830); Surgeon: Itz Shah MD;  Location[facility];  Service: Surgical Oncology   • COLONOSCOPY     • LAPAROSCOPY     • MAMMO NEEDLE LOCALIZATION LEFT (ALL INC) Left 2018   • MAMMO STEREOTACTIC BREAST BIOPSY LEFT (ALL INC) Left 10/16/2018   • [place] [address] INCL FLUOR GDNCE DX W/CELL WASHG SPX Right 2019    Procedure: [address],;  Surgeon: Ivonne Arora MD;  [facility]: [facility];  Service: Thoracic   • AZ MEDIASTINOSCOPY WITH LYMPH NODE BIOPSY/IES Right 2019    Procedure: MEDIASTINOSCOPY;  Surgeon: Ivonne Arora MD;  [facility];  Service: Thoracic   • AZ THORACOSCOPY W/LOBECTOMY SINGLE LOBE Right 2019    Procedure: LOBECTOMY LUNG;  Surgeon: Ivonne Arora MD;  [facility];  Service: Thoracic   • TRIGGER FINGER RELEASE Right 10/10/2019   • ULNAR NERVE TRANSPOSITION Right 10/10/2019       Family History   Problem Relation Age of Onset   • Prostate cancer Father    • Hypertension Mother    • Glaucoma Mother    • Macular degeneration Mother    • Heart attack Brother      I have reviewed and agree with the history as documented      E-Cigarette/Vaping   • E-Cigarette Use Never User      E-Cigarette/Vaping Substances   • Nicotine No    • THC No    • CBD No    • Flavoring No    • Other No    • Unknown No      Social History     Tobacco Use   • Smoking status: Former     Packs/day: 1 00     Years: 60 00     Pack years: 60 00     Types: Cigarettes     Start date: 1958     Quit date: 2016     Years since quittin 7   • Smokeless tobacco: Never   Vaping Use   • Vaping Use: Never used   Substance Use Topics   • Alcohol use: Yes     Comment: occasional • Drug use: Never       Review of Systems   Constitutional: Positive for diaphoresis and fatigue  Negative for appetite change, chills and fever  HENT: Negative for sneezing and sore throat  Eyes: Negative for visual disturbance  Respiratory: Negative for cough, choking, chest tightness, shortness of breath and wheezing  Cardiovascular: Negative for chest pain and palpitations  Gastrointestinal: Negative for abdominal pain, constipation, diarrhea, nausea and vomiting  Genitourinary: Negative for difficulty urinating and dysuria  Neurological: Negative for dizziness, weakness, light-headedness, numbness and headaches  All other systems reviewed and are negative  Physical Exam  Physical Exam  Vitals and nursing note reviewed  Constitutional:       General: She is not in acute distress  Appearance: She is well-developed  She is not diaphoretic  HENT:      Head: Normocephalic and atraumatic  Eyes:      Pupils: Pupils are equal, round, and reactive to light  Neck:      Vascular: No JVD  Trachea: No tracheal deviation  Cardiovascular:      Rate and Rhythm: Normal rate and regular rhythm  Heart sounds: Normal heart sounds  No murmur heard  No friction rub  No gallop  Pulmonary:      Effort: Pulmonary effort is normal  No respiratory distress  Breath sounds: Normal breath sounds  No wheezing or rales  Abdominal:      General: Bowel sounds are normal  There is no distension  Palpations: Abdomen is soft  Tenderness: There is no abdominal tenderness  There is no guarding or rebound  Skin:     General: Skin is warm and dry  Coloration: Skin is not pale  Neurological:      Mental Status: She is alert and oriented to person, place, and time  Cranial Nerves: No cranial nerve deficit  Motor: No abnormal muscle tone     Psychiatric:         Behavior: Behavior normal          Vital Signs  ED Triage Vitals [01/26/23 1755]   Temperature Pulse Respirations Blood Pressure SpO2   97 9 °F (36 6 °C) 89 18 (!) 224/100 100 %      Temp Source Heart Rate Source Patient Position - Orthostatic VS BP Location FiO2 (%)   Oral Monitor Sitting Left arm --      Pain Score       --           Vitals:    01/26/23 1900 01/26/23 1930 01/26/23 2000 01/26/23 2100   BP: (!) 181/96 153/70 144/68 153/72   Pulse: 68 66 66 66   Patient Position - Orthostatic VS: Sitting Sitting Sitting Sitting         Visual Acuity  Visual Acuity    Flowsheet Row Most Recent Value   L Pupil Size (mm) 3   R Pupil Size (mm) 3          ED Medications  Medications   ondansetron (ZOFRAN) injection 4 mg (4 mg Intravenous Given 1/26/23 1830)   labetalol (NORMODYNE) injection 10 mg (10 mg Intravenous Given 1/26/23 1912)       Diagnostic Studies  Results Reviewed     Procedure Component Value Units Date/Time    HS Troponin I 2hr [[id number]]  (Normal) Collected: 01/26/23 2036    Lab Status: Final result Specimen: Blood from Arm, Left Updated: 01/26/23 2119     hs TnI 2hr 6 ng/L      Delta 2hr hsTnI -1 ng/L     FLU/RSV/COVID - if FLU/RSV clinically relevant [[id number]]  (Normal) Collected: 01/26/23 1834    Lab Status: Final result Specimen: Nares from Nose Updated: 01/26/23 1924     SARS-CoV-2 Negative     INFLUENZA A PCR Negative     INFLUENZA B PCR Negative     RSV PCR Negative    Narrative:      FOR PEDIATRIC PATIENTS - copy/paste COVID Guidelines URL to browser: [email or web address]    SARS-CoV-2 assay is a Nucleic Acid Amplification assay intended for the  qualitative detection of nucleic acid from SARS-CoV-2 in nasopharyngeal  swabs  Results are for the presumptive identification of SARS-CoV-2 RNA  Positive results are indicative of infection with SARS-CoV-2, the virus  causing COVID-19, but do not rule out bacterial infection or co-infection  with other viruses   Laboratories within the [place] and its  territories are required to report all positive results to the appropriate  public health authorities  Negative results do not preclude SARS-CoV-2  infection and should not be used as the sole basis for treatment or other  patient management decisions  Negative results must be combined with  clinical observations, patient history, and epidemiological information  This test has not been FDA cleared or approved  This test has been authorized by FDA under an Emergency Use Authorization  (EUA)  This test is only authorized for the duration of time the  declaration that circumstances exist justifying the authorization of the  emergency use of an in vitro diagnostic tests for detection of SARS-CoV-2  virus and/or diagnosis of COVID-19 infection under section 564(b)(1) of  the Act, 21 U  S C  237XLS-3(X)(7), unless the authorization is terminated  or revoked sooner  The test has been validated but independent review by FDA  and CLIA is pending  Test performed using [employer] GeneXpert: This RT-PCR assay targets N2,  a region unique to SARS-CoV-2  A conserved region in the E-gene was chosen  for pan-Sarbecovirus detection which includes SARS-CoV-2  According to CMS-2020-01-R, this platform meets the definition of high-throughput technology      HS Troponin 0hr (reflex protocol) [[id number]]  (Normal) Collected: 01/26/23 1834    Lab Status: Final result Specimen: Blood from Arm, Left Updated: 01/26/23 1909     hs TnI 0hr 7 ng/L     Comprehensive metabolic panel [[id number]]  (Abnormal) Collected: 01/26/23 1834    Lab Status: Final result Specimen: Blood from Arm, Left Updated: 01/26/23 1902     Sodium 134 mmol/L      Potassium 4 2 mmol/L      Chloride 99 mmol/L      CO2 26 mmol/L      ANION GAP 9 mmol/L      BUN 23 mg/dL      Creatinine 1 16 mg/dL      Glucose 104 mg/dL      Calcium 9 1 mg/dL      AST 25 U/L      ALT 26 U/L      Alkaline Phosphatase 102 U/L      Total Protein 7 7 g/dL      Albumin 4 1 g/dL      Total Bilirubin 0 32 mg/dL      eGFR 44 ml/min/1 73sq m     Narrative:      National Kidney Disease Foundation guidelines for Chronic Kidney Disease (CKD):   •  Stage 1 with normal or high GFR (GFR > 90 mL/min/1 73 square meters)  •  Stage 2 Mild CKD (GFR = 60-89 mL/min/1 73 square meters)  •  Stage 3A Moderate CKD (GFR = 45-59 mL/min/1 73 square meters)  •  Stage 3B Moderate CKD (GFR = 30-44 mL/min/1 73 square meters)  •  Stage 4 Severe CKD (GFR = 15-29 mL/min/1 73 square meters)  •  Stage 5 End Stage CKD (GFR <15 mL/min/1 73 square meters)  Note: GFR calculation is accurate only with a steady state creatinine    UA w Reflex to Microscopic w Reflex to Culture [[id number]] Collected: 01/26/23 1834    Lab Status: Final result Specimen: Urine, Clean Catch Updated: 01/26/23 1848     Color, UA Colorless     Clarity, UA Clear     Specific [place], UA 1 005     pH, UA 5 0     Leukocytes, UA Negative     Nitrite, UA Negative     Protein, UA Negative mg/dl      Glucose, UA Negative mg/dl      Ketones, UA Negative mg/dl      Urobilinogen, UA <2 0 mg/dl      Bilirubin, UA Negative     Occult Blood, UA Negative    CBC and differential [[id number]]  (Abnormal) Collected: 01/26/23 1834    Lab Status: Final result Specimen: Blood from Arm, Left Updated: 01/26/23 1841     WBC 10 79 Thousand/uL      RBC 4 78 Million/uL      Hemoglobin 14 3 g/dL      Hematocrit 44 0 %      MCV 92 fL      MCH 29 9 pg      MCHC 32 5 g/dL      RDW 13 5 %      MPV 9 4 fL      Platelets 013 Thousands/uL      nRBC 0 /100 WBCs      Neutrophils Relative 79 %      Immat GRANS % 1 %      Lymphocytes Relative 11 %      Monocytes Relative 7 %      Eosinophils Relative 1 %      Basophils Relative 1 %      Neutrophils Absolute 8 73 Thousands/µL      Immature Grans Absolute 0 05 Thousand/uL      Lymphocytes Absolute 1 17 Thousands/µL      Monocytes Absolute 0 70 Thousand/µL      Eosinophils Absolute 0 07 Thousand/µL      Basophils Absolute 0 07 Thousands/µL                  XR chest 1 view portable Final Result by Nichole Grijalva MD (01/27 1800)      1  No acute cardiopulmonary disease  2   Stable left lower lobe pulmonary nodule  Workstation performed: [id number]         CT head without contrast   Final Result by Jacky Paniagua MD (01/26 1937)      No acute intracranial abnormality  Workstation performed: [id number]                    Procedures  Procedures         ED Course                                             Medical Decision Making  27-year-old female with episode of fatigue, diaphoresis, hypertension, unclear etiology at this time, could have been a possible cardiac event or possibly a posterior stroke or TIA, regardless her neuro exam is normal now, will give blood pressure medicine, check cardiac labs, CT head, reassess  Patient's blood pressure responded well to 1 dose of blood pressure medicine here, she is feeling much better at this time  Offered/recommended admission for further cardiac monitoring as well as work-up of possible CNS process, however patient is feeling better, given the normal work-up in the emergency department she is requesting discharge and I believe this is reasonable, she understands that she is to return promptly if her symptoms return, and follow-up with her PCP soon as possible  Weakness: complicated acute illness or injury  Amount and/or Complexity of Data Reviewed  Labs: ordered  Radiology: ordered  Risk  Prescription drug management  Disposition  Final diagnoses:   Weakness     Time reflects when diagnosis was documented in both MDM as applicable and the Disposition within this note     Time User Action Codes Description Comment    1/26/2023  9:14 PM Pita [address] [place] [address] [R53 1] Weakness       ED Disposition     ED Disposition   Discharge    Condition   Stable    Date/Time   Thu Jan 26, 2023  9:05 PM    Comment   Urban Wilks discharge to home/self care                 Follow-up Information     Follow up With Specialties Details Why [facility] Schedule an appointment as soon as possible for a visit   [address] Internal Medicine   [address] [address]  [phone]            Discharge Medication List as of 1/26/2023  9:17 PM      CONTINUE these medications which have NOT CHANGED    Details   albuterol (Ventolin HFA) 90 mcg/act inhaler Inhale 2 puffs every 6 (six) hours as needed for wheezing, Starting Tue 5/4/2021, Normal      Cholecalciferol (Vitamin D3) 125 MCG (5000 UT) CAPS Take 2,000 Units by mouth daily, Historical Med      donepezil (ARICEPT) 10 mg tablet TAKE ONE TABLET BY MOUTH DAILY AT BEDTIME, Normal      fexofenadine (ALLEGRA) 180 MG tablet Take 180 mg by mouth if needed, Starting Mon 3/15/2021, Historical Med      meclizine (ANTIVERT) 25 mg tablet Historical Med      memantine (NAMENDA) 10 mg tablet TAKE ONE TABLET BY MOUTH TWICE DAILY, Normal      sertraline (Zoloft) 25 mg tablet 1 p o  daily, Normal             No discharge procedures on file      PDMP Review     None          ED Provider  Electronically Signed by           Xochitl Roberts MD  01/28/23 3089

## 2023-02-01 ENCOUNTER — HOSPITAL ENCOUNTER (OUTPATIENT)
Dept: CT IMAGING | Facility: CLINIC | Age: 83
Discharge: HOME | End: 2023-02-01

## 2023-02-01 DIAGNOSIS — Z85.118 HISTORY OF LUNG CANCER: ICD-10-CM

## 2023-02-03 ENCOUNTER — ANNUAL EXAM (OUTPATIENT)
Dept: GYNECOLOGY | Facility: CLINIC | Age: 83
End: 2023-02-03

## 2023-02-03 VITALS
WEIGHT: 137 LBS | BODY MASS INDEX: 22.82 KG/M2 | HEIGHT: 65 IN | DIASTOLIC BLOOD PRESSURE: 80 MMHG | SYSTOLIC BLOOD PRESSURE: 124 MMHG

## 2023-02-03 DIAGNOSIS — Z01.419 ENCOUNTER FOR ANNUAL ROUTINE GYNECOLOGICAL EXAMINATION: ICD-10-CM

## 2023-02-03 DIAGNOSIS — N95.2 VAGINAL ATROPHY: ICD-10-CM

## 2023-02-03 DIAGNOSIS — Z12.31 SCREENING MAMMOGRAM FOR BREAST CANCER: Primary | ICD-10-CM

## 2023-02-03 RX ORDER — PAROXETINE 10 MG/1
TABLET, FILM COATED ORAL
COMMUNITY
Start: 2023-01-27

## 2023-02-03 NOTE — PROGRESS NOTES
Assessment/Plan:  Vaginal atrophy  Patient with CARL  Normal breast exam  History of ductal hyperplasia of the left breast  History of lung cancer  Normal colonoscopy ,no further testing as per GI  COVID-vaccine and booster  Dementia (mild)      Plan: Rx mammogram   Continue healthy diet and weightbearing exercise  Subjective: G 3 P3      Patient ID: Jasvir Foss is a [de-identified] y o  female presents to the office for annual exam   Daughter was present at the visit since patient is experiencing signs of mild dementia  Denies any pelvic pain or vaginal bleeding  Denies any breast or bowel problems  Her daughter states that she occasionally has urinary leakage  Really at night  She drinks a glass of water before she goes to bed at night  Recommended that she stops drinking liquids at dinnertime  Also reminded her to stay hydrated during the day  Takes care of 5 small dogs  Lives next-door to her daughter and walks over to her house twice a day  Review of Systems   Constitutional: Negative  Negative for fatigue, fever and unexpected weight change  HENT: Negative  Eyes: Negative  Respiratory: Negative  Negative for chest tightness, shortness of breath, wheezing and stridor  Cardiovascular: Negative  Negative for chest pain, palpitations and leg swelling  Gastrointestinal: Negative  Negative for abdominal pain, blood in stool, diarrhea, nausea, rectal pain and vomiting  Endocrine: Negative  Genitourinary: Negative for dysuria, frequency, vaginal bleeding, vaginal discharge and vaginal pain  Musculoskeletal: Negative  Skin: Negative  Allergic/Immunologic: Negative  Neurological: Negative  Hematological: Negative  Psychiatric/Behavioral: Negative  All other systems reviewed and are negative  Objective:      Ht 5' 5" (1 651 m)   Wt 62 1 kg (137 lb)   BMI 22 80 kg/m²          Physical Exam  Constitutional:       Appearance: She is well-developed     HENT: Head: Normocephalic and atraumatic  Neck:      Thyroid: No thyromegaly  Trachea: No tracheal deviation  Cardiovascular:      Rate and Rhythm: Normal rate and regular rhythm  Heart sounds: Normal heart sounds  Pulmonary:      Effort: Pulmonary effort is normal  No respiratory distress  Breath sounds: Normal breath sounds  No stridor  No wheezing or rales  Chest:      Chest wall: No tenderness  Breasts:     Breasts are symmetrical       Right: No inverted nipple, mass, nipple discharge, skin change or tenderness  Left: No inverted nipple, mass, nipple discharge, skin change or tenderness  Abdominal:      General: Bowel sounds are normal  There is no distension  Palpations: Abdomen is soft  There is no mass  Tenderness: There is no abdominal tenderness  There is no guarding or rebound  Hernia: No hernia is present  There is no hernia in the left inguinal area  Genitourinary:     Labia:         Right: No rash, tenderness, lesion or injury  Left: No rash, tenderness, lesion or injury  Vagina: Normal  No signs of injury and foreign body  No vaginal discharge, erythema, tenderness or bleeding  Cervix: No cervical motion tenderness, discharge or friability  Uterus: Not deviated, not enlarged, not fixed and not tender  Adnexa:         Right: No mass, tenderness or fullness  Left: No mass, tenderness or fullness  Rectum: No mass, anal fissure, external hemorrhoid or internal hemorrhoid  Comments: Normal urethra and [place]'s glands  Vaginal atrophy  No uterine prolapse  No cystocele or rectocele  Excellent muscle tone  Musculoskeletal:      Cervical back: Normal range of motion and neck supple  Lymphadenopathy:      Lower Body: No right inguinal adenopathy  No left inguinal adenopathy  Skin:     General: Skin is warm and dry  Neurological:      Mental Status: She is alert and oriented to person, place, and time  Psychiatric:         Behavior: Behavior normal          Thought Content:  Thought content normal          Judgment: Judgment normal

## 2023-02-07 ENCOUNTER — OFFICE VISIT (OUTPATIENT)
Dept: CARDIAC SURGERY | Facility: CLINIC | Age: 83
End: 2023-02-07

## 2023-02-07 VITALS
TEMPERATURE: 98.6 F | WEIGHT: 138.6 LBS | HEIGHT: 65 IN | DIASTOLIC BLOOD PRESSURE: 84 MMHG | OXYGEN SATURATION: 98 % | HEART RATE: 80 BPM | BODY MASS INDEX: 23.09 KG/M2 | RESPIRATION RATE: 18 BRPM | SYSTOLIC BLOOD PRESSURE: 134 MMHG

## 2023-02-07 DIAGNOSIS — Z85.118 HISTORY OF LUNG CANCER: ICD-10-CM

## 2023-02-07 DIAGNOSIS — R91.8 MULTIPLE LUNG NODULES: Primary | ICD-10-CM

## 2023-02-07 DIAGNOSIS — C34.91 PRIMARY ADENOCARCINOMA OF RIGHT LUNG (HCC): ICD-10-CM

## 2023-02-07 NOTE — PROGRESS NOTES
Assessment/Plan:    Multiple lung nodules  The patient has multiple lung nodules  We will continue to follow these on surveillance CT scans    History of lung cancer  Ms Brigid Quiñonez is an 21-year-old female who is very well-known to me  She underwent a VATS right lower lobectomy in 2019  She presents today for routine surveillance  I have personally reviewed her CT scan in PACS  This demonstrates stability and no signs of any recurrence of her cancer  She does have multiple groundglass opacities that has been stable since 2015  Today in the office, I am overall happy with how she is doing  At this time, she can be maintained on a once a year annual CT scan  I have ordered a CT scan for 1 year from now and she will follow-up with me at that time  Yamel Spence MD  Thoracic Surgery  (Available on [employer])  Office: [phone]         Diagnoses and all orders for this visit:    Multiple lung nodules  -     CT chest wo contrast; Future    History of lung cancer  -     CT chest wo contrast; Future    Primary adenocarcinoma of right lung [facility])          Thoracic History    Procedure: 1/16/2019 VATS RLLobectomy and mediastinoscopy  Pathology: Invasive adenocarcinoma, 3 6cm, 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, Stage IB, [id number]  Treatment: resection, no adjuvant     Cancer Staging   Primary adenocarcinoma of right lung [facility])  Staging form: Lung, AJCC 8th Edition  - Clinical stage from 2/4/2019: Stage IB (cT2a, cN0, cM0) - Signed by Ramírez Rolon MD on 2/4/2019  Histopathologic type:  Adenocarcinoma, NOS  Histologic grade (G): G1  Histologic grading system: 4 grade system  Laterality: Right  Tumor size (mm): 3 6  Lymph-vascular invasion (LVI): LVI not present (absent)/not identified  Specimen type: Excision  Type of lung cancer: Surgically resected non-small cell lung cancer  Perineural invasion (PNI): Absent  Adequacy of mediastinal dissection: Adequate  Stage used in treatment planning: Yes  National guidelines used in treatment planning: Yes  Type of national guideline used in treatment planning: NCCN    Oncology History    No history exists  Subjective:    Patient ID: Gregorio Larson is a [de-identified] y o  female  HPI  Ms Georgina Lopez is an 62-year-old female who is very well-known to me  She underwent a VATS right lower lobectomy in 2019  She presents today for routine surveillance  I have personally reviewed her CT scan in PACS  This demonstrates stability and no signs of any recurrence of her cancer  She does have multiple groundglass opacities that has been stable since 2015  Today in the office, she is doing well  She does report that she is getting winded walking up and down the stairs  She used to go for walks with her daughter but she has been unable to go on these walks recently because of shortness of breath  She denies any recent upper respiratory infection or pneumonia  She reports a chronic cough but feels that this is associated with her postnasal drip  I have reviewed the most recent notes in her chart including from her most recent visit to the emergency department approximately 2 weeks ago  This was for hypertensive crisis  This was likely secondary to an effect of a new medication that she was taking and her blood pressure has improved since she has stopped that medication  The following portions of the patient's history were reviewed and updated as appropriate: allergies, current medications, past family history, past medical history, past social history, past surgical history and problem list     Review of Systems   Constitutional: Negative for chills, fatigue, fever and unexpected weight change  HENT: Positive for postnasal drip  Eyes: Negative  Negative for visual disturbance  Respiratory: Positive for cough and shortness of breath  Negative for stridor  Cardiovascular: Negative for chest pain  Gastrointestinal: Negative  Endocrine: Negative  Genitourinary: Negative  Musculoskeletal: Negative  Skin: Negative  Neurological: Negative for dizziness, light-headedness and headaches  Hematological: Negative for adenopathy  Psychiatric/Behavioral: Negative  Objective:   Physical Exam  Vitals and nursing note reviewed  Constitutional:       General: She is not in acute distress  Appearance: Normal appearance  She is well-developed  She is not diaphoretic  HENT:      Head: Normocephalic and atraumatic  Nose: Nose normal  No congestion or rhinorrhea  Mouth/Throat:      Mouth: Mucous membranes are moist       Pharynx: Oropharynx is clear  No oropharyngeal exudate  Eyes:      General: No scleral icterus  Pupils: Pupils are equal, round, and reactive to light  Neck:      Trachea: No tracheal deviation  Cardiovascular:      Rate and Rhythm: Normal rate and regular rhythm  Pulses: Normal pulses  Heart sounds: Normal heart sounds  No murmur heard  Pulmonary:      Effort: Pulmonary effort is normal  No respiratory distress  Breath sounds: Normal breath sounds  No stridor  No wheezing or rales  Chest:      Chest wall: No tenderness  Abdominal:      General: Bowel sounds are normal  There is no distension  Palpations: Abdomen is soft  Tenderness: There is no abdominal tenderness  There is no rebound  Musculoskeletal:         General: Normal range of motion  Cervical back: Normal range of motion and neck supple  No muscular tenderness  Lymphadenopathy:      Cervical: No cervical adenopathy  Skin:     General: Skin is warm and dry  Coloration: Skin is not jaundiced or pale  Findings: No erythema or rash  Neurological:      General: No focal deficit present  Mental Status: She is alert and oriented to person, place, and time  Psychiatric:         Mood and Affect: Mood normal          Behavior: Behavior normal          Thought Content:  Thought content normal  Judgment: Judgment normal      /84 (BP Location: Left arm, Patient Position: Sitting, Cuff Size: Standard)   Pulse 80   Temp 98 6 °F (37 °C) (Temporal)   Resp 18   Ht 5' 5" (1 651 m)   Wt 62 9 kg (138 lb 9 6 oz)   SpO2 98%   BMI 23 06 kg/m²     No Chest XR results available for this patient  CT chest wo contrast    Result Date: 2/6/2023  Narrative CT CHEST WITHOUT IV CONTRAST INDICATION:   Z85 118: Personal history of other malignant neoplasm of bronchus and lung  Right lower lobectomy in January 2019 at 3 6 cm adenocarcinoma  COMPARISON:  Chest CT 1/27/2022 and 10/21/2015  TECHNIQUE: Chest CT without intravenous contrast   Axial, sagittal, coronal 2D reformats and coronal MIPS from source data  Radiation dose length product (DLP):  283 mGy-cm   Radiation dose exposure minimized using iterative reconstruction and automated exposure control  FINDINGS: LUNGS:  Several bilateral subcentimeter groundglass opacities, the most conspicuous marked on series 206, without significant change since 2015  Stable solid 5 mm lateral basal left lower lobe nodule since 2015 (206/61)  Benign biapical pleural-parenchymal scar  Stable benign scar in the anterior segment right upper lobe  Right lower lobectomy  AIRWAYS: No significant filling defects  PLEURA:  Unremarkable  HEART/GREAT VESSELS:  Normal heart size  Moderate coronary artery calcification indicating atherosclerotic heart disease  MEDIASTINUM AND ETTA:  Unremarkable  CHEST WALL AND LOWER NECK: Subcentimeter right thyroid nodule  No follow-up is needed  UPPER ABDOMEN:  Hepatic cysts  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, stable since 2015  OSSEOUS STRUCTURES: Mild degenerative disease in the spine  Impression Several bilateral subcentimeter groundglass opacities without significant change since 2015  This could represent multifocal adenocarcinoma, but given lack of significant change since 2015, it may never become clinically significant  Follow-up could be  obtained in one year with a chest CT with no contrast   Workstation performed: [id number]     No CT Chest,Abdomen,Pelvis results available for this patient  No NM PET CT results available for this patient  No Barium Swallow results available for this patient

## 2023-02-07 NOTE — ASSESSMENT & PLAN NOTE
Ms Muriel Pittman is an 30-year-old female who is very well-known to me  She underwent a VATS right lower lobectomy in 2019  She presents today for routine surveillance  I have personally reviewed her CT scan in PACS  This demonstrates stability and no signs of any recurrence of her cancer  She does have multiple groundglass opacities that has been stable since 2015  Today in the office, I am overall happy with how she is doing  At this time, she can be maintained on a once a year annual CT scan  I have ordered a CT scan for 1 year from now and she will follow-up with me at that time      Robb Varner MD  Thoracic Surgery  (Available on [employer])  Office: [phone]

## 2023-03-15 ENCOUNTER — HOSPITAL ENCOUNTER (EMERGENCY)
Facility: HOSPITAL | Age: 83
Discharge: HOME | End: 2023-03-16
Attending: EMERGENCY MEDICINE

## 2023-03-15 ENCOUNTER — APPOINTMENT (EMERGENCY)
Dept: CT IMAGING | Facility: HOSPITAL | Age: 83
End: 2023-03-15

## 2023-03-15 DIAGNOSIS — R03.0 ELEVATED BLOOD PRESSURE READING: Primary | ICD-10-CM

## 2023-03-15 DIAGNOSIS — R42 LIGHTHEADEDNESS: ICD-10-CM

## 2023-03-15 LAB
ALBUMIN SERPL BCP-MCNC: 4.1 G/DL (ref 3.5–5)
ALP SERPL-CCNC: 68 U/L (ref 34–104)
ALT SERPL W P-5'-P-CCNC: 14 U/L (ref 7–52)
ANION GAP SERPL CALCULATED.3IONS-SCNC: 8 MMOL/L (ref 4–13)
AST SERPL W P-5'-P-CCNC: 17 U/L (ref 13–39)
BASOPHILS # BLD AUTO: 0.07 THOUSANDS/ÂΜL (ref 0–0.1)
BASOPHILS NFR BLD AUTO: 1 % (ref 0–1)
BILIRUB SERPL-MCNC: 0.29 MG/DL (ref 0.2–1)
BUN SERPL-MCNC: 23 MG/DL (ref 5–25)
CALCIUM SERPL-MCNC: 9.7 MG/DL (ref 8.4–10.2)
CARDIAC TROPONIN I PNL SERPL HS: 7 NG/L
CHLORIDE SERPL-SCNC: 104 MMOL/L (ref 96–108)
CO2 SERPL-SCNC: 24 MMOL/L (ref 21–32)
CREAT SERPL-MCNC: 1.02 MG/DL (ref 0.6–1.3)
EOSINOPHIL # BLD AUTO: 0.15 THOUSAND/ÂΜL (ref 0–0.61)
EOSINOPHIL NFR BLD AUTO: 3 % (ref 0–6)
ERYTHROCYTE [DISTWIDTH] IN BLOOD BY AUTOMATED COUNT: 13.7 % (ref 11.6–15.1)
GFR SERPL CREATININE-BSD FRML MDRD: 52 ML/MIN/1.73SQ M
GLUCOSE SERPL-MCNC: 97 MG/DL (ref 65–140)
HCT VFR BLD AUTO: 39.3 % (ref 34.8–46.1)
HGB BLD-MCNC: 13 G/DL (ref 11.5–15.4)
IMM GRANULOCYTES # BLD AUTO: 0.02 THOUSAND/UL (ref 0–0.2)
IMM GRANULOCYTES NFR BLD AUTO: 0 % (ref 0–2)
LYMPHOCYTES # BLD AUTO: 1.76 THOUSANDS/ÂΜL (ref 0.6–4.47)
LYMPHOCYTES NFR BLD AUTO: 29 % (ref 14–44)
MCH RBC QN AUTO: 29.8 PG (ref 26.8–34.3)
MCHC RBC AUTO-ENTMCNC: 33.1 G/DL (ref 31.4–37.4)
MCV RBC AUTO: 90 FL (ref 82–98)
MONOCYTES # BLD AUTO: 0.59 THOUSAND/ÂΜL (ref 0.17–1.22)
MONOCYTES NFR BLD AUTO: 10 % (ref 4–12)
NEUTROPHILS # BLD AUTO: 3.49 THOUSANDS/ÂΜL (ref 1.85–7.62)
NEUTS SEG NFR BLD AUTO: 57 % (ref 43–75)
NRBC BLD AUTO-RTO: 0 /100 WBCS
PLATELET # BLD AUTO: 205 THOUSANDS/UL (ref 149–390)
PMV BLD AUTO: 10 FL (ref 8.9–12.7)
POTASSIUM SERPL-SCNC: 3.9 MMOL/L (ref 3.5–5.3)
PROT SERPL-MCNC: 6.7 G/DL (ref 6.4–8.4)
RBC # BLD AUTO: 4.36 MILLION/UL (ref 3.81–5.12)
SODIUM SERPL-SCNC: 136 MMOL/L (ref 135–147)
WBC # BLD AUTO: 6.08 THOUSAND/UL (ref 4.31–10.16)

## 2023-03-15 RX ORDER — METOPROLOL TARTRATE 5 MG/5ML
5 INJECTION INTRAVENOUS ONCE
Status: COMPLETED | OUTPATIENT
Start: 2023-03-15 | End: 2023-03-15

## 2023-03-15 RX ADMIN — METOROPROLOL TARTRATE 5 MG: 5 INJECTION, SOLUTION INTRAVENOUS at 23:46

## 2023-03-15 RX ADMIN — METOROPROLOL TARTRATE 5 MG: 5 INJECTION, SOLUTION INTRAVENOUS at 22:26

## 2023-03-16 VITALS
RESPIRATION RATE: 20 BRPM | HEART RATE: 58 BPM | OXYGEN SATURATION: 98 % | DIASTOLIC BLOOD PRESSURE: 105 MMHG | TEMPERATURE: 98.1 F | SYSTOLIC BLOOD PRESSURE: 174 MMHG

## 2023-03-16 LAB
2HR DELTA HS TROPONIN: 1 NG/L
ATRIAL RATE: 60 BPM
CARDIAC TROPONIN I PNL SERPL HS: 8 NG/L
P AXIS: 67 DEGREES
PR INTERVAL: 192 MS
QRS AXIS: 50 DEGREES
QRSD INTERVAL: 78 MS
QT INTERVAL: 432 MS
QTC INTERVAL: 432 MS
T WAVE AXIS: 76 DEGREES
VENTRICULAR RATE: 60 BPM

## 2023-03-16 NOTE — ED PROVIDER NOTES
History  Chief Complaint   Patient presents with   • Dizziness     Pt arrives c/o dizziness/Lightlessness and nausea starting approx  1 hours ago  Pt reports high blood pressure at home  Does not take HTN medication  Reports starting paxil recently      Patient is an 27-year-old female with a past medical history significant for COPD, dementia, hyperlipidemia presenting to the emergency department for evaluation of nausea, lightheadedness, elevated blood pressure  She states that approximately 2 hours ago she felt acutely lightheaded and nauseous  She checked her blood pressure and it was found to be in the 986L systolic at home  Patient denies dizziness/room spinning sensation, however does report feeling "unsteady" on her feet  Of note, patient had a similar episode about 2 months ago after she was started on Zoloft  Zoloft was stopped at that time and she was switched to Paxil  She had a recent increase in her dose of Paxil and is concerned that the increased dose of medication is causing her elevated blood pressure  She has no history of hypertension and does not take antihypertensive medications  She is denying any fevers, chills, chest pain, difficulty breathing, visual disturbance, focal weakness, numbness, tingling  No other complaints at this time  Prior to Admission Medications   Prescriptions Last Dose Informant Patient Reported? Taking?    Cholecalciferol (Vitamin D3) 125 MCG (5000 UT) CAPS   Yes No   Sig: Take 2,000 Units by mouth daily   PARoxetine (PAXIL) 10 mg tablet   Yes No   Sig: TAKE 1 TABLET (10 MG) BY MOUTH ONCE DAILY BEFORE BEDTIME   albuterol (Ventolin HFA) 90 mcg/act inhaler   No No   Sig: Inhale 2 puffs every 6 (six) hours as needed for wheezing   donepezil (ARICEPT) 10 mg tablet   No No   Sig: TAKE ONE TABLET BY MOUTH DAILY AT BEDTIME   fexofenadine (ALLEGRA) 180 MG tablet   Yes No   Sig: Take 180 mg by mouth if needed   meclizine (ANTIVERT) 25 mg tablet   Yes No memantine (NAMENDA) 10 mg tablet   No No   Sig: TAKE ONE TABLET BY MOUTH TWICE DAILY      Facility-Administered Medications: None       Past Medical History:   Diagnosis Date   • Abnormal chest x-ray    • Abnormal mammogram    • COPD (chronic obstructive pulmonary disease) (HCC)    • Dementia (HCC)     Early stage    • Ductal hyperplasia of breast     Left    • Hyperlipidemia     diet controled   • Nodule of lower lobe of right lung     Right Lower Lobe    • SOB (shortness of breath)    • SOB (shortness of breath)    • Wheezing        Past Surgical History:   Procedure Laterality Date   • BREAST BIOPSY Left 10/12/2018   • BREAST CYST EXCISION Left 10/31/2018   • BREAST LUMPECTOMY Left 11/13/2018    Procedure: BREAST LUMPECTOMY; BREAST NEEDLE LOCALIZATION (NEEDLE LOC AT 0830); Surgeon: Sanjiv Fountain MD;  Location[facility];  Service: Surgical Oncology   • COLONOSCOPY     • LAPAROSCOPY     • MAMMO NEEDLE LOCALIZATION LEFT (ALL INC) Left 11/13/2018   • MAMMO STEREOTACTIC BREAST BIOPSY LEFT (ALL INC) Left 10/16/2018   • OR [address] INCL FLUOR GDNCE DX W/CELL WASHG SPX Right 1/16/2019    [place] [address] [facility] [place]  Surgeon: Tio Mederos MD;  [facility];  Service: Thoracic   • OR MEDIASTINOSCOPY WITH LYMPH NODE BIOPSY/IES Right 1/16/2019    Procedure: MEDIASTINOSCOPY;  Surgeon: Tio Mederos MD;  [facility];  Service: Thoracic   • OR THORACOSCOPY W/LOBECTOMY SINGLE LOBE Right 1/16/2019    Procedure: LOBECTOMY LUNG;  Surgeon: Tio Mederos MD;  [facility];  Service: Thoracic   • TRIGGER FINGER RELEASE Right 10/10/2019   • ULNAR NERVE TRANSPOSITION Right 10/10/2019       Family History   Problem Relation Age of Onset   • Prostate cancer Father    • Hypertension Mother    • Glaucoma Mother    • Macular degeneration Mother    • Heart attack Brother      I have reviewed and agree with the history as documented      E-Cigarette/Vaping   • E-Cigarette Use Never User E-Cigarette/Vaping Substances   • Nicotine No    • THC No    • CBD No    • Flavoring No    • Other No    • Unknown No      Social History     Tobacco Use   • Smoking status: Former     Packs/day: 1 00     Years: 60 00     Pack years: 60 00     Types: Cigarettes     Start date: 1958     Quit date: 2016     Years since quittin 8   • Smokeless tobacco: Never   Vaping Use   • Vaping Use: Never used   Substance Use Topics   • Alcohol use: Yes     Alcohol/week: 2 0 standard drinks     Types: 2 Cans of beer per week     Comment: occasional   • Drug use: Never       Review of Systems   Constitutional: Negative for chills and fever  HENT: Negative for congestion, facial swelling, nosebleeds, sore throat and voice change  Eyes: Negative for pain and redness  Respiratory: Negative for cough, choking, chest tightness, shortness of breath and stridor  Cardiovascular: Negative for chest pain and palpitations  Gastrointestinal: Positive for nausea  Negative for abdominal pain, diarrhea and vomiting  Musculoskeletal: Negative for arthralgias, back pain, myalgias, neck pain and neck stiffness  Skin: Negative for color change and rash  Neurological: Positive for light-headedness  Negative for dizziness, syncope, facial asymmetry, weakness, numbness and headaches  Psychiatric/Behavioral: Negative for confusion and suicidal ideas  All other systems reviewed and are negative  Physical Exam  Physical Exam  Vitals reviewed  Constitutional:       General: She is not in acute distress  Appearance: Normal appearance  She is not ill-appearing, toxic-appearing or diaphoretic  HENT:      Head: Normocephalic and atraumatic  Right Ear: External ear normal       Left Ear: External ear normal       Nose: Nose normal  No congestion or rhinorrhea  Mouth/Throat:      Mouth: Mucous membranes are moist       Pharynx: Oropharynx is clear   No oropharyngeal exudate or posterior oropharyngeal erythema  Eyes:      General: No visual field deficit or scleral icterus  Right eye: No discharge  Left eye: No discharge  Extraocular Movements: Extraocular movements intact  Conjunctiva/sclera: Conjunctivae normal    Cardiovascular:      Rate and Rhythm: Normal rate and regular rhythm  Pulses: Normal pulses  Heart sounds: Normal heart sounds  No murmur heard  No friction rub  No gallop  Pulmonary:      Effort: Pulmonary effort is normal  No respiratory distress  Breath sounds: Normal breath sounds  No stridor  No wheezing, rhonchi or rales  Abdominal:      General: Abdomen is flat  Palpations: Abdomen is soft  Tenderness: There is no abdominal tenderness  There is no guarding or rebound  Musculoskeletal:      Cervical back: Normal range of motion and neck supple  Right lower leg: No edema  Left lower leg: No edema  Skin:     General: Skin is warm and dry  Capillary Refill: Capillary refill takes less than 2 seconds  Neurological:      General: No focal deficit present  Mental Status: She is alert and oriented to person, place, and time  GCS: GCS eye subscore is 4  GCS verbal subscore is 5  GCS motor subscore is 6  Cranial Nerves: Cranial nerves 2-12 are intact  No cranial nerve deficit, dysarthria or facial asymmetry  Sensory: Sensation is intact  No sensory deficit  Motor: Motor function is intact  No weakness, tremor, seizure activity or pronator drift  Coordination: Coordination is intact   Coordination normal  Finger-Nose-Finger Test and Heel to [facility] Test normal  Rapid alternating movements normal    Psychiatric:         Mood and Affect: Mood normal          Behavior: Behavior normal          Vital Signs  ED Triage Vitals [03/15/23 2039]   Temperature Pulse Respirations Blood Pressure SpO2   98 1 °F (36 7 °C) 70 18 (!) 228/93 98 %      Temp Source Heart Rate Source Patient Position - Orthostatic VS BP Location FiO2 (%)   Oral Monitor Sitting Left arm --      Pain Score       --           Vitals:    03/15/23 2330 03/16/23 0000 03/16/23 0030 03/16/23 0100   BP: (!) 189/89 (!) 175/81 (!) 181/85 (!) 174/105   Pulse: 58 57 59 58   Patient Position - Orthostatic VS:    Lying         Visual Acuity  Visual Acuity    Flowsheet Row Most Recent Value   L Pupil Size (mm) 2   R Pupil Size (mm) 2          ED Medications  Medications   metoprolol (LOPRESSOR) injection 5 mg (5 mg Intravenous Given 3/15/23 2226)   metoprolol (LOPRESSOR) injection 5 mg (5 mg Intravenous Given 3/15/23 2346)       Diagnostic Studies  Results Reviewed     Procedure Component Value Units Date/Time    HS Troponin I 2hr [[id number]]  (Normal) Collected: 03/15/23 2350    Lab Status: Final result Specimen: Blood from Arm, Right Updated: 03/16/23 0033     hs TnI 2hr 8 ng/L      Delta 2hr hsTnI 1 ng/L     HS Troponin 0hr (reflex protocol) [[id number]]  (Normal) Collected: 03/15/23 2210    Lab Status: Final result Specimen: Blood from Arm, Left Updated: 03/15/23 2240     hs TnI 0hr 7 ng/L     Comprehensive metabolic panel [[id number]] Collected: 03/15/23 2210    Lab Status: Final result Specimen: Blood from Arm, Left Updated: 03/15/23 2231     Sodium 136 mmol/L      Potassium 3 9 mmol/L      Chloride 104 mmol/L      CO2 24 mmol/L      ANION GAP 8 mmol/L      BUN 23 mg/dL      Creatinine 1 02 mg/dL      Glucose 97 mg/dL      Calcium 9 7 mg/dL      AST 17 U/L      ALT 14 U/L      Alkaline Phosphatase 68 U/L      Total Protein 6 7 g/dL      Albumin 4 1 g/dL      Total Bilirubin 0 29 mg/dL      eGFR 52 ml/min/1 73sq m     Narrative:      Don guidelines for Chronic Kidney Disease (CKD):   •  Stage 1 with normal or high GFR (GFR > 90 mL/min/1 73 square meters)  •  Stage 2 Mild CKD (GFR = 60-89 mL/min/1 73 square meters)  •  Stage 3A Moderate CKD (GFR = 45-59 mL/min/1 73 square meters)  •  Stage 3B Moderate CKD (GFR = 30-44 mL/min/1 73 square meters)  •  Stage 4 Severe CKD (GFR = 15-29 mL/min/1 73 square meters)  •  Stage 5 End Stage CKD (GFR <15 mL/min/1 73 square meters)  Note: GFR calculation is accurate only with a steady state creatinine    CBC and differential [[id number]] Collected: 03/15/23 2210    Lab Status: Final result Specimen: Blood from Arm, Left Updated: 03/15/23 2215     WBC 6 08 Thousand/uL      RBC 4 36 Million/uL      Hemoglobin 13 0 g/dL      Hematocrit 39 3 %      MCV 90 fL      MCH 29 8 pg      MCHC 33 1 g/dL      RDW 13 7 %      MPV 10 0 fL      Platelets 407 Thousands/uL      nRBC 0 /100 WBCs      Neutrophils Relative 57 %      Immat GRANS % 0 %      Lymphocytes Relative 29 %      Monocytes Relative 10 %      Eosinophils Relative 3 %      Basophils Relative 1 %      Neutrophils Absolute 3 49 Thousands/µL      Immature Grans Absolute 0 02 Thousand/uL      Lymphocytes Absolute 1 76 Thousands/µL      Monocytes Absolute 0 59 Thousand/µL      Eosinophils Absolute 0 15 Thousand/µL      Basophils Absolute 0 07 Thousands/µL                  CT head without contrast   Final Result by Tammy Cevallos MD (03/15 2254)      No acute intracranial abnormality  Mild chronic small vessel/microangiopathic changes  Workstation performed: [id number]                    Procedures  Procedures         ED Course  ED Course as of 03/16/23 0548   Wed Mar 15, 2023   2229 EKG interpreted by myself  Normal sinus rhythm at 60 bpm   Normal axis, normal intervals  No ST segment changes  Unchanged from previous EKG  Medical Decision Making  Patient presenting for evaluation of lightheadedness, elevated blood pressure readings at home  Upon arrival patient appears comfortable  She is not in any acute distress  Vital signs remarkable for blood pressure 228/93 initially  Blood pressure decreased to 174/105 after 10 mg of labetalol  Patient without concerning findings on physical examination  GCS 15  Neurologic exam benign  No focal neurologic deficits  Sensation/motor function/coordination intact  CT head unremarkable  Laboratory evaluation reassuring  Troponin normal x2  EKG without ischemic changes  Patient offered admission to the hospital given symptomatic hypertension, however patient feeling significantly improved and requesting to go home  Patient experienced a similar reaction after starting Zoloft and her elevated blood pressure may be a side effect of her recently started Paxil as this is a known side effect  Given benign neurologic exam, normal CT, improvement of patient's symptoms I believe it is reasonable for patient to go home with PCP follow-up and strict return precautions  Patient and family agreeable with this plan  Currently in stable condition  Elevated blood pressure reading: acute illness or injury  Lightheadedness: acute illness or injury  Amount and/or Complexity of Data Reviewed  Labs: ordered  Radiology: ordered  Risk  Prescription drug management  Disposition  Final diagnoses:   Elevated blood pressure reading   Lightheadedness     Time reflects when diagnosis was documented in both MDM as applicable and the Disposition within this note     Time User Action Codes Description Comment    3/16/2023  1:02 AM Jennifer Sanchez Add [R03 0] Elevated blood pressure reading     3/16/2023  1:02 AM Jennifer Sanchez Add [R42] [facility]       ED Disposition     ED Disposition   Discharge    Condition   Stable    Date/Time   Thu Mar 16, 2023  1:02 AM    Comment   Jasvri Foss discharge to home/self care                 Follow-up Information     Follow up With Specialties Details Why Contact Info Additional [address] [facility] Emergency Medicine Go to  If symptoms worsen [address] [facility] [id number] [facility], [address] [facility], [facility] Internal Medicine Schedule an appointment as soon as possible for a visit  for follow up [phone] [address]  [phone]             Discharge Medication List as of 3/16/2023  1:02 AM      CONTINUE these medications which have NOT CHANGED    Details   albuterol (Ventolin HFA) 90 mcg/act inhaler Inhale 2 puffs every 6 (six) hours as needed for wheezing, Starting Tue 5/4/2021, Normal      Cholecalciferol (Vitamin D3) 125 MCG (5000 UT) CAPS Take 2,000 Units by mouth daily, Historical Med      donepezil (ARICEPT) 10 mg tablet TAKE ONE TABLET BY MOUTH DAILY AT BEDTIME, Normal      fexofenadine (ALLEGRA) 180 MG tablet Take 180 mg by mouth if needed, Starting Mon 3/15/2021, Historical Med      meclizine (ANTIVERT) 25 mg tablet Historical Med      memantine (NAMENDA) 10 mg tablet TAKE ONE TABLET BY MOUTH TWICE DAILY, Normal      PARoxetine (PAXIL) 10 mg tablet TAKE 1 TABLET (10 MG) BY MOUTH ONCE DAILY BEFORE BEDTIME, Historical Med             No discharge procedures on file      PDMP Review     None          ED Provider  Electronically Signed by           Noy García PA-C  03/16/23 5026

## 2023-06-19 DIAGNOSIS — F02.80 ALZHEIMER'S DEMENTIA WITHOUT BEHAVIORAL DISTURBANCE (HCC): ICD-10-CM

## 2023-06-19 DIAGNOSIS — G30.9 ALZHEIMER'S DEMENTIA WITHOUT BEHAVIORAL DISTURBANCE (HCC): ICD-10-CM

## 2023-06-19 RX ORDER — MEMANTINE HYDROCHLORIDE 10 MG/1
TABLET ORAL
Qty: 60 TABLET | Refills: 1 | Status: SHIPPED | OUTPATIENT
Start: 2023-06-19

## 2023-06-19 RX ORDER — DONEPEZIL HYDROCHLORIDE 10 MG/1
TABLET, FILM COATED ORAL
Qty: 30 TABLET | Refills: 1 | Status: SHIPPED | OUTPATIENT
Start: 2023-06-19

## 2023-07-05 ENCOUNTER — OFFICE VISIT (OUTPATIENT)
Dept: NEUROLOGY | Facility: CLINIC | Age: 83
End: 2023-07-05
Payer: COMMERCIAL

## 2023-07-05 VITALS
WEIGHT: 135 LBS | HEART RATE: 70 BPM | SYSTOLIC BLOOD PRESSURE: 150 MMHG | HEIGHT: 65 IN | BODY MASS INDEX: 22.49 KG/M2 | DIASTOLIC BLOOD PRESSURE: 90 MMHG

## 2023-07-05 DIAGNOSIS — F02.80 ALZHEIMER'S DEMENTIA WITHOUT BEHAVIORAL DISTURBANCE (HCC): Primary | ICD-10-CM

## 2023-07-05 DIAGNOSIS — G30.9 ALZHEIMER'S DEMENTIA WITHOUT BEHAVIORAL DISTURBANCE (HCC): Primary | ICD-10-CM

## 2023-07-05 PROCEDURE — 99213 OFFICE O/P EST LOW 20 MIN: CPT | Performed by: PSYCHIATRY & NEUROLOGY

## 2023-07-05 RX ORDER — BUSPIRONE HYDROCHLORIDE 15 MG/1
TABLET ORAL
COMMUNITY
Start: 2023-06-02

## 2023-07-05 NOTE — PROGRESS NOTES
Douglas Bryant is a 82 y.o. female returns in follow-up of a history of dementia    Assessment:  1. Alzheimer's dementia without behavioral disturbance ([address])        Plan:  Continue Aricept and Namenda  Continue BuSpar  Follow-up 6 months    Discussion:  Ana Gore has been stable overall cognitively on Aricept and Namenda. She was not able to tolerate Zoloft or Paxil but seems to be doing well with BuSpar. We will continue current management and I will see her back in 6 months      Subjective:    HPI  Ana Gore returns in follow-up today accompanied by her daughter. She reports overall things have been fairly stable cognitively. She remains independent with her daily routine, preparing her meals and taking care of her dogs being prompted by notes. She needs some guidance with dressing and bathing. She had been started on Zoloft for some of her mood issues, however within a few weeks her blood pressure became very high. This resolved when the medicine was discontinued. She was then started on Paxil with similar reaction. She is currently on BuSpar and seems to be doing fairly well with this. She remains on Aricept and Namenda. Is now incontinent of urine and uses depends.       Past Medical History:   Diagnosis Date   • Abnormal chest x-ray    • Abnormal mammogram    • COPD (chronic obstructive pulmonary disease) (HCC)    • Dementia (HCC)     Early stage    • Ductal hyperplasia of breast     Left    • Hyperlipidemia     diet controled   • Nodule of lower lobe of right lung     Right Lower Lobe    • SOB (shortness of breath)    • SOB (shortness of breath)    • Wheezing        Family History:  Family History   Problem Relation Age of Onset   • Prostate cancer Father    • Hypertension Mother    • Glaucoma Mother    • Macular degeneration Mother    • Heart attack Brother        Past Surgical History:  Past Surgical History:   Procedure Laterality Date   • BREAST BIOPSY Left 10/12/2018   • BREAST CYST EXCISION Left 10/31/2018   • BREAST LUMPECTOMY Left 11/13/2018    Procedure: BREAST LUMPECTOMY; BREAST NEEDLE LOCALIZATION (NEEDLE LOC AT 0830); Surgeon: Ankita Howard MD;  [facility];  Service: Surgical Oncology   • COLONOSCOPY     • LAPAROSCOPY     • MAMMO NEEDLE LOCALIZATION LEFT (ALL INC) Left 11/13/2018   • MAMMO STEREOTACTIC BREAST BIOPSY LEFT (ALL INC) Left 10/16/2018   •  [address] INCL FLUOR GDNCE DX W/CELL WASHG SPX Right 1/16/2019    Procedure: [address] [place] [address] [address],;  Surgeon: Antoni Sampson MD;  [facility];  Service: Thoracic   • PA MEDIASTINOSCOPY WITH LYMPH NODE BIOPSY/IES Right 1/16/2019    Procedure: MEDIASTINOSCOPY;  Surgeon: Antoni Sampson MD;  [facility];  Service: Thoracic   • PA THORACOSCOPY W/LOBECTOMY SINGLE LOBE Right 1/16/2019    Procedure: LOBECTOMY LUNG;  Surgeon: Antoni Sampson MD;  [facility];  Service: Thoracic   • TRIGGER FINGER RELEASE Right 10/10/2019   • ULNAR NERVE TRANSPOSITION Right 10/10/2019       Social History:   reports that she quit smoking about 7 years ago. Her smoking use included cigarettes. She started smoking about 65 years ago. She has a 60.00 pack-year smoking history. She has never used smokeless tobacco. She reports current alcohol use of about 2.0 standard drinks of alcohol per week. She reports that she does not use drugs. Allergies:  Patient has no known allergies.       Current Outpatient Medications:   •  donepezil (ARICEPT) 10 mg tablet, TAKE 1 TABLET BY MOUTH EVERYDAY AT BEDTIME, Disp: 30 tablet, Rfl: 1  •  memantine (NAMENDA) 10 mg tablet, TAKE 1 TABLET BY MOUTH TWICE A DAY, Disp: 60 tablet, Rfl: 1  •  albuterol (Ventolin HFA) 90 mcg/act inhaler, Inhale 2 puffs every 6 (six) hours as needed for wheezing (Patient not taking: Reported on 7/5/2023), Disp: 18 g, Rfl: 1  •  busPIRone (BUSPAR) 15 mg tablet, TAKE 1 TABLET (15 MG) BY ORAL ROUTE 2 TIMES PER DAY, Disp: , Rfl:   •  Cholecalciferol (Vitamin D3) 125 MCG (5000 UT) CAPS, Take 2,000 Units by mouth daily (Patient not taking: Reported on 7/5/2023), Disp: , Rfl:   •  fexofenadine (ALLEGRA) 180 MG tablet, Take 180 mg by mouth if needed, Disp: , Rfl:   •  meclizine (ANTIVERT) 25 mg tablet, , Disp: , Rfl:   •  PARoxetine (PAXIL) 10 mg tablet, TAKE 1 TABLET (10 MG) BY MOUTH ONCE DAILY BEFORE BEDTIME (Patient not taking: Reported on 7/5/2023), Disp: , Rfl:     I have reviewed the past medical, social and family history, current medications, allergies, vitals, review of systems and updated this information as appropriate today     Objective:    Vitals:  Blood pressure 150/90, pulse 70, height 5' 5" (1.651 m), weight 61.2 kg (135 lb), not currently breastfeeding. Physical Exam    Neurological Exam  GENERAL: Well-developed well-nourished woman in no acute distress  HEENT/NECK: Head is atraumatic normocephalic, neck is supple  NEUROLOGIC:  Mental Status: Awake and alert without aphasia. She scored a 12+1/30 on MoCA  Cranial Nerves: Extraocular movements are full.  Face is symmetrical  Coordination: Gait is stable      ROS:    Review of Systems

## 2023-07-28 ENCOUNTER — HOSPITAL ENCOUNTER (OUTPATIENT)
Age: 83
Discharge: HOME | End: 2023-07-28
Payer: COMMERCIAL

## 2023-07-28 VITALS — BODY MASS INDEX: 22.49 KG/M2 | WEIGHT: 135 LBS | HEIGHT: 65 IN

## 2023-07-28 DIAGNOSIS — Z12.31 SCREENING MAMMOGRAM FOR BREAST CANCER: ICD-10-CM

## 2023-07-28 PROCEDURE — 77067 SCR MAMMO BI INCL CAD: CPT

## 2023-07-28 PROCEDURE — 77063 BREAST TOMOSYNTHESIS BI: CPT

## 2023-08-24 DIAGNOSIS — F02.80 ALZHEIMER'S DEMENTIA WITHOUT BEHAVIORAL DISTURBANCE (HCC): ICD-10-CM

## 2023-08-24 DIAGNOSIS — G30.9 ALZHEIMER'S DEMENTIA WITHOUT BEHAVIORAL DISTURBANCE (HCC): ICD-10-CM

## 2023-08-24 RX ORDER — DONEPEZIL HYDROCHLORIDE 10 MG/1
TABLET, FILM COATED ORAL
Qty: 30 TABLET | Refills: 1 | Status: SHIPPED | OUTPATIENT
Start: 2023-08-24

## 2023-08-24 RX ORDER — MEMANTINE HYDROCHLORIDE 10 MG/1
TABLET ORAL
Qty: 60 TABLET | Refills: 1 | Status: SHIPPED | OUTPATIENT
Start: 2023-08-24

## 2023-09-25 NOTE — RESTORATIVE TECHNICIAN NOTE
Restorative Specialist Mobility Note       Activity: Ambulate in trotter, Chair     Assistive Device: Front wheel walker Otc Regimen: Zinc bar - wash face once daily Detail Level: Zone

## 2023-10-21 DIAGNOSIS — F02.80 ALZHEIMER'S DEMENTIA WITHOUT BEHAVIORAL DISTURBANCE (HCC): ICD-10-CM

## 2023-10-21 DIAGNOSIS — G30.9 ALZHEIMER'S DEMENTIA WITHOUT BEHAVIORAL DISTURBANCE (HCC): ICD-10-CM

## 2023-10-23 RX ORDER — DONEPEZIL HYDROCHLORIDE 10 MG/1
TABLET, FILM COATED ORAL
Qty: 30 TABLET | Refills: 1 | Status: SHIPPED | OUTPATIENT
Start: 2023-10-23

## 2023-10-23 RX ORDER — MEMANTINE HYDROCHLORIDE 10 MG/1
TABLET ORAL
Qty: 60 TABLET | Refills: 1 | Status: SHIPPED | OUTPATIENT
Start: 2023-10-23

## 2023-12-01 DIAGNOSIS — G30.9 ALZHEIMER'S DEMENTIA WITHOUT BEHAVIORAL DISTURBANCE (HCC): ICD-10-CM

## 2023-12-01 DIAGNOSIS — F02.80 ALZHEIMER'S DEMENTIA WITHOUT BEHAVIORAL DISTURBANCE (HCC): ICD-10-CM

## 2023-12-01 RX ORDER — MEMANTINE HYDROCHLORIDE 10 MG/1
TABLET ORAL
Qty: 60 TABLET | Refills: 1 | Status: SHIPPED | OUTPATIENT
Start: 2023-12-01

## 2023-12-01 RX ORDER — DONEPEZIL HYDROCHLORIDE 10 MG/1
TABLET, FILM COATED ORAL
Qty: 30 TABLET | Refills: 1 | Status: SHIPPED | OUTPATIENT
Start: 2023-12-01

## 2024-02-07 ENCOUNTER — OFFICE VISIT (OUTPATIENT)
Dept: NEUROLOGY | Facility: CLINIC | Age: 84
End: 2024-02-07
Payer: COMMERCIAL

## 2024-02-07 VITALS
DIASTOLIC BLOOD PRESSURE: 78 MMHG | WEIGHT: 141.2 LBS | BODY MASS INDEX: 23.53 KG/M2 | SYSTOLIC BLOOD PRESSURE: 140 MMHG | HEIGHT: 65 IN | HEART RATE: 68 BPM

## 2024-02-07 DIAGNOSIS — G30.9 ALZHEIMER'S DEMENTIA WITHOUT BEHAVIORAL DISTURBANCE (HCC): ICD-10-CM

## 2024-02-07 DIAGNOSIS — G30.9 ALZHEIMER'S DEMENTIA WITH BEHAVIORAL DISTURBANCE (HCC): Primary | ICD-10-CM

## 2024-02-07 DIAGNOSIS — F02.818 ALZHEIMER'S DEMENTIA WITH BEHAVIORAL DISTURBANCE (HCC): Primary | ICD-10-CM

## 2024-02-07 DIAGNOSIS — F02.80 ALZHEIMER'S DEMENTIA WITHOUT BEHAVIORAL DISTURBANCE (HCC): ICD-10-CM

## 2024-02-07 PROCEDURE — 99213 OFFICE O/P EST LOW 20 MIN: CPT | Performed by: PSYCHIATRY & NEUROLOGY

## 2024-02-07 RX ORDER — MELOXICAM 15 MG/1
TABLET ORAL
COMMUNITY
Start: 2024-01-11

## 2024-02-07 RX ORDER — OXYBUTYNIN CHLORIDE 10 MG/1
10 TABLET, EXTENDED RELEASE ORAL DAILY
COMMUNITY
Start: 2024-01-30

## 2024-02-07 RX ORDER — ROSUVASTATIN CALCIUM 5 MG/1
5 TABLET, COATED ORAL DAILY
COMMUNITY
Start: 2024-01-25

## 2024-02-07 RX ORDER — DONEPEZIL HYDROCHLORIDE 10 MG/1
10 TABLET, FILM COATED ORAL
Qty: 30 TABLET | Refills: 5 | Status: SHIPPED | OUTPATIENT
Start: 2024-02-07

## 2024-02-07 RX ORDER — MEMANTINE HYDROCHLORIDE 10 MG/1
10 TABLET ORAL 2 TIMES DAILY
Qty: 60 TABLET | Refills: 5 | Status: SHIPPED | OUTPATIENT
Start: 2024-02-07

## 2024-02-07 NOTE — PROGRESS NOTES
Aurelia Jaime is a 83 y.o. female who returns in follow-up today with history of dementia    Assessment:  1. Alzheimer's dementia with behavioral disturbance (HCC)    2. Alzheimer's dementia without behavioral disturbance (HCC)        Plan:  Continue Aricept Namenda and BuSpar  Follow-up 6 months    Discussion:  Aurelia has demonstrated some decline cognitively according to her daughter but scored approximately the same on MoCA testing today.  She will continue with Aricept and Namenda as well as her BuSpar and I will see her back in follow-up in 6 months      Subjective:    HPI  Aurelia returns in follow-up today.  Her daughter reports that she has noticed that she has declined over the last 6 months.  She requires a lot more cueing.  Her daughter states that before she could just call her up and tell her to take a shower and tell her how to turn the water on.  Now she actually has to physically go over to her house and a sister.  She relies on others for her food.  She gets easily confused if her routine is changed.  She was put on Ditropan for incontinence      Past Medical History:   Diagnosis Date    Abnormal chest x-ray     Abnormal mammogram     COPD (chronic obstructive pulmonary disease) (HCC)     Dementia (HCC)     Early stage     Ductal hyperplasia of breast     Left     Hyperlipidemia     diet controled    Nodule of lower lobe of right lung     Right Lower Lobe     SOB (shortness of breath)     SOB (shortness of breath)     Wheezing        Family History:  Family History   Problem Relation Age of Onset    Hypertension Mother     Glaucoma Mother     Macular degeneration Mother     Prostate cancer Father     Heart attack Brother        Past Surgical History:  Past Surgical History:   Procedure Laterality Date    BREAST BIOPSY Left 10/12/2018    BREAST CYST EXCISION Left 10/31/2018    BREAST LUMPECTOMY Left 11/13/2018    Procedure: BREAST LUMPECTOMY; BREAST NEEDLE LOCALIZATION (NEEDLE LOC AT 0830);  Surgeon:  Mian Valenzuela MD;  [facility];  Service: Surgical Oncology    COLONOSCOPY      LAPAROSCOPY      MAMMO NEEDLE LOCALIZATION LEFT (ALL INC) Left 11/13/2018    MAMMO STEREOTACTIC BREAST BIOPSY LEFT (ALL INC) Left 10/16/2018    [facility] INCL FLUOR GDNCE DX W/CELL WASHG SPX Right 1/16/2019    Procedure: BRONCHOSCOPY FLEXIBLE,;  Surgeon: Julia Mccord MD;  [facility];  Service: Thoracic    RI MEDIASTINOSCOPY WITH LYMPH NODE BIOPSY/IES Right 1/16/2019    Procedure: MEDIASTINOSCOPY;  Surgeon: Julia Mccord MD;  [facility];  Service: Thoracic    RI THORACOSCOPY W/LOBECTOMY SINGLE LOBE Right 1/16/2019    Procedure: LOBECTOMY LUNG;  Surgeon: Julia Mccord MD;  [facility];  Service: Thoracic    TRIGGER FINGER RELEASE Right 10/10/2019    ULNAR NERVE TRANSPOSITION Right 10/10/2019       Social History:   reports that she quit smoking about 7 years ago. Her smoking use included cigarettes. She started smoking about 66 years ago. She has a 60.0 pack-year smoking history. She has never used smokeless tobacco. She reports current alcohol use of about 2.0 standard drinks of alcohol per week. She reports that she does not use drugs.    Allergies:  Patient has no known allergies.      Current Outpatient Medications:     busPIRone (BUSPAR) 15 mg tablet, TAKE 1 TABLET (15 MG) BY ORAL ROUTE 2 TIMES PER DAY, Disp: , Rfl:     donepezil (ARICEPT) 10 mg tablet, Take 1 tablet (10 mg total) by mouth daily at bedtime, Disp: 30 tablet, Rfl: 5    meloxicam (MOBIC) 15 mg tablet, TAKE 1 TABLET (15 MG) BY ORAL ROUTE ONCE DAILY WITH DINNER, Disp: , Rfl:     memantine (NAMENDA) 10 mg tablet, Take 1 tablet (10 mg total) by mouth 2 (two) times a day, Disp: 60 tablet, Rfl: 5    oxybutynin (DITROPAN-XL) 10 MG 24 hr tablet, Take 10 mg by mouth daily, Disp: , Rfl:     rosuvastatin (CRESTOR) 5 mg tablet, Take 5 mg by mouth daily, Disp: , Rfl:     albuterol (Ventolin HFA) 90 mcg/act inhaler, Inhale 2  "puffs every 6 (six) hours as needed for wheezing (Patient not taking: Reported on 7/5/2023), Disp: 18 g, Rfl: 1    Cholecalciferol (Vitamin D3) 125 MCG (5000 UT) CAPS, Take 2,000 Units by mouth daily (Patient not taking: Reported on 7/5/2023), Disp: , Rfl:     fexofenadine (ALLEGRA) 180 MG tablet, Take 180 mg by mouth if needed, Disp: , Rfl:     meclizine (ANTIVERT) 25 mg tablet, , Disp: , Rfl:     PARoxetine (PAXIL) 10 mg tablet, TAKE 1 TABLET (10 MG) BY MOUTH ONCE DAILY BEFORE BEDTIME (Patient not taking: Reported on 7/5/2023), Disp: , Rfl:     I have reviewed the past medical, social and family history, current medications, allergies, vitals, review of systems and updated this information as appropriate today     Objective:    Vitals:  Blood pressure 140/78, pulse 68, height 5' 5\" (1.651 m), weight 64 kg (141 lb 3.2 oz), not currently breastfeeding.    Physical Exam    Neurological Exam  GENERAL: Well-developed well-nourished woman in no acute distress  HEENT/NECK: Head is atraumatic normocephalic, neck is supple  NEUROLOGIC:  Mental Status: Awake and alert without aphasia.  She scored a 13+1/30 on MoCA  Cranial Nerves: Extraocular movements are full. Face is symmetrical  Coordination: Gait is stable      ROS:    Review of Systems   Constitutional:  Negative for appetite change, fatigue and fever.   HENT: Negative.  Negative for hearing loss, tinnitus, trouble swallowing and voice change.    Eyes: Negative.  Negative for photophobia, pain and visual disturbance.   Respiratory: Negative.  Negative for shortness of breath.    Cardiovascular: Negative.  Negative for palpitations.   Gastrointestinal: Negative.  Negative for nausea and vomiting.   Endocrine: Negative.  Negative for cold intolerance.   Genitourinary: Negative.  Negative for dysuria, frequency and urgency.   Musculoskeletal:  Negative for back pain, gait problem, myalgias, neck pain and neck stiffness.   Skin: Negative.  Negative for rash. "   Allergic/Immunologic: Negative.    Neurological: Negative.  Negative for dizziness, tremors, seizures, syncope, facial asymmetry, speech difficulty, weakness, light-headedness, numbness and headaches.   Hematological: Negative.  Does not bruise/bleed easily.   Psychiatric/Behavioral:  Positive for confusion. Negative for hallucinations and sleep disturbance.

## 2024-02-14 ENCOUNTER — HOSPITAL ENCOUNTER (OUTPATIENT)
Dept: CT IMAGING | Facility: CLINIC | Age: 84
Discharge: HOME | End: 2024-02-14
Payer: COMMERCIAL

## 2024-02-14 DIAGNOSIS — Z85.118 HISTORY OF LUNG CANCER: ICD-10-CM

## 2024-02-14 DIAGNOSIS — R91.8 MULTIPLE LUNG NODULES: ICD-10-CM

## 2024-02-14 PROCEDURE — G1004 CDSM NDSC: HCPCS

## 2024-02-14 PROCEDURE — 71250 CT THORAX DX C-: CPT

## 2024-02-20 ENCOUNTER — OFFICE VISIT (OUTPATIENT)
Dept: CARDIAC SURGERY | Facility: CLINIC | Age: 84
End: 2024-02-20
Payer: COMMERCIAL

## 2024-02-20 VITALS
HEIGHT: 65 IN | DIASTOLIC BLOOD PRESSURE: 78 MMHG | WEIGHT: 139 LBS | HEART RATE: 80 BPM | BODY MASS INDEX: 23.16 KG/M2 | SYSTOLIC BLOOD PRESSURE: 132 MMHG | OXYGEN SATURATION: 97 %

## 2024-02-20 DIAGNOSIS — R91.1 LUNG NODULE: ICD-10-CM

## 2024-02-20 DIAGNOSIS — J41.0 SIMPLE CHRONIC BRONCHITIS (HCC): ICD-10-CM

## 2024-02-20 DIAGNOSIS — R91.8 MULTIPLE LUNG NODULES: ICD-10-CM

## 2024-02-20 DIAGNOSIS — C34.91 PRIMARY ADENOCARCINOMA OF RIGHT LUNG (HCC): ICD-10-CM

## 2024-02-20 DIAGNOSIS — Z85.118 HISTORY OF LUNG CANCER: Primary | ICD-10-CM

## 2024-02-20 PROCEDURE — G2211 COMPLEX E/M VISIT ADD ON: HCPCS | Performed by: THORACIC SURGERY (CARDIOTHORACIC VASCULAR SURGERY)

## 2024-02-20 PROCEDURE — 99214 OFFICE O/P EST MOD 30 MIN: CPT | Performed by: THORACIC SURGERY (CARDIOTHORACIC VASCULAR SURGERY)

## 2024-02-20 NOTE — PROGRESS NOTES
Assessment/Plan:    Multiple lung nodules  The left upper lobe groundglass opacity now has a solid component in it.  The solid component is still less than 2 mm.  We will continue just to follow this at this time.    Primary adenocarcinoma of right lung (HCC)  Aurelia is an 83-year-old female who is well-known to me.  She underwent a VATS right lower lobectomy in January 2019.  She presents today for her annual surveillance visit.  She is now 5 years out and remains cancer free which is excellent.    We discussed her most recent CT scan in detail.  We will plan to just continue to perform annual surveillance at this time.  I have ordered a CT scan for 1 year from now and she will come back and see me at that time.  Her and her daughter both know to call me if anything changes in the interim.       Diagnoses and all orders for this visit:    History of lung cancer  -     CT chest wo contrast; Future    Simple chronic bronchitis (HCC)    Primary adenocarcinoma of right lung (HCC)    Lung nodule    Multiple lung nodules          Thoracic History   Procedure: 1/16/2019 VATS RLLobectomy and mediastinoscopy  Pathology: Invasive adenocarcinoma, 3.6cm, 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, Stage IB, [id number]  Treatment: resection, no adjuvant     Cancer Staging   Primary adenocarcinoma of right lung (HCC)  Staging form: Lung, AJCC 8th Edition  - Clinical stage from 2/4/2019: Stage IB (cT2a, cN0, cM0) - Signed by Julia Mccord MD on 2/4/2019  Histopathologic type: Adenocarcinoma, NOS  Histologic grade (G): G1  Histologic grading system: 4 grade system  Laterality: Right  Tumor size (mm): 3.6  Lymph-vascular invasion (LVI): LVI not present (absent)/not identified  Specimen type: Excision  Type of lung cancer: Surgically resected non-small cell lung cancer  Perineural invasion (PNI): Absent  Adequacy of mediastinal dissection: Adequate  Stage used in treatment planning: Yes  National guidelines used in treatment planning: Yes  Type  of national guideline used in treatment planning: NCCN    Oncology History    No history exists.            Subjective:    Patient ID: Aurelia Jaime is a 83 y.o. female.    ETHAN Moses is an 83-year-old female who is well-known to me.  She underwent a VATS right lower lobectomy in January 2019.  She presents today for her annual surveillance visit.  Today at this visit, this marks her 5-year anniversary cancer free.    I have personally reviewed her most recent CT scan in PACS this demonstrates stability and no signs of any recurrence of her cancer she has a known groundglass opacity in her left upper lobe.  This has developed a sub-2 mm solid component over the past year.    Today in the office, she is doing okay.  She has had some cognitive decline over the past year but otherwise continues to do well.  Her she has some increased work of breathing that she is always had.  She reports a continued chronic cough and postnasal drip.  She denies any recent upper respiratory infection or pneumonia.    I have personally reviewed all the most recent notes in the chart including from her neurologist who is treating her for her Alzheimer's.    The following portions of the patient's history were reviewed and updated as appropriate: allergies, current medications, past family history, past medical history, past social history, past surgical history and problem list.    Review of Systems   Constitutional:  Negative for chills, fatigue, fever and unexpected weight change.   HENT:  Positive for postnasal drip.    Eyes: Negative.  Negative for visual disturbance.   Respiratory:  Positive for cough and shortness of breath. Negative for stridor.    Cardiovascular:  Negative for chest pain.   Gastrointestinal: Negative.    Endocrine: Negative.    Genitourinary: Negative.    Musculoskeletal: Negative.    Skin: Negative.    Neurological:  Negative for dizziness, light-headedness and headaches.   Hematological:  Negative for  "adenopathy.   Psychiatric/Behavioral: Negative.           Objective:   Physical Exam  Vitals and nursing note reviewed.   Constitutional:       General: She is not in acute distress.     Appearance: Normal appearance. She is well-developed. She is not diaphoretic.   HENT:      Head: Normocephalic and atraumatic.      Nose: Nose normal. No congestion or rhinorrhea.      Mouth/Throat:      Mouth: Mucous membranes are moist.      Pharynx: Oropharynx is clear. No oropharyngeal exudate.   Eyes:      General: No scleral icterus.     Pupils: Pupils are equal, round, and reactive to light.   Neck:      Trachea: No tracheal deviation.   Cardiovascular:      Rate and Rhythm: Normal rate and regular rhythm.      Pulses: Normal pulses.      Heart sounds: Normal heart sounds. No murmur heard.  Pulmonary:      Effort: Pulmonary effort is normal. No respiratory distress.      Breath sounds: Normal breath sounds. No stridor. No wheezing or rales.   Chest:      Chest wall: No tenderness.   Abdominal:      General: Bowel sounds are normal. There is no distension.      Palpations: Abdomen is soft.      Tenderness: There is no abdominal tenderness. There is no rebound.   Musculoskeletal:         General: Normal range of motion.      Cervical back: Normal range of motion and neck supple. No muscular tenderness.   Lymphadenopathy:      Cervical: No cervical adenopathy.   Skin:     General: Skin is warm and dry.      Coloration: Skin is not jaundiced or pale.      Findings: No erythema or rash.   Neurological:      General: No focal deficit present.      Mental Status: She is alert and oriented to person, place, and time. Mental status is at baseline.   Psychiatric:         Mood and Affect: Mood normal.         Behavior: Behavior normal.         Thought Content: Thought content normal.         Judgment: Judgment normal.     /78 (BP Location: Right arm, Patient Position: Sitting)   Pulse 80   Ht 5' 5\" (1.651 m)   Wt 63 kg (139 lb) "   SpO2 97%   BMI 23.13 kg/m²     No Chest XR results available for this patient.   CT chest wo contrast    Result Date: 2/18/2024  Narrative CT CHEST WITHOUT IV CONTRAST INDICATION: Z85.118: Personal history of other malignant neoplasm of bronchus and lung R91.8: Other nonspecific abnormal finding of lung field. COMPARISON: 2/1/2023 and 10/21/2015 TECHNIQUE: CT examination of the chest was performed without intravenous contrast. Multiplanar 2D reformatted images were created from the source data. This examination, like all CT scans performed in the [facility], was performed utilizing techniques to minimize radiation dose exposure, including the use of iterative reconstruction and automated exposure control. Radiation dose length product (DLP) for this visit: 75 mGy-cm FINDINGS: LUNGS: Similar emphysema. Partial changes in the right lower lobe are unchanged. 5 mm groundglass opacity nodule in the left upper lobe (207-49) is unchanged in size but shows interval development of a sub-2 mm solid component. The additional numerous previously described groundglass opacity nodular foci have otherwise not significantly changed. Left lower lobe calcified nodule is unchanged. 4 mm left lower lobe nodule (207-127) is unchanged. New 3 mm endobronchial filling defect within the right middle lobe (207-117) No new suspicious pulmonary nodules. PLEURA: No pleural effusion or pneumothorax. HEART/GREAT VESSELS: Heart is unremarkable for patient's age. No thoracic aortic aneurysm. Coronary artery calcifications. Mitral valve calcifications. Aortic valve calcifications. MEDIASTINUM AND ETTA: Unremarkable. CHEST WALL AND LOWER NECK: Unchanged multinodular thyroid gland. VISUALIZED STRUCTURES IN THE UPPER ABDOMEN: Unchanged hepatic cyst. The upper abdomen is otherwise unremarkable. OSSEOUS STRUCTURES: No acute fracture or destructive osseous lesion. Superior endplate compression fractures of T12 and T10 are unchanged.      Impression 5 mm groundglass nodule in the left upper lobe with interval development of tiny (sub-2 mm) solid component. Recommend follow-up chest CT in 1 year to reassess. New 3 mm endobronchial filling defect in the right middle lobe. This likely represents secretions. This can be reassessed in 1 year as well. The study was marked in EPIC for significant notification. Workstation performed: [id number]     No CT Chest,Abdomen,Pelvis results available for this patient.   No NM PET CT results available for this patient.   No Barium Swallow results available for this patient.

## 2024-02-20 NOTE — ASSESSMENT & PLAN NOTE
Aurelia is an 83-year-old female who is well-known to me.  She underwent a VATS right lower lobectomy in January 2019.  She presents today for her annual surveillance visit.  She is now 5 years out and remains cancer free which is excellent.    We discussed her most recent CT scan in detail.  We will plan to just continue to perform annual surveillance at this time.  I have ordered a CT scan for 1 year from now and she will come back and see me at that time.  Her and her daughter both know to call me if anything changes in the interim.

## 2024-02-28 NOTE — PLAN OF CARE
Problem: PHYSICAL THERAPY ADULT  Goal: Performs mobility at highest level of function for planned discharge setting  See evaluation for individualized goals  Treatment/Interventions: Functional transfer training, LE strengthening/ROM, Elevations, Therapeutic exercise, Endurance training, Equipment eval/education, Bed mobility, Gait training, Spoke to nursing, OT  Equipment Recommended: Hector Fox (at this time)       See flowsheet documentation for full assessment, interventions and recommendations  Outcome: Adequate for Discharge  Prognosis: Good  Problem List: Decreased strength, Decreased endurance, Impaired balance, Decreased mobility, Decreased cognition, Decreased safety awareness  Assessment: The pt  is progressing with her ambulatory distance, and she does have improving balance  She does require occasional redirection to task and frequent instruction during transfers as she impulsively stood twice during the session  She will benefit from continued physical therapy to maximize her balance, strength, and activity tolerance  Barriers to Discharge: None     Recommendation: Home PT, Home with family support     PT - OK to Discharge: Yes    See flowsheet documentation for full assessment  Detail Level: Detailed Quality 110: Preventive Care And Screening: Influenza Immunization: Influenza Immunization Administered during Influenza season Quality 226: Preventive Care And Screening: Tobacco Use: Screening And Cessation Intervention: Patient screened for tobacco use and is an ex/non-smoker Quality 130: Documentation Of Current Medications In The Medical Record: Current Medications Documented

## 2024-05-06 ENCOUNTER — APPOINTMENT (OUTPATIENT)
Dept: MRI IMAGING | Facility: HOSPITAL | Age: 84
End: 2024-05-06
Payer: COMMERCIAL

## 2024-05-06 ENCOUNTER — HOSPITAL ENCOUNTER (OUTPATIENT)
Facility: HOSPITAL | Age: 84
Setting detail: OBSERVATION
Discharge: HOME | End: 2024-05-07
Attending: EMERGENCY MEDICINE | Admitting: STUDENT IN AN ORGANIZED HEALTH CARE EDUCATION/TRAINING PROGRAM
Payer: COMMERCIAL

## 2024-05-06 ENCOUNTER — APPOINTMENT (EMERGENCY)
Dept: CT IMAGING | Facility: HOSPITAL | Age: 84
End: 2024-05-06
Payer: COMMERCIAL

## 2024-05-06 ENCOUNTER — APPOINTMENT (EMERGENCY)
Dept: RADIOLOGY | Facility: HOSPITAL | Age: 84
End: 2024-05-06
Payer: COMMERCIAL

## 2024-05-06 ENCOUNTER — APPOINTMENT (OUTPATIENT)
Dept: NON INVASIVE DIAGNOSTICS | Facility: HOSPITAL | Age: 84
End: 2024-05-06
Payer: COMMERCIAL

## 2024-05-06 DIAGNOSIS — R91.8 LUNG NODULES: ICD-10-CM

## 2024-05-06 DIAGNOSIS — R42 ACUTE ONSET OF SEVERE VERTIGO: Primary | ICD-10-CM

## 2024-05-06 DIAGNOSIS — I67.2 ATHEROSCLEROTIC CEREBROVASCULAR DISEASE: ICD-10-CM

## 2024-05-06 PROBLEM — R29.90 STROKE-LIKE SYMPTOMS: Status: ACTIVE | Noted: 2024-05-06

## 2024-05-06 LAB
2HR DELTA HS TROPONIN: 0 NG/L
ALBUMIN SERPL BCP-MCNC: 3.8 G/DL (ref 3.5–5)
ALP SERPL-CCNC: 73 U/L (ref 34–104)
ALT SERPL W P-5'-P-CCNC: 10 U/L (ref 7–52)
ANION GAP SERPL CALCULATED.3IONS-SCNC: 6 MMOL/L (ref 4–13)
APICAL FOUR CHAMBER EJECTION FRACTION: 64 %
APTT PPP: 27 SECONDS (ref 23–37)
ASCENDING AORTA: 3.2 CM
AST SERPL W P-5'-P-CCNC: 16 U/L (ref 13–39)
ATRIAL RATE: 78 BPM
AV LVOT MEAN GRADIENT: 4 MMHG
AV LVOT PEAK GRADIENT: 6 MMHG
BASOPHILS # BLD AUTO: 0.07 THOUSANDS/ÂΜL (ref 0–0.1)
BASOPHILS NFR BLD AUTO: 1 % (ref 0–1)
BILIRUB DIRECT SERPL-MCNC: 0.06 MG/DL (ref 0–0.2)
BILIRUB SERPL-MCNC: 0.44 MG/DL (ref 0.2–1)
BILIRUB UR QL STRIP: NEGATIVE
BSA FOR ECHO PROCEDURE: 1.74 M2
BUN SERPL-MCNC: 16 MG/DL (ref 5–25)
CALCIUM SERPL-MCNC: 8.8 MG/DL (ref 8.4–10.2)
CARDIAC TROPONIN I PNL SERPL HS: 6 NG/L
CARDIAC TROPONIN I PNL SERPL HS: 6 NG/L
CHLORIDE SERPL-SCNC: 108 MMOL/L (ref 96–108)
CHOLEST SERPL-MCNC: 176 MG/DL
CLARITY UR: CLEAR
CO2 SERPL-SCNC: 26 MMOL/L (ref 21–32)
COLOR UR: COLORLESS
CREAT SERPL-MCNC: 0.88 MG/DL (ref 0.6–1.3)
DOP CALC LVOT PEAK VEL VTI: 27 CM
DOP CALC LVOT PEAK VEL: 1.22 M/S
E WAVE DECELERATION TIME: 283 MS
E/A RATIO: 1.02
EOSINOPHIL # BLD AUTO: 0.17 THOUSAND/ÂΜL (ref 0–0.61)
EOSINOPHIL NFR BLD AUTO: 3 % (ref 0–6)
ERYTHROCYTE [DISTWIDTH] IN BLOOD BY AUTOMATED COUNT: 14.1 % (ref 11.6–15.1)
FLUAV RNA RESP QL NAA+PROBE: NEGATIVE
FLUBV RNA RESP QL NAA+PROBE: NEGATIVE
GFR SERPL CREATININE-BSD FRML MDRD: 61 ML/MIN/1.73SQ M
GLUCOSE SERPL-MCNC: 101 MG/DL (ref 65–140)
GLUCOSE SERPL-MCNC: 83 MG/DL (ref 65–140)
GLUCOSE UR STRIP-MCNC: NEGATIVE MG/DL
HCT VFR BLD AUTO: 37.8 % (ref 34.8–46.1)
HDLC SERPL-MCNC: 58 MG/DL
HGB BLD-MCNC: 12.5 G/DL (ref 11.5–15.4)
HGB UR QL STRIP.AUTO: NEGATIVE
IMM GRANULOCYTES # BLD AUTO: 0.02 THOUSAND/UL (ref 0–0.2)
IMM GRANULOCYTES NFR BLD AUTO: 0 % (ref 0–2)
INR PPP: 0.98 (ref 0.84–1.19)
KETONES UR STRIP-MCNC: NEGATIVE MG/DL
LAAS-AP2: 18 CM2
LAAS-AP4: 13.4 CM2
LDLC SERPL CALC-MCNC: 101 MG/DL (ref 0–100)
LEFT ATRIUM VOLUME (MOD BIPLANE): 38 ML
LEFT ATRIUM VOLUME INDEX (MOD BIPLANE): 21.8 ML/M2
LEUKOCYTE ESTERASE UR QL STRIP: NEGATIVE
LYMPHOCYTES # BLD AUTO: 0.92 THOUSANDS/ÂΜL (ref 0.6–4.47)
LYMPHOCYTES NFR BLD AUTO: 15 % (ref 14–44)
MAGNESIUM SERPL-MCNC: 2.3 MG/DL (ref 1.9–2.7)
MCH RBC QN AUTO: 30.2 PG (ref 26.8–34.3)
MCHC RBC AUTO-ENTMCNC: 33.1 G/DL (ref 31.4–37.4)
MCV RBC AUTO: 91 FL (ref 82–98)
MONOCYTES # BLD AUTO: 0.56 THOUSAND/ÂΜL (ref 0.17–1.22)
MONOCYTES NFR BLD AUTO: 9 % (ref 4–12)
MV E'TISSUE VEL-SEP: 11 CM/S
MV PEAK A VEL: 0.81 M/S
MV PEAK E VEL: 83 CM/S
MV STENOSIS PRESSURE HALF TIME: 83 MS
MV VALVE AREA P 1/2 METHOD: 2.65
NEUTROPHILS # BLD AUTO: 4.45 THOUSANDS/ÂΜL (ref 1.85–7.62)
NEUTS SEG NFR BLD AUTO: 72 % (ref 43–75)
NITRITE UR QL STRIP: NEGATIVE
NRBC BLD AUTO-RTO: 0 /100 WBCS
P AXIS: 75 DEGREES
PH UR STRIP.AUTO: 7.5 [PH]
PLATELET # BLD AUTO: 195 THOUSANDS/UL (ref 149–390)
PMV BLD AUTO: 10.2 FL (ref 8.9–12.7)
POTASSIUM SERPL-SCNC: 4.2 MMOL/L (ref 3.5–5.3)
PR INTERVAL: 186 MS
PROT SERPL-MCNC: 6.3 G/DL (ref 6.4–8.4)
PROT UR STRIP-MCNC: NEGATIVE MG/DL
PROTHROMBIN TIME: 13.5 SECONDS (ref 11.6–14.5)
QRS AXIS: 67 DEGREES
QRSD INTERVAL: 80 MS
QT INTERVAL: 420 MS
QTC INTERVAL: 478 MS
RBC # BLD AUTO: 4.14 MILLION/UL (ref 3.81–5.12)
RIGHT VENTRICLE ID DIMENSION: 3.5 CM
RSV RNA RESP QL NAA+PROBE: NEGATIVE
SARS-COV-2 RNA RESP QL NAA+PROBE: NEGATIVE
SL CV LV EF: 60
SODIUM SERPL-SCNC: 140 MMOL/L (ref 135–147)
SP GR UR STRIP.AUTO: 1.02 (ref 1–1.03)
T WAVE AXIS: 74 DEGREES
TR MAX PG: 18 MMHG
TR PEAK VELOCITY: 2.1 M/S
TRICUSPID ANNULAR PLANE SYSTOLIC EXCURSION: 3 CM
TRICUSPID VALVE PEAK REGURGITATION VELOCITY: 2.14 M/S
TRIGL SERPL-MCNC: 83 MG/DL
UROBILINOGEN UR STRIP-ACNC: <2 MG/DL
VENTRICULAR RATE: 78 BPM
WBC # BLD AUTO: 6.19 THOUSAND/UL (ref 4.31–10.16)

## 2024-05-06 PROCEDURE — A9585 GADOBUTROL INJECTION: HCPCS | Performed by: STUDENT IN AN ORGANIZED HEALTH CARE EDUCATION/TRAINING PROGRAM

## 2024-05-06 PROCEDURE — 71045 X-RAY EXAM CHEST 1 VIEW: CPT

## 2024-05-06 PROCEDURE — 96361 HYDRATE IV INFUSION ADD-ON: CPT

## 2024-05-06 PROCEDURE — 96374 THER/PROPH/DIAG INJ IV PUSH: CPT

## 2024-05-06 PROCEDURE — 70544 MR ANGIOGRAPHY HEAD W/O DYE: CPT

## 2024-05-06 PROCEDURE — 70553 MRI BRAIN STEM W/O & W/DYE: CPT

## 2024-05-06 PROCEDURE — 70498 CT ANGIOGRAPHY NECK: CPT

## 2024-05-06 PROCEDURE — 83036 HEMOGLOBIN GLYCOSYLATED A1C: CPT | Performed by: STUDENT IN AN ORGANIZED HEALTH CARE EDUCATION/TRAINING PROGRAM

## 2024-05-06 PROCEDURE — 93306 TTE W/DOPPLER COMPLETE: CPT | Performed by: INTERNAL MEDICINE

## 2024-05-06 PROCEDURE — 70496 CT ANGIOGRAPHY HEAD: CPT

## 2024-05-06 PROCEDURE — 84484 ASSAY OF TROPONIN QUANT: CPT | Performed by: EMERGENCY MEDICINE

## 2024-05-06 PROCEDURE — 99223 1ST HOSP IP/OBS HIGH 75: CPT | Performed by: STUDENT IN AN ORGANIZED HEALTH CARE EDUCATION/TRAINING PROGRAM

## 2024-05-06 PROCEDURE — 93005 ELECTROCARDIOGRAM TRACING: CPT

## 2024-05-06 PROCEDURE — 70549 MR ANGIOGRAPH NECK W/O&W/DYE: CPT

## 2024-05-06 PROCEDURE — 99285 EMERGENCY DEPT VISIT HI MDM: CPT | Performed by: EMERGENCY MEDICINE

## 2024-05-06 PROCEDURE — 81003 URINALYSIS AUTO W/O SCOPE: CPT | Performed by: EMERGENCY MEDICINE

## 2024-05-06 PROCEDURE — 99285 EMERGENCY DEPT VISIT HI MDM: CPT

## 2024-05-06 PROCEDURE — 36415 COLL VENOUS BLD VENIPUNCTURE: CPT | Performed by: EMERGENCY MEDICINE

## 2024-05-06 PROCEDURE — 0241U HB NFCT DS VIR RESP RNA 4 TRGT: CPT | Performed by: EMERGENCY MEDICINE

## 2024-05-06 PROCEDURE — 93010 ELECTROCARDIOGRAM REPORT: CPT | Performed by: INTERNAL MEDICINE

## 2024-05-06 PROCEDURE — 85730 THROMBOPLASTIN TIME PARTIAL: CPT | Performed by: EMERGENCY MEDICINE

## 2024-05-06 PROCEDURE — 80048 BASIC METABOLIC PNL TOTAL CA: CPT | Performed by: EMERGENCY MEDICINE

## 2024-05-06 PROCEDURE — 83735 ASSAY OF MAGNESIUM: CPT | Performed by: EMERGENCY MEDICINE

## 2024-05-06 PROCEDURE — 93306 TTE W/DOPPLER COMPLETE: CPT

## 2024-05-06 PROCEDURE — 80061 LIPID PANEL: CPT | Performed by: STUDENT IN AN ORGANIZED HEALTH CARE EDUCATION/TRAINING PROGRAM

## 2024-05-06 PROCEDURE — 80076 HEPATIC FUNCTION PANEL: CPT | Performed by: EMERGENCY MEDICINE

## 2024-05-06 PROCEDURE — 85025 COMPLETE CBC W/AUTO DIFF WBC: CPT | Performed by: EMERGENCY MEDICINE

## 2024-05-06 PROCEDURE — 82948 REAGENT STRIP/BLOOD GLUCOSE: CPT

## 2024-05-06 PROCEDURE — 85610 PROTHROMBIN TIME: CPT | Performed by: EMERGENCY MEDICINE

## 2024-05-06 RX ORDER — CLOPIDOGREL BISULFATE 75 MG/1
300 TABLET ORAL ONCE
Status: COMPLETED | OUTPATIENT
Start: 2024-05-06 | End: 2024-05-06

## 2024-05-06 RX ORDER — ASPIRIN 81 MG/1
324 TABLET, CHEWABLE ORAL ONCE
Status: COMPLETED | OUTPATIENT
Start: 2024-05-06 | End: 2024-05-06

## 2024-05-06 RX ORDER — HEPARIN SODIUM 5000 [USP'U]/ML
5000 INJECTION, SOLUTION INTRAVENOUS; SUBCUTANEOUS EVERY 8 HOURS SCHEDULED
Status: DISCONTINUED | OUTPATIENT
Start: 2024-05-06 | End: 2024-05-07 | Stop reason: HOSPADM

## 2024-05-06 RX ORDER — GADOBUTROL 604.72 MG/ML
6 INJECTION INTRAVENOUS
Status: COMPLETED | OUTPATIENT
Start: 2024-05-06 | End: 2024-05-06

## 2024-05-06 RX ORDER — MECLIZINE HYDROCHLORIDE 25 MG/1
25 TABLET ORAL ONCE
Status: COMPLETED | OUTPATIENT
Start: 2024-05-06 | End: 2024-05-06

## 2024-05-06 RX ORDER — ASPIRIN 81 MG/1
81 TABLET, CHEWABLE ORAL DAILY
Status: DISCONTINUED | OUTPATIENT
Start: 2024-05-06 | End: 2024-05-07 | Stop reason: HOSPADM

## 2024-05-06 RX ORDER — LABETALOL HYDROCHLORIDE 5 MG/ML
10 INJECTION, SOLUTION INTRAVENOUS ONCE
Status: COMPLETED | OUTPATIENT
Start: 2024-05-06 | End: 2024-05-06

## 2024-05-06 RX ORDER — ATORVASTATIN CALCIUM 40 MG/1
40 TABLET, FILM COATED ORAL EVERY EVENING
Status: DISCONTINUED | OUTPATIENT
Start: 2024-05-06 | End: 2024-05-07 | Stop reason: HOSPADM

## 2024-05-06 RX ADMIN — LABETALOL HYDROCHLORIDE 10 MG: 5 INJECTION, SOLUTION INTRAVENOUS at 10:18

## 2024-05-06 RX ADMIN — CLOPIDOGREL 300 MG: 75 TABLET ORAL at 10:18

## 2024-05-06 RX ADMIN — ASPIRIN 324 MG: 81 TABLET, CHEWABLE ORAL at 10:18

## 2024-05-06 RX ADMIN — MECLIZINE HYDROCHLORIDE 25 MG: 25 TABLET ORAL at 10:15

## 2024-05-06 RX ADMIN — IOHEXOL 85 ML: 350 INJECTION, SOLUTION INTRAVENOUS at 09:30

## 2024-05-06 RX ADMIN — ATORVASTATIN CALCIUM 40 MG: 40 TABLET, FILM COATED ORAL at 17:42

## 2024-05-06 RX ADMIN — SODIUM CHLORIDE 500 ML: 0.9 INJECTION, SOLUTION INTRAVENOUS at 09:42

## 2024-05-06 RX ADMIN — GADOBUTROL 6 ML: 604.72 INJECTION INTRAVENOUS at 17:33

## 2024-05-06 RX ADMIN — HEPARIN SODIUM 5000 UNITS: 5000 INJECTION INTRAVENOUS; SUBCUTANEOUS at 21:31

## 2024-05-06 RX ADMIN — HEPARIN SODIUM 5000 UNITS: 5000 INJECTION INTRAVENOUS; SUBCUTANEOUS at 14:13

## 2024-05-06 NOTE — H&P
[facility]  H&P  Name: Aurelia Jaime 83 y.o. female I MRN: [id number]  Unit/Bed#: ED 21 I Date of Admission: 5/6/2024   Date of Service: 5/6/2024 I Hospital Day: 0      Assessment/Plan   * Stroke-like symptoms  Assessment & Plan  Acute onset vertigo  Initial neuro imaging showing vertebral artery dissection, atherosclerotic disease   MRI brain/MRA brain pending for further evaluation  Neurology consulted, not a candidate for TNK  Continue stroke pathway, permissive hypertension         VTE Pharmacologic Prophylaxis:   Moderate Risk (Score 3-4) - Pharmacological DVT Prophylaxis Ordered: heparin.  Code Status: Level 1 - Full Code   Discussion with family: Updated  (daughter) at bedside.    Anticipated Length of Stay: Patient will be admitted on an observation basis with an anticipated length of stay of less than 2 midnights secondary to stroke pathway .    Total Time Spent on Date of Encounter in care of patient: 30+ mins. This time was spent on one or more of the following: performing physical exam; counseling and coordination of care; obtaining or reviewing history; documenting in the medical record; reviewing/ordering tests, medications or procedures; communicating with other healthcare professionals and discussing with patient's family/caregivers.    Chief Complaint: dizziness    History of Present Illness:  Aurelia Jaime is a 83 y.o. female with a PMH of dementia, lung cancer who presents with dizziness acutely first noted this morning. First started at 6 AM and symptoms persisted so she came to the ED for further evaluation. In the ED, she was noted to have concerns for stroke like symptoms so stroke alert was initiated. She was not a candidate for TNK. Admitted to [facility] for further management.    Review of Systems:  Review of Systems   Constitutional:  Negative for chills and fever.   HENT:  Negative for ear pain and sore throat.    Eyes:  Negative for pain and visual  disturbance.   Respiratory:  Negative for cough and shortness of breath.    Cardiovascular:  Negative for chest pain and palpitations.   Gastrointestinal:  Negative for abdominal pain and vomiting.   Genitourinary:  Negative for dysuria and hematuria.   Musculoskeletal:  Negative for arthralgias and back pain.   Skin:  Negative for color change and rash.   Neurological:  Positive for dizziness. Negative for seizures and syncope.   All other systems reviewed and are negative.      Past Medical and Surgical History:   Past Medical History:   Diagnosis Date    Abnormal chest x-ray     Abnormal mammogram     COPD (chronic obstructive pulmonary disease) (HCC)     Dementia (HCC)     Early stage     Ductal hyperplasia of breast     Left     Hyperlipidemia     diet controled    Nodule of lower lobe of right lung     Right Lower Lobe     SOB (shortness of breath)     SOB (shortness of breath)     Wheezing        Past Surgical History:   Procedure Laterality Date    BREAST BIOPSY Left 10/12/2018    BREAST CYST EXCISION Left 10/31/2018    BREAST LUMPECTOMY Left 11/13/2018    Procedure: BREAST LUMPECTOMY; BREAST NEEDLE LOCALIZATION (NEEDLE LOC AT 0830);  Surgeon: Mian Valenzuela MD;  Location: [facility];  Service: Surgical Oncology    COLONOSCOPY      LAPAROSCOPY      MAMMO NEEDLE LOCALIZATION LEFT (ALL INC) Left 11/13/2018    MAMMO STEREOTACTIC BREAST BIOPSY LEFT (ALL INC) Left 10/16/2018    KY BRNCHSC INCL FLUOR GDNCE DX W/CELL WASHG SPX Right 1/16/2019    Procedure: BRONCHOSCOPY FLEXIBLE,;  Surgeon: Julia Mccord MD;  Location: [facility];  Service: Thoracic    KY MEDIASTINOSCOPY WITH LYMPH NODE BIOPSY/IES Right 1/16/2019    Procedure: MEDIASTINOSCOPY;  Surgeon: Julia Mccord MD;  Location: [facility];  Service: Thoracic    KY THORACOSCOPY W/LOBECTOMY SINGLE LOBE Right 1/16/2019    Procedure: LOBECTOMY LUNG;  Surgeon: Julia Mccord MD;  Location: [facility];  Service: Thoracic    TRIGGER FINGER RELEASE Right  10/10/2019    ULNAR NERVE TRANSPOSITION Right 10/10/2019       Meds/Allergies:  Prior to Admission medications    Medication Sig Start Date End Date Taking? Authorizing Provider   albuterol (Ventolin HFA) 90 mcg/act inhaler Inhale 2 puffs every 6 (six) hours as needed for wheezing  Patient not taking: Reported on 7/5/2023 5/4/21   Gisella Welch MD   busPIRone (BUSPAR) 15 mg tablet TAKE 1 TABLET (15 MG) BY ORAL ROUTE 2 TIMES PER DAY 6/2/23   Historical Provider, MD   Cholecalciferol (Vitamin D3) 125 MCG (5000 UT) CAPS Take 2,000 Units by mouth daily  Patient not taking: Reported on 7/5/2023    Historical Provider, MD   donepezil (ARICEPT) 10 mg tablet Take 1 tablet (10 mg total) by mouth daily at bedtime 2/7/24   Adrian Agarwal MD   fexofenadine (ALLEGRA) 180 MG tablet Take 180 mg by mouth if needed 3/15/21   Historical Provider, MD   meclizine (ANTIVERT) 25 mg tablet     Historical Provider, MD   meloxicam (MOBIC) 15 mg tablet TAKE 1 TABLET (15 MG) BY ORAL ROUTE ONCE DAILY WITH DINNER 1/11/24   Historical Provider, MD   memantine (NAMENDA) 10 mg tablet Take 1 tablet (10 mg total) by mouth 2 (two) times a day 2/7/24   Adrian Agarwal MD   oxybutynin (DITROPAN-XL) 10 MG 24 hr tablet Take 10 mg by mouth daily 1/30/24   Historical Provider, MD   PARoxetine (PAXIL) 10 mg tablet TAKE 1 TABLET (10 MG) BY MOUTH ONCE DAILY BEFORE BEDTIME  Patient not taking: Reported on 7/5/2023 1/27/23   Historical Provider, MD   rosuvastatin (CRESTOR) 5 mg tablet Take 5 mg by mouth daily 1/25/24   Historical Provider, MD TALBERT have reviewed home medications using recent Epic encounter.    Allergies: No Known Allergies    Social History:  Marital Status:    Occupation: na  Patient Pre-hospital Living Situation: Home  Patient Pre-hospital Level of Mobility: walks  Patient Pre-hospital Diet Restrictions: na  Substance Use History:   Social History     Substance and Sexual Activity   Alcohol Use Yes    Alcohol/week: 2.0 standard  drinks of alcohol    Types: 2 Cans of beer per week    Comment: occasional     Social History     Tobacco Use   Smoking Status Former    Current packs/day: 0.00    Average packs/day: 1 pack/day for 60.0 years (60.0 ttl pk-yrs)    Types: Cigarettes    Start date: 1958    Quit date: 2016    Years since quittin.0   Smokeless Tobacco Never     Social History     Substance and Sexual Activity   Drug Use Never       Family History:  Family History   Problem Relation Age of Onset    Hypertension Mother     Glaucoma Mother     Macular degeneration Mother     Prostate cancer Father     Heart attack Brother        Physical Exam:     Vitals:   Blood Pressure: 156/60 (24 1400)  Pulse: 82 (24 1400)  Temperature: 98 °F (36.7 °C) (24 1047)  Temp Source: Oral (24 104)  Respirations: 18 (24 1400)  Weight - Scale: 67 kg (147 lb 11.3 oz) (24 0919)  SpO2: 98 % (24 1400)    Physical Exam  Constitutional:       General: She is not in acute distress.     Appearance: Normal appearance. She is not toxic-appearing.   Cardiovascular:      Rate and Rhythm: Normal rate and regular rhythm.      Heart sounds: Normal heart sounds. No murmur heard.  Pulmonary:      Effort: Pulmonary effort is normal. No respiratory distress.      Breath sounds: Normal breath sounds. No wheezing.   Abdominal:      General: Abdomen is flat. There is no distension.      Palpations: Abdomen is soft.      Tenderness: There is no abdominal tenderness.   Neurological:      General: No focal deficit present.      Mental Status: She is alert. Mental status is at baseline.      Motor: No weakness.          Additional Data:     Lab Results:  Results from last 7 days   Lab Units 24  0922   WBC Thousand/uL 6.19   HEMOGLOBIN g/dL 12.5   HEMATOCRIT % 37.8   PLATELETS Thousands/uL 195   SEGS PCT % 72   LYMPHO PCT % 15   MONO PCT % 9   EOS PCT % 3     Results from last 7 days   Lab Units 24  0922   SODIUM mmol/L  140   POTASSIUM mmol/L 4.2   CHLORIDE mmol/L 108   CO2 mmol/L 26   BUN mg/dL 16   CREATININE mg/dL 0.88   ANION GAP mmol/L 6   CALCIUM mg/dL 8.8   ALBUMIN g/dL 3.8   TOTAL BILIRUBIN mg/dL 0.44   ALK PHOS U/L 73   ALT U/L 10   AST U/L 16   GLUCOSE RANDOM mg/dL 83     Results from last 7 days   Lab Units 05/06/24  0922   INR  0.98     Results from last 7 days   Lab Units 05/06/24  0921   POC GLUCOSE mg/dl 101               Lines/Drains:  Invasive Devices       Peripheral Intravenous Line  Duration             Peripheral IV 05/06/24 Left;Ventral (anterior) Forearm <1 day                        Imaging: Reviewed radiology reports from this admission including: CT head  X-ray chest 1 view portable   ED Interpretation by Juana Roman DO (05/06 1004)   No acute abnormality in the chest.  Stable left lower lobe nodule.  No significant change from prior chest x-ray.      Final Result by Brian Smith DO (05/06 1353)      No acute cardiopulmonary disease.      Resident: Axel Thomas I, the attending radiologist, have reviewed the images and agree with the final report above.      Workstation performed: [id number]         CTA stroke alert (head/neck)   Final Result by Alexis José DO (05/06 1122)      No large intracranial arterial occlusion.      Short segment dissection versus fenestration noted in the left vertebral artery at the C2/C3 vertebral disc level.      Suspected stenosis of a proximal right M2/M3 branch, may be related to intracranial atherosclerotic disease.      Moderate stenosis at the bilateral carotid bulbs/proximal internal carotid arteries without significant stenosis per NASCET criteria.      Bilateral groundglass nodules with increasing solid component of a left upper lobe nodule, in comparison to a February 2023 exam. Based on current Fleischner Society 2017 Guidelines on incidental pulmonary nodule, follow-up noncontrast CT is recommended    at 3-6 months from the  initial examination to confirm persistence; if stable at that time (solid component remains <6mm), additional follow-up CT is recommended annually until 5 years of stability is demonstrated.      Findings were directly discussed with   Dr. Margaret Gurrola at 9:45 a.m on 5/6/2024.                           Workstation performed: [id number]         CT stroke alert brain   Final Result by Alexis José DO (05/06 1009)      No acute intracranial abnormality. 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      Findings were directly discussed with   Dr. Kali Gurrola at approximately  9:45am on 5/6/24  .      Workstation performed: [id number]         MRI inpatient order    (Results Pending)       EKG and Other Studies Reviewed on Admission:   EKG:  sinus.    ** Please Note: This note has been constructed using a voice recognition system. **

## 2024-05-06 NOTE — ED PROVIDER NOTES
History  Chief Complaint   Patient presents with    Dizziness     Pt BIB EMS from home. Pt reports dizziness as if the room is spinning/ Last known well approx [phone] per daughter     Patient is an 83-year-old female with past medical history of dementia, hyperlipidemia, COPD, presents to the emergency department by ambulance for acute onset vertigo that started around 8 AM.  Patient lives with her daughter and has cameras in patient's room to ensure safety and daughter states that she got up at around 6 AM to use the bathroom which is typical for her.  On the camera and per patient, she did not note any concerning symptoms or any abnormal gait.  She got back into bed and around 8 AM, she reports she had difficulty getting out of bed, felt very dizzy described as the room spinning as well as a lightheaded feeling.  Daughter states she went to check on her and noticed that her eyes were moving back and forth in an abnormal way so she called 911.  Per EMS, patient did have unsteady ataxic gait but other than that, normal neurologic exam.  Patient reports still feeling dizzy and generally weak.  She also reports headache which according to daughter has been present since yesterday.  Patient did have a fall in the bathroom 1 to 2 days ago that patient states was due to her missing the seat however she denies any known head strike or LOC.  She denies any injury or pain from the fall.  On review of systems, patient has had nasal congestion and a head cold for the past several days.  Patient denies any current change in vision or hearing, tinnitus, chest pain, shortness of breath, palpitations, earache, abdominal pain, nausea, vomiting, change in bowel habits, blood per rectum or melena, urinary symptoms, skin rash or color change, lateralizing weakness or extremity paresthesia, slurring of speech or difficulty getting words out, facial drooping or other focal neurologic deficits.  She denies being on any blood thinners  including antiplatelet or anticoagulant agents.  Denies any prior history of vertigo. Denies prior history of stroke.  Per EMS, fingerstick glucose 93.  Patient was noted to be hypertensive with systolic pressure in the 170s, other than that, prehospital vitals stable.      History provided by:  Patient, relative and EMS personnel (daughter)   used: No        Prior to Admission Medications   Prescriptions Last Dose Informant Patient Reported? Taking?   Cholecalciferol (Vitamin D3) 125 MCG (5000 UT) CAPS  Self Yes No   Sig: Take 2,000 Units by mouth daily   Patient not taking: Reported on 7/5/2023   PARoxetine (PAXIL) 10 mg tablet  Self Yes No   Sig: TAKE 1 TABLET (10 MG) BY MOUTH ONCE DAILY BEFORE BEDTIME   Patient not taking: Reported on 7/5/2023   albuterol (Ventolin HFA) 90 mcg/act inhaler  Self No No   Sig: Inhale 2 puffs every 6 (six) hours as needed for wheezing   Patient not taking: Reported on 7/5/2023   busPIRone (BUSPAR) 15 mg tablet  Self Yes No   Sig: TAKE 1 TABLET (15 MG) BY ORAL ROUTE 2 TIMES PER DAY   donepezil (ARICEPT) 10 mg tablet  Self No No   Sig: Take 1 tablet (10 mg total) by mouth daily at bedtime   fexofenadine (ALLEGRA) 180 MG tablet  Self Yes No   Sig: Take 180 mg by mouth if needed   meclizine (ANTIVERT) 25 mg tablet  Self Yes No   meloxicam (MOBIC) 15 mg tablet  Self Yes No   Sig: TAKE 1 TABLET (15 MG) BY ORAL ROUTE ONCE DAILY WITH DINNER   memantine (NAMENDA) 10 mg tablet  Self No No   Sig: Take 1 tablet (10 mg total) by mouth 2 (two) times a day   oxybutynin (DITROPAN-XL) 10 MG 24 hr tablet  Self Yes No   Sig: Take 10 mg by mouth daily   rosuvastatin (CRESTOR) 5 mg tablet  Self Yes No   Sig: Take 5 mg by mouth daily      Facility-Administered Medications: None       Past Medical History:   Diagnosis Date    Abnormal chest x-ray     Abnormal mammogram     COPD (chronic obstructive pulmonary disease) (HCC)     Dementia (HCC)     Early stage     Ductal hyperplasia of  breast     Left     Hyperlipidemia     diet controled    Nodule of lower lobe of right lung     Right Lower Lobe     SOB (shortness of breath)     SOB (shortness of breath)     Wheezing        Past Surgical History:   Procedure Laterality Date    BREAST BIOPSY Left 10/12/2018    BREAST CYST EXCISION Left 10/31/2018    BREAST LUMPECTOMY Left 11/13/2018    Procedure: BREAST LUMPECTOMY; BREAST NEEDLE LOCALIZATION (NEEDLE LOC AT 0830);  Surgeon: Mian Valenzuela MD;  Location: [facility];  Service: Surgical Oncology    COLONOSCOPY      LAPAROSCOPY      MAMMO NEEDLE LOCALIZATION LEFT (ALL INC) Left 11/13/2018    MAMMO STEREOTACTIC BREAST BIOPSY LEFT (ALL INC) Left 10/16/2018    NH BRNCHSC INCL FLUOR GDNCE DX W/CELL WASHG SPX Right 1/16/2019    Procedure: BRONCHOSCOPY FLEXIBLE,;  Surgeon: Julia Mccord MD;  Location: [facility];  Service: Thoracic    NH MEDIASTINOSCOPY WITH LYMPH NODE BIOPSY/IES Right 1/16/2019    Procedure: MEDIASTINOSCOPY;  Surgeon: Julia Mccord MD;  Location: [facility];  Service: Thoracic    NH THORACOSCOPY W/LOBECTOMY SINGLE LOBE Right 1/16/2019    Procedure: LOBECTOMY LUNG;  Surgeon: Julia Mccord MD;  Location: [facility];  Service: Thoracic    TRIGGER FINGER RELEASE Right 10/10/2019    ULNAR NERVE TRANSPOSITION Right 10/10/2019       Family History   Problem Relation Age of Onset    Hypertension Mother     Glaucoma Mother     Macular degeneration Mother     Prostate cancer Father     Heart attack Brother      I have reviewed and agree with the history as documented.    E-Cigarette/Vaping    E-Cigarette Use Never User      E-Cigarette/Vaping Substances    Nicotine No     THC No     CBD No     Flavoring No     Other No     Unknown No      Social History     Tobacco Use    Smoking status: Former     Current packs/day: 0.00     Average packs/day: 1 pack/day for 60.0 years (60.0 ttl pk-yrs)     Types: Cigarettes     Start date: 1/1/1958     Quit date: 4/30/2016     Years since quitting:  8.0    Smokeless tobacco: Never   Vaping Use    Vaping status: Never Used   Substance Use Topics    Alcohol use: Yes     Alcohol/week: 2.0 standard drinks of alcohol     Types: 2 Cans of beer per week     Comment: occasional    Drug use: Never       Review of Systems   Constitutional:  Negative for chills and fever.        +Generalized weakness.    HENT:  Positive for congestion. Negative for ear pain, hearing loss, rhinorrhea, sore throat and tinnitus.    Eyes:  Negative for photophobia, pain and visual disturbance.   Respiratory:  Negative for cough, chest tightness, shortness of breath and wheezing.    Cardiovascular:  Negative for chest pain and palpitations.   Gastrointestinal:  Negative for abdominal distention, abdominal pain, blood in stool, constipation, diarrhea, nausea and vomiting.   Genitourinary:  Negative for dysuria, flank pain, frequency and hematuria.   Musculoskeletal:  Negative for back pain, neck pain and neck stiffness.   Skin:  Negative for color change, pallor, rash and wound.   Allergic/Immunologic: Negative for immunocompromised state.   Neurological:  Positive for dizziness, light-headedness and headaches. Negative for syncope, facial asymmetry, speech difficulty, weakness and numbness.   Hematological:  Negative for adenopathy.   Psychiatric/Behavioral:  Negative for confusion and decreased concentration.    All other systems reviewed and are negative.      Physical Exam  Physical Exam  Vitals and nursing note reviewed.   Constitutional:       General: She is not in acute distress.     Appearance: Normal appearance. She is well-developed. She is not ill-appearing, toxic-appearing or diaphoretic.   HENT:      Head: Normocephalic and atraumatic.      Right Ear: External ear normal.      Left Ear: External ear normal.      Mouth/Throat:      Mouth: Mucous membranes are moist.      Pharynx: Oropharynx is clear. No oropharyngeal exudate.   Eyes:      Extraocular Movements: Extraocular  movements intact.      Conjunctiva/sclera: Conjunctivae normal.      Pupils: Pupils are equal, round, and reactive to light.      Comments: Bidirectional horizontal nystagmus, fatigable.   Neck:      Vascular: No JVD.   Cardiovascular:      Rate and Rhythm: Normal rate and regular rhythm.      Pulses: Normal pulses.      Heart sounds: Normal heart sounds. No murmur heard.     No friction rub. No gallop.   Pulmonary:      Effort: Pulmonary effort is normal. No respiratory distress.      Breath sounds: Normal breath sounds. No wheezing, rhonchi or rales.   Abdominal:      General: There is no distension.      Palpations: Abdomen is soft.      Tenderness: There is no abdominal tenderness. There is no guarding or rebound.   Musculoskeletal:         General: No swelling or tenderness. Normal range of motion.      Cervical back: Normal range of motion and neck supple. No rigidity.   Skin:     General: Skin is warm and dry.      Coloration: Skin is not pale.      Findings: No erythema or rash.   Neurological:      General: No focal deficit present.      Mental Status: She is alert. Mental status is at baseline. She is disoriented.      Cranial Nerves: No cranial nerve deficit.      Sensory: No sensory deficit.      Motor: No weakness.      Comments: Patient oriented to person and place and events from this morning but disoriented to time which according to daughter is her baseline.  5/5 strength throughout without extremity drift x 4.  Normal finger-to-nose and heel-to-shin exam bilaterally.  Normal speech.   Psychiatric:         Mood and Affect: Mood normal.         Behavior: Behavior normal.         Vital Signs  ED Triage Vitals   Temperature Pulse Respirations Blood Pressure SpO2   05/06/24 1047 05/06/24 0915 05/06/24 0915 05/06/24 0915 05/06/24 0915   98 °F (36.7 °C) 75 16 (!) 180/79 99 %      Temp Source Heart Rate Source Patient Position - Orthostatic VS BP Location FiO2 (%)   05/06/24 1047 05/06/24 1230 05/06/24  0919 05/06/24 0919 --   Oral Monitor Lying Right arm       Pain Score       05/06/24 0919       No Pain         Vitals:    05/06/24 1350 05/06/24 1355 05/06/24 1400 05/06/24 1405   BP:   156/60    BP Location:       Pulse: 80 81 82 68   Resp: 20 16 18 21   Temp:       TempSrc:       SpO2: 99% 99% 98% 98%   Weight:              Visual Acuity  Visual Acuity      Flowsheet Row Most Recent Value   L Pupil Size (mm) 2   R Pupil Size (mm) 2            ED Medications  Medications   aspirin chewable tablet 81 mg (81 mg Oral Not Given 5/6/24 1409)   atorvastatin (LIPITOR) tablet 40 mg (has no administration in time range)   heparin (porcine) subcutaneous injection 5,000 Units (has no administration in time range)   meclizine (ANTIVERT) tablet 25 mg (25 mg Oral Given 5/6/24 1015)   sodium chloride 0.9 % bolus 500 mL (0 mL Intravenous Stopped 5/6/24 1135)   iohexol (OMNIPAQUE) 350 MG/ML injection (MULTI-DOSE) 85 mL (85 mL Intravenous Given 5/6/24 0930)   labetalol (NORMODYNE) injection 10 mg (10 mg Intravenous Given 5/6/24 1018)   aspirin chewable tablet 324 mg (324 mg Oral Given 5/6/24 1018)   clopidogrel (PLAVIX) tablet 300 mg (300 mg Oral Given 5/6/24 1018)       Diagnostic Studies  Results Reviewed       Procedure Component Value Units Date/Time    Lipid Panel with Direct LDL reflex [[id number]]     Lab Status: No result Specimen: Blood     Hemoglobin A1c w/EAG Estimation [[id number]]     Lab Status: No result Specimen: Blood     HS Troponin I 2hr [[id number]]  (Normal) Collected: 05/06/24 1132    Lab Status: Final result Specimen: Blood from Arm, Right Updated: 05/06/24 1208     hs TnI 2hr 6 ng/L      Delta 2hr hsTnI 0 ng/L     UA (URINE) with reflex to Scope [[id number]] Collected: 05/06/24 1132    Lab Status: Final result Specimen: Urine, Clean Catch Updated: 05/06/24 1147     Color, UA Colorless     Clarity, UA Clear     Specific Gravity, UA 1.025     pH, UA 7.5     Leukocytes, UA Negative     Nitrite, UA Negative      Protein, UA Negative mg/dl      Glucose, UA Negative mg/dl      Ketones, UA Negative mg/dl      Urobilinogen, UA <2.0 mg/dl      Bilirubin, UA Negative     Occult Blood, UA Negative    FLU/RSV/COVID - if FLU/RSV clinically relevant [[id number]]  (Normal) Collected: 05/06/24 0922    Lab Status: Final result Specimen: Nares from Nose Updated: 05/06/24 1035     SARS-CoV-2 Negative     INFLUENZA A PCR Negative     INFLUENZA B PCR Negative     RSV PCR Negative    Narrative:      FOR PEDIATRIC PATIENTS - copy/paste COVID Guidelines URL to browser: [email or web address]    SARS-CoV-2 assay is a Nucleic Acid Amplification assay intended for the  qualitative detection of nucleic acid from SARS-CoV-2 in nasopharyngeal  swabs. Results are for the presumptive identification of SARS-CoV-2 RNA.    Positive results are indicative of infection with SARS-CoV-2, the virus  causing COVID-19, but do not rule out bacterial infection or co-infection  with other viruses. Laboratories within the [place] and its  territories are required to report all positive results to the appropriate  public health authorities. Negative results do not preclude SARS-CoV-2  infection and should not be used as the sole basis for treatment or other  patient management decisions. Negative results must be combined with  clinical observations, patient history, and epidemiological information.  This test has not been FDA cleared or approved.    This test has been authorized by FDA under an Emergency Use Authorization  (EUA). This test is only authorized for the duration of time the  declaration that circumstances exist justifying the authorization of the  emergency use of an in vitro diagnostic tests for detection of SARS-CoV-2  virus and/or diagnosis of COVID-19 infection under section 564(b)(1) of  the Act, 21 U.S.C. 360bbb-3(b)(1), unless the authorization is terminated  or revoked sooner. The test has  been validated but independent review by FDA  and CLIA is pending.    Test performed using [employer]: This RT-PCR assay targets N2,  a region unique to SARS-CoV-2. A conserved region in the E-gene was chosen  for pan-Sarbecovirus detection which includes SARS-CoV-2.    According to CMS-2020-01-R, this platform meets the definition of high-throughput technology.    HS Troponin 0hr (reflex protocol) [[id number]]  (Normal) Collected: 05/06/24 0922    Lab Status: Final result Specimen: Blood from Arm, Left Updated: 05/06/24 0957     hs TnI 0hr 6 ng/L     Basic metabolic panel [[id number]] Collected: 05/06/24 0922    Lab Status: Final result Specimen: Blood from Arm, Left Updated: 05/06/24 0952     Sodium 140 mmol/L      Potassium 4.2 mmol/L      Chloride 108 mmol/L      CO2 26 mmol/L      ANION GAP 6 mmol/L      BUN 16 mg/dL      Creatinine 0.88 mg/dL      Glucose 83 mg/dL      Calcium 8.8 mg/dL      eGFR 61 ml/min/1.73sq m     Narrative:      National Kidney Disease Foundation guidelines for Chronic Kidney Disease (CKD):     Stage 1 with normal or high GFR (GFR > 90 mL/min/1.73 square meters)    Stage 2 Mild CKD (GFR = 60-89 mL/min/1.73 square meters)    Stage 3A Moderate CKD (GFR = 45-59 mL/min/1.73 square meters)    Stage 3B Moderate CKD (GFR = 30-44 mL/min/1.73 square meters)    Stage 4 Severe CKD (GFR = 15-29 mL/min/1.73 square meters)    Stage 5 End Stage CKD (GFR <15 mL/min/1.73 square meters)  Note: GFR calculation is accurate only with a steady state creatinine    Magnesium [[id number]]  (Normal) Collected: 05/06/24 0922    Lab Status: Final result Specimen: Blood from Arm, Left Updated: 05/06/24 0952     Magnesium 2.3 mg/dL     Hepatic function panel [[id number]]  (Abnormal) Collected: 05/06/24 0922    Lab Status: Final result Specimen: Blood from Arm, Left Updated: 05/06/24 0952     Total Bilirubin 0.44 mg/dL      Bilirubin, Direct 0.06 mg/dL      Alkaline Phosphatase 73 U/L      AST 16 U/L      ALT 10  U/L      Total Protein 6.3 g/dL      Albumin 3.8 g/dL     Protime-INR [[id number]]  (Normal) Collected: 05/06/24 0922    Lab Status: Final result Specimen: Blood from Arm, Left Updated: 05/06/24 0950     Protime 13.5 seconds      INR 0.98    APTT [[id number]]  (Normal) Collected: 05/06/24 0922    Lab Status: Final result Specimen: Blood from Arm, Left Updated: 05/06/24 0950     PTT 27 seconds     CBC and differential [[id number]] Collected: 05/06/24 0922    Lab Status: Final result Specimen: Blood from Arm, Left Updated: 05/06/24 0935     WBC 6.19 Thousand/uL      RBC 4.14 Million/uL      Hemoglobin 12.5 g/dL      Hematocrit 37.8 %      MCV 91 fL      MCH 30.2 pg      MCHC 33.1 g/dL      RDW 14.1 %      MPV 10.2 fL      Platelets 195 Thousands/uL      nRBC 0 /100 WBCs      Segmented % 72 %      Immature Grans % 0 %      Lymphocytes % 15 %      Monocytes % 9 %      Eosinophils Relative 3 %      Basophils Relative 1 %      Absolute Neutrophils 4.45 Thousands/µL      Absolute Immature Grans 0.02 Thousand/uL      Absolute Lymphocytes 0.92 Thousands/µL      Absolute Monocytes 0.56 Thousand/µL      Eosinophils Absolute 0.17 Thousand/µL      Basophils Absolute 0.07 Thousands/µL     Fingerstick Glucose (POCT) [[id number]]  (Normal) Collected: 05/06/24 0921    Lab Status: Final result Specimen: Blood Updated: 05/06/24 0922     POC Glucose 101 mg/dl                    X-ray chest 1 view portable   ED Interpretation by Juana Roman DO (05/06 1004)   No acute abnormality in the chest.  Stable left lower lobe nodule.  No significant change from prior chest x-ray.      Final Result by Brian Smith DO (05/06 1353)      No acute cardiopulmonary disease.      Resident: Axel Thomas I, the attending radiologist, have reviewed the images and agree with the final report above.      Workstation performed: [id number]         CTA stroke alert (head/neck)   Final Result by Alexis José DO (05/06 1122)       No large intracranial arterial occlusion.      Short segment dissection versus fenestration noted in the left vertebral artery at the C2/C3 vertebral disc level.      Suspected stenosis of a proximal right M2/M3 branch, may be related to intracranial atherosclerotic disease.      Moderate stenosis at the bilateral carotid bulbs/proximal internal carotid arteries without significant stenosis per NASCET criteria.      Bilateral groundglass nodules with increasing solid component of a left upper lobe nodule, in comparison to a February 2023 exam. Based on current Fleischner Society 2017 Guidelines on incidental pulmonary nodule, follow-up noncontrast CT is recommended    at 3-6 months from the initial examination to confirm persistence; if stable at that time (solid component remains <6mm), additional follow-up CT is recommended annually until 5 years of stability is demonstrated.      Findings were directly discussed with   Dr. Margaret Gurrola at 9:45 a.m on 5/6/2024.                           Workstation performed: [id number]         CT stroke alert brain   Final Result by Alexis José DO (05/06 1009)      No acute intracranial abnormality. 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      Findings were directly discussed with   Dr. Kali Gurrola at approximately  9:45am on 5/6/24  .      Workstation performed: [id number]         MRI inpatient order    (Results Pending)              Procedures  ECG 12 Lead Documentation Only    Date/Time: 5/6/2024 9:22 AM    Performed by: Juana Roman DO  Authorized by: Juana Roman DO    ECG reviewed by me, the ED Provider: yes    Patient location:  ED  Previous ECG:     Previous ECG:  Compared to current    Comparison ECG info:  3-    Similarity:  No change  Interpretation:     Interpretation: normal    Rate:     ECG rate:  78    ECG rate assessment: normal    Rhythm:     Rhythm: sinus rhythm    Ectopy:     Ectopy:  none    QRS:     QRS axis:  Normal    QRS intervals:  Normal  Conduction:     Conduction: normal    ST segments:     ST segments:  Normal  T waves:     T waves: normal             ED Course  ED Course as of 05/06/24 1412   Mon May 06, 2024   0946 Neurology attending and AP reviewed CT scans and they are unremarkable.  They recommended loading with aspirin and Plavix and admitting under stroke pathway. Patient and daughter are agreeable.    1049 Updated patient and daughter about workup thus far.                  Stroke Assessment       Row Name 05/06/24 0914             NIH Stroke Scale    Interval Baseline      Level of Consciousness (1a.) 0      LOC Questions (1b.) 0      LOC Commands (1c.) 0      Best Gaze (2.) 0      Visual (3.) 0      Facial Palsy (4.) 0      Motor Arm, Left (5a.) 0      Motor Arm, Right (5b.) 0      Motor Leg, Left (6a.) 0      Motor Leg, Right (6b.) 0      Limb Ataxia (7.) 0      Sensory (8.) 0      Best Language (9.) 0      Dysarthria (10.) 0      Extinction and Inattention (11.) (Formerly Neglect) 0      Total 0                    Flowsheet Row Most Recent Value   Thrombolytic Decision Options    Thrombolytic Decision Patient not a candidate.   Patient is not a candidate options Symptoms resolved/clearly non disabling.                      SBIRT 22yo+      Flowsheet Row Most Recent Value   Initial Alcohol Screen: US AUDIT-C     1. How often do you have a drink containing alcohol? 0 Filed at: 05/06/2024 0921   2. How many drinks containing alcohol do you have on a typical day you are drinking?  0 Filed at: 05/06/2024 0921   3b. FEMALE Any Age, or MALE 65+: How often do you have 4 or more drinks on one occassion? 0 Filed at: 05/06/2024 0921   Audit-C Score 0 Filed at: 05/06/2024 0921   MAR: How many times in the past year have you...    Used an illegal drug or used a prescription medication for non-medical reasons? Never Filed at: 05/06/2024 0921                      Medical Decision  Making  83-year-old female presents to the ED by ambulance for acute onset vertigo that started this morning at around 8 AM.  Suspect peripheral etiology but given her age, central etiology such as stroke or VBI also considered.  Stroke alert called given new onset symptoms starting this morning.  Will obtain stat CTA head and neck, check cardiac labs, EKG and chest x-ray.  Will consult with neurology.  Will provide IV fluid bolus 500 cc and meclizine for vertigo.  Will most likely recommend admission for brain MRI to definitively rule out stroke.  Will discuss with neurology about whether or not patient is a TNK candidate as her NIH stroke scale is currently a 0.    Amount and/or Complexity of Data Reviewed  Independent Historian: EMS     Details: Daughter  Labs: ordered. Decision-making details documented in ED Course.  Radiology: ordered and independent interpretation performed. Decision-making details documented in ED Course.  ECG/medicine tests: ordered and independent interpretation performed. Decision-making details documented in ED Course.    Risk  OTC drugs.  Prescription drug management.  Decision regarding hospitalization.             Disposition  Final diagnoses:   Acute onset of severe vertigo   Atherosclerotic cerebrovascular disease   Lung nodules     Time reflects when diagnosis was documented in both MDM as applicable and the Disposition within this note       Time User Action Codes Description Comment    5/6/2024  9:12 AM Juana Roman Add [R42] Acute onset of severe vertigo     5/6/2024  2:11 PM Juana Roman Add [I67.2] Atherosclerotic cerebrovascular disease     5/6/2024  2:11 PM Juana Roman Add [R91.8] Lung nodules           ED Disposition       ED Disposition   Admit    Condition   Stable    Date/Time   Mon May 6, 2024 1047    Comment   Case was discussed with [facility] and the patient's admission status was agreed to be Admission Status: observation status to the service of Dr. Ruffin .                Follow-up Information    None         Patient's Medications   Discharge Prescriptions    No medications on file       No discharge procedures on file.    PDMP Review       None            ED Provider  Electronically Signed by             Juana Roman DO  05/06/24 8274

## 2024-05-06 NOTE — PLAN OF CARE
Problem: Potential for Falls  Goal: Patient will remain free of falls  Description: INTERVENTIONS:  - Educate patient/family on patient safety including physical limitations  - Instruct patient to call for assistance with activity   - Consult OT/PT to assist with strengthening/mobility   - Keep Call bell within reach  - Keep bed low and locked with side rails adjusted as appropriate  - Keep care items and personal belongings within reach  - Initiate and maintain comfort rounds  - Make Fall Risk Sign visible to staff  - Offer Toileting every  Hours, in advance of need  - Initiate/Maintain alarm  - Obtain necessary fall risk management equipment  - Apply yellow socks and bracelet for high fall risk patients  - Consider moving patient to room near nurses station  5/6/2024 1746 by Monie Ansari  Outcome: Progressing  5/6/2024 1746 by Monie Ansari  Outcome: Progressing     Problem: Prexisting or High Potential for Compromised Skin Integrity  Goal: Skin integrity is maintained or improved  Description: INTERVENTIONS:  - Identify patients at risk for skin breakdown  - Assess and monitor skin integrity  - Assess and monitor nutrition and hydration status  - Monitor labs   - Assess for incontinence   - Turn and reposition patient  - Assist with mobility/ambulation  - Relieve pressure over bony prominences  - Avoid friction and shearing  - Provide appropriate hygiene as needed including keeping skin clean and dry  - Evaluate need for skin moisturizer/barrier cream  - Collaborate with interdisciplinary team   - Patient/family teaching  - Consider wound care consult   5/6/2024 1746 by Monie Ansari  Outcome: Progressing  5/6/2024 1746 by Monie Ansari  Outcome: Progressing     Problem: Neurological Deficit  Goal: Neurological status is stable or improving  Description: Interventions:  - Monitor and assess patient's level of consciousness, motor function, sensory function, and level of assistance needed for ADLs.    - Monitor and report changes from baseline. Collaborate with interdisciplinary team to initiate plan and implement interventions as ordered.   - Provide and maintain a safe environment.  - Consider seizure precautions.  - Consider fall precautions.  - Consider aspiration precautions.  - Consider bleeding precautions.  Outcome: Progressing     Problem: Activity Intolerance/Impaired Mobility  Goal: Mobility/activity is maintained at optimum level for patient  Description: Interventions:  - Assess and monitor patient  barriers to mobility and need for assistive/adaptive devices.  - Assess patient's emotional response to limitations.  - Collaborate with interdisciplinary team and initiate plans and interventions as ordered.  - Encourage independent activity per ability.  - Maintain proper body alignment.  - Perform active/passive rom as tolerated/ordered.  - Plan activities to conserve energy.  - Turn patient as appropriate  Outcome: Progressing     Problem: Communication Impairment  Goal: Ability to express needs and understand communication  Description: Assess patient's communication skills and ability to understand information.  Patient will demonstrate use of effective communication techniques, alternative methods of communication and understanding even if not able to speak.     - Encourage communication and provide alternate methods of communication as needed.  - Collaborate with case management/ for discharge needs.  - Include patient/family/caregiver in decisions related to communication.  Outcome: Progressing     Problem: Potential for Aspiration  Goal: Non-ventilated patient's risk of aspiration is minimized  Description: Assess and monitor vital signs, respiratory status, and labs (WBC).  Monitor for signs of aspiration (tachypnea, cough, rales, wheezing, cyanosis, fever).    - Assess and monitor patient's ability to swallow.  - Place patient up in chair to eat if possible.  - HOB up at 90  degrees to eat if unable to get patient up into chair.  - Supervise patient during oral intake.   - Instruct patient/ family to take small bites.  - Instruct patient/ family to take small single sips when taking liquids.  - Follow patient-specific strategies generated by speech pathologist.  Outcome: Progressing  Goal: Ventilated patient's risk of aspiration is minimized  Description: Assess and monitor vital signs, respiratory status, airway cuff pressure, and labs (WBC).  Monitor for signs of aspiration (tachypnea, cough, rales, wheezing, cyanosis, fever).    - Elevate head of bed 30 degrees if patient has tube feeding.  - Monitor tube feeding.  Outcome: Progressing     Problem: Nutrition  Goal: Nutrition/Hydration status is improving  Description: Monitor and assess patient's nutrition/hydration status for malnutrition (ex- brittle hair, bruises, dry skin, pale skin and conjunctiva, muscle wasting, smooth red tongue, and disorientation). Collaborate with interdisciplinary team and initiate plan and interventions as ordered.  Monitor patient's weight and dietary intake as ordered or per policy. Utilize nutrition screening tool and intervene per policy. Determine patient's food preferences and provide high-protein, high-caloric foods as appropriate.     - Assist patient with eating.  - Allow adequate time for meals.  - Encourage patient to take dietary supplement as ordered.  - Collaborate with clinical nutritionist.  - Include patient/family/caregiver in decisions related to nutrition.  Outcome: Progressing

## 2024-05-06 NOTE — CONSULTS
Consultation - Stroke   Aurelia Jaime 83 y.o. female MRN: [id number]  Unit/Bed#: ED 21 Encounter: [id number]      Assessment/Plan     Stroke-like symptoms  Assessment & Plan  83 y.o.  female former smoker with dementia, hld and lung cancer s/p lobectomy who presented to [facility] ED 5/6/24 with dizziness. Stroke alert initiated in the ED with NIHSS reported as 0. Initial /79, noted as high as 214/89. CTH/CTA without acute intracranial findings; possible left v2 filling defect noted. Deemed not a TNK or thrombectomy candidate. Loaded with asa 325mg and plavix 300mg and admitted on stroke pathway for additional work-up.  Neuroimaging/work-up:  5/6/24 CTH:  No acute intracranial abnormality. 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   5/6/24 CTA:  No large intracranial arterial occlusion.  Short segment dissection versus fenestration noted in the left vertebral artery at the C2/C3 vertebral disc level.  Suspected stenosis of a proximal right M2/M3 branch, may be related to intracranial atherosclerotic disease.  Moderate stenosis at the bilateral carotid bulbs/proximal internal carotid arteries without significant stenosis per NASCET criteria.  Bilateral groundglass nodules with increasing solid component of a left upper lobe nodule, in comparison to a February 2023 exam.   Recommendations:  Stroke vs htn encephalopathy vs bppv  - Admit on stroke pathway  MRI brain  MRA head and neck dissection protocol to better evaluate possible vertebral dissection noted on CTA  Echo  Lipid Panel  Hemoglobin A1c  TSH  Continue DAPT with Aspirin 81 mg and plavix 75mg daily starting 5/7  Atorvastatin 40 mg  Permissive HTN, avoid hypotension  Euglycemic, normothermic goal  Continue telemetry  PT/OT/ST  Stroke education if indicated  Continue to monitor and notify neurology with any changes.  STAT CT head for any acute change in neuro exam  - Medical management and correction of any metabolic or infectious  "disturbances per primary service.   - meclizine PRN, ensure adequate hydration        Thrombolytic Decision: Patient not a candidate. Symptoms resolved/clearly non disabling.    Recommendations for outpatient neurological follow up have yet to be determined.    Reason for Consult / Principal Problem: stroke alert  Hx and PE limited by: dementia  Patient last known well: around [id number] 5/6/24  Stroke alert called: 0910 5/6/24  Neurology time of arrival: 0912 5/6/24  HPI: Aurelia Jaime is a 83 y.o.  female former smoker with dementia, hld and lung cancer s/p lobectomy who presented to [facility] ED 5/6/24 with dizziness. Stroke alert initiated in the ED with NIHSS reported as 0. Initial /79, noted as high as 214/89. CTH/CTA without acute intracranial findings; possible left v2 filling defect noted. Deemed not a TNK or thrombectomy candidate. Loaded with asa 325mg and plavix 300mg and admitted on stroke pathway for additional work-up.    Patient's daughter at bedside reports LKN around 6-630 am when she was observed on video cameral getting out of bed to use the bathroom without difficulty. At approximately 0750 the patient was unable to sit up due to reported dizziness. Her daughter also observed rotational nystagmus and noted elevated BP. Patient denies headache, she does feel \"stuffed up\" secondary to allergies. No new medications. In the past she has felt \"woozy\" but not like this. At present, she feels better and is near back to baseline only feeling a little bit dizzy when she opens her eyes.       Per chart review, patient follows with Dr. Agarwal for her dementia. Last office visit 2/2024 with noticeable cognitive decline over the past 6 months. MoCA 13+1/30. She is maintained on aricept 10mg qhs, namenda 10mg bid, Buspar 15mg bid and was started on ditropan 10mg daily for incontinence      Inpatient consult to Neurology  Consult performed by: ADELINE Henderson  Consult ordered by: Juana Lemus " DO Abel        Review of Systems   Unable to perform ROS: Dementia   See HPI    Historical Information   Past Medical History:   Diagnosis Date    Abnormal chest x-ray     Abnormal mammogram     COPD (chronic obstructive pulmonary disease) (HCC)     Dementia (HCC)     Early stage     Ductal hyperplasia of breast     Left     Hyperlipidemia     diet controled    Nodule of lower lobe of right lung     Right Lower Lobe     SOB (shortness of breath)     SOB (shortness of breath)     Wheezing      Past Surgical History:   Procedure Laterality Date    BREAST BIOPSY Left 10/12/2018    BREAST CYST EXCISION Left 10/31/2018    BREAST LUMPECTOMY Left 11/13/2018    Procedure: BREAST LUMPECTOMY; BREAST NEEDLE LOCALIZATION (NEEDLE LOC AT 0830);  Surgeon: Mian Valenzuela MD;  Location: [facility];  Service: Surgical Oncology    COLONOSCOPY      LAPAROSCOPY      MAMMO NEEDLE LOCALIZATION LEFT (ALL INC) Left 11/13/2018    MAMMO STEREOTACTIC BREAST BIOPSY LEFT (ALL INC) Left 10/16/2018    MI BRNCHSC INCL FLUOR GDNCE DX W/CELL WASHG SPX Right 1/16/2019    Procedure: BRONCHOSCOPY FLEXIBLE,;  Surgeon: Julia Mccord MD;  Location: [facility];  Service: Thoracic    MI MEDIASTINOSCOPY WITH LYMPH NODE BIOPSY/IES Right 1/16/2019    Procedure: MEDIASTINOSCOPY;  Surgeon: Juila Mccord MD;  Location: [facility];  Service: Thoracic    MI THORACOSCOPY W/LOBECTOMY SINGLE LOBE Right 1/16/2019    Procedure: LOBECTOMY LUNG;  Surgeon: Julia Mccord MD;  Location: [facility];  Service: Thoracic    TRIGGER FINGER RELEASE Right 10/10/2019    ULNAR NERVE TRANSPOSITION Right 10/10/2019     Social History   Social History     Substance and Sexual Activity   Alcohol Use Yes    Alcohol/week: 2.0 standard drinks of alcohol    Types: 2 Cans of beer per week    Comment: occasional     Social History     Substance and Sexual Activity   Drug Use Never     E-Cigarette/Vaping    E-Cigarette Use Never User      E-Cigarette/Vaping Substances     Nicotine No     THC No     CBD No     Flavoring No     Other No     Unknown No      Social History     Tobacco Use   Smoking Status Former    Current packs/day: 0.00    Average packs/day: 1 pack/day for 60.0 years (60.0 ttl pk-yrs)    Types: Cigarettes    Start date: 1958    Quit date: 2016    Years since quittin.0   Smokeless Tobacco Never     Family History:   Family History   Problem Relation Age of Onset    Hypertension Mother     Glaucoma Mother     Macular degeneration Mother     Prostate cancer Father     Heart attack Brother        Review of previous medical records was completed.     Meds/Allergies   all current active meds have been reviewed    No Known Allergies    Objective   Vitals:Blood pressure 161/66, pulse 67, temperature 98 °F (36.7 °C), temperature source Oral, resp. rate 15, weight 67 kg (147 lb 11.3 oz), SpO2 98%, not currently breastfeeding.,Body mass index is 24.58 kg/m².    Intake/Output Summary (Last 24 hours) at 2024 1247  Last data filed at 2024 1135  Gross per 24 hour   Intake 1500 ml   Output --   Net 1500 ml       Invasive Devices:   Invasive Devices       Peripheral Intravenous Line  Duration             Peripheral IV 24 Left;Ventral (anterior) Forearm <1 day                    Physical Exam  Vitals reviewed.   Constitutional:       General: She is not in acute distress.  HENT:      Head: Normocephalic and atraumatic.   Pulmonary:      Effort: Pulmonary effort is normal.   Skin:     General: Skin is warm and dry.   Neurological:      Mental Status: She is alert.      Cranial Nerves: Cranial nerves 2-12 are intact.      Motor: Motor strength is normal.     Coordination: Finger-Nose-Finger Test and Heel to Shin Test normal.   Psychiatric:         Speech: Speech normal.       Neurologic Exam     Mental Status   Attention: normal. Concentration: normal.   Speech: speech is normal   Able to name object. Able to repeat.   Awake, alert, oriented to self, birth-date  and place (baseline per daughter)  Unable to state the correct month or age       Cranial Nerves   Cranial nerves II through XII intact.     CN III, IV, VI   Nystagmus: none     Motor Exam     Strength   Strength 5/5 throughout.     Sensory Exam   Light touch normal.   Right arm pinprick: normal  Left arm pinprick: decreased from elbow  Right leg pinprick: normal  Left leg pinprick: decreased from knee  Patient reports decreased sharp to LUE and LLE     Gait, Coordination, and Reflexes     Coordination   Finger to nose coordination: normal  Heel to shin coordination: normal    Tremor   Resting tremor: absent    Reflexes   Right plantar: normal  Left plantar: normal      NIHSS:  1a.Level of Consciousness: 0 = Alert   1b. LOC Questions: 2 = Answers neither correctly   1c. LOC Commands: 0 = Obeys both correctly   2. Best Gaze: 0 = Normal   3. Visual: 0 = No visual field loss   4. Facial Palsy: 0=Normal symmetric movement   5a. Motor Right Arm: 0=No drift, limb holds 90 (or 45) degrees for full 10 seconds   5b. Motor Left Arm: 0=No drift, limb holds 90 (or 45) degrees for full 10 seconds   6a. Motor Right Le=No drift, limb holds 90 (or 45) degrees for full 10 seconds   6b. Motor Left Le=No drift, limb holds 90 (or 45) degrees for full 10 seconds   7. Limb Ataxia:  0=Absent   8. Sensory: 1=Mild to moderate sensory loss; patient feels pinprick is less sharp or is dull on the affected side; there is a loss of superficial pain with pinprick but patient is aware She is being touched   9. Best Language:  0=No aphasia, normal   10. Dysarthria: 0=Normal articulation   11. Extinction and Inattention (formerly Neglect): 0=No abnormality   Total Score: 3   -Of note NIHSS score includes 2 points for incorrect LOC questions which is patient baseline with dementia    Time NIHSS was completed: 0918    Modified [place] Score:  Unable to determine currently, will gather additional data    Lab Results: I have personally reviewed  pertinent reports.      Imaging Studies: I have personally reviewed pertinent reports.   and I have personally reviewed pertinent films in PACS    EKG, Pathology, and Other Studies: I have personally reviewed pertinent reports.      Code Status: Prior    Counseling / Coordination of Care  Assessment, images and plan reviewed with Dr. Gurrola. Plan discussed with patient, daughter at bedside and ED attending. Please refer to attending attestation for additional recommendations

## 2024-05-06 NOTE — ASSESSMENT & PLAN NOTE
Acute onset vertigo  Initial neuro imaging showing vertebral artery dissection, atherosclerotic disease   MRI brain/MRA brain pending for further evaluation  Neurology consulted, not a candidate for TNK  Continue stroke pathway, permissive hypertension

## 2024-05-06 NOTE — ASSESSMENT & PLAN NOTE
83 y.o.  female former smoker with dementia, hld and lung cancer s/p lobectomy who presented to [facility] ED 5/6/24 with dizziness. Stroke alert initiated in the ED with NIHSS reported as 0. Initial /79, noted as high as 214/89. CTH/CTA without acute intracranial findings; possible left v2 filling defect noted. Deemed not a TNK or thrombectomy candidate. Loaded with asa 325mg and plavix 300mg and admitted on stroke pathway for additional work-up.  Neuroimaging/work-up:  5/6/24 CTH:  No acute intracranial abnormality. 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   5/6/24 CTA:  No large intracranial arterial occlusion.  Short segment dissection versus fenestration noted in the left vertebral artery at the C2/C3 vertebral disc level.  Suspected stenosis of a proximal right M2/M3 branch, may be related to intracranial atherosclerotic disease.  Moderate stenosis at the bilateral carotid bulbs/proximal internal carotid arteries without significant stenosis per NASCET criteria.  Bilateral groundglass nodules with increasing solid component of a left upper lobe nodule, in comparison to a February 2023 exam.   5/6/24 MRI brain w/wo:  Moderate motion artifact on postcontrast sequences.  - No acute intracranial abnormality.  - Mild chronic microangiopathy.  5/6/24 MRA head wo:  No large vessel occlusion. Mild stenosis P1 segment of the right posterior cerebral artery. No large vessel occlusion.  5/6/24 MRA carotids w/wo:  No dissection in left vertebral artery distal V2 segment, as clinically questioned. Earlier same day CTA findings likely present tiny fenestration in left vertebral artery distal V2 segment.  Similar moderate stenosis (50-70% narrowing) in bilateral ICA proximal cervical segments due to atherosclerotic disease.  Normal right vertebral artery.  5/6/24 ECHO:    This was a technically difficult study.    Left Ventricle: Left ventricular cavity size is normal. Wall thickness is mildly  increased. The left ventricular ejection fraction is 60%. Systolic function is normal. Wall motion is normal. Diastolic function is normal.    Mitral Valve: There is mild annular calcification.  -A1c 5.4  -  Recommendations:  Stroke work-up completed; no acute infarct or dissection. Suspect hypertensive vs bppv as etiology of presenting symptoms  Bilateral ICA stenosis noted  D/C plavix  Continue asa 81mg daily  Increase outpatient crestor to 10mg daily ()  Ensure adequate hydration  PRN meclizine  Goal normotension; avoid hypotension or changing position quickly  Outpatient referral to vascular surgery to evaluate ICA stenosis  Consider outpatient vestibular therapy/PT eval if dizziness persists  No additional inpatient neurology recommendations at this time  - Medical management and correction of any metabolic or infectious disturbances per primary service.

## 2024-05-07 VITALS
WEIGHT: 147 LBS | OXYGEN SATURATION: 96 % | SYSTOLIC BLOOD PRESSURE: 154 MMHG | BODY MASS INDEX: 24.49 KG/M2 | DIASTOLIC BLOOD PRESSURE: 89 MMHG | HEART RATE: 89 BPM | RESPIRATION RATE: 18 BRPM | TEMPERATURE: 97.7 F | HEIGHT: 65 IN

## 2024-05-07 PROBLEM — I95.1 ORTHOSTATIC HYPOTENSION: Status: ACTIVE | Noted: 2024-05-07

## 2024-05-07 PROBLEM — I65.23 CAROTID STENOSIS, BILATERAL: Status: ACTIVE | Noted: 2024-05-07

## 2024-05-07 LAB
ALBUMIN SERPL BCP-MCNC: 3.7 G/DL (ref 3.5–5)
ALP SERPL-CCNC: 77 U/L (ref 34–104)
ALT SERPL W P-5'-P-CCNC: 10 U/L (ref 7–52)
ANION GAP SERPL CALCULATED.3IONS-SCNC: 7 MMOL/L (ref 4–13)
AST SERPL W P-5'-P-CCNC: 15 U/L (ref 13–39)
BASOPHILS # BLD AUTO: 0.06 THOUSANDS/ÂΜL (ref 0–0.1)
BASOPHILS NFR BLD AUTO: 1 % (ref 0–1)
BILIRUB SERPL-MCNC: 0.43 MG/DL (ref 0.2–1)
BUN SERPL-MCNC: 14 MG/DL (ref 5–25)
CALCIUM SERPL-MCNC: 8.9 MG/DL (ref 8.4–10.2)
CHLORIDE SERPL-SCNC: 107 MMOL/L (ref 96–108)
CO2 SERPL-SCNC: 25 MMOL/L (ref 21–32)
CREAT SERPL-MCNC: 0.82 MG/DL (ref 0.6–1.3)
EOSINOPHIL # BLD AUTO: 0.25 THOUSAND/ÂΜL (ref 0–0.61)
EOSINOPHIL NFR BLD AUTO: 4 % (ref 0–6)
ERYTHROCYTE [DISTWIDTH] IN BLOOD BY AUTOMATED COUNT: 14.1 % (ref 11.6–15.1)
EST. AVERAGE GLUCOSE BLD GHB EST-MCNC: 108 MG/DL
GFR SERPL CREATININE-BSD FRML MDRD: 67 ML/MIN/1.73SQ M
GLUCOSE P FAST SERPL-MCNC: 93 MG/DL (ref 65–99)
GLUCOSE SERPL-MCNC: 93 MG/DL (ref 65–140)
HBA1C MFR BLD: 5.4 %
HCT VFR BLD AUTO: 39.8 % (ref 34.8–46.1)
HGB BLD-MCNC: 12.7 G/DL (ref 11.5–15.4)
IMM GRANULOCYTES # BLD AUTO: 0.02 THOUSAND/UL (ref 0–0.2)
IMM GRANULOCYTES NFR BLD AUTO: 0 % (ref 0–2)
LYMPHOCYTES # BLD AUTO: 1.42 THOUSANDS/ÂΜL (ref 0.6–4.47)
LYMPHOCYTES NFR BLD AUTO: 24 % (ref 14–44)
MAGNESIUM SERPL-MCNC: 2.1 MG/DL (ref 1.9–2.7)
MCH RBC QN AUTO: 29 PG (ref 26.8–34.3)
MCHC RBC AUTO-ENTMCNC: 31.9 G/DL (ref 31.4–37.4)
MCV RBC AUTO: 91 FL (ref 82–98)
MONOCYTES # BLD AUTO: 0.54 THOUSAND/ÂΜL (ref 0.17–1.22)
MONOCYTES NFR BLD AUTO: 9 % (ref 4–12)
NEUTROPHILS # BLD AUTO: 3.65 THOUSANDS/ÂΜL (ref 1.85–7.62)
NEUTS SEG NFR BLD AUTO: 62 % (ref 43–75)
NRBC BLD AUTO-RTO: 0 /100 WBCS
PLATELET # BLD AUTO: 225 THOUSANDS/UL (ref 149–390)
PMV BLD AUTO: 10.1 FL (ref 8.9–12.7)
POTASSIUM SERPL-SCNC: 3.8 MMOL/L (ref 3.5–5.3)
PROT SERPL-MCNC: 6.1 G/DL (ref 6.4–8.4)
RBC # BLD AUTO: 4.38 MILLION/UL (ref 3.81–5.12)
SODIUM SERPL-SCNC: 139 MMOL/L (ref 135–147)
WBC # BLD AUTO: 5.94 THOUSAND/UL (ref 4.31–10.16)

## 2024-05-07 PROCEDURE — 80053 COMPREHEN METABOLIC PANEL: CPT | Performed by: STUDENT IN AN ORGANIZED HEALTH CARE EDUCATION/TRAINING PROGRAM

## 2024-05-07 PROCEDURE — 97167 OT EVAL HIGH COMPLEX 60 MIN: CPT

## 2024-05-07 PROCEDURE — 85025 COMPLETE CBC W/AUTO DIFF WBC: CPT | Performed by: STUDENT IN AN ORGANIZED HEALTH CARE EDUCATION/TRAINING PROGRAM

## 2024-05-07 PROCEDURE — 99239 HOSP IP/OBS DSCHRG MGMT >30: CPT

## 2024-05-07 PROCEDURE — 97535 SELF CARE MNGMENT TRAINING: CPT

## 2024-05-07 PROCEDURE — 97163 PT EVAL HIGH COMPLEX 45 MIN: CPT

## 2024-05-07 PROCEDURE — 83735 ASSAY OF MAGNESIUM: CPT | Performed by: STUDENT IN AN ORGANIZED HEALTH CARE EDUCATION/TRAINING PROGRAM

## 2024-05-07 RX ORDER — ASPIRIN 81 MG/1
81 TABLET, CHEWABLE ORAL DAILY
Qty: 30 TABLET | Refills: 0 | Status: SHIPPED | OUTPATIENT
Start: 2024-05-08

## 2024-05-07 RX ORDER — ROSUVASTATIN CALCIUM 10 MG/1
10 TABLET, COATED ORAL DAILY
Qty: 30 TABLET | Refills: 0 | Status: SHIPPED | OUTPATIENT
Start: 2024-05-07

## 2024-05-07 RX ORDER — MECLIZINE HYDROCHLORIDE 25 MG/1
25 TABLET ORAL EVERY 8 HOURS PRN
Status: DISCONTINUED | OUTPATIENT
Start: 2024-05-07 | End: 2024-05-07 | Stop reason: HOSPADM

## 2024-05-07 RX ADMIN — ASPIRIN 81 MG: 81 TABLET, CHEWABLE ORAL at 09:43

## 2024-05-07 RX ADMIN — MECLIZINE HYDROCHLORIDE 25 MG: 25 TABLET ORAL at 06:33

## 2024-05-07 RX ADMIN — HEPARIN SODIUM 5000 UNITS: 5000 INJECTION INTRAVENOUS; SUBCUTANEOUS at 05:55

## 2024-05-07 NOTE — PHYSICAL THERAPY NOTE
Physical Therapy Evaluation     Patient's Name: Aurelia Jaime    Admitting Diagnosis  Dizziness [R42]  Atherosclerotic cerebrovascular disease [I67.2]  Lung nodules [R91.8]  Acute onset of severe vertigo [R42]    Problem List  Patient Active Problem List   Diagnosis    Atypical ductal hyperplasia of left breast    Papilloma of left breast    Lung nodule, solitary    Right lower lobe lung mass    Lung nodule    Primary adenocarcinoma of right lung (HCC)    History of lung cancer    Encounter for follow-up surveillance of lung cancer    Multiple lung nodules    SOB (shortness of breath) on exertion    Alzheimer's dementia with behavioral disturbance (HCC)    Simple chronic bronchitis (HCC)    Stroke-like symptoms       Past Medical History  Past Medical History:   Diagnosis Date    Abnormal chest x-ray     Abnormal mammogram     COPD (chronic obstructive pulmonary disease) (HCC)     Dementia (HCC)     Early stage     Ductal hyperplasia of breast     Left     Hyperlipidemia     diet controled    Nodule of lower lobe of right lung     Right Lower Lobe     SOB (shortness of breath)     SOB (shortness of breath)     Wheezing        Past Surgical History  Past Surgical History:   Procedure Laterality Date    BREAST BIOPSY Left 10/12/2018    BREAST CYST EXCISION Left 10/31/2018    BREAST LUMPECTOMY Left 11/13/2018    Procedure: BREAST LUMPECTOMY; BREAST NEEDLE LOCALIZATION (NEEDLE LOC AT 0830);  Surgeon: Mian Valenzuela MD;  [facility]: [facility];  Service: Surgical Oncology    COLONOSCOPY      LAPAROSCOPY      MAMMO NEEDLE LOCALIZATION LEFT (ALL INC) Left 11/13/2018    MAMMO STEREOTACTIC BREAST BIOPSY LEFT (ALL INC) Left 10/16/2018    AZ BRNCC INCL FLUOR GDNCE DX W/CELL WASHG SPX Right 1/16/2019    Procedure: BRONCHOSCOPY FLEXIBLE,;  Surgeon: Julia Mccord MD;  [facility]: [facility];  Service: Thoracic    AZ MEDIASTINOSCOPY WITH LYMPH NODE BIOPSY/IES Right 1/16/2019    Procedure: MEDIASTINOSCOPY;  Surgeon: Julia NG  "MD Flex;  [facility];  Service: Thoracic    IN THORACOSCOPY W/LOBECTOMY SINGLE LOBE Right 1/16/2019    Procedure: LOBECTOMY LUNG;  Surgeon: Julia Mccord MD;  [facility];  Service: Thoracic    TRIGGER FINGER RELEASE Right 10/10/2019    ULNAR NERVE TRANSPOSITION Right 10/10/2019          05/07/24 0845   PT Last Visit   PT Visit Date 05/07/24   Note Type   Note type Evaluation   Pain Assessment   Pain Assessment Tool 0-10   Pain Score   (no numeric score provided)   Pain Location/Orientation Orientation: Right;Location: Arm   Restrictions/Precautions   Weight Bearing Precautions Per Order No   Other Precautions Cognitive;Chair Alarm;Bed Alarm;Fall Risk;Telemetry   Home Living   Type of Home House   Home Layout Two level;Performs ADLs on one level;Able to live on main level with bedroom/bathroom   Home Equipment   (pt unsure if uses equipment at baseline)   Prior Function   Level of [place] Needs assistance with ADLs;Independent with functional mobility;Needs assistance with IADLS   Lives With Daughter  (?/ pt unsure)   Receives Help From Family   IADLs Family/Friend/Other provides transportation;Family/Friend/Other provides meals;Family/Friend/Other provides medication management   Falls in the last 6 months   (unknown)   Vocational Retired  (office work)   Comments pt unable to elaborate on PLOF/ home s/u information d/t cognitive deficits, CM to follow up   General   Family/Caregiver Present No   Cognition   Overall Cognitive Status Impaired   Arousal/Participation Alert   Orientation Level Oriented to person;Oriented to place;Disoriented to time;Disoriented to situation  (\"[facility]\", does not know town/area)   Memory Decreased recall of biographical information;Decreased short term memory;Decreased recall of recent events;Decreased recall of precautions   Following Commands Follows one step commands with increased time or repetition   Comments pt agreeable to PT evaluation " "  RLE Assessment   RLE Assessment   (hip flexion, knee extension 4/5)   LLE Assessment   LLE Assessment   (hip flexion, knee extension 4/5)   Vision-Basic Assessment   Current Vision   (pt reports wearing glasses at baseline, unable to elaborate on need)   Coordination   Movements are Fluid and Coordinated 1   Sensation WFL   Bed Mobility   Supine to Sit 4  Minimal assistance   Additional items Assist x 1;HOB elevated;Bedrails;Increased time required;Verbal cues   Transfers   Sit to Stand 4  Minimal assistance   Additional items Assist x 1;Increased time required;Verbal cues;Armrests   Stand to Sit 4  Minimal assistance   Additional items Assist x 1;Increased time required;Verbal cues;Armrests   Toilet transfer 4  Minimal assistance   Additional items Assist x 1;Increased time required;Commode;Verbal cues   Additional Comments pt reported dizziness/ \"wooziness\" with transitional changes and during functional mobility; improved with seated rest. RN aware. orthostatic vitals taken    Vitals:    05/07/24 0902 05/07/24 0904 05/07/24 0905   BP: 157/75 138/71 126/73   Pulse: 71 77 89   Patient Position - Orthostatic VS: Lying - Orthostatic VS Sitting - Orthostatic VS Standing - Orthostatic VS        Ambulation/Elevation   Gait pattern Decreased foot clearance;Short stride;Step to   Gait Assistance 4  Minimal assist   Additional items Assist x 1;Verbal cues   Assistive Device   (HHA)   Distance 15'   Balance   Static Sitting Good   Dynamic Sitting Fair +   Static Standing Fair -   Dynamic Standing Poor +   Ambulatory Poor +   Endurance Deficit   Endurance Deficit Yes   Activity Tolerance   Activity Tolerance Patient limited by fatigue   Medical Staff Made Aware Pt seen as a co-eval with OT due to the patient's co-morbidities, clinically unstable presentation, and present impairments which are a regression from the patient's baseline.   Nurse Made Aware Zhou   Assessment   Prognosis Good   Problem List " Decreased strength;Impaired balance;Decreased endurance;Decreased mobility;Decreased cognition   Assessment Pt is 83 y.o. female seen for PT evaluation on 5/7/2024 s/p admit to [facility] on 5/6/2024 w/ Stroke-like symptoms. PT was consulted to assess pt's functional mobility and d/c needs. Order placed for PT eval and tx. PTA, pt resides with daughter in [place]; pt questionable historian, CM to follow up as pt unable to provide reliable home s/u / PLOF information. At time of eval, pt requiring min a for all phases of mobility. Upon evaluation, pt presenting with impaired functional mobility d/t decreased strength, decreased endurance, impaired balance, decreased mobility, decreased cognition, and activity intolerance. Pertinent PMHx and current co-morbidities affecting pt's physical performance at time of assessment include: stroke like symptoms, long nodule, RLL lung mass, h/o lung CA, SOB, alzheimer's dementia with behavioral disturbance, simple chronic bronchitis. Personal factors affecting pt at time of eval include: inability to navigate community distances and decreased cognition. The following objective measures performed on IE also reveal limitations: Barthel Index: 35/100, Modified Olivia: 4 (moderate/severe disability), and AM-PAC 6-Clicks: 18/24. Pt's clinical presentation is currently unstable/unpredictable seen in pt's presentation of advanced age, abnormal lab value(s), ongoing medical assessment, and on telemetry monitoring. Overall, pt's rehab potential and prognosis to return to PLOF is good as impacted by objective findings, warranting pt to receive further skilled PT interventions to address identified impairments, activity limitation(s), and participation restriction(s). Pt to benefit from continued PT tx to address deficits as defined above and maximize level of functional independent mobility. From PT/mobility standpoint, recommend level 2, moderate resource intensity in order to  facilitate return to PLOF.   Barriers to Discharge Inaccessible home environment   Goals   Patient Goals to go home   STG Expiration Date 05/17/24   Short Term Goal #1 In 7-10 days: Increase bilateral LE strength 1/2 grade to facilitate independent mobility, Perform all bed mobility tasks modified independent to decrease caregiver burden, Perform all transfers modified independent to improve independence, Ambulate > 150 ft. with least restrictive assistive device with distant S w/o LOB and w/ normalized gait pattern 100% of the time, Increase all balance 1/2 grade to decrease risk for falls, Improve Barthel Index score to 50 or greater to facilitate independence, PT provider will perform functional balance assessment to determine fall risk, and PT to see and establish goals for elevations/stairs when appropriate   PT Treatment Day 0   Plan   Treatment/Interventions Functional transfer training;LE strengthening/ROM;Therapeutic exercise;Endurance training;Patient/family training;Equipment eval/education;Bed mobility;Elevations;Gait training;Spoke to nursing;OT   PT Frequency 3-5x/wk   Discharge Recommendation   Rehab Resource Intensity Level, PT II (Moderate Resource Intensity)   AM-PAC Basic Mobility Inpatient   Turning in Flat Bed Without Bedrails 3   Lying on Back to Sitting on Edge of Flat Bed Without Bedrails 3   Moving Bed to Chair 3   Standing Up From Chair Using Arms 3   Walk in Room 3   Climb 3-5 Stairs With Railing 3   Basic Mobility Inpatient Raw Score 18   Basic Mobility Standardized Score 41.05   [facility] Highest Level Of Mobility   -HL Goal 6: Walk 10 steps or more   -HLM Achieved 6: Walk 10 steps or more   Modified Becker Scale   Modified Olivia Scale 4   Barthel Index   Feeding 5   Bathing 0   Grooming Score 0   Dressing Score 5   Bladder Score 5   Bowels Score 5   Toilet Use Score 5   Transfers (Bed/Chair) Score 10   Mobility (Level Surface) Score 0   Stairs Score 0   Barthel Index Score 35          Sofy Corley, PT, DPT

## 2024-05-07 NOTE — ASSESSMENT & PLAN NOTE
Bilateral ICA stenosis  5/6 CTA neck: CTA findings likely present tiny fenestration in left vertebral artery distal V2 segment. Similar moderate stenosis (50-70% narrowing) in bilateral ICA proximal cervical segments due to atherosclerotic disease.  Per neurology, no intervention at this time.  Recommend outpatient Vascular Surgery referral, order placed

## 2024-05-07 NOTE — PLAN OF CARE
Problem: PHYSICAL THERAPY ADULT  Goal: Performs mobility at highest level of function for planned discharge setting.  See evaluation for individualized goals.  Description: Treatment/Interventions: Functional transfer training, LE strengthening/ROM, Therapeutic exercise, Endurance training, Patient/family training, Equipment eval/education, Bed mobility, Elevations, Gait training, Spoke to nursing, OT          See flowsheet documentation for full assessment, interventions and recommendations.  5/7/2024 1025 by Sofy Corley PT  Note: Prognosis: Good  Problem List: Decreased strength, Impaired balance, Decreased endurance, Decreased mobility, Decreased cognition  Assessment: Pt is 83 y.o. female seen for PT evaluation on 5/7/2024 s/p admit to [facility] on 5/6/2024 w/ Stroke-like symptoms. PT was consulted to assess pt's functional mobility and d/c needs. Order placed for PT eval and tx. PTA, pt resides with daughter in [place]; pt questionable historian, CM to follow up as pt unable to provide reliable home s/u / PLOF information. At time of eval, pt requiring min a for all phases of mobility. Upon evaluation, pt presenting with impaired functional mobility d/t decreased strength, decreased endurance, impaired balance, decreased mobility, decreased cognition, and activity intolerance. Pertinent PMHx and current co-morbidities affecting pt's physical performance at time of assessment include: stroke like symptoms, long nodule, RLL lung mass, h/o lung CA, SOB, alzheimer's dementia with behavioral disturbance, simple chronic bronchitis. Personal factors affecting pt at time of eval include: inability to navigate community distances and decreased cognition. The following objective measures performed on IE also reveal limitations: Barthel Index: 35/100, Modified [place]: 4 (moderate/severe disability), and AM-PAC 6-Clicks: 18/24. Pt's clinical presentation is currently unstable/unpredictable seen in pt's presentation  of advanced age, abnormal lab value(s), ongoing medical assessment, and on telemetry monitoring. Overall, pt's rehab potential and prognosis to return to PLOF is good as impacted by objective findings, warranting pt to receive further skilled PT interventions to address identified impairments, activity limitation(s), and participation restriction(s). Pt to benefit from continued PT tx to address deficits as defined above and maximize level of functional independent mobility. From PT/mobility standpoint, recommend level 2, moderate resource intensity in order to facilitate return to PLOF.  Barriers to Discharge: Inaccessible home environment     Rehab Resource Intensity Level, PT: II (Moderate Resource Intensity)    See flowsheet documentation for full assessment.     5/7/2024 1025 by Sofy Corley PT  Note: Prognosis: Good  Problem List: Decreased strength, Impaired balance, Decreased endurance, Decreased mobility, Decreased cognition  Assessment: Pt is 83 y.o. female seen for PT evaluation on 5/7/2024 s/p admit to [facility] on 5/6/2024 w/ Stroke-like symptoms. PT was consulted to assess pt's functional mobility and d/c needs. Order placed for PT eval and tx. PTA, pt resides with daughter in 2SH; pt questionable historian, CM to follow up as pt unable to provide reliable home s/u / PLOF information. At time of eval, pt requiring min a for all phases of mobility. Upon evaluation, pt presenting with impaired functional mobility d/t decreased strength, decreased endurance, impaired balance, decreased mobility, decreased cognition, and activity intolerance. Pertinent PMHx and current co-morbidities affecting pt's physical performance at time of assessment include: stroke like symptoms, long nodule, RLL lung mass, h/o lung CA, SOB, alzheimer's dementia with behavioral disturbance, simple chronic bronchitis. Personal factors affecting pt at time of eval include: inability to navigate community distances and  decreased cognition. The following objective measures performed on IE also reveal limitations: Barthel Index: 35/100, Modified [place]: 4 (moderate/severe disability), and AM-PAC 6-Clicks: 18/24. Pt's clinical presentation is currently unstable/unpredictable seen in pt's presentation of advanced age, abnormal lab value(s), ongoing medical assessment, and on telemetry monitoring. Overall, pt's rehab potential and prognosis to return to PLOF is good as impacted by objective findings, warranting pt to receive further skilled PT interventions to address identified impairments, activity limitation(s), and participation restriction(s). Pt to benefit from continued PT tx to address deficits as defined above and maximize level of functional independent mobility. From PT/mobility standpoint, recommend level 2, moderate resource intensity in order to facilitate return to PLOF.  Barriers to Discharge: Inaccessible home environment     Rehab Resource Intensity Level, PT: II (Moderate Resource Intensity)    See flowsheet documentation for full assessment.

## 2024-05-07 NOTE — OCCUPATIONAL THERAPY NOTE
Occupational Therapy Evaluation and Treatment      Patient Name: Aurelia Jaime  Today's Date: 5/7/2024      Problem List  Principal Problem:    dizziness  Active Problems:    Alzheimer's dementia with behavioral disturbance (HCC)    Orthostatic hypotension    Carotid stenosis, bilateral    Past Medical History  Past Medical History:   Diagnosis Date    Abnormal chest x-ray     Abnormal mammogram     COPD (chronic obstructive pulmonary disease) (HCC)     Dementia (HCC)     Early stage     Ductal hyperplasia of breast     Left     Hyperlipidemia     diet controled    Nodule of lower lobe of right lung     Right Lower Lobe     SOB (shortness of breath)     SOB (shortness of breath)     Wheezing      Past Surgical History  Past Surgical History:   Procedure Laterality Date    BREAST BIOPSY Left 10/12/2018    BREAST CYST EXCISION Left 10/31/2018    BREAST LUMPECTOMY Left 11/13/2018    Procedure: BREAST LUMPECTOMY; BREAST NEEDLE LOCALIZATION (NEEDLE LOC AT 0830);  Surgeon: Mian Valenzuela MD;  [facility];  Service: Surgical Oncology    COLONOSCOPY      LAPAROSCOPY      MAMMO NEEDLE LOCALIZATION LEFT (ALL INC) Left 11/13/2018    MAMMO STEREOTACTIC BREAST BIOPSY LEFT (ALL INC) Left 10/16/2018    AK BRNCHSC INCL FLUOR GDNCE DX W/CELL WASHG SPX Right 1/16/2019    Procedure: BRONCHOSCOPY FLEXIBLE,;  Surgeon: Julia Mccord MD;  [facility];  Service: Thoracic    AK MEDIASTINOSCOPY WITH LYMPH NODE BIOPSY/IES Right 1/16/2019    Procedure: MEDIASTINOSCOPY;  Surgeon: Julia Mccord MD;  [facility];  Service: Thoracic    AK THORACOSCOPY W/LOBECTOMY SINGLE LOBE Right 1/16/2019    Procedure: LOBECTOMY LUNG;  Surgeon: Julia Mccord MD;  [facility];  Service: Thoracic    TRIGGER FINGER RELEASE Right 10/10/2019    ULNAR NERVE TRANSPOSITION Right 10/10/2019         05/07/24 0910   OT Last Visit   OT Visit Date 05/07/24   Note Type   Note type Evaluation  (and Treatment)   Pain  Assessment   Pain Assessment Tool 0-10   Pain Score   (no numeric score provided.)   Pain Location/Orientation Orientation: Right;Orientation: Upper;Location: Arm   Pain Onset/Description Frequency: Intermittent  (with movement)   Patient's Stated Pain Goal No pain   Hospital Pain Intervention(s) Repositioned;Ambulation/increased activity   Restrictions/Precautions   Weight Bearing Precautions Per Order No   Other Precautions Cognitive;Chair Alarm;Bed Alarm;Telemetry;Fall Risk   Home Living   Type of Home House   Home Layout Two level;Performs ADLs on one level;Able to live on main level with bedroom/bathroom   Home Equipment   (Possibly a RW, pt unsure if she uses AD)   Prior Function   Level of [place] Needs assistance with ADLs;Independent with functional mobility;Needs assistance with IADLS   Lives With Daughter  (?. Pt unsure)   Receives Help From Family   IADLs Family/Friend/Other provides transportation;Family/Friend/Other provides meals;Family/Friend/Other provides medication management   Falls in the last 6 months   (unknown)   Vocational Retired  (office work)   Comments pt unable to elaborate on PLOF/ home s/u information d/t cognitive deficits, CM to follow up   General   Family/Caregiver Present No   Subjective   Subjective Pt pleasant and agreeable to therapy evaluation   ADL   Where Assessed Other (Comment)  (Assist levels for some self care tasks are based on functional assessment of performance skills and deficits observed during session.)   Eating Assistance 7  Independent   Grooming Assistance 5  Supervision/Setup   Grooming Deficit Setup  (seated)   UB Dressing Assistance 4  Minimal Assistance   UB Dressing Deficit Setup;Supervision/safety;Increased time to complete  (seated)   LB Dressing Assistance 4  Minimal Assistance   LB Dressing Deficit Setup;Supervision/safety;Increased time to complete   Toileting Assistance  4  Minimal Assistance   Toileting Deficit  "Setup;Steadying;Supervison/safety;Increased time to complete   Bed Mobility   Supine to Sit 4  Minimal assistance   Additional items Assist x 1;HOB elevated;Bedrails;Increased time required;Verbal cues   Transfers   Sit to Stand 4  Minimal assistance   Additional items Assist x 1;Increased time required;Verbal cues;Armrests   Stand to Sit 4  Minimal assistance   Additional items Assist x 1;Increased time required;Verbal cues;Armrests   Stand pivot 4  Minimal assistance   Additional items Assist x 1   Toilet transfer 4  Minimal assistance   Additional items Assist x 1;Armrests;Commode   Additional Comments with HHA. pt reported dizziness/ \"wooziness\" with transitional changes and during functional mobility; improved with seated rest. RN aware. orthostatic vitals taken   Functional Mobility   Functional Mobility 4  Minimal assistance   Additional Comments assist x1   Additional items Rolling walker   Activity Tolerance   Activity Tolerance Patient limited by fatigue   Medical Staff Made Aware Sofy PT  (Pt seen for co-evaluation/treatment with Physical Therapist due to pt's medical complexity, functional limitations and limited activity tolerance.)   Nurse Made Aware Zhou KOO Assessment   RUE Assessment WFL   LUE Assessment   LUE Assessment WFL   Hand Function   Gross Motor Coordination Functional   Sensation   Light Touch No apparent deficits   Vision-Basic Assessment   Current Vision   (pt reports wearing glasses at baseline, unable to report if this is all of the time vs just for reading)   Psychosocial   Psychosocial (WDL) WDL   Cognition   Overall Cognitive Status Impaired  (pt with dx of dementia)   Arousal/Participation Alert;Responsive;Cooperative   Attention Attends with cues to redirect   Orientation Level Oriented to person;Oriented to place;Disoriented to time;Disoriented to situation   Memory Decreased long term memory;Decreased recall of biographical information   Following Commands " Follows one step commands with increased time or repetition   Assessment   Limitation Decreased ADL status;Decreased cognition;Decreased endurance   Assessment Pt is a 83 y.o. female seen for OT evaluation s/p admit to [facility] on 5/6/[facility]24 w/ Stroke-like symptoms.  Comorbidities affecting pt's functional performance at time of assessment include: limited vision, previous surgery, limited cognition, cancer history, and dementia. Personal factors affecting pt at time of IE include:steps to enter environment, difficulty performing ADLS, difficulty performing IADLS , limited insight into deficits, decreased initiation and engagement , and health management . Prior to admission, pt was needing assistance with ADLs and was modified independent possibly with use of RW. Upon evaluation: Pt requires Minimal Assistance to Moderate Assistance with HHA during mobility, and Moderate Assistance during some basic self care 2* the following deficits impacting occupational performance: decreased strength, decreased balance, decreased tolerance, impaired memory, impaired sequencing, and impaired problem solving. Pt to benefit from continued skilled OT tx while in the hospital to address deficits as defined above and maximize level of functional independence w ADL's and functional mobility. Occupational Performance areas to address include: grooming, bathing/shower, toilet hygiene, dressing, and functional mobility. From OT standpoint, recommendation at time of d/c would be Level 2 Moderate Resource Intensity.   Goals   Patient Goals to go home   Plan   Treatment Interventions ADL retraining;Functional transfer training;Endurance training;Cognitive reorientation;Patient/family training   Goal Expiration Date 05/21/24   OT Treatment Day 0   OT Frequency 3-5x/wk   Discharge Recommendation   Rehab Resource Intensity Level, OT II (Moderate Resource Intensity)   AM-PAC Daily Activity Inpatient   Lower Body Dressing 2   Bathing 2   Toileting 3    Upper Body Dressing 3   Grooming 3   Eating 4   Daily Activity Raw Score 17   Daily Activity Standardized Score (Calc for Raw Score >=11) 37.26   AM-PAC Applied Cognition Inpatient   Following a Speech/Presentation 3   Understanding Ordinary Conversation 3   Taking Medications 1   Remembering Where Things Are Placed or Put Away 2   Remembering List of 4-5 Errands 1   Taking Care of Complicated Tasks 1   Applied Cognition Raw Score 11   Applied Cognition Standardized Score 27.03   Additional Treatment Session   Start Time 0900   End Time 0910   Treatment Assessment Pt seen this date for skilled OT session focused on ADLs, functional transfers and mobility, safety education. The patient was received long sitting in bed, NAD, PIV access, tele, on RA. She participated in functional bed mobility, toileting, grooming while seated, and functional ambulation in the room. Pt required Minimal Assistance to Moderate Assistance with HHA during mobility and ambulation in the room, Minimal Assistance during toileting, Moderate Assistance for LB dressing, and supervision with set up for seated grooming ADL. At end of session the patient was located seated in bed side chair with call bell in reach and all needs met. Chair alarm activated. Overall the patient remains below her functional baseline, and is primarily limited at this time due to impaired cognition, generalized deconditioning, and impaired balance. OT will continue to follow while acute to address POC. At this time, recommend Level 2 Moderate Resource Intensity upon d/c.   Additional Treatment Day 1       Goals: to be met by 5/21/24    Patient will perform functional bed mobility with modified independence, with HOB flat, no rails  Patient will perform functional transfers with Standby Assistance using LRAD in preparation for ADL tasks, with good safety awareness  Patient will perform UB dressing task with Standby Assistance while seated, with set up  Patient will  perform LB dressing task with Minimal Assistance   Patient will perform toilet transfer with Standby Assistance  Patient will perform toileting with Standby Assistance, including hygiene and clothing management   Patient will improve activity tolerance by participating in 25 minutes of session at a time in preparation for participation in ADL tasks  Patient will identify 3 potential fall hazards and identify compensatory techniques to decrease fall risk in the home environment  Patient will attend to 100% of cognitive task during session          Iván Bowers OTR/L

## 2024-05-07 NOTE — CASE MANAGEMENT
Case Management Assessment & Discharge Planning Note    Patient name Aurelia Jaime  Location 2 [facility] [address] 254-01 MRN [id number]  : 1940 Date 2024       Current Admission Date: 2024  Current Admission Diagnosis:dizziness   Patient Active Problem List    Diagnosis Date Noted    dizziness 2024    Simple chronic bronchitis (HCC) 2024    Alzheimer's dementia with behavioral disturbance (HCC) 2024    SOB (shortness of breath) on exertion 2020    Multiple lung nodules 2020    History of lung cancer 2019    Encounter for follow-up surveillance of lung cancer 2019    Primary adenocarcinoma of right lung (HCC) 2019    Lung nodule 2019    Lung nodule, solitary 2018    Right lower lobe lung mass 2018    Papilloma of left breast 2018    Atypical ductal hyperplasia of left breast 10/25/2018      LOS (days): 0  Geometric Mean LOS (GMLOS) (days):   Days to GMLOS:     OBJECTIVE:     Current admission status: Observation  Referral Reason: Stroke    Preferred Pharmacy:   [id number] - [place], [place] - [address]  [address] [address]  Phone: [phone] Fax: [phone]    Primary Care Provider: Karel Robert  Primary Insurance: [facility]  Secondary Insurance:     ASSESSMENT:  Active Health Care Proxies    There are no active Health Care Proxies on file.       Advance Directives  Does patient have a Health Care POA?: Yes  Does patient have Advance Directives?: Yes  Advance Directives: Living will, Power of  for health care  Primary Contact: Ragini Matias (Daughter)   [phone]    Readmission Root Cause  30 Day Readmission: No    Patient Information  Admitted from:: Home  Mental Status: Alert  During Assessment patient was accompanied by: Not accompanied during assessment  Assessment information provided by:: Patient  Primary Caregiver: Self  Support Systems: Self, Daughter, Son, Family  members  [facility]: [place]  [place] [facility] do you live in?: [place]  Home entry access options. Select all that apply.: Stairs  Number of steps to enter home.: 2  Type of Current Residence: 2 story home  Upon entering residence, is there a bedroom on the main floor (no further steps)?: Yes  Upon entering residence, is there a bathroom on the main floor (no further steps)?: Yes  Living Arrangements: Lives w/ Son  Is patient a ?: No    Activities of Daily Living Prior to Admission  Functional Status: Independent  Completes ADLs independently?: Yes  Ambulates independently?: Yes  Does patient use assisted devices?: No  Does patient currently own DME?: No  Does patient have a history of Outpatient Therapy (PT/OT)?: No  Does the patient have a history of Short-Term Rehab?: No  Does patient have a history of HHC?: No  Does patient currently have HHC?: No    Patient Information Continued  Does patient have prescription coverage?: Yes  Does patient receive dialysis treatments?: No  Does patient have a history of substance abuse?: No  Does patient have a history of Mental Health Diagnosis?: No    PHQ 2/9 Screening   Reviewed PHQ 2/9 Depression Screening Score?: No    Means of Transportation  Means of Transport to Appts:: Family transport      Social Determinants of Health (SDOH)      Flowsheet Row Most Recent Value   Housing Stability    In the last 12 months, was there a time when you were not able to pay the mortgage or rent on time? N   In the last 12 months, how many places have you lived? 1   In the last 12 months, was there a time when you did not have a steady place to sleep or slept in a shelter (including now)? N   Transportation Needs    In the past 12 months, has lack of transportation kept you from medical appointments or from getting medications? no   In the past 12 months, has lack of transportation kept you from meetings, work, or from getting things needed for daily living? No   Food  Insecurity    Within the past 12 months, you worried that your food would run out before you got the money to buy more. Never true   Within the past 12 months, the food you bought just didn't last and you didn't have money to get more. Never true   Utilities    In the past 12 months has the electric, gas, oil, or water company threatened to shut off services in your home? No       DISCHARGE DETAILS:    Discharge planning discussed with:: Patient at bedside.  Freedom of Choice: Yes  Comments - Freedom of Choice: FOC maintained - CM met with patient at bedside for CM assessment.  PT/OT rec not in at time of assessment.  [facility].  Patient did not feel services would be needed but was agreeable to HHC if it was recommended.  CM contacted dtr for review as well, dtr does not feel STR is needed.  CM contacted family/caregiver?: Yes  Were Treatment Team discharge recommendations reviewed with patient/caregiver?: Yes (As it pertains to d/c planning and CM role.)  Did patient/caregiver verbalize understanding of patient care needs?: Yes (As it pertains to d/c planning and CM role.)  Were patient/caregiver advised of the risks associated with not following Treatment Team discharge recommendations?: Yes (As it pertains to d/c planning and CM role.)    Contacts  Patient Contacts: Ragini (dtr)  Relationship to Patient:: Family  Contact Method: Phone  Phone Number: [phone]  Reason/Outcome: Continuity of Care, Discharge Planning    Requested Home Health Care         Is the patient interested in HHC at discharge?: No    DME Referral Provided  Referral made for DME?: No    Other Referral/Resources/Interventions Provided:  Interventions: None Indicated  Programs:: COPD    Treatment Team Recommendation: Short Term Rehab, Home with Home Health Care (Level II rec)  Discharge Destination Plan:: Home with Home Health Care, Home (HHC vs home)  Transport at Discharge : Family

## 2024-05-07 NOTE — INCIDENTAL FINDINGS
The following findings require follow up:  Radiographic finding   Findin2024 MRA Neck: No dissection in left vertebral artery distal V2 segment, as clinically questioned. Earlier same day CTA findings likely present tiny fenestration in left vertebral artery distal V2 segment.     Similar moderate stenosis (50-70% narrowing) in bilateral ICA proximal cervical segments due to atherosclerotic disease.   Follow up required: Yes   Follow up should be done within  1 month(s)    Please notify the following clinician to assist with the follow up:   Primary care provider

## 2024-05-07 NOTE — ASSESSMENT & PLAN NOTE
Patient admitted for dizziness, orthostatic BPs checked  Laying 157/75, sitting 138/71, standing 126/73  5/6 EKG NSR, no events on telemetry  Ordered compression stockings  Educated patient and family on importance of standing slowly and pausing to prevent falls  Declines rehab or outpatient PT at this time but will pursue it as needed through PCP

## 2024-05-07 NOTE — PLAN OF CARE
Problem: Potential for Falls  Goal: Patient will remain free of falls  Description: INTERVENTIONS:  - Educate patient/family on patient safety including physical limitations  - Instruct patient to call for assistance with activity   - Consult OT/PT to assist with strengthening/mobility   - Keep Call bell within reach  - Keep bed low and locked with side rails adjusted as appropriate  - Keep care items and personal belongings within reach  - Initiate and maintain comfort rounds  - Make Fall Risk Sign visible to staff  - Offer Toileting every 2 Hours, in advance of need  - Initiate/Maintain bed alarm  - Obtain necessary fall risk management equipment:   - Apply yellow socks and bracelet for high fall risk patients  - Consider moving patient to room near nurses station  5/7/2024 1032 by Monie Ansari  Outcome: Progressing  5/7/2024 1032 by Monie Ansari  Outcome: Progressing  5/7/2024 1014 by Monie Ansari  Outcome: Progressing  5/7/2024 1014 by Monie Ansari  Outcome: Progressing     Problem: Prexisting or High Potential for Compromised Skin Integrity  Goal: Skin integrity is maintained or improved  Description: INTERVENTIONS:  - Identify patients at risk for skin breakdown  - Assess and monitor skin integrity  - Assess and monitor nutrition and hydration status  - Monitor labs   - Assess for incontinence   - Turn and reposition patient  - Assist with mobility/ambulation  - Relieve pressure over bony prominences  - Avoid friction and shearing  - Provide appropriate hygiene as needed including keeping skin clean and dry  - Evaluate need for skin moisturizer/barrier cream  - Collaborate with interdisciplinary team   - Patient/family teaching  - Consider wound care consult   5/7/2024 1032 by Monie Ansari  Outcome: Progressing  5/7/2024 1032 by Monie Ansari  Outcome: Progressing  5/7/2024 1014 by Monie Ansari  Outcome: Progressing  5/7/2024 1014 by Monie Ansari  Outcome: Progressing      Problem: Neurological Deficit  Goal: Neurological status is stable or improving  Description: Interventions:  - Monitor and assess patient's level of consciousness, motor function, sensory function, and level of assistance needed for ADLs.   - Monitor and report changes from baseline. Collaborate with interdisciplinary team to initiate plan and implement interventions as ordered.   - Provide and maintain a safe environment.  - Consider seizure precautions.  - Consider fall precautions.  - Consider aspiration precautions.  - Consider bleeding precautions.  5/7/2024 1032 by Monie Ansari  Outcome: Progressing  5/7/2024 1032 by Monie Ansari  Outcome: Progressing  5/7/2024 1014 by Monie Ansari  Outcome: Progressing  5/7/2024 1014 by Monie Ansari  Outcome: Progressing     Problem: Activity Intolerance/Impaired Mobility  Goal: Mobility/activity is maintained at optimum level for patient  Description: Interventions:  - Assess and monitor patient  barriers to mobility and need for assistive/adaptive devices.  - Assess patient's emotional response to limitations.  - Collaborate with interdisciplinary team and initiate plans and interventions as ordered.  - Encourage independent activity per ability.  - Maintain proper body alignment.  - Perform active/passive rom as tolerated/ordered.  - Plan activities to conserve energy.  - Turn patient as appropriate  5/7/2024 1032 by Monie Ansari  Outcome: Progressing  5/7/2024 1032 by Monie Ansari  Outcome: Progressing  5/7/2024 1014 by Monie Ansari  Outcome: Progressing  5/7/2024 1014 by Monie Ansari  Outcome: Progressing     Problem: Communication Impairment  Goal: Ability to express needs and understand communication  Description: Assess patient's communication skills and ability to understand information.  Patient will demonstrate use of effective communication techniques, alternative methods of communication and understanding even if not able to  speak.     - Encourage communication and provide alternate methods of communication as needed.  - Collaborate with case management/ for discharge needs.  - Include patient/family/caregiver in decisions related to communication.  5/7/2024 1032 by Monie Ansari  Outcome: Progressing  5/7/2024 1032 by Monie Ansari  Outcome: Progressing  5/7/2024 1014 by Monie Ansari  Outcome: Progressing  5/7/2024 1014 by Monie Ansari  Outcome: Progressing     Problem: Potential for Aspiration  Goal: Non-ventilated patient's risk of aspiration is minimized  Description: Assess and monitor vital signs, respiratory status, and labs (WBC).  Monitor for signs of aspiration (tachypnea, cough, rales, wheezing, cyanosis, fever).    - Assess and monitor patient's ability to swallow.  - Place patient up in chair to eat if possible.  - HOB up at 90 degrees to eat if unable to get patient up into chair.  - Supervise patient during oral intake.   - Instruct patient/ family to take small bites.  - Instruct patient/ family to take small single sips when taking liquids.  - Follow patient-specific strategies generated by speech pathologist.  5/7/2024 1032 by Monie Ansari  Outcome: Progressing  5/7/2024 1032 by Monie Ansari  Outcome: Progressing  5/7/2024 1014 by Monie Ansari  Outcome: Progressing  5/7/2024 1014 by Monie Asnari  Outcome: Progressing  Goal: Ventilated patient's risk of aspiration is minimized  Description: Assess and monitor vital signs, respiratory status, airway cuff pressure, and labs (WBC).  Monitor for signs of aspiration (tachypnea, cough, rales, wheezing, cyanosis, fever).    - Elevate head of bed 30 degrees if patient has tube feeding.  - Monitor tube feeding.  5/7/2024 1032 by Monie Ansari  Outcome: Progressing  5/7/2024 1032 by Monie Ansari  Outcome: Progressing  5/7/2024 1014 by Monie Ansari  Outcome: Progressing  5/7/2024 1014 by Monie Ansari  Outcome:  Progressing     Problem: Nutrition  Goal: Nutrition/Hydration status is improving  Description: Monitor and assess patient's nutrition/hydration status for malnutrition (ex- brittle hair, bruises, dry skin, pale skin and conjunctiva, muscle wasting, smooth red tongue, and disorientation). Collaborate with interdisciplinary team and initiate plan and interventions as ordered.  Monitor patient's weight and dietary intake as ordered or per policy. Utilize nutrition screening tool and intervene per policy. Determine patient's food preferences and provide high-protein, high-caloric foods as appropriate.     - Assist patient with eating.  - Allow adequate time for meals.  - Encourage patient to take dietary supplement as ordered.  - Collaborate with clinical nutritionist.  - Include patient/family/caregiver in decisions related to nutrition.  5/7/2024 1032 by Monie Ansari  Outcome: Progressing  5/7/2024 1032 by Monie Ansari  Outcome: Progressing  5/7/2024 1014 by Monie Ansari  Outcome: Progressing  5/7/2024 1014 by Monie Ansari  Outcome: Progressing

## 2024-05-07 NOTE — ASSESSMENT & PLAN NOTE
Acute onset vertigo  Initial neuro imaging concerning vertebral artery dissection vs fenestration, atherosclerotic disease  Neurology consulted and stroke pathway initiated  not a candidate for TNK  Permissive hypertension  Increase Rosuvastatin to 10 mg daily at DC  Continue ASA 81 mg  Ok to discharge per neuro  MRI brain: No large vessel occlusion. Mild stenosis P1 segment of the right posterior cerebral artery  PT/OT recommending STR, daughter would like to take patient home and will follow up with PT as needed  Follow up with neurology in 08/2024

## 2024-05-07 NOTE — PLAN OF CARE
Problem: Potential for Falls  Goal: Patient will remain free of falls  Description: INTERVENTIONS:  - Educate patient/family on patient safety including physical limitations  - Instruct patient to call for assistance with activity   - Consult OT/PT to assist with strengthening/mobility   - Keep Call bell within reach  - Keep bed low and locked with side rails adjusted as appropriate  - Keep care items and personal belongings within reach  - Initiate and maintain comfort rounds  - Make Fall Risk Sign visible to staff  - Offer Toileting every 4 Hours, in advance of need  - Initiate/Maintain bed alarm  - Obtain necessary fall risk management equipment: walker  - Apply yellow socks and bracelet for high fall risk patients  - Consider moving patient to room near nurses station  Outcome: Progressing     Problem: Neurological Deficit  Goal: Neurological status is stable or improving  Description: Interventions:  - Monitor and assess patient's level of consciousness, motor function, sensory function, and level of assistance needed for ADLs.   - Monitor and report changes from baseline. Collaborate with interdisciplinary team to initiate plan and implement interventions as ordered.   - Provide and maintain a safe environment.  - Consider seizure precautions.  - Consider fall precautions.  - Consider aspiration precautions.  - Consider bleeding precautions.  Outcome: Progressing

## 2024-05-07 NOTE — DISCHARGE SUMMARY
[facility]  Discharge- Aurelia Jaime 1940, 83 y.o. female MRN: [id number]  Unit/Bed#: 2 E 254-01 Encounter: [id number]  Primary Care Provider: Karel Robert   Date and time admitted to hospital: 5/6/2024  8:59 AM    * dizziness  Assessment & Plan  Acute onset vertigo  Initial neuro imaging concerning vertebral artery dissection vs fenestration, atherosclerotic disease  Neurology consulted and stroke pathway initiated  not a candidate for TNK  Permissive hypertension  Increase Rosuvastatin to 10 mg daily at DC  Continue ASA 81 mg  Ok to discharge per neuro  MRI brain: No large vessel occlusion. Mild stenosis P1 segment of the right posterior cerebral artery  PT/OT recommending STR, daughter would like to take patient home and will follow up with PT as needed  Follow up with neurology in 08/2024    Carotid stenosis, bilateral  Assessment & Plan  Bilateral ICA stenosis  5/6 CTA neck: CTA findings likely present tiny fenestration in left vertebral artery distal V2 segment. Similar moderate stenosis (50-70% narrowing) in bilateral ICA proximal cervical segments due to atherosclerotic disease.  Per neurology, no intervention at this time.  Recommend outpatient Vascular Surgery referral, order placed    Orthostatic hypotension  Assessment & Plan  Patient admitted for dizziness, orthostatic BPs checked  Laying 157/75, sitting 138/71, standing 126/73  5/6 EKG NSR, no events on telemetry  Ordered compression stockings  Educated patient and family on importance of standing slowly and pausing to prevent falls  Declines rehab or outpatient PT at this time but will pursue it as needed through PCP    Alzheimer's dementia with behavioral disturbance (HCC)  Assessment & Plan  Chronic, at baseline per daughter  Continue home meds      Medical Problems       Resolved Problems  Date Reviewed: 5/7/2024   None       Discharging Physician / Practitioner: ADELINE Henriquez  PCP: Karel  [facility]  Admission Date:   Admission Orders (From admission, onward)       Ordered        05/06/24 1048  Place in Observation  Once                          Discharge Date: 05/07/24    Consultations During Hospital Stay:  Neurology, PT/OT    Procedures Performed:   None    Significant Findings / Test Results:   MRA carotids wo and w contrast  Result Date: 5/6/2024  No dissection in left vertebral artery distal V2 segment, as clinically questioned. Earlier same day CTA findings likely present tiny fenestration in left vertebral artery distal V2 segment. Similar moderate stenosis (50-70% narrowing) in bilateral ICA proximal cervical segments due to atherosclerotic disease. Normal right vertebral artery. Workstation performed: [id number]   MRI brain w wo contrast  Result Date: 5/6/2024  Moderate motion artifact on postcontrast sequences. - No acute intracranial abnormality. - Mild chronic microangiopathy.  MRA head wo contrast  Result Date: 5/6/2024  No large vessel occlusion. Mild stenosis P1 segment of the right posterior cerebral artery. No large vessel occlusion.  X-ray chest 1 view portable  Result Date: 5/6/2024  No acute cardiopulmonary disease.  CTA stroke alert (head/neck)  Result Date: 5/6/2024  No large intracranial arterial occlusion. Short segment dissection versus fenestration noted in the left vertebral artery at the C2/C3 vertebral disc level. Suspected stenosis of a proximal right M2/M3 branch, may be related to intracranial atherosclerotic disease. Moderate stenosis at the bilateral carotid bulbs/proximal internal carotid arteries without significant stenosis per NASCET criteria. Bilateral groundglass nodules with increasing solid component of a left upper lobe nodule, in comparison to a February 2023 exam. Based on current Fleischner Society 2017 Guidelines on incidental pulmonary nodule, follow-up noncontrast CT is recommended at 3-6 months from the initial examination to confirm persistence; if  "stable at that time (solid component remains <6mm), additional follow-up CT is recommended annually until 5 years of stability is demonstrated.  CT stroke alert brain  Result Date: 5/6/2024  No acute intracranial abnormality. 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    Incidental Findings:   See above  I reviewed the above mentioned incidental findings with the patient and/or family and they expressed understanding.    Test Results Pending at Discharge (will require follow up):   None     Outpatient Tests Requested:  None    Complications:  None    Reason for Admission: Dizziness    Hospital Course:   Aurelia Jaime is a 83 y.o. female patient who originally presented to the hospital on 5/6/2024 due to dizziness.  She was admitted on stroke pathway, not a TNK candidate.  MRI negative for stroke but was found to have bilateral carotid stenosis.  Plan to follow up with vascular surgery outpatient.  Found to have orthostatic hypotension, plan to wear compression stockings and change position slowly.  Ok to discharge per neuro.  PT/OT recommending STR but daughter prefers to take patient home.  Please see above list of diagnoses and related plan for additional information.     Condition at Discharge: good    Discharge Day Visit / Exam:   Subjective:  Patient is pleasantly confused and has no complaints besides the masimo monitor on her arm.  No dizziness while laying in bed.  No pain or complaints at this time.  Vitals: Blood Pressure: 154/89 (05/07/24 1114)  Pulse: 89 (05/07/24 0905)  Temperature: 97.7 °F (36.5 °C) (05/07/24 0700)  Temp Source: Oral (05/07/24 0700)  Respirations: 18 (05/07/24 0700)  Height: 5' 5\" (165.1 cm) (05/06/24 1445)  Weight - Scale: 66.7 kg (147 lb) (05/06/24 1445)  SpO2: 96 % (05/07/24 0905)  Exam:   Physical Exam  Constitutional:       Appearance: Normal appearance. She is normal weight.   HENT:      Head: Normocephalic and atraumatic.      Mouth/Throat:      Mouth: " Mucous membranes are moist.      Pharynx: Oropharynx is clear.   Cardiovascular:      Rate and Rhythm: Normal rate and regular rhythm.      Pulses: Normal pulses.      Heart sounds: Normal heart sounds.   Pulmonary:      Effort: Pulmonary effort is normal. No respiratory distress.      Breath sounds: Normal breath sounds.   Abdominal:      General: Bowel sounds are normal.      Palpations: Abdomen is soft.   Musculoskeletal:         General: Normal range of motion.   Skin:     General: Skin is warm and dry.   Neurological:      Mental Status: She is alert. Mental status is at baseline. She is confused.   Psychiatric:         Mood and Affect: Mood normal.         Behavior: Behavior normal.          Discussion with Family: Updated  (daughter) at bedside.    Discharge instructions/Information to patient and family:   See after visit summary for information provided to patient and family.      Provisions for Follow-Up Care:  See after visit summary for information related to follow-up care and any pertinent home health orders.      Mobility at time of Discharge:   Basic Mobility Inpatient Raw Score: 18  JH-HLM Goal: 6: Walk 10 steps or more  JH-HLM Achieved: 6: Walk 10 steps or more  HLM Goal achieved. Continue to encourage appropriate mobility.     Disposition:   Home    Planned Readmission: None     Discharge Statement:  I spent 50 minutes discharging the patient. This time was spent on the day of discharge. I had direct contact with the patient on the day of discharge. Greater than 50% of the total time was spent examining patient, answering all patient questions, arranging and discussing plan of care with patient as well as directly providing post-discharge instructions.  Additional time then spent on discharge activities.    Discharge Medications:  See after visit summary for reconciled discharge medications provided to patient and/or family.      **Please Note: This note may have been constructed  using a voice recognition system**

## 2024-05-07 NOTE — PROGRESS NOTES
Progress Note - Neurology   Aurelia Jaime 83 y.o. female [id number]  Unit/Bed#: 2 EAST 254/2 E 254-01    Assessment/Plan:  Aurelia Jaime is a 83 y.o. female    * dizziness  Assessment & Plan  83 y.o.  female former smoker with dementia, hld and lung cancer s/p lobectomy who presented to [facility] ED 5/6/24 with dizziness. Stroke alert initiated in the ED with NIHSS reported as 0. Initial /79, noted as high as 214/89. CTH/CTA without acute intracranial findings; possible left v2 filling defect noted. Deemed not a TNK or thrombectomy candidate. Loaded with asa 325mg and plavix 300mg and admitted on stroke pathway for additional work-up.  Neuroimaging/work-up:  5/6/24 CTH:  No acute intracranial abnormality. 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   5/6/24 CTA:  No large intracranial arterial occlusion.  Short segment dissection versus fenestration noted in the left vertebral artery at the C2/C3 vertebral disc level.  Suspected stenosis of a proximal right M2/M3 branch, may be related to intracranial atherosclerotic disease.  Moderate stenosis at the bilateral carotid bulbs/proximal internal carotid arteries without significant stenosis per NASCET criteria.  Bilateral groundglass nodules with increasing solid component of a left upper lobe nodule, in comparison to a February 2023 exam.   5/6/24 MRI brain w/wo:  Moderate motion artifact on postcontrast sequences.  - No acute intracranial abnormality.  - Mild chronic microangiopathy.  5/6/24 MRA head wo:  No large vessel occlusion. Mild stenosis P1 segment of the right posterior cerebral artery. No large vessel occlusion.  5/6/24 MRA carotids w/wo:  No dissection in left vertebral artery distal V2 segment, as clinically questioned. Earlier same day CTA findings likely present tiny fenestration in left vertebral artery distal V2 segment.  Similar moderate stenosis (50-70% narrowing) in bilateral ICA proximal cervical segments due to  "atherosclerotic disease.  Normal right vertebral artery.  5/6/24 ECHO:    This was a technically difficult study.    Left Ventricle: Left ventricular cavity size is normal. Wall thickness is mildly increased. The left ventricular ejection fraction is 60%. Systolic function is normal. Wall motion is normal. Diastolic function is normal.    Mitral Valve: There is mild annular calcification.  -A1c 5.4  -  Recommendations:  Stroke work-up completed; no acute infarct or dissection. Suspect hypertensive vs bppv as etiology of presenting symptoms  Bilateral ICA stenosis noted  D/C plavix  Continue asa 81mg daily  Increase outpatient crestor to 10mg daily ()  Ensure adequate hydration  PRN meclizine  Goal normotension; avoid hypotension or changing position quickly  Outpatient referral to vascular surgery to evaluate ICA stenosis  Consider outpatient vestibular therapy/PT eval if dizziness persists  No additional inpatient neurology recommendations at this time  - Medical management and correction of any metabolic or infectious disturbances per primary service.     Patient has outpatient neurology appointment scheduled 8/21/24 with Dr. Agarwal    Subjective:   Feeling better this morning. Still feels \"funny\", a little lightheaded at times but not as much. Denies motor/sensory deficits, speech difficulty    Past Medical History:   Diagnosis Date    Abnormal chest x-ray     Abnormal mammogram     COPD (chronic obstructive pulmonary disease) (HCC)     Dementia (HCC)     Early stage     Ductal hyperplasia of breast     Left     Hyperlipidemia     diet controled    Nodule of lower lobe of right lung     Right Lower Lobe     SOB (shortness of breath)     SOB (shortness of breath)     Wheezing      Past Surgical History:   Procedure Laterality Date    BREAST BIOPSY Left 10/12/2018    BREAST CYST EXCISION Left 10/31/2018    BREAST LUMPECTOMY Left 11/13/2018    Procedure: BREAST LUMPECTOMY; BREAST NEEDLE LOCALIZATION " (NEEDLE LOC AT 0830);  Surgeon: Mian Valenzuela MD;  Location: [facility];  Service: Surgical Oncology    COLONOSCOPY      LAPAROSCOPY      MAMMO NEEDLE LOCALIZATION LEFT (ALL INC) Left 2018    MAMMO STEREOTACTIC BREAST BIOPSY LEFT (ALL INC) Left 10/16/2018    AK BRNCHSC INCL FLUOR GDNCE DX W/CELL WASHG SPX Right 2019    Procedure: BRONCHOSCOPY FLEXIBLE,;  Surgeon: Julia Mccord MD;  Location: [facility];  Service: Thoracic    AK MEDIASTINOSCOPY WITH LYMPH NODE BIOPSY/IES Right 2019    Procedure: MEDIASTINOSCOPY;  Surgeon: Julia Mccord MD;  Location: [facility];  Service: Thoracic    AK THORACOSCOPY W/LOBECTOMY SINGLE LOBE Right 2019    Procedure: LOBECTOMY LUNG;  Surgeon: Julia Mccord MD;  Location: [facility];  Service: Thoracic    TRIGGER FINGER RELEASE Right 10/10/2019    ULNAR NERVE TRANSPOSITION Right 10/10/2019     Family History   Problem Relation Age of Onset    Hypertension Mother     Glaucoma Mother     Macular degeneration Mother     Prostate cancer Father     Heart attack Brother      Social History     Socioeconomic History    Marital status:      Spouse name: None    Number of children: None    Years of education: None    Highest education level: None   Occupational History    Occupation: retired   Tobacco Use    Smoking status: Former     Current packs/day: 0.00     Average packs/day: 1 pack/day for 60.0 years (60.0 ttl pk-yrs)     Types: Cigarettes     Start date: 1958     Quit date: 2016     Years since quittin.0    Smokeless tobacco: Never   Vaping Use    Vaping status: Never Used   Substance and Sexual Activity    Alcohol use: Yes     Alcohol/week: 2.0 standard drinks of alcohol     Types: 2 Cans of beer per week     Comment: occasional    Drug use: Never    Sexual activity: Not Currently   Other Topics Concern    None   Social History Narrative    None     Social Determinants of Health     Financial Resource Strain: Not on file   Food  "Insecurity: No Food Insecurity (5/6/2024)    Hunger Vital Sign     Worried About Running Out of Food in the Last Year: Never true     Ran Out of Food in the Last Year: Never true   Transportation Needs: No Transportation Needs (5/6/2024)    PRAPARE - Transportation     Lack of Transportation (Medical): No     Lack of Transportation (Non-Medical): No   Physical Activity: Not on file   Stress: Not on file   Social Connections: Not on file   Intimate Partner Violence: Not on file   Housing Stability: Low Risk  (5/6/2024)    Housing Stability Vital Sign     Unable to Pay for Housing in the Last Year: No     Number of Places Lived in the Last Year: 1     Unstable Housing in the Last Year: No     E-Cigarette/Vaping    E-Cigarette Use Never User      E-Cigarette/Vaping Substances    Nicotine No     THC No     CBD No     Flavoring No     Other No     Unknown No      Medications:  All current active meds have been reviewed and current meds:  Scheduled Meds:  Current Facility-Administered Medications   Medication Dose Route Frequency Provider Last Rate    aspirin  81 mg Oral Daily Nicolas Ruffin MD      atorvastatin  40 mg Oral QPM Nicolas Ruffin MD      heparin (porcine)  5,000 Units Subcutaneous Q8H [facility] Nicolas Ruffin MD      meclizine  25 mg Oral Q8H PRN Jodee Melo DO       Continuous Infusions:   PRN Meds:.  meclizine     ROS:   Review of Systems  See above    Vitals:   /73 (BP Location: Left arm)   Pulse 89   Temp 97.7 °F (36.5 °C) (Oral)   Resp 18   Ht 5' 5\" (1.651 m)   Wt 66.7 kg (147 lb)   SpO2 96%   BMI 24.46 kg/m²     Physical Exam:   Physical Exam  Vitals reviewed.   Constitutional:       General: She is not in acute distress.  HENT:      Head: Normocephalic and atraumatic.   Pulmonary:      Effort: Pulmonary effort is normal.   Neurological:      Mental Status: She is alert.      Cranial Nerves: Cranial nerves 2-12 are intact.      Motor: Motor strength is normal.     " "Coordination: Finger-Nose-Finger Test normal.   Psychiatric:         Speech: Speech normal.       Neurologic Exam     Mental Status   Follows 3 step commands.   Attention: normal. Concentration: normal.   Speech: speech is normal   Level of consciousness: alert  Able to name object. Able to repeat.   Oriented to self, place and birth date, not age     Cranial Nerves   Cranial nerves II through XII intact.     Motor Exam     Strength   Strength 5/5 throughout.     Gait, Coordination, and Reflexes     Coordination   Finger to nose coordination: normal    Tremor   Resting tremor: absent      Labs: I have personally reviewed pertinent reports.   Recent Results (from the past 24 hour(s))   UA (URINE) with reflex to Scope    Collection Time: 05/06/24 11:32 AM   Result Value Ref Range    Color, UA Colorless     Clarity, UA Clear     Specific Gravity, UA 1.025 1.003 - 1.030    pH, UA 7.5 4.5, 5.0, 5.5, 6.0, 6.5, 7.0, 7.5, 8.0    Leukocytes, UA Negative Negative    Nitrite, UA Negative Negative    Protein, UA Negative Negative mg/dl    Glucose, UA Negative Negative mg/dl    Ketones, UA Negative Negative mg/dl    Urobilinogen, UA <2.0 <2.0 mg/dl mg/dl    Bilirubin, UA Negative Negative    Occult Blood, UA Negative Negative   HS Troponin I 2hr    Collection Time: 05/06/24 11:32 AM   Result Value Ref Range    hs TnI 2hr 6 \"Refer to ACS Flowchart\"- see link ng/L    Delta 2hr hsTnI 0 <20 ng/L   Echo complete w/ contrast if indicated    Collection Time: 05/06/24  3:00 PM   Result Value Ref Range    Triscuspid Valve Regurgitation Peak Gradient 18.0 mmHg    LA Volume Index (BP) 21.8 mL/m2    MV Peak A Stevo 0.81 m/s    MV stenosis pressure 1/2 time 83 ms    MV Peak E Stevo 83 cm/s    LVOT peak VTI 27 cm    LVOT peak stevo 1.22 m/s    E wave deceleration time 283 ms    E/A ratio 1.02     MV valve area p 1/2 method 2.65     AV LVOT peak gradient 6 mmHg    TR Peak Stevo 2.1 m/s    LVOT mn grad 4.0 mmHg    RVID d 3.5 cm    A4C EF 64 %    " Tricuspid valve peak regurgitation velocity 2.14 m/s    Tricuspid annular plane systolic excursion 3.00 cm    Asc Ao 3.2 cm    LA volume (BP) 38 mL    Left Atrium Area-systolic Four Chamber 13.4 cm2    Left Atrium Area-systolic Apical Two Chamber 18 cm2    MV E' Tissue Velocity Septal 11 cm/s    BSA 1.74 m2    LV EF 60    Magnesium    Collection Time: 05/07/24  5:38 AM   Result Value Ref Range    Magnesium 2.1 1.9 - 2.7 mg/dL   Comprehensive metabolic panel    Collection Time: 05/07/24  5:38 AM   Result Value Ref Range    Sodium 139 135 - 147 mmol/L    Potassium 3.8 3.5 - 5.3 mmol/L    Chloride 107 96 - 108 mmol/L    CO2 25 21 - 32 mmol/L    ANION GAP 7 4 - 13 mmol/L    BUN 14 5 - 25 mg/dL    Creatinine 0.82 0.60 - 1.30 mg/dL    Glucose 93 65 - 140 mg/dL    Glucose, Fasting 93 65 - 99 mg/dL    Calcium 8.9 8.4 - 10.2 mg/dL    AST 15 13 - 39 U/L    ALT 10 7 - 52 U/L    Alkaline Phosphatase 77 34 - 104 U/L    Total Protein 6.1 (L) 6.4 - 8.4 g/dL    Albumin 3.7 3.5 - 5.0 g/dL    Total Bilirubin 0.43 0.20 - 1.00 mg/dL    eGFR 67 ml/min/1.73sq m   CBC and differential    Collection Time: 05/07/24  5:38 AM   Result Value Ref Range    WBC 5.94 4.31 - 10.16 Thousand/uL    RBC 4.38 3.81 - 5.12 Million/uL    Hemoglobin 12.7 11.5 - 15.4 g/dL    Hematocrit 39.8 34.8 - 46.1 %    MCV 91 82 - 98 fL    MCH 29.0 26.8 - 34.3 pg    MCHC 31.9 31.4 - 37.4 g/dL    RDW 14.1 11.6 - 15.1 %    MPV 10.1 8.9 - 12.7 fL    Platelets 225 149 - 390 Thousands/uL    nRBC 0 /100 WBCs    Segmented % 62 43 - 75 %    Immature Grans % 0 0 - 2 %    Lymphocytes % 24 14 - 44 %    Monocytes % 9 4 - 12 %    Eosinophils Relative 4 0 - 6 %    Basophils Relative 1 0 - 1 %    Absolute Neutrophils 3.65 1.85 - 7.62 Thousands/µL    Absolute Immature Grans 0.02 0.00 - 0.20 Thousand/uL    Absolute Lymphocytes 1.42 0.60 - 4.47 Thousands/µL    Absolute Monocytes 0.54 0.17 - 1.22 Thousand/µL    Eosinophils Absolute 0.25 0.00 - 0.61 Thousand/µL    Basophils Absolute 0.06  0.00 - 0.10 Thousands/µL       Imaging: I have personally reviewed pertinent imaging in PACS and I have personally reviewed PACS reports.     EKG, Pathology, and Other Studies: I have personally reviewed pertinent reports.     Counseling / Coordination of Care  Assessment, images and plan reviewed with Dr. Gurrola. Plan discussed with patient and primary service. Please refer to attending attestation for additional recommendations

## 2024-05-07 NOTE — UTILIZATION REVIEW
Initial Clinical Review    Admission: Date/Time/Statement:   Admission Orders (From admission, onward)       Ordered        05/06/24 1048  Place in Observation  Once                          Orders Placed This Encounter   Procedures    Place in Observation     Standing Status:   Standing     Number of Occurrences:   1     Order Specific Question:   Level of Care     Answer:   Med Surg [16]     Order Specific Question:   Bed request comments     Answer:   tele     ED Arrival Information       Expected   -    Arrival   5/6/2024 08:58    Acuity   Immediate              Means of arrival   Ambulance    Escorted by   [facility]    Service   Hospitalist    Admission type   Emergency              Arrival complaint   DIZZINESS             Chief Complaint   Patient presents with    Dizziness     Pt BIB EMS from home. Pt reports dizziness as if the room is spinning/ Last known well approx [phone] per daughter       Initial Presentation: 83 y.o. female to ED from home via EMS w/ PMH of dementia, lung cancer who presents with dizziness acutely first noted this morning. First started at 6 AM and symptoms persisted so she came to the ED for further evaluation. In the ED, she was noted to have concerns for stroke like symptoms so stroke alert was initiated. She was not a candidate for TNK. Initial neuro imaging showing vertebral artery dissection, atherosclerotic disease .Admitted OBS status w/ stroke like sx , acute onset of vertigo . Plan for MRI / MRA brain , neuro consult , permissive HTN .     Anticipated Length of Stay/Certification Statement:  Patient will be admitted on an observation basis with an anticipated length of stay of less than 2 midnights secondary to stroke pathway .     5/6 Neuro consult   dizziness. Initial NIHSS for neurology 3 as stated above. CTH/CTA without any acute findings, possible L v2 filling defect noted. No TNK or thrombectomy candidate. Loaded with DAPT and admitted on stroke pathway . Impression:  Suspect Stroke vs HTN enechalopathy, pending Mri brain  Plan Stroke pathway,Echo,MRI and MRA head and neck   Lipid Panel and HBA1c.     5/7 Neuro Note   Mri brain negative for acute intracranial pathology. Echo: EF: 60 % LA normal. MRA head and negative obtained given initial concern for possible Dissection on CTA, however overall no significant findings, no dissection does have moderate stenosis in bilateral ICA. Plan Impression: Suspect HTN urgency vs peripheral etiology for initial symptom onset and presentation. Can not entirely exclude TIA. Recommend continuing with ASA 81 mg daily and increasing Crestor to 10 mg given LDL of 101. Normotensive goals. OP f/u w/ vascular for the moderate stenosis in bilateral ICA.     ED Triage Vitals   Temperature Pulse Respirations Blood Pressure SpO2   05/06/24 1047 05/06/24 0915 05/06/24 0915 05/06/24 0915 05/06/24 0915   98 °F (36.7 °C) 75 16 (!) 180/79 99 %      Temp Source Heart Rate Source Patient Position - Orthostatic VS BP Location FiO2 (%)   05/06/24 1047 05/06/24 1230 05/06/24 0919 05/06/24 0919 --   Oral Monitor Lying Right arm       Pain Score       05/06/24 0919       No Pain          Wt Readings from Last 1 Encounters:   05/06/24 66.7 kg (147 lb)     Additional Vital Signs:   05/07/24 09:05:35 -- 89 -- 126/73 91 96 % -- Standing - Orthostatic VS   05/07/24 09:04:01 -- 77 -- 138/71 93 97 % -- Sitting - Orthostatic VS   05/07/24 09:02:19 -- 71 -- 157/75 102 97 % -- Lying - Orthostatic VS   05/07/24 0700 97.7 °F (36.5 °C) 85 18 170/87 115 96 % -- Lying   05/07/24 0622 -- 85 -- 170/87 115 96 % -- --   05/07/24 0300 -- 79 -- 163/73 103 98 % -- --   05/07/24 02:59:27 -- 79 -- 163/73 103 98 % -- --   05/06/24 2300 -- 69 17 182/67 Abnormal  -- 98 % -- Lying   05/06/24 22:26:47 98.2 °F (36.8 °C) 80 17 182/67 Abnormal  105 95 % None (Room air) Lying   05/06/24 21:02:27 -- 65 -- -- -- 97 % -- --   05/06/24 2100 -- 71 18 182/86 Abnormal  118 98 % -- Lying   05/06/24 2000 --  74 -- -- -- 98 % -- --   05/06/24 1900 -- 80 18 155/62 93 97 % -- Lying   05/06/24 18:56:03 -- 64 -- -- -- 96 % -- --   05/06/24 17:01:19 -- 71 -- 186/89 Abnormal  121 97 % -- --   05/06/24 1700 98.4 °F (36.9 °C) 70 16 186/89 Abnormal  121 97 % None (Room air) --   05/06/24 1515 -- 65 20 163/72 103 98 % -- --   05/06/24 1500 -- 71 17 154/63 90 98 % -- --   05/06/24 1445 -- 65 17 189/81 Abnormal  117 99 % -- --   05/06/24 1430 -- 65 16 151/72 103 99 % -- --   05/06/24 1415 -- 68 16 149/70 101 98 % -- --   05/06/24 1405 -- 68 21 -- -- 98 % -- --   05/06/24 1400 -- 82 18 156/60 -- 98 % -- --   05/06/24 1355 -- 81 16 -- -- 99 % -- --   05/06/24 1350 -- 80 20 -- -- 99 % -- --   05/06/24 1345 -- 69 21 159/71 -- 98 % -- --   05/06/24 1340 -- 79 18 -- -- 98 % -- --   05/06/24 1335 -- 70 17 -- -- 98 % -- --   05/06/24 1330 -- 78 14 167/76 -- 98 % -- --   05/06/24 1325 -- 66 18 -- -- 97 % -- --   05/06/24 1320 -- 70 22 -- -- 99 % -- --   05/06/24 1315 -- 69 17 177/78 Abnormal  -- 99 % -- --   05/06/24 1310 -- 70 20 -- -- 98 % -- --   05/06/24 1305 -- 71 24 Abnormal  -- -- 98 % -- --   05/06/24 1300 -- 68 18 169/76 -- 97 % -- --   05/06/24 1255 -- 69 20 -- -- 99 % -- --   05/06/24 1250 -- 68 17 -- -- 99 % -- --   05/06/24 1245 -- 69 15 176/79 Abnormal  -- 99 % -- --   05/06/24 1240 -- 68 18 -- -- 99 % -- --   05/06/24 1235 -- 67 18 -- -- 99 % -- --   05/06/24 1230 -- 67 15 161/66 104 98 % None (Room air) Lying   05/06/24 1225 -- 63 27 Abnormal  -- -- 98 % -- --   05/06/24 1220 -- 62 10 Abnormal  -- -- 98 % -- --   05/06/24 1215 -- 65 16 178/82 Abnormal  -- 98 % -- --   05/06/24 1210 -- 62 13 -- -- 99 % -- --   05/06/24 1205 -- 62 17 -- -- 98 % -- --   05/06/24 1200 -- 61 18 159/69 -- 98 % -- --   05/06/24 1155 -- 61 13 -- -- 99 % -- --   05/06/24 1150 -- 62 15 -- -- 97 % -- --   05/06/24 1145 -- 62 14 169/79 -- 98 % -- --   05/06/24 1140 -- 62 14 -- -- 98 % -- --   05/06/24 1135 -- 74 13 -- -- 99 % -- --   05/06/24 1130 -- 67  19 170/76 -- 99 % -- --   05/06/24 1125 -- 67 14 -- -- 99 % -- --   05/06/24 1120 -- 74 16 -- -- 98 % -- --   05/06/24 1115 -- 69 22 169/75 -- 98 % -- --   05/06/24 1110 -- 69 17 -- -- 98 % -- --   05/06/24 1108 -- 70 21 176/63 Abnormal  -- 98 % -- --   05/06/24 1107 -- 70 18 194/83 Abnormal  -- 98 % -- --   05/06/24 1105 -- 71 29 Abnormal  -- -- 98 % -- --   05/06/24 1100 -- 71 18 208/83 Abnormal  -- 99 % -- --   05/06/24 1055 -- 71 27 Abnormal  -- -- 99 % -- --   05/06/24 1050 -- 62 16 -- -- 98 % -- --   05/06/24 1047 98 °F (36.7 °C) -- -- -- -- -- -- --   05/06/24 1045 -- 62 17 155/72 -- 98 % -- --   05/06/24 1040 -- 62 12 -- -- 99 % -- --   05/06/24 1035 -- 63 13 -- -- 98 % -- --   05/06/24 1030 -- 69 17 184/86 Abnormal  -- 100 % -- --   05/06/24 1025 -- 72 23 Abnormal  -- -- 99 % -- --   05/06/24 1020 -- 83 30 Abnormal  -- -- 99 % -- --   05/06/24 1015 -- 77 39 Abnormal  185/87 Abnormal  -- 100 % -- --   05/06/24 1010 -- 70 16 -- -- 99 % -- --   05/06/24 1005 -- 71 12 -- -- 99 % -- --   05/06/24 1000 -- 69 17 189/103 Abnormal  -- 98 % -- --   05/06/24 0955 -- 70 13 -- -- 98 % -- --   05/06/24 0950 -- 68 12 -- -- 99 % -- --   05/06/24 0945 -- 78 12 214/89 Abnormal  -- 100 % -- --   05/06/24 0940 -- 78 11 Abnormal  -- -- 97 % -- --   05/06/24 0936 -- 77 10 Abnormal  205/95 Abnormal  -- 99 % None (Room air) --   05/06/24 0935 -- 77 31 Abnormal  -- -- 99 % -- --   05/06/24 0930 -- 78 13 -- -- 97 % -- --   05/06/24 0925 -- 74 17 -- -- 99 % -- --   05/06/24 0921 -- -- -- -- -- 97 % -- --   05/06/24 0920 -- 75 14 -- -- -- -- --   05/06/24 0919 -- 85 18 -- -- 98 % None (Room air) Lying     Pertinent Labs/Diagnostic Test Results:   5/6 Echo   This was a technically difficult study.    Left Ventricle: Left ventricular cavity size is normal. Wall thickness is mildly increased. The left ventricular ejection fraction is 60%. Systolic function is normal. Wall motion is normal. Diastolic function is normal.    Mitral Valve:  There is mild annular calcification.  5/6 EKG Normal sinus rhythm  Normal ECG  MRA carotids wo and w contrast   Final Result by Clay Appiah MD (05/06 1840)      No dissection in left vertebral artery distal V2 segment, as clinically questioned. Earlier same day CTA findings likely present tiny fenestration in left vertebral artery distal V2 segment.      Similar moderate stenosis (50-70% narrowing) in bilateral ICA proximal cervical segments due to atherosclerotic disease.      Normal right vertebral artery.               Workstation performed: [id number]         MRI brain w wo contrast   Final Result by Clay Appiah MD (05/06 1833)      Moderate motion artifact on postcontrast sequences.   - No acute intracranial abnormality.   - Mild chronic microangiopathy.      Workstation performed: [id number]         MRA head wo contrast   Final Result by Markus Knowles DO (05/06 1653)      No large vessel occlusion. Mild stenosis P1 segment of the right posterior cerebral artery. No large vessel occlusion.         Workstation performed: [id number]         X-ray chest 1 view portable   ED Interpretation by Juana Roman DO (05/06 1004)   No acute abnormality in the chest.  Stable left lower lobe nodule.  No significant change from prior chest x-ray.      Final Result by Brian Smith DO (05/06 1353)      No acute cardiopulmonary disease.      Resident: Axel Thomas I, the attending radiologist, have reviewed the images and agree with the final report above.      Workstation performed: [id number]         CTA stroke alert (head/neck)   Final Result by Alexis José DO (05/06 1122)      No large intracranial arterial occlusion.      Short segment dissection versus fenestration noted in the left vertebral artery at the C2/C3 vertebral disc level.      Suspected stenosis of a proximal right M2/M3 branch, may be related to intracranial atherosclerotic disease.       Moderate stenosis at the bilateral carotid bulbs/proximal internal carotid arteries without significant stenosis per NASCET criteria.      Bilateral groundglass nodules with increasing solid component of a left upper lobe nodule, in comparison to a February 2023 exam. Based on current Fleischner Society 2017 Guidelines on incidental pulmonary nodule, follow-up noncontrast CT is recommended    at 3-6 months from the initial examination to confirm persistence; if stable at that time (solid component remains <6mm), additional follow-up CT is recommended annually until 5 years of stability is demonstrated.      Findings were directly discussed with   Dr. Margaret Gurrola at 9:45 a.m on 5/6/2024.                           Workstation performed: [id number]         CT stroke alert brain   Final Result by Alexis José DO (05/06 1009)      No acute intracranial abnormality. 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      Findings were directly discussed with   Dr. Kali Gurrola at approximately  9:45am on 5/6/24  .      Workstation performed: [id number]           Results from last 7 days   Lab Units 05/06/24  0922   SARS-COV-2  Negative     Results from last 7 days   Lab Units 05/07/24  0538 05/06/24  0922   WBC Thousand/uL 5.94 6.19   HEMOGLOBIN g/dL 12.7 12.5   HEMATOCRIT % 39.8 37.8   PLATELETS Thousands/uL 225 195   TOTAL NEUT ABS Thousands/µL 3.65 4.45         Results from last 7 days   Lab Units 05/07/24  0538 05/06/24  0922   SODIUM mmol/L 139 140   POTASSIUM mmol/L 3.8 4.2   CHLORIDE mmol/L 107 108   CO2 mmol/L 25 26   ANION GAP mmol/L 7 6   BUN mg/dL 14 16   CREATININE mg/dL 0.82 0.88   EGFR ml/min/1.73sq m 67 61   CALCIUM mg/dL 8.9 8.8   MAGNESIUM mg/dL 2.1 2.3     Results from last 7 days   Lab Units 05/07/24  0538 05/06/24  0922   AST U/L 15 16   ALT U/L 10 10   ALK PHOS U/L 77 73   TOTAL PROTEIN g/dL 6.1* 6.3*   ALBUMIN g/dL 3.7 3.8   TOTAL BILIRUBIN mg/dL 0.43 0.44   BILIRUBIN  DIRECT mg/dL  --  0.06     Results from last 7 days   Lab Units 05/06/24  0921   POC GLUCOSE mg/dl 101     Results from last 7 days   Lab Units 05/07/24  0538 05/06/24  0922   GLUCOSE RANDOM mg/dL 93 83         Results from last 7 days   Lab Units 05/06/24  0922   HEMOGLOBIN A1C % 5.4   EAG mg/dl 108       Results from last 7 days   Lab Units 05/06/24  1132 05/06/24  0922   HS TNI 0HR ng/L  --  6   HS TNI 2HR ng/L 6  --    HSTNI D2 ng/L 0  --      Results from last 7 days   Lab Units 05/06/24  0922   PROTIME seconds 13.5   INR  0.98   PTT seconds 27     Results from last 7 days   Lab Units 05/06/24  1132   CLARITY UA  Clear   COLOR UA  Colorless   SPEC GRAV UA  1.025   PH UA  7.5   GLUCOSE UA mg/dl Negative   KETONES UA mg/dl Negative   BLOOD UA  Negative   PROTEIN UA mg/dl Negative   NITRITE UA  Negative   BILIRUBIN UA  Negative   UROBILINOGEN UA (BE) mg/dl <2.0   LEUKOCYTES UA  Negative     Results from last 7 days   Lab Units 05/06/24  0922   INFLUENZA A PCR  Negative   INFLUENZA B PCR  Negative   RSV PCR  Negative       ED Treatment:   Medication Administration from 05/06/2024 0858 to 05/06/2024 1654         Date/Time Order Dose Route Action     05/06/2024 1015 EDT meclizine (ANTIVERT) tablet 25 mg 25 mg Oral Given     05/06/2024 0942 EDT sodium chloride 0.9 % bolus 500 mL 500 mL Intravenous New Bag     05/06/2024 1018 EDT labetalol (NORMODYNE) injection 10 mg 10 mg Intravenous Given     05/06/2024 1018 EDT aspirin chewable tablet 324 mg 324 mg Oral Given     05/06/2024 1018 EDT clopidogrel (PLAVIX) tablet 300 mg 300 mg Oral Given     05/06/2024 1413 EDT heparin (porcine) subcutaneous injection 5,000 Units 5,000 Units Subcutaneous Given          Past Medical History:   Diagnosis Date    Abnormal chest x-ray     Abnormal mammogram     COPD (chronic obstructive pulmonary disease) (HCC)     Dementia (HCC)     Early stage     Ductal hyperplasia of breast     Left     Hyperlipidemia     diet controled    Nodule of  lower lobe of right lung     Right Lower Lobe     SOB (shortness of breath)     SOB (shortness of breath)     Wheezing      Present on Admission:  **None**      Admitting Diagnosis: Dizziness [R42]  Atherosclerotic cerebrovascular disease [I67.2]  Lung nodules [R91.8]  Acute onset of severe vertigo [R42]  Age/Sex: 83 y.o. female  Admission Orders:  Scheduled Medications:  aspirin, 81 mg, Oral, Daily  atorvastatin, 40 mg, Oral, QPM  heparin (porcine), 5,000 Units, Subcutaneous, Q8H JUSTIN      Continuous IV Infusions:     PRN Meds:  meclizine, 25 mg, Oral, Q8H PRN    Orthostatics   PT OT ST eval   Tele   Neuro checks  I&O   Up and OOB     IP CONSULT TO NEUROLOGY  IP CONSULT TO CASE MANAGEMENT  IP CONSULT TO NUTRITION SERVICES    Network Utilization Review Department  ATTENTION: Please call with any questions or concerns to [phone] and carefully listen to the prompts so that you are directed to the right person. All voicemails are confidential.   For Discharge needs, contact Care Management DC Support Team at [phone] opt. [phone]  Send all requests for admission clinical reviews, approved or denied determinations and any other requests to dedicated fax number below belonging to the campus where the patient is receiving treatment. List of dedicated fax numbers for the Facilities:  FACILITY NAME UR FAX NUMBER   ADMISSION DENIALS (Administrative/Medical Necessity) [phone]   DISCHARGE SUPPORT TEAM (NETWORK) [phone]   PARENT CHILD HEALTH (Maternity/NICU/Pediatrics) [phone]   [facility] [phone]   [facility] [phone]   [facility] [phone]   [facility] [phone]   [facility] [phone]   [facility] [phone]   [facility] [phone]   [facility] [phone]   [facility]  [facility] [phone]   [facility] [phone]   [facility] [phone]   [facility] [phone]

## 2024-05-07 NOTE — SPEECH THERAPY NOTE
Speech Language/Pathology      Patient passed nursing dysphagia screen; presently tolerating oral diet.  Need for formal evaluation of swallow function is not indicated at this time.  Please re-order should status change or concerns arise.     Ella Mueller MS, CCC-SLP  Speech-Language Pathologist  [place] #[id number]  [place] #[id number]

## 2024-05-10 ENCOUNTER — TELEPHONE (OUTPATIENT)
Dept: NEUROLOGY | Facility: CLINIC | Age: 84
End: 2024-05-10

## 2024-06-07 ENCOUNTER — APPOINTMENT (EMERGENCY)
Dept: RADIOLOGY | Facility: HOSPITAL | Age: 84
End: 2024-06-07
Payer: COMMERCIAL

## 2024-06-07 ENCOUNTER — HOSPITAL ENCOUNTER (EMERGENCY)
Facility: HOSPITAL | Age: 84
Discharge: HOME | End: 2024-06-07
Attending: EMERGENCY MEDICINE
Payer: COMMERCIAL

## 2024-06-07 VITALS
TEMPERATURE: 98.2 F | HEART RATE: 72 BPM | BODY MASS INDEX: 25.83 KG/M2 | OXYGEN SATURATION: 96 % | RESPIRATION RATE: 20 BRPM | DIASTOLIC BLOOD PRESSURE: 67 MMHG | WEIGHT: 155.2 LBS | SYSTOLIC BLOOD PRESSURE: 152 MMHG

## 2024-06-07 DIAGNOSIS — R53.1 WEAKNESS: ICD-10-CM

## 2024-06-07 DIAGNOSIS — E87.1 HYPONATREMIA: ICD-10-CM

## 2024-06-07 DIAGNOSIS — N39.0 UTI (URINARY TRACT INFECTION): Primary | ICD-10-CM

## 2024-06-07 LAB
ALBUMIN SERPL BCP-MCNC: 3.8 G/DL (ref 3.5–5)
ALP SERPL-CCNC: 75 U/L (ref 34–104)
ALT SERPL W P-5'-P-CCNC: 15 U/L (ref 7–52)
ANION GAP SERPL CALCULATED.3IONS-SCNC: 10 MMOL/L (ref 4–13)
AST SERPL W P-5'-P-CCNC: 14 U/L (ref 13–39)
ATRIAL RATE: 73 BPM
BACTERIA UR QL AUTO: ABNORMAL /HPF
BASOPHILS # BLD AUTO: 0.06 THOUSANDS/ÂΜL (ref 0–0.1)
BASOPHILS NFR BLD AUTO: 1 % (ref 0–1)
BILIRUB SERPL-MCNC: 0.52 MG/DL (ref 0.2–1)
BILIRUB UR QL STRIP: NEGATIVE
BUN SERPL-MCNC: 18 MG/DL (ref 5–25)
CALCIUM SERPL-MCNC: 9.4 MG/DL (ref 8.4–10.2)
CARDIAC TROPONIN I PNL SERPL HS: 7 NG/L (ref 8–18)
CHLORIDE SERPL-SCNC: 97 MMOL/L (ref 96–108)
CLARITY UR: CLEAR
CO2 SERPL-SCNC: 24 MMOL/L (ref 21–32)
COLOR UR: COLORLESS
CREAT SERPL-MCNC: 1 MG/DL (ref 0.6–1.3)
EOSINOPHIL # BLD AUTO: 0.14 THOUSAND/ÂΜL (ref 0–0.61)
EOSINOPHIL NFR BLD AUTO: 1 % (ref 0–6)
ERYTHROCYTE [DISTWIDTH] IN BLOOD BY AUTOMATED COUNT: 13.2 % (ref 11.6–15.1)
GFR SERPL CREATININE-BSD FRML MDRD: 52 ML/MIN/1.73SQ M
GLUCOSE SERPL-MCNC: 107 MG/DL (ref 65–140)
GLUCOSE SERPL-MCNC: 121 MG/DL (ref 65–140)
GLUCOSE UR STRIP-MCNC: NEGATIVE MG/DL
HCT VFR BLD AUTO: 35.2 % (ref 34.8–46.1)
HGB BLD-MCNC: 11.7 G/DL (ref 11.5–15.4)
HGB UR QL STRIP.AUTO: NEGATIVE
IMM GRANULOCYTES # BLD AUTO: 0.06 THOUSAND/UL (ref 0–0.2)
IMM GRANULOCYTES NFR BLD AUTO: 1 % (ref 0–2)
KETONES UR STRIP-MCNC: NEGATIVE MG/DL
LEUKOCYTE ESTERASE UR QL STRIP: ABNORMAL
LIPASE SERPL-CCNC: 20 U/L (ref 11–82)
LYMPHOCYTES # BLD AUTO: 1.2 THOUSANDS/ÂΜL (ref 0.6–4.47)
LYMPHOCYTES NFR BLD AUTO: 11 % (ref 14–44)
MCH RBC QN AUTO: 29.2 PG (ref 26.8–34.3)
MCHC RBC AUTO-ENTMCNC: 33.2 G/DL (ref 31.4–37.4)
MCV RBC AUTO: 88 FL (ref 82–98)
MONOCYTES # BLD AUTO: 0.88 THOUSAND/ÂΜL (ref 0.17–1.22)
MONOCYTES NFR BLD AUTO: 8 % (ref 4–12)
NEUTROPHILS # BLD AUTO: 8.55 THOUSANDS/ÂΜL (ref 1.85–7.62)
NEUTS SEG NFR BLD AUTO: 78 % (ref 43–75)
NITRITE UR QL STRIP: NEGATIVE
NON-SQ EPI CELLS URNS QL MICRO: ABNORMAL /HPF
NRBC BLD AUTO-RTO: 0 /100 WBCS
P AXIS: 78 DEGREES
PH UR STRIP.AUTO: 6.5 [PH]
PLATELET # BLD AUTO: 295 THOUSANDS/UL (ref 149–390)
PMV BLD AUTO: 9.4 FL (ref 8.9–12.7)
POTASSIUM SERPL-SCNC: 3.9 MMOL/L (ref 3.5–5.3)
PR INTERVAL: 182 MS
PROT SERPL-MCNC: 6.6 G/DL (ref 6.4–8.4)
PROT UR STRIP-MCNC: NEGATIVE MG/DL
QRS AXIS: 93 DEGREES
QRSD INTERVAL: 82 MS
QT INTERVAL: 408 MS
QTC INTERVAL: 449 MS
RBC # BLD AUTO: 4.01 MILLION/UL (ref 3.81–5.12)
RBC #/AREA URNS AUTO: ABNORMAL /HPF
SODIUM SERPL-SCNC: 131 MMOL/L (ref 135–147)
SP GR UR STRIP.AUTO: 1.01 (ref 1–1.03)
T WAVE AXIS: 66 DEGREES
UROBILINOGEN UR STRIP-ACNC: <2 MG/DL
VENTRICULAR RATE: 73 BPM
WBC # BLD AUTO: 10.89 THOUSAND/UL (ref 4.31–10.16)
WBC #/AREA URNS AUTO: ABNORMAL /HPF

## 2024-06-07 PROCEDURE — 96375 TX/PRO/DX INJ NEW DRUG ADDON: CPT

## 2024-06-07 PROCEDURE — 81001 URINALYSIS AUTO W/SCOPE: CPT

## 2024-06-07 PROCEDURE — 85025 COMPLETE CBC W/AUTO DIFF WBC: CPT

## 2024-06-07 PROCEDURE — 99285 EMERGENCY DEPT VISIT HI MDM: CPT

## 2024-06-07 PROCEDURE — 36415 COLL VENOUS BLD VENIPUNCTURE: CPT

## 2024-06-07 PROCEDURE — 83690 ASSAY OF LIPASE: CPT

## 2024-06-07 PROCEDURE — 87086 URINE CULTURE/COLONY COUNT: CPT

## 2024-06-07 PROCEDURE — 93010 ELECTROCARDIOGRAM REPORT: CPT | Performed by: INTERNAL MEDICINE

## 2024-06-07 PROCEDURE — 87186 SC STD MICRODIL/AGAR DIL: CPT

## 2024-06-07 PROCEDURE — 87077 CULTURE AEROBIC IDENTIFY: CPT

## 2024-06-07 PROCEDURE — 93005 ELECTROCARDIOGRAM TRACING: CPT

## 2024-06-07 PROCEDURE — 80053 COMPREHEN METABOLIC PANEL: CPT

## 2024-06-07 PROCEDURE — 82948 REAGENT STRIP/BLOOD GLUCOSE: CPT

## 2024-06-07 PROCEDURE — 71045 X-RAY EXAM CHEST 1 VIEW: CPT

## 2024-06-07 PROCEDURE — 96365 THER/PROPH/DIAG IV INF INIT: CPT

## 2024-06-07 PROCEDURE — 84484 ASSAY OF TROPONIN QUANT: CPT | Performed by: EMERGENCY MEDICINE

## 2024-06-07 PROCEDURE — 99285 EMERGENCY DEPT VISIT HI MDM: CPT | Performed by: EMERGENCY MEDICINE

## 2024-06-07 RX ORDER — SULFAMETHOXAZOLE AND TRIMETHOPRIM 800; 160 MG/1; MG/1
1 TABLET ORAL EVERY 12 HOURS SCHEDULED
Status: DISCONTINUED | OUTPATIENT
Start: 2024-06-07 | End: 2024-06-07 | Stop reason: HOSPADM

## 2024-06-07 RX ORDER — SODIUM CHLORIDE, SODIUM GLUCONATE, SODIUM ACETATE, POTASSIUM CHLORIDE, MAGNESIUM CHLORIDE, SODIUM PHOSPHATE, DIBASIC, AND POTASSIUM PHOSPHATE .53; .5; .37; .037; .03; .012; .00082 G/100ML; G/100ML; G/100ML; G/100ML; G/100ML; G/100ML; G/100ML
500 INJECTION, SOLUTION INTRAVENOUS ONCE
Status: COMPLETED | OUTPATIENT
Start: 2024-06-07 | End: 2024-06-07

## 2024-06-07 RX ORDER — SULFAMETHOXAZOLE AND TRIMETHOPRIM 800; 160 MG/1; MG/1
1 TABLET ORAL 2 TIMES DAILY
Qty: 6 TABLET | Refills: 0 | Status: SHIPPED | OUTPATIENT
Start: 2024-06-08 | End: 2024-06-11

## 2024-06-07 RX ORDER — SODIUM CHLORIDE 1 G/1
1 TABLET ORAL 3 TIMES DAILY
Qty: 15 TABLET | Refills: 0 | Status: SHIPPED | OUTPATIENT
Start: 2024-06-07 | End: 2024-06-12

## 2024-06-07 RX ORDER — ONDANSETRON 2 MG/ML
4 INJECTION INTRAMUSCULAR; INTRAVENOUS ONCE
Status: COMPLETED | OUTPATIENT
Start: 2024-06-07 | End: 2024-06-07

## 2024-06-07 RX ORDER — SODIUM CHLORIDE, SODIUM GLUCONATE, SODIUM ACETATE, POTASSIUM CHLORIDE, MAGNESIUM CHLORIDE, SODIUM PHOSPHATE, DIBASIC, AND POTASSIUM PHOSPHATE .53; .5; .37; .037; .03; .012; .00082 G/100ML; G/100ML; G/100ML; G/100ML; G/100ML; G/100ML; G/100ML
500 INJECTION, SOLUTION INTRAVENOUS ONCE
Status: DISCONTINUED | OUTPATIENT
Start: 2024-06-07 | End: 2024-06-07

## 2024-06-07 RX ORDER — LABETALOL HYDROCHLORIDE 5 MG/ML
10 INJECTION, SOLUTION INTRAVENOUS ONCE
Status: COMPLETED | OUTPATIENT
Start: 2024-06-07 | End: 2024-06-07

## 2024-06-07 RX ADMIN — SODIUM CHLORIDE, SODIUM GLUCONATE, SODIUM ACETATE, POTASSIUM CHLORIDE, MAGNESIUM CHLORIDE, SODIUM PHOSPHATE, DIBASIC, AND POTASSIUM PHOSPHATE 500 ML: .53; .5; .37; .037; .03; .012; .00082 INJECTION, SOLUTION INTRAVENOUS at 15:52

## 2024-06-07 RX ADMIN — SULFAMETHOXAZOLE AND TRIMETHOPRIM 1 TABLET: 800; 160 TABLET ORAL at 15:52

## 2024-06-07 RX ADMIN — ONDANSETRON 4 MG: 2 INJECTION INTRAMUSCULAR; INTRAVENOUS at 14:24

## 2024-06-07 RX ADMIN — LABETALOL HYDROCHLORIDE 10 MG: 5 INJECTION, SOLUTION INTRAVENOUS at 14:24

## 2024-06-07 NOTE — ED PROVIDER NOTES
"History  Chief Complaint   Patient presents with    Weakness - Generalized     Pt arrives EMS from home for fatigue and weakness x2 days. Pt denies any specific complaints but just states she \"doesn't feel well\". Hx of dementia, disoriented to time during triage.      83 YOF pmhx adenocarcinoma of lung s/p resection, alzheimer dementia with difficulty recalling recent events at baseline who presents to ED with daughter for 1 week worsening weakness / sob / overall feeling ill. Per daughter, pt started feeling ill last Friday with reported nausea / abd pain and increased belching. States that she has been also been complaining of SOB and worsening allergies. No cough. Per daughter, pt granddaughter recently moved out and having worsening           Prior to Admission Medications   Prescriptions Last Dose Informant Patient Reported? Taking?   Cholecalciferol (Vitamin D3) 125 MCG (5000 UT) CAPS  Self Yes No   Sig: Take 2,000 Units by mouth daily   PARoxetine (PAXIL) 10 mg tablet  Self Yes No   Sig: TAKE 1 TABLET (10 MG) BY MOUTH ONCE DAILY BEFORE BEDTIME   albuterol (Ventolin HFA) 90 mcg/act inhaler  Self No No   Sig: Inhale 2 puffs every 6 (six) hours as needed for wheezing   aspirin 81 mg chewable tablet   No No   Sig: Chew 1 tablet (81 mg total) daily   busPIRone (BUSPAR) 15 mg tablet  Self Yes No   Sig: TAKE 1 TABLET (15 MG) BY ORAL ROUTE 2 TIMES PER DAY   donepezil (ARICEPT) 10 mg tablet  Self No No   Sig: Take 1 tablet (10 mg total) by mouth daily at bedtime   fexofenadine (ALLEGRA) 180 MG tablet  Self Yes No   Sig: Take 180 mg by mouth if needed   meclizine (ANTIVERT) 25 mg tablet  Self Yes No   meloxicam (MOBIC) 15 mg tablet  Self Yes No   Sig: TAKE 1 TABLET (15 MG) BY ORAL ROUTE ONCE DAILY WITH DINNER   memantine (NAMENDA) 10 mg tablet  Self No No   Sig: Take 1 tablet (10 mg total) by mouth 2 (two) times a day   oxybutynin (DITROPAN-XL) 10 MG 24 hr tablet  Self Yes No   Sig: Take 10 mg by mouth daily "   rosuvastatin (CRESTOR) 10 MG tablet   No No   Sig: Take 1 tablet (10 mg total) by mouth daily      Facility-Administered Medications: None       Past Medical History:   Diagnosis Date    Abnormal chest x-ray     Abnormal mammogram     COPD (chronic obstructive pulmonary disease) (HCC)     Dementia (HCC)     Early stage     Ductal hyperplasia of breast     Left     Hyperlipidemia     diet controled    Nodule of lower lobe of right lung     Right Lower Lobe     SOB (shortness of breath)     SOB (shortness of breath)     Wheezing        Past Surgical History:   Procedure Laterality Date    BREAST BIOPSY Left 10/12/2018    BREAST CYST EXCISION Left 10/31/2018    BREAST LUMPECTOMY Left 11/13/2018    Procedure: BREAST LUMPECTOMY; BREAST NEEDLE LOCALIZATION (NEEDLE LOC AT 0830);  Surgeon: Mian Valenzuela MD;  Location: [facility];  Service: Surgical Oncology    COLONOSCOPY      LAPAROSCOPY      MAMMO NEEDLE LOCALIZATION LEFT (ALL INC) Left 11/13/2018    MAMMO STEREOTACTIC BREAST BIOPSY LEFT (ALL INC) Left 10/16/2018    [place] BRNCHSC INCL FLUOR GDNCE DX W/CELL WASHG SPX Right 1/16/2019    Procedure: BRONCHOSCOPY FLEXIBLE,;  Surgeon: Julia Mccord MD;  Location: [facility];  Service: Thoracic    [place] MEDIASTINOSCOPY WITH LYMPH NODE BIOPSY/IES Right 1/16/2019    Procedure: MEDIASTINOSCOPY;  Surgeon: Julia Mccord MD;  [facility];  Service: Thoracic    [place] THORACOSCOPY W/LOBECTOMY SINGLE LOBE Right 1/16/2019    Procedure: LOBECTOMY LUNG;  Surgeon: Julia Mccord MD;  Location: [facility];  Service: Thoracic    TRIGGER FINGER RELEASE Right 10/10/2019    ULNAR NERVE TRANSPOSITION Right 10/10/2019       Family History   Problem Relation Age of Onset    Hypertension Mother     Glaucoma Mother     Macular degeneration Mother     Prostate cancer Father     Heart attack Brother      I have reviewed and agree with the history as documented.    E-Cigarette/Vaping    E-Cigarette Use Never User       E-Cigarette/Vaping Substances    Nicotine No     THC No     CBD No     Flavoring No     Other No     Unknown No      Social History     Tobacco Use    Smoking status: Former     Current packs/day: 0.00     Average packs/day: 1 pack/day for 60.0 years (60.0 ttl pk-yrs)     Types: Cigarettes     Start date: 1958     Quit date: 2016     Years since quittin.1    Smokeless tobacco: Never   Vaping Use    Vaping status: Never Used   Substance Use Topics    Alcohol use: Yes     Alcohol/week: 2.0 standard drinks of alcohol     Types: 2 Cans of beer per week     Comment: occasional    Drug use: Never        Review of Systems   Constitutional:  Positive for fatigue. Negative for chills and fever.   Respiratory:  Positive for cough and shortness of breath. Negative for chest tightness.    Cardiovascular:  Negative for chest pain, palpitations and leg swelling.   Gastrointestinal:  Positive for abdominal pain and nausea. Negative for constipation, diarrhea and vomiting.   Neurological:  Positive for light-headedness.   Psychiatric/Behavioral: Negative.         Physical Exam  ED Triage Vitals   Temperature Pulse Respirations Blood Pressure SpO2   24 1326 24 1326 24 1326 24 1326 24 1326   98.2 °F (36.8 °C) 73 18 (!) 203/88 96 %      Temp Source Heart Rate Source Patient Position - Orthostatic VS BP Location FiO2 (%)   24 1326 24 1326 24 1330 24 1326 --   Oral Monitor Lying Right arm       Pain Score       --                    Orthostatic Vital Signs  Vitals:    24 1326 24 1330 24 1445   BP: (!) 203/88 (!) 206/93 152/67   Pulse: 73 78 72   Patient Position - Orthostatic VS:  Lying Lying       Physical Exam  Constitutional:       General: She is not in acute distress.     Appearance: She is not ill-appearing or diaphoretic.   HENT:      Mouth/Throat:      Mouth: Mucous membranes are moist.      Pharynx: Oropharynx is clear.   Eyes:       General: No scleral icterus.  Cardiovascular:      Rate and Rhythm: Normal rate and regular rhythm.   Pulmonary:      Effort: Pulmonary effort is normal.      Breath sounds: Normal breath sounds. No wheezing, rhonchi or rales.   Abdominal:      General: Abdomen is flat. Bowel sounds are normal.      Tenderness: There is abdominal tenderness.      Comments: Epigastric tenderness to palpation   Musculoskeletal:      Right lower leg: No edema.      Left lower leg: No edema.   Skin:     General: Skin is warm and dry.   Neurological:      Mental Status: She is alert and oriented to person, place, and time. Mental status is at baseline.   Psychiatric:         Mood and Affect: Mood normal.         Behavior: Behavior normal.         Thought Content: Thought content normal.         Judgment: Judgment normal.         ED Medications  Medications   multi-electrolyte (ISOLYTE-S PH 7.4) bolus 500 mL (has no administration in time range)   sulfamethoxazole-trimethoprim (BACTRIM DS) 800-160 mg per tablet 1 tablet (has no administration in time range)   ondansetron (ZOFRAN) injection 4 mg (4 mg Intravenous Given 6/7/24 1424)   labetalol (NORMODYNE) injection 10 mg (10 mg Intravenous Given 6/7/24 1424)       Diagnostic Studies  Results Reviewed       Procedure Component Value Units Date/Time    High Sensitivity Troponin I Random [[id number]]  (Abnormal) Collected: 06/07/24 1423    Lab Status: Final result Specimen: Blood from Arm, Right Updated: 06/07/24 1502     HS TnI random 7 ng/L     Urine Microscopic [[id number]]  (Abnormal) Collected: 06/07/24 1432    Lab Status: Final result Specimen: Urine, Clean Catch Updated: 06/07/24 1500     RBC, UA 1-2 /hpf      WBC, UA 20-30 /hpf      Epithelial Cells Occasional /hpf      Bacteria, UA Occasional /hpf     Urine culture [[id number]] Collected: 06/07/24 1432    Lab Status: In process Specimen: Urine, Clean Catch Updated: 06/07/24 1500    UA w Reflex to Microscopic w Reflex to Culture  [[id number]]  (Abnormal) Collected: 06/07/24 1432    Lab Status: Final result Specimen: Urine, Clean Catch Updated: 06/07/24 1458     Color, UA Colorless     Clarity, UA Clear     Specific Gravity, UA 1.006     pH, UA 6.5     Leukocytes, UA Moderate     Nitrite, UA Negative     Protein, UA Negative mg/dl      Glucose, UA Negative mg/dl      Ketones, UA Negative mg/dl      Urobilinogen, UA <2.0 mg/dl      Bilirubin, UA Negative     Occult Blood, UA Negative    Comprehensive metabolic panel [[id number]]  (Abnormal) Collected: 06/07/24 1423    Lab Status: Final result Specimen: Blood from Arm, Right Updated: 06/07/24 1454     Sodium 131 mmol/L      Potassium 3.9 mmol/L      Chloride 97 mmol/L      CO2 24 mmol/L      ANION GAP 10 mmol/L      BUN 18 mg/dL      Creatinine 1.00 mg/dL      Glucose 107 mg/dL      Calcium 9.4 mg/dL      AST 14 U/L      ALT 15 U/L      Alkaline Phosphatase 75 U/L      Total Protein 6.6 g/dL      Albumin 3.8 g/dL      Total Bilirubin 0.52 mg/dL      eGFR 52 ml/min/1.73sq m     Narrative:      National Kid[employer] Foundation guidelines for Chronic Kidney Disease (CKD):     Stage 1 with normal or high GFR (GFR > 90 mL/min/1.73 square meters)    Stage 2 Mild CKD (GFR = 60-89 mL/min/1.73 square meters)    Stage 3A Moderate CKD (GFR = 45-59 mL/min/1.73 square meters)    Stage 3B Moderate CKD (GFR = 30-44 mL/min/1.73 square meters)    Stage 4 Severe CKD (GFR = 15-29 mL/min/1.73 square meters)    Stage 5 End Stage CKD (GFR <15 mL/min/1.73 square meters)  Note: GFR calculation is accurate only with a steady state creatinine    Lipase [[id number]]  (Normal) Collected: 06/07/24 1423    Lab Status: Final result Specimen: Blood from Arm, Right Updated: 06/07/24 1454     Lipase 20 u/L     CBC and differential [[id number]]  (Abnormal) Collected: 06/07/24 1423    Lab Status: Final result Specimen: Blood from Arm, Right Updated: 06/07/24 1439     WBC 10.89 Thousand/uL      RBC 4.01 Million/uL      Hemoglobin  11.7 g/dL      Hematocrit 35.2 %      MCV 88 fL      MCH 29.2 pg      MCHC 33.2 g/dL      RDW 13.2 %      MPV 9.4 fL      Platelets 295 Thousands/uL      nRBC 0 /100 WBCs      Segmented % 78 %      Immature Grans % 1 %      Lymphocytes % 11 %      Monocytes % 8 %      Eosinophils Relative 1 %      Basophils Relative 1 %      Absolute Neutrophils 8.55 Thousands/µL      Absolute Immature Grans 0.06 Thousand/uL      Absolute Lymphocytes 1.20 Thousands/µL      Absolute Monocytes 0.88 Thousand/µL      Eosinophils Absolute 0.14 Thousand/µL      Basophils Absolute 0.06 Thousands/µL     Fingerstick Glucose (POCT) [[id number]]  (Normal) Collected: 06/07/24 1330    Lab Status: Final result Specimen: Blood Updated: 06/07/24 1331     POC Glucose 121 mg/dl                    XR chest portable    (Results Pending)         Procedures  Procedures      ED Course  ED Course as of 06/07/24 1515   Fri Jun 07, 2024   1500 Urine Microscopic   1501 UA w Reflex to Microscopic w Reflex to Culture(!)   1504 Sodium(!): 131                             SBIRT 22yo+      Flowsheet Row Most Recent Value   Initial Alcohol Screen: US AUDIT-C     1. How often do you have a drink containing alcohol? 0 Filed at: 06/07/2024 1346   2. How many drinks containing alcohol do you have on a typical day you are drinking?  0 Filed at: 06/07/2024 1346   3b. FEMALE Any Age, or MALE 65+: How often do you have 4 or more drinks on one occassion? 0 Filed at: 06/07/2024 1346   Audit-C Score 0 Filed at: 06/07/2024 1346   MAR: How many times in the past year have you...    Used an illegal drug or used a prescription medication for non-medical reasons? Never Filed at: 06/07/2024 1346                  Medical Decision Making  Weakness unspecific will work up for infection / electrolyte issues in elderly. R/o ACS. Cbc, BMP, troponin, lipase. UA large leukocytes and moderate bacteria will add bactrim. CXR--wnl compared to recent in May. Hyponatremia likely hypovolemic  hypotonic hyponatremia. Will give 500ml Isolyte and have outpt BMP follow-up.    Amount and/or Complexity of Data Reviewed  Labs: ordered.  Radiology: ordered.    Risk  Prescription drug management.          Disposition  Final diagnoses:   Weakness     Time reflects when diagnosis was documented in both MDM as applicable and the Disposition within this note       Time User Action Codes Description Comment    6/7/2024  2:02 PM Tameka Rodriguez Add [R53.1] Weakness           ED Disposition       None          Follow-up Information    None         Patient's Medications   Discharge Prescriptions    No medications on file     No discharge procedures on file.    PDMP Review       None             ED Provider  Attending physically available and evaluated Aurelia Jaime. I managed the patient along with the ED Attending.    Electronically Signed by           Discharge    Condition   Stable    Date/Time   Fri Jun 7, 2024 1537    Comment   Aurelia NG Jaime discharge to home/self care.                   Follow-up Information       Follow up With Specialties Details Why Contact Info    [facility] Call today  [address] [facility] [address]  [phone]      [facility] In 3 days recheck sodium levels [address] [facility] [address]  [phone]              Discharge Medication List as of 6/7/2024  3:37 PM        START taking these medications    Details   sodium chloride 1 g tablet Take 1 tablet (1 g total) by mouth 3 (three) times a day for 5 days, Starting Fri 6/7/2024, Until Wed 6/12/2024, Normal      sulfamethoxazole-trimethoprim (BACTRIM DS) 800-160 mg per tablet Take 1 tablet by mouth 2 (two) times a day for 3 days smx-tmp DS (BACTRIM) 800-160 mg tabs (1tab q12 D10) Do not start before June 8, 2024., Starting Sat 6/8/2024, Until Tue 6/11/2024, Normal           CONTINUE these medications which have NOT CHANGED    Details   albuterol (Ventolin HFA) 90 mcg/act inhaler Inhale 2 puffs every 6 (six) hours as needed for wheezing, Starting Tue 5/4/2021, Normal      aspirin 81 mg chewable tablet Chew 1 tablet (81 mg total) daily, Starting Wed 5/8/2024, Normal      busPIRone (BUSPAR) 15 mg tablet TAKE 1 TABLET (15 MG) BY ORAL ROUTE 2 TIMES PER DAY, Historical Med      Cholecalciferol (Vitamin D3) 125 MCG (5000 UT) CAPS Take 2,000 Units by mouth daily, Historical Med      donepezil (ARICEPT) 10 mg tablet Take 1 tablet (10 mg total) by mouth daily at bedtime, Starting Wed 2/7/2024, Normal      fexofenadine (ALLEGRA) 180 MG tablet Take 180 mg by mouth if needed, Starting Mon 3/15/2021, Historical Med      meclizine (ANTIVERT) 25 mg tablet Historical Med      meloxicam (MOBIC) 15 mg tablet TAKE 1 TABLET (15 MG) BY ORAL ROUTE ONCE DAILY WITH  DINNER, Historical Med      memantine (NAMENDA) 10 mg tablet Take 1 tablet (10 mg total) by mouth 2 (two) times a day, Starting Wed 2/7/2024, Normal      oxybutynin (DITROPAN-XL) 10 MG 24 hr tablet Take 10 mg by mouth daily, Starting Tue 1/30/2024, Historical Med      PARoxetine (PAXIL) 10 mg tablet TAKE 1 TABLET (10 MG) BY MOUTH ONCE DAILY BEFORE BEDTIME, Historical Med      rosuvastatin (CRESTOR) 10 MG tablet Take 1 tablet (10 mg total) by mouth daily, Starting Tue 5/7/2024, Normal           No discharge procedures on file.    PDMP Review       None             ED Provider  Attending physically available and evaluated Aurelia Jaime. I managed the patient along with the ED Attending.    Electronically Signed by           Tameka Rodriguez DO  06/10/24 1200

## 2024-06-07 NOTE — DISCHARGE INSTRUCTIONS
Please continue to drink water at home and stay hydrated. Please talk to your PCP regarding high blood pressure. Please take the prescribed antibiotic

## 2024-06-08 NOTE — ED ATTENDING ATTESTATION
6/7/2024  I, Bao Jackson MD, saw and evaluated the patient. I have discussed the patient with the resident/non-physician practitioner and agree with the resident's/non-physician practitioner's findings, Plan of Care, and MDM as documented in the resident's/non-physician practitioner's note, except where noted. All available labs and Radiology studies were reviewed.  I was present for key portions of any procedure(s) performed by the resident/non-physician practitioner and I was immediately available to provide assistance.       At this point I agree with the current assessment done in the Emergency Department.  I have conducted an independent evaluation of this patient a history and physical is as follows:    ED Course  ED Course as of 06/07/24 2055 Fri Jun 07, 2024   1334 EKG is normal sinus rhythm with sinus arrhythmia rate of 73.  Normal axis.  No ST elevations or depressions.   1356 83 F with previous CA on remission. Some dementia.   Here today hx from daughter, increased stress at home  Some SOB, some abd pain w/ nausea (epigastric)   Lightheaded, dizzy and other nonspecific symptoms  HTN on triage 206/93   1505 Leukocytes, UA(!): Moderate   1512 Sodium(!): 131   1540 Patient was seen and examined by me.  I agree with history, physical exam, plan of care.  Patient will be discharged home with salt tablets and antibiotics for presumed UTI.  Patient clinically well and stable.  Educated the importance of follow-up next week for repeat sodium level.         Critical Care Time  Procedures

## 2024-06-09 LAB — BACTERIA UR CULT: ABNORMAL

## 2024-06-09 RX ORDER — CEPHALEXIN 500 MG/1
500 CAPSULE ORAL EVERY 6 HOURS SCHEDULED
Qty: 28 CAPSULE | Refills: 0 | Status: SHIPPED | OUTPATIENT
Start: 2024-06-09 | End: 2024-06-16

## 2024-06-09 RX ORDER — ONDANSETRON 4 MG/1
4 TABLET, ORALLY DISINTEGRATING ORAL EVERY 6 HOURS PRN
Qty: 20 TABLET | Refills: 0 | Status: SHIPPED | OUTPATIENT
Start: 2024-06-09

## 2024-06-09 NOTE — RESULT ENCOUNTER NOTE
Patient was placed on Bactrim and is resistant.  Called and spoke with Ragini her daughter who states that her mother was sleeping and has been still having urinary symptoms.  Patient placed on Keflex which ceftolozane is susceptible.  Patient also was having nausea and Zofran was prescribed.

## 2024-06-18 ENCOUNTER — TELEPHONE (OUTPATIENT)
Age: 84
End: 2024-06-18

## 2024-06-18 DIAGNOSIS — Z12.31 SCREENING MAMMOGRAM FOR BREAST CANCER: Primary | ICD-10-CM

## 2024-06-18 NOTE — TELEPHONE ENCOUNTER
Pt daughter called. Pt daughter stated her mom would like to complete a mammogram but needs order script. Pt daughter made aware of message sent.

## 2024-06-25 ENCOUNTER — CONSULT (OUTPATIENT)
Dept: VASCULAR SURGERY | Facility: CLINIC | Age: 84
End: 2024-06-25
Payer: COMMERCIAL

## 2024-06-25 VITALS
BODY MASS INDEX: 25.83 KG/M2 | DIASTOLIC BLOOD PRESSURE: 76 MMHG | WEIGHT: 155 LBS | HEART RATE: 90 BPM | SYSTOLIC BLOOD PRESSURE: 132 MMHG | HEIGHT: 65 IN

## 2024-06-25 DIAGNOSIS — I67.2 ATHEROSCLEROTIC CEREBROVASCULAR DISEASE: ICD-10-CM

## 2024-06-25 DIAGNOSIS — I65.23 CAROTID STENOSIS, BILATERAL: Primary | ICD-10-CM

## 2024-06-25 PROCEDURE — 99204 OFFICE O/P NEW MOD 45 MIN: CPT | Performed by: SURGERY

## 2024-06-25 RX ORDER — GABAPENTIN 100 MG/1
100 CAPSULE ORAL DAILY
COMMUNITY
Start: 2024-06-10

## 2024-06-25 NOTE — ASSESSMENT & PLAN NOTE
Moderate stenosis of the carotid arteries on both sides.  Seen incidentally on workup for dizziness.      I have personally reviewed the CT imaging and my independent interpretation is as follows:  Bilateral carotid bifurcation area atherosclerotic plaque with stenosis.  There is an irregular short segment of left vertebral artery, may be short segment dissection due to atherosclerotic plaque.    Based on evaluation of the patient risk factors and the carotid artery stenosis I recommend continued medical management.  Rosuvastatin dose has been increased to 10 mg.  Daily 81 mg baby aspirin has also been started.  This should help in stroke prevention.  Daily walking is recommended.    Follow-up carotid Doppler in 6 months is recommended and ordered.  We discussed the risk and benefit of carotid intervention.  At this degree of stenosis medical management is recommended.

## 2024-06-25 NOTE — PATIENT INSTRUCTIONS
Patient Education     Carotid Artery Stenosis Discharge Instructions   About this topic   Carotid arteries are blood vessels in your neck. They carry blood and oxygen from your heart to your brain. In carotid artery stenosis, your arteries are narrow due to a build-up of cholesterol or other materials. The buildup is called plaque. It may cause a blockage of blood supply into your brain. When blood supply does not get to your brain, this may lead to a stroke. If the blood vessel is only blocked for a short time, it is called a transient ischemic attack or TIA. With a TIA, the signs only last for a short time, most often less than a day. Blocked or narrow carotid arteries may be treated with drugs, surgery, or by placing a stent to keep the blood vessel open.  What care is needed at home?   Ask your doctor what you need to do when you go home. Make sure you ask questions if you do not understand what the doctor says. This way you will know what you need to do.  Control your blood pressure. Take your drugs as ordered by your doctor.  Control your cholesterol. You may need diet changes or have a new drug to take.  Control your blood sugar.  Lose weight if you are overweight.  Stop smoking if you are a smoker.  You may need to increase your activity. Talk to your doctor.  What follow-up care is needed?   Your doctor may ask you to make visits to the office to check on your progress. Be sure to keep these visits. During your follow-up, your doctor may tell you if surgery is needed.  What drugs may be needed?   The doctor may order drugs to:  Control cholesterol  Prevent blood clots  Lower blood pressure  Control blood sugar  Help you stop smoking if you smoke  Will physical activity be limited?   Talk to your doctor about the right amount of activity for you.  What changes to diet are needed?   Eating a healthy diet is important during this time. This means:  Eat whole grain foods and foods high in fiber.  Limit salt  intake.  Choose many different fruits and vegetables. Fresh or frozen is best.  Cut back on solid fats like butter or margarine. Eat less fatty or processed foods.  Eat more low fat or lean meats like chicken, fish, or turkey. Eat less red meat.  Limit beer, wine, and mixed drinks (alcohol).  Avoid caffeine.  What problems could happen?   If the stenosis is not corrected, blood flow to a part of the brain may stop for a short time. When this happens, you may have some signs of a stroke that gets better with time. This is a transient ischemic attack or TIA. Signs of a TIA usually last only 10 to 20 minutes but are a sign that a stroke may occur in the future.  Stroke. This is where a complete blockage prevents blood flow to part of your brain which can cause brain damage.  Heart attack  What can be done to prevent this health problem?   Keep a healthy weight.  Do not smoke.  Do not use illegal drugs.  Learn how to manage your stress.  Eat a healthy diet and reduce salt intake.  Monitor your blood pressure and control high blood pressure.  Control high levels of fat and cholesterol in the blood.  Control high levels of blood sugar in the blood due to diabetes.  When do I need to call the doctor?   Activate the emergency medical system right away if you have signs of a heart attack or stroke. Call 911 in the [place] or [place]. The sooner treatment begins, the better your chances for recovery. Call for emergency help right away if you have:  Signs of heart attack:  Chest pain  Trouble breathing  Fast heartbeat  Feeling dizzy  Nausea  Pain in back, neck, shoulder, jaw, arm  Significant fatigue  Signs of stroke:  Sudden numbness or weakness of the face, arm, or leg, especially on one side of the body  Sudden confusion, trouble speaking or understanding  Sudden trouble seeing in one or both eyes  Sudden trouble walking, dizziness, loss of balance or coordination  Sudden severe headache with no known cause  Call your  doctor if you have problem with walking, balance, or coordination.  Teach Back: Helping You Understand   The Teach Back Method helps you understand the information we are giving you. After you talk with the staff, tell them in your own words what you learned. This helps to make sure the staff has described each thing clearly. It also helps to explain things that may have been confusing. Before going home, make sure you can do these:  I can tell you about my condition.  I can tell you what changes I need to make with my diet or drugs.  I can tell you what I will do if I have signs of a heart attack or stroke.  Last Reviewed Date   2024-02-13  Consumer Information Use and Disclaimer   This generalized information is a limited summary of diagnosis, treatment, and/or medication information. It is not meant to be comprehensive and should be used as a tool to help the user understand and/or assess potential diagnostic and treatment options. It does NOT include all information about conditions, treatments, medications, side effects, or risks that may apply to a specific patient. It is not intended to be medical advice or a substitute for the medical advice, diagnosis, or treatment of a health care provider based on the health care provider's examination and assessment of a patient’s specific and unique circumstances. Patients must speak with a health care provider for complete information about their health, medical questions, and treatment options, including any risks or benefits regarding use of medications. This information does not endorse any treatments or medications as safe, effective, or approved for treating a specific patient. [employer] and its affiliates disclaim any warranty or liability relating to this information or the use thereof. The use of this information is governed by the Terms of Use, available at https://www.wolterskluwer.com/en/know/clinical-effectiveness-terms   Copyright   Copyright © 2024  [employer] and its affiliates and/or licensors. All rights reserved.

## 2024-06-25 NOTE — PROGRESS NOTES
Ambulatory Visit  Name: Aurelia Jaime      : 1940      MRN: [id number]  Encounter Provider: Divya Menon MD  Encounter Date: 2024   Encounter department: [facility]    Assessment & Plan   1. Carotid stenosis, bilateral  Assessment & Plan:  Moderate stenosis of the carotid arteries on both sides.  Seen incidentally on workup for dizziness.      I have personally reviewed the CT imaging and my independent interpretation is as follows:  Bilateral carotid bifurcation area atherosclerotic plaque with stenosis.  There is an irregular short segment of left vertebral artery, may be short segment dissection due to atherosclerotic plaque.    Based on evaluation of the patient risk factors and the carotid artery stenosis I recommend continued medical management.  Rosuvastatin dose has been increased to 10 mg.  Daily 81 mg baby aspirin has also been started.  This should help in stroke prevention.  Daily walking is recommended.    Follow-up carotid Doppler in 6 months is recommended and ordered.  We discussed the risk and benefit of carotid intervention.  At this degree of stenosis medical management is recommended.  Orders:  -     VAS carotid complete study; Future; Expected date: 2024  2. Atherosclerotic cerebrovascular disease  -     Ambulatory Referral to Vascular Surgery  -     VAS carotid complete study; Future; Expected date: 2024      History of Present Illness     Aurelia Jaime is a 83 y.o. female who presents for evaluation of findings of CT angiogram    New patient referred from hospital.  Presents today to review CTA of h/n.  History obtained from patient's daughter as patient has cognitive decline due to dementia.    Patient was admitted to the hospital with episode of dizziness and rotational nystagmus.  Neurology evaluation was performed, there was no stroke.  There was bilateral carotid artery stenosis.  LDL cholesterol was high so patient's Crestor  was increased to 10 mg.    She is back to baseline activity without no further episodes as per daughter.    Review of Systems   Constitutional: Negative.    HENT: Negative.     Eyes: Negative.    Respiratory: Negative.     Cardiovascular: Negative.    Gastrointestinal: Negative.    Endocrine: Negative.    Genitourinary: Negative.    Musculoskeletal: Negative.    Skin: Negative.    Allergic/Immunologic: Negative.    Neurological: Negative.    Hematological: Negative.    Psychiatric/Behavioral: Negative.     I have reviewed the review of systems as entered and made appropriate changes as necessary  Past Medical History   Past Medical History:   Diagnosis Date   • Abnormal chest x-ray    • Abnormal mammogram    • COPD (chronic obstructive pulmonary disease) (HCC)    • Dementia (HCC)     Early stage    • Ductal hyperplasia of breast     Left    • Hyperlipidemia     diet controled   • Nodule of lower lobe of right lung     Right Lower Lobe    • SOB (shortness of breath)    • SOB (shortness of breath)    • Wheezing      Past Surgical History:   Procedure Laterality Date   • BREAST BIOPSY Left 10/12/2018   • BREAST CYST EXCISION Left 10/31/2018   • BREAST LUMPECTOMY Left 11/13/2018    Procedure: BREAST LUMPECTOMY; BREAST NEEDLE LOCALIZATION (NEEDLE LOC AT 0830);  Surgeon: Mian Valenzuela MD;  Location: [facility];  Service: Surgical Oncology   • COLONOSCOPY     • LAPAROSCOPY     • MAMMO NEEDLE LOCALIZATION LEFT (ALL INC) Left 11/13/2018   • MAMMO STEREOTACTIC BREAST BIOPSY LEFT (ALL INC) Left 10/16/2018   • MT BRNCC INCL FLUOR GDNCE DX W/CELL WASHG SPX Right 1/16/2019    Procedure: BRONCHOSCOPY FLEXIBLE,;  Surgeon: Julia Mccord MD;  Location: [facility];  Service: Thoracic   • MT MEDIASTINOSCOPY WITH LYMPH NODE BIOPSY/IES Right 1/16/2019    Procedure: MEDIASTINOSCOPY;  Surgeon: Julia Mccord MD;  [facility];  Service: Thoracic   • MT THORACOSCOPY W/LOBECTOMY SINGLE LOBE Right 1/16/2019    Procedure:  LOBECTOMY LUNG;  Surgeon: Julia Mccord MD;  Location[facility];  Service: Thoracic   • TRIGGER FINGER RELEASE Right 10/10/2019   • ULNAR NERVE TRANSPOSITION Right 10/10/2019     Family History   Problem Relation Age of Onset   • Hypertension Mother    • Glaucoma Mother    • Macular degeneration Mother    • Prostate cancer Father    • Heart attack Brother      Current Outpatient Medications on File Prior to Visit   Medication Sig Dispense Refill   • aspirin 81 mg chewable tablet Chew 1 tablet (81 mg total) daily 30 tablet 0   • donepezil (ARICEPT) 10 mg tablet Take 1 tablet (10 mg total) by mouth daily at bedtime 30 tablet 5   • gabapentin (NEURONTIN) 100 mg capsule Take 100 mg by mouth daily     • meloxicam (MOBIC) 15 mg tablet TAKE 1 TABLET (15 MG) BY ORAL ROUTE ONCE DAILY WITH DINNER     • memantine (NAMENDA) 10 mg tablet Take 1 tablet (10 mg total) by mouth 2 (two) times a day 60 tablet 5   • oxybutynin (DITROPAN-XL) 10 MG 24 hr tablet Take 10 mg by mouth daily     • rosuvastatin (CRESTOR) 10 MG tablet Take 1 tablet (10 mg total) by mouth daily 30 tablet 0   • [DISCONTINUED] albuterol (Ventolin HFA) 90 mcg/act inhaler Inhale 2 puffs every 6 (six) hours as needed for wheezing 18 g 1   • [DISCONTINUED] busPIRone (BUSPAR) 15 mg tablet TAKE 1 TABLET (15 MG) BY ORAL ROUTE 2 TIMES PER DAY     • [DISCONTINUED] Cholecalciferol (Vitamin D3) 125 MCG (5000 UT) CAPS Take 2,000 Units by mouth daily     • [DISCONTINUED] fexofenadine (ALLEGRA) 180 MG tablet Take 180 mg by mouth if needed     • [DISCONTINUED] meclizine (ANTIVERT) 25 mg tablet      • [DISCONTINUED] ondansetron (ZOFRAN-ODT) 4 mg disintegrating tablet Take 1 tablet (4 mg total) by mouth every 6 (six) hours as needed for nausea or vomiting 20 tablet 0   • [DISCONTINUED] PARoxetine (PAXIL) 10 mg tablet TAKE 1 TABLET (10 MG) BY MOUTH ONCE DAILY BEFORE BEDTIME     • [DISCONTINUED] sodium chloride 1 g tablet Take 1 tablet (1 g total) by mouth 3 (three) times a  "day for 5 days 15 tablet 0     No current facility-administered medications on file prior to visit.   No Known Allergies   Objective     /76 (BP Location: Right arm, Patient Position: Sitting, Cuff Size: Standard)   Pulse 90   Ht 5' 5\" (1.651 m)   Wt 70.3 kg (155 lb)   BMI 25.79 kg/m²     Physical Exam  Vitals and nursing note reviewed.   Constitutional:       Appearance: Normal appearance.      Comments: Frail-appearing elderly lady   Cardiovascular:      Rate and Rhythm: Normal rate and regular rhythm.      Pulses:           Radial pulses are 2+ on the right side and 2+ on the left side.   Pulmonary:      Effort: Pulmonary effort is normal.   Musculoskeletal:      Right lower leg: No edema.      Left lower leg: No edema.   Skin:     General: Skin is warm.      Capillary Refill: Capillary refill takes less than 2 seconds.   Neurological:      General: No focal deficit present.      Mental Status: She is alert and oriented to person, place, and time.       Administrative Statements           "

## 2024-07-29 ENCOUNTER — HOSPITAL ENCOUNTER (OUTPATIENT)
Age: 84
Discharge: HOME | End: 2024-07-29
Payer: COMMERCIAL

## 2024-07-29 DIAGNOSIS — Z12.31 SCREENING MAMMOGRAM FOR BREAST CANCER: ICD-10-CM

## 2024-07-29 PROCEDURE — 77063 BREAST TOMOSYNTHESIS BI: CPT

## 2024-07-29 PROCEDURE — 77067 SCR MAMMO BI INCL CAD: CPT

## 2024-08-28 DIAGNOSIS — F02.80 ALZHEIMER'S DEMENTIA WITHOUT BEHAVIORAL DISTURBANCE (HCC): ICD-10-CM

## 2024-08-28 DIAGNOSIS — G30.9 ALZHEIMER'S DEMENTIA WITHOUT BEHAVIORAL DISTURBANCE (HCC): ICD-10-CM

## 2024-08-28 RX ORDER — MEMANTINE HYDROCHLORIDE 10 MG/1
10 TABLET ORAL 2 TIMES DAILY
Qty: 60 TABLET | Refills: 5 | Status: SHIPPED | OUTPATIENT
Start: 2024-08-28

## 2024-08-28 RX ORDER — DONEPEZIL HYDROCHLORIDE 10 MG/1
10 TABLET, FILM COATED ORAL
Qty: 30 TABLET | Refills: 5 | Status: SHIPPED | OUTPATIENT
Start: 2024-08-28

## 2024-08-28 NOTE — TELEPHONE ENCOUNTER
Reason for call:   [x] Refill   [] Prior Auth  [] Other:     Office:   [] PCP/Provider -   [x] Specialty/Provider - Neurology     Medication: memantine (NAMENDA) 10 mg tablet Take 1 tablet (10 mg total) by mouth 2 (two) times a day,     donepezil (ARICEPT) 10 mg tablet Take 1 tablet (10 mg total) by mouth daily at bedtime       Pharmacy: [facility] #[id number] - [place], [place] - [address]      Does the patient have enough for 3 days?   [x] Yes   [] No - Send as HP to POD

## 2024-09-09 ENCOUNTER — HOSPITAL ENCOUNTER (OUTPATIENT)
Dept: ULTRASOUND IMAGING | Facility: CLINIC | Age: 84
Discharge: HOME | End: 2024-09-09
Payer: COMMERCIAL

## 2024-09-09 ENCOUNTER — HOSPITAL ENCOUNTER (OUTPATIENT)
Dept: MAMMOGRAPHY | Facility: CLINIC | Age: 84
Discharge: HOME | End: 2024-09-09
Payer: COMMERCIAL

## 2024-09-09 VITALS — WEIGHT: 155 LBS | HEIGHT: 65 IN | BODY MASS INDEX: 25.83 KG/M2

## 2024-09-09 DIAGNOSIS — R92.8 ABNORMAL MAMMOGRAM: ICD-10-CM

## 2024-09-09 PROCEDURE — 76642 ULTRASOUND BREAST LIMITED: CPT

## 2024-09-09 PROCEDURE — G0279 TOMOSYNTHESIS, MAMMO: HCPCS

## 2024-09-09 PROCEDURE — 77065 DX MAMMO INCL CAD UNI: CPT

## 2024-09-09 NOTE — PROGRESS NOTES
Met with patient, daughter Livia and Dr. Espinoza regarding recommendation for;    ___X__ RIGHT ______LEFT      ____X_Ultrasound guided  ______Stereotactic breast biopsy.      __X___Verbalized understanding.    Reviewed clip placement with patient, pt states understanding: Yes: ___X___ No: ______  Comments:    Blood thinners:  No: __X___ Yes: ______ What:          Biopsy teaching sheet given:  Yes: ___X___ No: ________    Pt given contact information and adv to call with any questions/needs    Patient advised to arrive at 0900 for a 0930 appointment

## 2024-09-24 ENCOUNTER — HOSPITAL ENCOUNTER (OUTPATIENT)
Dept: ULTRASOUND IMAGING | Facility: CLINIC | Age: 84
Discharge: HOME | End: 2024-09-24
Payer: COMMERCIAL

## 2024-09-24 ENCOUNTER — HOSPITAL ENCOUNTER (OUTPATIENT)
Dept: MAMMOGRAPHY | Facility: CLINIC | Age: 84
Discharge: HOME | End: 2024-09-24
Payer: COMMERCIAL

## 2024-09-24 VITALS — HEART RATE: 63 BPM | SYSTOLIC BLOOD PRESSURE: 187 MMHG | DIASTOLIC BLOOD PRESSURE: 93 MMHG

## 2024-09-24 DIAGNOSIS — R92.8 ABNORMAL MAMMOGRAM: ICD-10-CM

## 2024-09-24 PROCEDURE — 88341 IMHCHEM/IMCYTCHM EA ADD ANTB: CPT | Performed by: PATHOLOGY

## 2024-09-24 PROCEDURE — 88342 IMHCHEM/IMCYTCHM 1ST ANTB: CPT | Performed by: PATHOLOGY

## 2024-09-24 PROCEDURE — 88305 TISSUE EXAM BY PATHOLOGIST: CPT | Performed by: PATHOLOGY

## 2024-09-24 PROCEDURE — A4648 IMPLANTABLE TISSUE MARKER: HCPCS

## 2024-09-24 PROCEDURE — 19083 BX BREAST 1ST LESION US IMAG: CPT

## 2024-09-24 RX ORDER — LIDOCAINE HYDROCHLORIDE 10 MG/ML
5 INJECTION, SOLUTION EPIDURAL; INFILTRATION; INTRACAUDAL; PERINEURAL ONCE
Status: COMPLETED | OUTPATIENT
Start: 2024-09-24 | End: 2024-09-24

## 2024-09-24 RX ADMIN — LIDOCAINE HYDROCHLORIDE 5 ML: 10 INJECTION, SOLUTION EPIDURAL; INFILTRATION; INTRACAUDAL; PERINEURAL at 10:15
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
## 2024-09-24 NOTE — PROGRESS NOTES
Procedure type:    __X___ultrasound guided _____stereotactic    Breast:    _____Left __X___Right    Location: R Breast periareolar    Needle:12G    # of passes:4    Clip:Madhavi    Performed by: Dr. Natan Pappas    Pressure held for 5 minutes by: Sadie Barahona RN    Steri Strips:    ___X__yes _____no    Band aid:    __X___yes_____no    Tolerated procedure:    __X___yes _____no

## 2024-09-24 NOTE — PROGRESS NOTES
Ice pack given:    __X___yes _____no    Discharge instructions reviewed and given to patient and daughter Livia    __X___yes _____no    Discharged via:    __X___ambulatory    _____wheelchair    _____stretcher    Biopsy site clean and dry with no bleeding on discharge:    __X___yes ____no    Daughter Livia ok to receive biopsy results over phone, she states her mother's blood pressure becomes elevated when she is cold and stressed due to having to come for biopsy. She states she has a BP cuff and will check blood pressure once pt is settled at home. She will all provide if it remains elevated.

## 2024-09-25 NOTE — PROGRESS NOTES
Post procedure call completed, spoke with pt daughter    Bleeding: _____yes __X___no, daughter denies    Pain: _____yes ___X___no, daughter states pt had no pain    Redness/Swelling: ______yes ___X___no, daughter denies, states pt used ice pack as directed    Band aid removed: _____yes ___X__no (discussed removing when she showers)    Steri-Strips intact: ___X___yes _____no (discussed with patient to remove steri strips on 9/29 if they have not come off on their own)    Pt with no questions at this time, adv will call when results available, adv to call with any questions or concerns, has name/# for contact

## 2024-09-27 ENCOUNTER — TELEPHONE (OUTPATIENT)
Dept: MAMMOGRAPHY | Facility: CLINIC | Age: 84
End: 2024-09-27

## 2024-09-27 DIAGNOSIS — N60.91 ATYPICAL DUCTAL HYPERPLASIA OF RIGHT BREAST: Primary | ICD-10-CM

## 2024-09-27 PROCEDURE — 88341 IMHCHEM/IMCYTCHM EA ADD ANTB: CPT | Performed by: PATHOLOGY

## 2024-09-27 PROCEDURE — 88305 TISSUE EXAM BY PATHOLOGIST: CPT | Performed by: PATHOLOGY

## 2024-09-27 PROCEDURE — 88342 IMHCHEM/IMCYTCHM 1ST ANTB: CPT | Performed by: PATHOLOGY

## 2024-09-27 NOTE — PROGRESS NOTES
Call placed to patient daughter Livia Matias MEGA regarding recommendation for;    ___X__ RIGHT ______LEFT      __X___Ultrasound guided  ______Stereotactic cyst aspiration    Daughter states that procedure was explained to her, additional questions answered at this time    __X___Verbalized understanding.    Reviewed clip placement with patient, pt states understanding: Yes: ____ No: ____  Comments:N/A    Blood thinners: No: _____ Yes: __x___ What: aspirin    Biopsy teaching sheet given:  _____yes __X__no (All teaching points discussed during call, pt with no questions at this time, pt adv to arrive at 1345 for 1400 ultrasound and cyst asp)    Pt given name/# for any further questions/needs    Pt agreeable to a post procedure call and states we can give her biopsy results to her over the phone

## 2024-09-27 NOTE — TELEPHONE ENCOUNTER
Call placed to patient daughter Livia Matias MEGA after she was  given biopsy results from radiologist, questions answered, adv next step is to set patient up with surgeon, options discussed and daughter wanted to have appt made with Dr. Randall Valle, sachin patient that  would call her back by tomorrow with appt, pt states understanding, pt with no questions at this time, has name/# for any further needs

## 2024-10-01 PROBLEM — N60.91 ATYPICAL DUCTAL HYPERPLASIA OF RIGHT BREAST: Status: ACTIVE | Noted: 2018-10-25

## 2024-10-01 PROBLEM — D05.11 DUCTAL CARCINOMA IN SITU (DCIS) OF RIGHT BREAST: Status: ACTIVE | Noted: 2024-10-01

## 2024-10-03 ENCOUNTER — OFFICE VISIT (OUTPATIENT)
Dept: SURGICAL ONCOLOGY | Facility: CLINIC | Age: 84
End: 2024-10-03
Payer: COMMERCIAL

## 2024-10-03 ENCOUNTER — APPOINTMENT (OUTPATIENT)
Dept: LAB | Facility: HOSPITAL | Age: 84
End: 2024-10-03
Payer: COMMERCIAL

## 2024-10-03 VITALS
DIASTOLIC BLOOD PRESSURE: 85 MMHG | OXYGEN SATURATION: 98 % | HEART RATE: 68 BPM | HEIGHT: 65 IN | SYSTOLIC BLOOD PRESSURE: 158 MMHG | TEMPERATURE: 98.1 F | WEIGHT: 130 LBS | BODY MASS INDEX: 21.66 KG/M2

## 2024-10-03 DIAGNOSIS — Z01.818 PRE-OP EXAMINATION: ICD-10-CM

## 2024-10-03 DIAGNOSIS — D05.11 DUCTAL CARCINOMA IN SITU (DCIS) OF RIGHT BREAST: Primary | ICD-10-CM

## 2024-10-03 DIAGNOSIS — N60.91 ATYPICAL DUCTAL HYPERPLASIA OF RIGHT BREAST: ICD-10-CM

## 2024-10-03 DIAGNOSIS — D05.11 DUCTAL CARCINOMA IN SITU (DCIS) OF RIGHT BREAST: ICD-10-CM

## 2024-10-03 LAB
ALBUMIN SERPL BCG-MCNC: 4 G/DL (ref 3.5–5)
ALP SERPL-CCNC: 68 U/L (ref 34–104)
ALT SERPL W P-5'-P-CCNC: 12 U/L (ref 7–52)
ANION GAP SERPL CALCULATED.3IONS-SCNC: 4 MMOL/L (ref 4–13)
AST SERPL W P-5'-P-CCNC: 15 U/L (ref 13–39)
BASOPHILS # BLD AUTO: 0.07 THOUSANDS/ΜL (ref 0–0.1)
BASOPHILS NFR BLD AUTO: 1 % (ref 0–1)
BILIRUB SERPL-MCNC: 0.42 MG/DL (ref 0.2–1)
BUN SERPL-MCNC: 17 MG/DL (ref 5–25)
CALCIUM SERPL-MCNC: 9.4 MG/DL (ref 8.4–10.2)
CHLORIDE SERPL-SCNC: 106 MMOL/L (ref 96–108)
CO2 SERPL-SCNC: 29 MMOL/L (ref 21–32)
CREAT SERPL-MCNC: 1.02 MG/DL (ref 0.6–1.3)
EOSINOPHIL # BLD AUTO: 0.11 THOUSAND/ΜL (ref 0–0.61)
EOSINOPHIL NFR BLD AUTO: 2 % (ref 0–6)
ERYTHROCYTE [DISTWIDTH] IN BLOOD BY AUTOMATED COUNT: 14 % (ref 11.6–15.1)
GFR SERPL CREATININE-BSD FRML MDRD: 51 ML/MIN/1.73SQ M
GLUCOSE P FAST SERPL-MCNC: 87 MG/DL (ref 65–99)
HCT VFR BLD AUTO: 40.8 % (ref 34.8–46.1)
HGB BLD-MCNC: 12.8 G/DL (ref 11.5–15.4)
IMM GRANULOCYTES # BLD AUTO: 0.03 THOUSAND/UL (ref 0–0.2)
IMM GRANULOCYTES NFR BLD AUTO: 1 % (ref 0–2)
LYMPHOCYTES # BLD AUTO: 1.13 THOUSANDS/ΜL (ref 0.6–4.47)
LYMPHOCYTES NFR BLD AUTO: 18 % (ref 14–44)
MCH RBC QN AUTO: 29.3 PG (ref 26.8–34.3)
MCHC RBC AUTO-ENTMCNC: 31.4 G/DL (ref 31.4–37.4)
MCV RBC AUTO: 93 FL (ref 82–98)
MONOCYTES # BLD AUTO: 0.49 THOUSAND/ΜL (ref 0.17–1.22)
MONOCYTES NFR BLD AUTO: 8 % (ref 4–12)
NEUTROPHILS # BLD AUTO: 4.55 THOUSANDS/ΜL (ref 1.85–7.62)
NEUTS SEG NFR BLD AUTO: 70 % (ref 43–75)
NRBC BLD AUTO-RTO: 0 /100 WBCS
PLATELET # BLD AUTO: 234 THOUSANDS/UL (ref 149–390)
PMV BLD AUTO: 10.3 FL (ref 8.9–12.7)
POTASSIUM SERPL-SCNC: 4.5 MMOL/L (ref 3.5–5.3)
PROT SERPL-MCNC: 6.6 G/DL (ref 6.4–8.4)
RBC # BLD AUTO: 4.37 MILLION/UL (ref 3.81–5.12)
SODIUM SERPL-SCNC: 139 MMOL/L (ref 135–147)
WBC # BLD AUTO: 6.38 THOUSAND/UL (ref 4.31–10.16)

## 2024-10-03 PROCEDURE — 99205 OFFICE O/P NEW HI 60 MIN: CPT | Performed by: SURGERY

## 2024-10-03 PROCEDURE — 36415 COLL VENOUS BLD VENIPUNCTURE: CPT

## 2024-10-03 PROCEDURE — 85025 COMPLETE CBC W/AUTO DIFF WBC: CPT

## 2024-10-03 PROCEDURE — 80053 COMPREHEN METABOLIC PANEL: CPT

## 2024-10-03 RX ORDER — CEFAZOLIN SODIUM 1 G/50ML
1000 SOLUTION INTRAVENOUS ONCE
OUTPATIENT
Start: 2024-10-03 | End: 2024-10-03

## 2024-10-03 RX ORDER — TOLTERODINE 4 MG/1
4 CAPSULE, EXTENDED RELEASE ORAL DAILY
COMMUNITY
Start: 2024-09-18 | End: 2025-09-18

## 2024-10-03 RX ORDER — ACETAMINOPHEN AND CODEINE PHOSPHATE 300; 30 MG/1; MG/1
1 TABLET ORAL EVERY 6 HOURS PRN
Qty: 20 TABLET | Refills: 0 | Status: SHIPPED | OUTPATIENT
Start: 2024-10-03

## 2024-10-03 NOTE — PROGRESS NOTES
Surgical Oncology Consult Note       [facility]  [facility] [place]  [address] [address]    Aurelia Jaime  1940  [id number]      Chief Complaint   Patient presents with    Consult     Referred by OB/Gyn for suspicious mass in right breast        Assessment & Plan    1. Ductal carcinoma in situ (DCIS) of right breast  2. Atypical ductal hyperplasia of right breast  -     Ambulatory referral to Surgical Oncology       Oncology History   Ductal carcinoma in situ (DCIS) of right breast   9/24/2024 Biopsy    A.  Right breast, 12:00 periareolar (ultrasound-guided 12-gauge marquee core biopsy, 4 passes):     - Atypical ductal hyperplasia (ADH) bordering on low grade DCIS, involving papilloma (3 mm).     10/1/2024 Initial Diagnosis    Ductal carcinoma in situ (DCIS) of right breast          83-year-old female with history of lung cancer with surgery approximately 5 years ago.  She had an abnormal mammogram identified the retroareolar 3 to 4 mm mass this was followed by biopsy biopsy is consistent with atypical ductal hyperplasia versus versus low-grade DCIS.  She is here with her daughter to discuss further management.  She is overall in good health except her dementia with mildly elevated high blood pressure.  She denies of any breast pain nipple discharge nipple retraction or skin changes.  She lives alone however her children lives closer to her and she has good social support.          Review of Systems   Constitutional:  Positive for activity change. Negative for chills and fever.   HENT:  Negative for ear pain and sore throat.    Eyes:  Negative for pain and visual disturbance.   Respiratory:  Negative for cough and shortness of breath.    Cardiovascular:  Negative for chest pain and palpitations.   Gastrointestinal:  Negative for abdominal pain and vomiting.   Genitourinary:  Negative for dysuria and hematuria.   Musculoskeletal:   Negative for arthralgias and back pain.   Skin:  Negative for color change and rash.   Neurological:  Negative for seizures and syncope.   Hematological:  Negative for adenopathy.   Psychiatric/Behavioral: Negative.     All other systems reviewed and are negative.       Past Medical History:      Patient Active Problem List   Diagnosis    Atypical ductal hyperplasia of right breast    Papilloma of left breast    Lung nodule, solitary    Right lower lobe lung mass    Lung nodule    Primary adenocarcinoma of right lung (HCC)    History of lung cancer    Encounter for follow-up surveillance of lung cancer    Multiple lung nodules    SOB (shortness of breath) on exertion    Alzheimer's dementia with behavioral disturbance (HCC)    Simple chronic bronchitis (HCC)    dizziness    Orthostatic hypotension    Carotid stenosis, bilateral    Ductal carcinoma in situ (DCIS) of right breast        Past Medical History:   Diagnosis Date    Abnormal chest x-ray     Abnormal mammogram     COPD (chronic obstructive pulmonary disease) (HCC)     Dementia (HCC)     Early stage     Ductal hyperplasia of breast     Left     Hyperlipidemia     diet controled    Nodule of lower lobe of right lung     Right Lower Lobe     SOB (shortness of breath)     SOB (shortness of breath)     Wheezing         Past Surgical History:   Procedure Laterality Date    BREAST BIOPSY Left 10/12/2018    BREAST BIOPSY Right 09/24/2024    BREAST CYST EXCISION Left 10/31/2018    BREAST LUMPECTOMY Left 11/13/2018    Procedure: BREAST LUMPECTOMY; BREAST NEEDLE LOCALIZATION (NEEDLE LOC AT 0830);  Surgeon: Mian Valenzuela MD;  Location[facility];  Service: Surgical Oncology    COLONOSCOPY      LAPAROSCOPY      MAMMO NEEDLE LOCALIZATION LEFT (ALL INC) Left 11/13/2018    MAMMO STEREOTACTIC BREAST BIOPSY LEFT (ALL INC) Left 10/16/2018    MO BRNCHSC INCL FLUOR GDNCE DX W/CELL WASHG SPX Right 01/16/2019    Procedure: BRONCHOSCOPY FLEXIBLE,;  Surgeon: Julia Mccord MD;   Location: [facility];  Service: Thoracic    ME MEDIASTINOSCOPY WITH LYMPH NODE BIOPSY/IES Right 2019    Procedure: MEDIASTINOSCOPY;  Surgeon: Julia Mccord MD;  [facility];  Service: Thoracic    ME THORACOSCOPY W/LOBECTOMY SINGLE LOBE Right 2019    Procedure: LOBECTOMY LUNG;  Surgeon: Julia Mccord MD;  [facility];  Service: Thoracic    TRIGGER FINGER RELEASE Right 10/10/2019    ULNAR NERVE TRANSPOSITION Right 10/10/2019    US GUIDED BREAST BIOPSY RIGHT COMPLETE Right 2024        Family History   Problem Relation Age of Onset    Hypertension Mother     Glaucoma Mother     Macular degeneration Mother     Prostate cancer Father     No Known Problems Daughter     No Known Problems Daughter     No Known Problems Maternal Grandmother     No Known Problems Paternal Grandmother     Heart attack Brother     No Known Problems Paternal Aunt     No Known Problems Paternal Aunt     Breast cancer Neg Hx         Social History     Socioeconomic History    Marital status:      Spouse name: Not on file    Number of children: Not on file    Years of education: Not on file    Highest education level: Not on file   Occupational History    Occupation: retired   Tobacco Use    Smoking status: Former     Current packs/day: 0.00     Average packs/day: 1 pack/day for 60.0 years (60.0 ttl pk-yrs)     Types: Cigarettes     Start date: 1958     Quit date: 2016     Years since quittin.4    Smokeless tobacco: Never   Vaping Use    Vaping status: Never Used   Substance and Sexual Activity    Alcohol use: Yes     Alcohol/week: 2.0 standard drinks of alcohol     Types: 2 Cans of beer per week     Comment: occasional    Drug use: Never    Sexual activity: Not Currently   Other Topics Concern    Not on file   Social History Narrative    Not on file     Social Determinants of Health     Financial Resource Strain: Not on file   Food Insecurity: No Food Insecurity (2024)     Hunger Vital Sign     Worried About Running Out of Food in the Last Year: Never true     Ran Out of Food in the Last Year: Never true   Transportation Needs: No Transportation Needs (5/7/2024)    PRAPARE - Transportation     Lack of Transportation (Medical): No     Lack of Transportation (Non-Medical): No   Physical Activity: Not on file   Stress: Not on file   Social Connections: Not on file   Intimate Partner Violence: Not on file   Housing Stability: Low Risk  (5/7/2024)    Housing Stability Vital Sign     Unable to Pay for Housing in the Last Year: No     Number of Times Moved in the Last Year: 1     Homeless in the Last Year: No        Current Outpatient Medications:     aspirin 81 mg chewable tablet, Chew 1 tablet (81 mg total) daily, Disp: 30 tablet, Rfl: 0    donepezil (ARICEPT) 10 mg tablet, Take 1 tablet (10 mg total) by mouth daily at bedtime, Disp: 30 tablet, Rfl: 5    gabapentin (NEURONTIN) 100 mg capsule, Take 100 mg by mouth daily, Disp: , Rfl:     meloxicam (MOBIC) 15 mg tablet, TAKE 1 TABLET (15 MG) BY ORAL ROUTE ONCE DAILY WITH DINNER, Disp: , Rfl:     memantine (NAMENDA) 10 mg tablet, Take 1 tablet (10 mg total) by mouth 2 (two) times a day, Disp: 60 tablet, Rfl: 5    oxybutynin (DITROPAN-XL) 10 MG 24 hr tablet, Take 10 mg by mouth daily, Disp: , Rfl:     rosuvastatin (CRESTOR) 10 MG tablet, Take 1 tablet (10 mg total) by mouth daily, Disp: 30 tablet, Rfl: 0   No Known Allergies    Physical Exam:     Vitals:    10/03/24 0805   BP: 158/85   Pulse: 68   Temp: 98.1 °F (36.7 °C)   SpO2: 98%     Physical Exam  Constitutional:       Appearance: Normal appearance.   HENT:      Head: Normocephalic and atraumatic.      Nose: Nose normal.      Mouth/Throat:      Mouth: Mucous membranes are moist.   Eyes:      Pupils: Pupils are equal, round, and reactive to light.   Cardiovascular:      Rate and Rhythm: Normal rate.      Pulses: Normal pulses.      Heart sounds: Normal heart sounds.   Pulmonary:       Effort: Pulmonary effort is normal.      Breath sounds: Normal breath sounds.   Chest:          Comments: Bilateral breast examination no palpable mass masses.  Right breast is bruised from recent biopsy.  No nipple discharge no nipple retraction.  Bilateral axilla and supraclavicular examination no palpable adenopathy.  Patient was examined seated as well as supine position.  Abdominal:      General: Bowel sounds are normal.      Palpations: Abdomen is soft.   Musculoskeletal:         General: Normal range of motion.      Cervical back: Normal range of motion and neck supple.   Skin:     General: Skin is warm.   Neurological:      General: No focal deficit present.      Mental Status: She is alert and oriented to person, place, and time.   Psychiatric:         Mood and Affect: Mood normal.         Behavior: Behavior normal.         Thought Content: Thought content normal.         Judgment: Judgment normal.         Results:      Right breast, 12:00 periareolar (ultrasound-guided 12-gauge marquee core biopsy, 4 passes):     - Atypical ductal hyperplasia (ADH) bordering on low grade DCIS, involving papilloma (3 mm).     Comment:  Definitive diagnosis is deferred to the resection specimen.          Narrative & Impression   DIAGNOSIS: Abnormal mammogram      TECHNIQUE:   Digital diagnostic mammography was performed. Computer Aided Detection (CAD) analyzed all applicable images.  Right breast ultrasound was performed.      COMPARISONS: Prior breast imaging dated: 07/29/2024, 07/28/2023, 02/22/2022, 02/08/2021, 11/13/2018, 11/13/2018, 10/16/2018, 10/16/2018, 10/16/2018, 10/12/2018, and 10/10/2018     RELEVANT HISTORY:   Family Breast Cancer History: History of breast cancer in Neg Hx.  Family Medical History: No known relevant family medical history.   Personal History: No known relevant hormone history. Surgical history includes breast biopsy, lumpectomy, and breast excisional biopsy. No known relevant medical history.      RISK ASSESSMENT:   5 Year Tyrer-Cuzick: No Score  10 Year Tyrer-Cuzick: No Score  Lifetime Tyrer-Cuzick: 0.56%     TISSUE DENSITY:   There are scattered areas of fibroglandular density.     INDICATION: Aurelia Jaime is a 83 y.o. female recalled from screening mammogram 7/29/2024 for right breast finding.     FINDINGS:   RIGHT  1) MASS [B]: Previously question right breast focal asymmetry persists as a small oval mass.  Sonographic evaluation shows a corresponding irregular indistinctly marginated hypoechoic mass 12:00 periareolar measuring 3 x 3 x 5 mm.     A benign complicated cyst at 12:00 3 cm from nipple measuring 4 x 3 mm is noted on ultrasound.     Elsewhere in the right breast on mammography, there are no suspicious masses, grouped microcalcifications or unexplained areas of architectural distortion.     IMPRESSION:  Right breast 12:00 mass is suspicious.  Ultrasound-guided core needle biopsy is recommended.     I personally discussed these findings and recommendations with the patient.              ASSESSMENT/BI-RADS CATEGORY:  Right: 4 - Suspicious  Overall: 4 - Suspicious     RECOMMENDATION:       - Ultrasound-guided breast biopsy for the right breast.     Discussion/Summary:   Here we have a 83-year-old very pleasant female with recent diagnosis of low-grade DCIS.  We did discuss in detail with her as well as with her daughter breast cancer initiation promotion progression.  We also discussed atypical ductal hyperplasia versus low-grade DCIS.  She also wait for her right axillary ultrasound.  Which is scheduled for next week.  I did discuss the cancer disease status cancer treatment plans and cancer treatment goals with the patient.  We also discussed in detail I will try my best to preserve her nipple areolar complex however they understand if it is compromising her cancer care I may remove the nipple areolar complex at the time of surgery.  Consent was obtained for right breast Madhavi  directed  lumpectomy.  I did spend more than 60 minutes reviewing all the films, pathology results, patient examination, counseling newly diagnosed cancer as well as surgical consenting.  They understand we will obtain PCP evaluation prior to surgery as well.  Surgical consent was obtained after explaining benefits, alternative, procedure and possible complications with regards above mentioned procedure. All her questions regarding the visit  as well as the  surgery were answered to  the patient's satisfaction.    Advance Care Planning/Advance Directives:  I Isai Valle MD discussed the disease status, and treatment plans with Aurelia Jaime today 10/03/24 and will follow-up with the patient.

## 2024-10-04 LAB
DME PARACHUTE DELIVERY DATE REQUESTED: NORMAL
DME PARACHUTE ITEM DESCRIPTION: NORMAL
DME PARACHUTE ORDER STATUS: NORMAL
DME PARACHUTE SUPPLIER NAME: NORMAL
DME PARACHUTE SUPPLIER PHONE: NORMAL

## 2024-10-07 ENCOUNTER — TELEPHONE (OUTPATIENT)
Dept: OBGYN CLINIC | Facility: OTHER | Age: 84
End: 2024-10-07

## 2024-10-07 NOTE — TELEPHONE ENCOUNTER
Spoke with Aurelias daughter, Ragini . Scheduled Aurelia at the [place] location for a second post op bra on 10/17/2024 @ 2:00 PM. Call back number is [phone]

## 2024-10-08 ENCOUNTER — HOSPITAL ENCOUNTER (OUTPATIENT)
Dept: MAMMOGRAPHY | Facility: CLINIC | Age: 84
Discharge: HOME | End: 2024-10-08
Payer: COMMERCIAL

## 2024-10-08 ENCOUNTER — HOSPITAL ENCOUNTER (OUTPATIENT)
Dept: ULTRASOUND IMAGING | Facility: CLINIC | Age: 84
Discharge: HOME | End: 2024-10-08
Payer: COMMERCIAL

## 2024-10-08 VITALS — SYSTOLIC BLOOD PRESSURE: 155 MMHG | DIASTOLIC BLOOD PRESSURE: 88 MMHG | HEART RATE: 66 BPM

## 2024-10-08 DIAGNOSIS — R92.8 ABNORMAL FINDINGS ON DIAGNOSTIC IMAGING OF BREAST: ICD-10-CM

## 2024-10-08 DIAGNOSIS — R92.8 ABNORMAL MAMMOGRAM: ICD-10-CM

## 2024-10-08 PROCEDURE — 76642 ULTRASOUND BREAST LIMITED: CPT

## 2024-10-08 PROCEDURE — 88305 TISSUE EXAM BY PATHOLOGIST: CPT | Performed by: SPECIALIST

## 2024-10-08 PROCEDURE — 88342 IMHCHEM/IMCYTCHM 1ST ANTB: CPT | Performed by: SPECIALIST

## 2024-10-08 PROCEDURE — 19083 BX BREAST 1ST LESION US IMAG: CPT

## 2024-10-08 PROCEDURE — A4648 IMPLANTABLE TISSUE MARKER: HCPCS

## 2024-10-08 PROCEDURE — 88341 IMHCHEM/IMCYTCHM EA ADD ANTB: CPT | Performed by: SPECIALIST

## 2024-10-08 RX ORDER — LIDOCAINE HYDROCHLORIDE 10 MG/ML
5 INJECTION, SOLUTION EPIDURAL; INFILTRATION; INTRACAUDAL; PERINEURAL ONCE
Status: COMPLETED | OUTPATIENT
Start: 2024-10-08 | End: 2024-10-08

## 2024-10-08 RX ADMIN — LIDOCAINE HYDROCHLORIDE 5 ML: 10 INJECTION, SOLUTION EPIDURAL; INFILTRATION; INTRACAUDAL; PERINEURAL at 14:27

## 2024-10-08 NOTE — PROGRESS NOTES
Ice pack given:    ___x__yes _____no    Discharge instructions reviewed & given to patient:    __x___yes _____no    Discharged via:    ___x__ambulatory    _____wheelchair    _____stretcher    Stable on discharge:    ___x__yes ____no  Patient's daughter present for consent, procedure and post instructions. Patient is to get results from Dr. Randall Valle.  Ok to leave a message.

## 2024-10-08 NOTE — PROGRESS NOTES
Procedure type:    ___x__ultrasound guided _____stereotactic    Breast:    _____Left __x___Right    Location: 12 o'clock 3 cmfn      Needle: 12 gauge brian    # of passes: 3    Clip: Tophat    Performed by: Dr. Natan Pappas     Pressure held for 5 minutes by: Juhi Haywood    Stervance Strips:    ___x__yes _____no    Band aid:    __x___yes_____no    Tape and guaze:    _____yes ___x__no    Tolerated procedure:    ___x__yes _____no

## 2024-10-09 NOTE — PROGRESS NOTES
Post procedure call completed    Bleeding: _____yes __X___no, daughter denies    Pain: _____yes ___X___no, daughter denies    Redness/Swelling: ______yes ___X___no, daughter denies    Band aid removed: __X___yes _____no     Steri-Strips intact: ___X___yes _____no (discussed with patient to remove steri strips on 1013 if they have not come off on their own)    Daughter Livia with no questions at this time, adv will call when results available, adv to call with any questions or concerns, has name/# for contact

## 2024-10-10 ENCOUNTER — TELEPHONE (OUTPATIENT)
Dept: SURGICAL ONCOLOGY | Facility: CLINIC | Age: 84
End: 2024-10-10

## 2024-10-10 PROCEDURE — 88305 TISSUE EXAM BY PATHOLOGIST: CPT | Performed by: SPECIALIST

## 2024-10-10 PROCEDURE — 88341 IMHCHEM/IMCYTCHM EA ADD ANTB: CPT | Performed by: SPECIALIST

## 2024-10-10 PROCEDURE — 88342 IMHCHEM/IMCYTCHM 1ST ANTB: CPT | Performed by: SPECIALIST

## 2024-10-10 NOTE — TELEPHONE ENCOUNTER
Called patient after review of biopsy on 10/8/24 with surgeon. Site needs to be excised, dilma needs to be placed. Reached out to [facility] who offered 10/16 at 1300 arrive at 1230 for dilma placement. Called patient - daughter answered who speaks on behalf of patient. Explained biopsy results. Explained need for dilma. daughter aware patient to wear no lotion powder or deodorant that morning for dilma placement. Offered appointment to sign new consent with dr. Valle after dilma is placed. Patient's daughter agreeable to all of the above. All questions answered at this time. Daughter appreciative. RBC aware daughter accepted appointment. Surgery scheduler will be made aware of change to procedure.

## 2024-10-11 NOTE — PRE-PROCEDURE INSTRUCTIONS
Pre-Surgery Instructions:   Medication Instructions    aspirin 81 mg chewable tablet Avoid 1 week prior to surgery      donepezil (ARICEPT) 10 mg tablet Take Day of Surgery; unless usually taken at night     gabapentin (NEURONTIN) 100 mg capsule Take Day of Surgery; unless usually taken at night     memantine (NAMENDA) 10 mg tablet Take Day of Surgery; unless usually taken at night     rosuvastatin (CRESTOR) 10 MG tablet Take Day of Surgery; unless usually taken at night     tolterodine (DETROL LA) 4 mg 24 hr capsule Do not take day of surgery; continue as prescribed excluding DOS     Medication instructions for day surgery reviewed. Please use only a sip of water to take your instructed medications. Avoid all over the counter vitamins, supplements and NSAIDS for one week prior to surgery per anesthesia guidelines. Tylenol is ok to take as needed.     You will receive a call one business day prior to surgery with an arrival time and hospital directions. If your surgery is scheduled on a Monday, the hospital will be calling you on the Friday prior to your surgery. If you have not heard from anyone by 8pm, please call the hospital supervisor through the hospital  at [phone]. ([place] [phone] or [place] [phone]).    Do not eat or drink anything after midnight the night before your surgery, including candy, mints, lifesavers, or chewing gum. Do not drink alcohol 24hrs before your surgery. Try not to smoke at least 24hrs before your surgery.       Follow the pre surgery showering instructions as listed in the “My Surgical Experience Booklet” or otherwise provided by your surgeon's office. Do not use a blade to shave the surgical area 1 week before surgery. It is okay to use a clean electric clippers up to 24 hours before surgery. Do not apply any lotions, creams, including makeup, cologne, deodorant, or perfumes after showering on the day of your surgery. Do not use dry shampoo, hair spray, hair  gel, or any type of hair products.     No contact lenses, eye make-up, or artificial eyelashes. Remove nail polish, including gel polish, and any artificial, gel, or acrylic nails if possible. Remove all jewelry including rings and body piercing jewelry.     Wear causal clothing that is easy to take on and off. Consider your type of surgery.    Keep any valuables, jewelry, piercings at home. Please bring any specially ordered equipment (sling, braces) if indicated.    Arrange for a responsible person to drive you to and from the hospital on the day of your surgery. Please confirm the visitor policy for the day of your procedure when you receive your phone call with an arrival time.     Call the surgeon's office with any new illnesses, exposures, or additional questions prior to surgery.    Please reference your “My Surgical Experience Booklet” for additional information to prepare for your upcoming surgery.

## 2024-10-15 PROBLEM — N60.91 ATYPICAL LOBULAR HYPERPLASIA OF RIGHT BREAST: Status: ACTIVE | Noted: 2024-10-15

## 2024-10-15 NOTE — PROGRESS NOTES
Message rec'd from Dr. Randall Singleton's office regarding recommendation for;    __X___ RIGHT ______LEFT      __X___SAVI  placement.    Procedure explained to patient by Dr. Valle, additional questions answered at that time    __X___Verbalized understanding.      Blood thinners:  _____yes __X___no    Date stopped: ___N/A____    All teaching points discussed during office visit, pt with no questions at that time, pt adv to arrive at 1230 for 1300 insertion    Pt given name/# for any further questions/needs

## 2024-10-16 ENCOUNTER — OFFICE VISIT (OUTPATIENT)
Dept: SURGICAL ONCOLOGY | Facility: CLINIC | Age: 84
End: 2024-10-16
Payer: COMMERCIAL

## 2024-10-16 ENCOUNTER — TELEPHONE (OUTPATIENT)
Dept: OBGYN CLINIC | Facility: OTHER | Age: 84
End: 2024-10-16

## 2024-10-16 ENCOUNTER — HOSPITAL ENCOUNTER (OUTPATIENT)
Dept: MAMMOGRAPHY | Facility: CLINIC | Age: 84
Discharge: HOME | End: 2024-10-16
Payer: COMMERCIAL

## 2024-10-16 ENCOUNTER — HOSPITAL ENCOUNTER (OUTPATIENT)
Dept: ULTRASOUND IMAGING | Facility: CLINIC | Age: 84
Discharge: HOME | End: 2024-10-16
Payer: COMMERCIAL

## 2024-10-16 VITALS
SYSTOLIC BLOOD PRESSURE: 180 MMHG | OXYGEN SATURATION: 99 % | DIASTOLIC BLOOD PRESSURE: 94 MMHG | TEMPERATURE: 97.9 F | HEART RATE: 67 BPM | SYSTOLIC BLOOD PRESSURE: 184 MMHG | HEART RATE: 84 BPM | BODY MASS INDEX: 22.33 KG/M2 | HEIGHT: 65 IN | WEIGHT: 134 LBS | DIASTOLIC BLOOD PRESSURE: 72 MMHG

## 2024-10-16 DIAGNOSIS — R92.8 ABNORMAL MAMMOGRAM: ICD-10-CM

## 2024-10-16 DIAGNOSIS — Z01.818 PRE-OP EXAM: ICD-10-CM

## 2024-10-16 DIAGNOSIS — N60.91 ATYPICAL DUCTAL HYPERPLASIA OF RIGHT BREAST: ICD-10-CM

## 2024-10-16 DIAGNOSIS — N60.91 ATYPICAL LOBULAR HYPERPLASIA OF RIGHT BREAST: ICD-10-CM

## 2024-10-16 DIAGNOSIS — D05.11 DUCTAL CARCINOMA IN SITU (DCIS) OF RIGHT BREAST: Primary | ICD-10-CM

## 2024-10-16 PROCEDURE — 99214 OFFICE O/P EST MOD 30 MIN: CPT | Performed by: SURGERY

## 2024-10-16 PROCEDURE — 19285 PERQ DEV BREAST 1ST US IMAG: CPT

## 2024-10-16 RX ORDER — LIDOCAINE HYDROCHLORIDE 10 MG/ML
5 INJECTION, SOLUTION EPIDURAL; INFILTRATION; INTRACAUDAL; PERINEURAL ONCE
Status: COMPLETED | OUTPATIENT
Start: 2024-10-16 | End: 2024-10-16

## 2024-10-16 RX ADMIN — LIDOCAINE HYDROCHLORIDE 5 ML: 10 INJECTION, SOLUTION EPIDURAL; INFILTRATION; INTRACAUDAL; PERINEURAL at 13:20

## 2024-10-16 NOTE — PROGRESS NOTES
Procedure type:    _x____ultrasound guided _____stereotactic    Breast:    _____Left ____x_Right    Location: 12 o'clock 3 cmfn    Needle: n/a    # of passes: n/a    Clip: dilma     Performed by: Dr. Natan Pappas    Pressure held for 5 minutes by: Tanesha Duque    Stervance Strips:    _____yes ___x__no    Band aid:    __X___yes_____no    Tolerated procedure:    __X___yes _____no

## 2024-10-16 NOTE — H&P (VIEW-ONLY)
Surgical Oncology Follow Up  [facility]  [facility] [place]  [address] [address]    Aurelia Jaime  1940  [id number]      Chief Complaint   Patient presents with    Follow-up     Sign Consent        Assessment & Plan:   This is a 83-year-old female with a right breast low-grade DCIS.  She had an additional right breast biopsies consistent with ALH.  She is here after these 2 biopsies.  Surgical consent was obtained for right breast MIREYA bracketed lumpectomy after explaining the benefit procedure alternative and possible complications including cosmetic concerns were reviewed and discussed.  She understand there can be significant defect due to the due to the area involved.    Cancer History:     Oncology History   Ductal carcinoma in situ (DCIS) of right breast   9/24/2024 Biopsy    A.  Right breast, 12:00 periareolar (ultrasound-guided 12-gauge marquee core biopsy, 4 passes):     - Atypical ductal hyperplasia (ADH) bordering on low grade DCIS, involving papilloma (3 mm).    In review of the patient’s recent imaging, the area of atypia in the right breast appears unifocal. It measured 5 mm on ultrasound. Ultrasound of the right axilla has not recently been performed.  Ultrasound-guided aspiration of the cyst in the 12 o'clock position right breast, 3 cm from the nipple is recommended for confirmation. Recent imaging of the contralateral left breast dated 7/29/2024 was reviewed and shows no suspicious findings.     10/1/2024 Initial Diagnosis    Ductal carcinoma in situ (DCIS) of right breast     10/8/2024 Biopsy    Right ultrasound guided biopsy  12 o'clock 3 cm from nipple  Atypical lobular hyperplasia (ALH)     The pathology results are technically benign and concordant with imaging; however, contains atypical lobular hyperplasia. Surgical excision is recommended. The 2 recent biopsy sites are 2.4 cm apart.            Interval History:    Follow-up with additional biopsy with recent diagnosis of PCI    Review of Systems:   Review of Systems   Constitutional:  Negative for chills and fever.   HENT:  Negative for ear pain and sore throat.    Eyes:  Negative for pain and visual disturbance.   Respiratory:  Negative for cough and shortness of breath.    Cardiovascular:  Negative for chest pain and palpitations.   Gastrointestinal:  Negative for abdominal pain and vomiting.   Genitourinary:  Negative for dysuria and hematuria.   Musculoskeletal:  Negative for arthralgias and back pain.   Skin:  Negative for color change and rash.   Neurological:  Negative for seizures and syncope.   All other systems reviewed and are negative.      Past Medical History     Patient Active Problem List   Diagnosis    Atypical ductal hyperplasia of right breast    Papilloma of left breast    Lung nodule, solitary    Right lower lobe lung mass    Lung nodule    Primary adenocarcinoma of right lung (HCC)    History of lung cancer    Encounter for follow-up surveillance of lung cancer    Multiple lung nodules    SOB (shortness of breath) on exertion    Alzheimer's dementia with behavioral disturbance (HCC)    Simple chronic bronchitis (HCC)    dizziness    Orthostatic hypotension    Carotid stenosis, bilateral    Ductal carcinoma in situ (DCIS) of right breast    Atypical lobular hyperplasia of right breast     Past Medical History:   Diagnosis Date    Abnormal chest x-ray     Abnormal mammogram     COPD (chronic obstructive pulmonary disease) (HCC)     Dementia (HCC)     Early stage     Ductal hyperplasia of breast     Left     History of postoperative delirium     Hyperlipidemia     diet controled    Lung cancer (HCC) 10/2018    Resolved with surgery    Nodule of lower lobe of right lung     Right Lower Lobe     SOB (shortness of breath)     SOB (shortness of breath)     Wheezing      Past Surgical History:   Procedure Laterality Date    BREAST BIOPSY Left 10/12/2018     BREAST BIOPSY Right 09/24/2024    BREAST CYST EXCISION Left 10/31/2018    BREAST LUMPECTOMY Left 11/13/2018    Procedure: BREAST LUMPECTOMY; BREAST NEEDLE LOCALIZATION (NEEDLE LOC AT 0830);  Surgeon: Mian Valenzuela MD;  [facility];  Service: Surgical Oncology    COLONOSCOPY      LUNG SURGERY      MAMMO NEEDLE LOCALIZATION LEFT (ALL INC) Left 11/13/2018    MAMMO STEREOTACTIC BREAST BIOPSY LEFT (ALL INC) Left 10/16/2018    MO BRNCHSC INCL FLUOR GDNCE DX W/CELL WASHG SPX Right 01/16/2019    Procedure: BRONCHOSCOPY FLEXIBLE,;  Surgeon: Julia Mccord MD;  [facility];  Service: Thoracic    MO MEDIASTINOSCOPY WITH LYMPH NODE BIOPSY/IES Right 01/16/2019    Procedure: MEDIASTINOSCOPY;  Surgeon: Julia Mccord MD;  [facility];  Service: Thoracic    MO THORACOSCOPY W/LOBECTOMY SINGLE LOBE Right 01/16/2019    Procedure: LOBECTOMY LUNG;  Surgeon: Julia Mccord MD;  [facility];  Service: Thoracic    TRIGGER FINGER RELEASE Right 10/10/2019    ULNAR NERVE TRANSPOSITION Right 10/10/2019    US GUIDED BREAST BIOPSY RIGHT COMPLETE Right 09/24/2024    US GUIDED BREAST BIOPSY RIGHT COMPLETE Right 10/08/2024     Family History   Problem Relation Age of Onset    Hypertension Mother     Glaucoma Mother     Macular degeneration Mother     Prostate cancer Father     Cancer Father     No Known Problems Daughter     No Known Problems Daughter     No Known Problems Maternal Grandmother     No Known Problems Paternal Grandmother     Heart attack Brother     No Known Problems Paternal Aunt     No Known Problems Paternal Aunt     Breast cancer Neg Hx      Social History     Socioeconomic History    Marital status:      Spouse name: Not on file    Number of children: Not on file    Years of education: Not on file    Highest education level: Not on file   Occupational History    Occupation: retired   Tobacco Use    Smoking status: Former     Current packs/day: 0.00     Average packs/day:  1 pack/day for 60.0 years (60.0 ttl pk-yrs)     Types: Cigarettes     Start date: 1958     Quit date: 2016     Years since quittin.4    Smokeless tobacco: Never   Vaping Use    Vaping status: Never Used   Substance and Sexual Activity    Alcohol use: Not Currently     Alcohol/week: 1.0 standard drink of alcohol     Types: 1 Cans of beer per week     Comment: 1 beer once or twice a week    Drug use: No    Sexual activity: Not Currently     Partners: Male     Birth control/protection: Abstinence, Post-menopausal   Other Topics Concern    Not on file   Social History Narrative    Not on file     Social Determinants of Health     Financial Resource Strain: Not on file   Food Insecurity: No Food Insecurity (2024)    Hunger Vital Sign     Worried About Running Out of Food in the Last Year: Never true     Ran Out of Food in the Last Year: Never true   Transportation Needs: No Transportation Needs (2024)    PRAPARE - Transportation     Lack of Transportation (Medical): No     Lack of Transportation (Non-Medical): No   Physical Activity: Not on file   Stress: Not on file   Social Connections: Not on file   Intimate Partner Violence: Not on file   Housing Stability: Low Risk  (2024)    Housing Stability Vital Sign     Unable to Pay for Housing in the Last Year: No     Number of Times Moved in the Last Year: 1     Homeless in the Last Year: No       Current Outpatient Medications:     acetaminophen-codeine (TYLENOL with CODEINE #3) 300-30 MG per tablet, Take 1 tablet by mouth every 6 (six) hours as needed for severe pain, Disp: 20 tablet, Rfl: 0    aspirin 81 mg chewable tablet, Chew 1 tablet (81 mg total) daily, Disp: 30 tablet, Rfl: 0    donepezil (ARICEPT) 10 mg tablet, Take 1 tablet (10 mg total) by mouth daily at bedtime, Disp: 30 tablet, Rfl: 5    gabapentin (NEURONTIN) 100 mg capsule, Take 100 mg by mouth daily, Disp: , Rfl:     memantine (NAMENDA) 10 mg tablet, Take 1 tablet (10 mg total) by  mouth 2 (two) times a day, Disp: 60 tablet, Rfl: 5    oxybutynin (DITROPAN-XL) 10 MG 24 hr tablet, Take 10 mg by mouth daily, Disp: , Rfl:     rosuvastatin (CRESTOR) 10 MG tablet, Take 1 tablet (10 mg total) by mouth daily, Disp: 30 tablet, Rfl: 0    tolterodine (DETROL LA) 4 mg 24 hr capsule, Take 4 mg by mouth daily, Disp: , Rfl:   No current facility-administered medications for this visit.  No Known Allergies    Physical Exam:     Vitals:    10/16/24 1356   BP: (!) 184/94   Pulse: 84   Temp: 97.9 °F (36.6 °C)   SpO2: 99%     Physical Exam  Constitutional:       Appearance: Normal appearance.   HENT:      Head: Normocephalic and atraumatic.      Nose: Nose normal.      Mouth/Throat:      Mouth: Mucous membranes are moist.   Eyes:      Pupils: Pupils are equal, round, and reactive to light.   Cardiovascular:      Rate and Rhythm: Normal rate.      Pulses: Normal pulses.      Heart sounds: Normal heart sounds.   Pulmonary:      Effort: Pulmonary effort is normal.      Breath sounds: Normal breath sounds.   Chest:          Comments: Well-healed left breast surgical scar.  Bilateral breast examination no palpable mass or masses.  Bilateral axilla and supraclavicular examination no palpable adenopathy.  Patient was examined seated as well as supine position.  Abdominal:      General: Bowel sounds are normal.      Palpations: Abdomen is soft.   Musculoskeletal:         General: Normal range of motion.      Cervical back: Normal range of motion and neck supple.   Skin:     General: Skin is warm.   Neurological:      General: No focal deficit present.      Mental Status: She is alert and oriented to person, place, and time.   Psychiatric:         Mood and Affect: Mood normal.         Behavior: Behavior normal.         Thought Content: Thought content normal.         Judgment: Judgment normal.           Results & Discussion:   Breast, Right, US right brst bx 1200 3cmfn, 3 passes, 12g marquee:  -  Atypical lobular  hyperplasia (ALH). See comment.   -  Negative for atypical ductal hyperplasia, ductal carcinoma in situ and invasive carcinoma.     I did review additional biopsy results.  Given she is undergoing to undergo right breast MIREYA directed lumpectomy for DCIS we will resect ALH at the same time.  Between 2 lesions approximately 2 and half to 3 cm apart we will try to obtain MIREYA bracketed lumpectomy.  She understand cosmetic concern.she is well aware of cosmetic concern in fact she requested mastectomy at this point I did explain to her that probably too much of her surgery for her low grade DCIS at her age. I did discussed in detail nature of breast DCIS and ALH she understands and  agrees . All patient questions were answered.       Advance Care Planning/Advance Directives:  I Isai Valle MD discussed the disease status with Aurelia Jaime  today 10/16/24  treatment plans and follow-up with the patient.

## 2024-10-16 NOTE — PROGRESS NOTES
Surgical Oncology Follow Up  [facility]  [facility] [place]  [address] [address]    Aurelia Jaime  1940  [id number]      Chief Complaint   Patient presents with    Follow-up     Sign Consent        Assessment & Plan:   This is a 83-year-old female with a right breast low-grade DCIS.  She had an additional right breast biopsies consistent with ALH.  She is here after these 2 biopsies.  Surgical consent was obtained for right breast MIREYA bracketed lumpectomy after explaining the benefit procedure alternative and possible complications including cosmetic concerns were reviewed and discussed.  She understand there can be significant defect due to the due to the area involved.    Cancer History:     Oncology History   Ductal carcinoma in situ (DCIS) of right breast   9/24/2024 Biopsy    A.  Right breast, 12:00 periareolar (ultrasound-guided 12-gauge marquee core biopsy, 4 passes):     - Atypical ductal hyperplasia (ADH) bordering on low grade DCIS, involving papilloma (3 mm).    In review of the patient’s recent imaging, the area of atypia in the right breast appears unifocal. It measured 5 mm on ultrasound. Ultrasound of the right axilla has not recently been performed.  Ultrasound-guided aspiration of the cyst in the 12 o'clock position right breast, 3 cm from the nipple is recommended for confirmation. Recent imaging of the contralateral left breast dated 7/29/2024 was reviewed and shows no suspicious findings.     10/1/2024 Initial Diagnosis    Ductal carcinoma in situ (DCIS) of right breast     10/8/2024 Biopsy    Right ultrasound guided biopsy  12 o'clock 3 cm from nipple  Atypical lobular hyperplasia (ALH)     The pathology results are technically benign and concordant with imaging; however, contains atypical lobular hyperplasia. Surgical excision is recommended. The 2 recent biopsy sites are 2.4 cm apart.            Interval History:    Follow-up with additional biopsy with recent diagnosis of PCI    Review of Systems:   Review of Systems   Constitutional:  Negative for chills and fever.   HENT:  Negative for ear pain and sore throat.    Eyes:  Negative for pain and visual disturbance.   Respiratory:  Negative for cough and shortness of breath.    Cardiovascular:  Negative for chest pain and palpitations.   Gastrointestinal:  Negative for abdominal pain and vomiting.   Genitourinary:  Negative for dysuria and hematuria.   Musculoskeletal:  Negative for arthralgias and back pain.   Skin:  Negative for color change and rash.   Neurological:  Negative for seizures and syncope.   All other systems reviewed and are negative.      Past Medical History     Patient Active Problem List   Diagnosis    Atypical ductal hyperplasia of right breast    Papilloma of left breast    Lung nodule, solitary    Right lower lobe lung mass    Lung nodule    Primary adenocarcinoma of right lung (HCC)    History of lung cancer    Encounter for follow-up surveillance of lung cancer    Multiple lung nodules    SOB (shortness of breath) on exertion    Alzheimer's dementia with behavioral disturbance (HCC)    Simple chronic bronchitis (HCC)    dizziness    Orthostatic hypotension    Carotid stenosis, bilateral    Ductal carcinoma in situ (DCIS) of right breast    Atypical lobular hyperplasia of right breast     Past Medical History:   Diagnosis Date    Abnormal chest x-ray     Abnormal mammogram     COPD (chronic obstructive pulmonary disease) (HCC)     Dementia (HCC)     Early stage     Ductal hyperplasia of breast     Left     History of postoperative delirium     Hyperlipidemia     diet controled    Lung cancer (HCC) 10/2018    Resolved with surgery    Nodule of lower lobe of right lung     Right Lower Lobe     SOB (shortness of breath)     SOB (shortness of breath)     Wheezing      Past Surgical History:   Procedure Laterality Date    BREAST BIOPSY Left 10/12/2018     BREAST BIOPSY Right 09/24/2024    BREAST CYST EXCISION Left 10/31/2018    BREAST LUMPECTOMY Left 11/13/2018    Procedure: BREAST LUMPECTOMY; BREAST NEEDLE LOCALIZATION (NEEDLE LOC AT 0830);  Surgeon: Mian Valenzuela MD;  [facility];  Service: Surgical Oncology    COLONOSCOPY      LUNG SURGERY      MAMMO NEEDLE LOCALIZATION LEFT (ALL INC) Left 11/13/2018    MAMMO STEREOTACTIC BREAST BIOPSY LEFT (ALL INC) Left 10/16/2018    MT BRNCHSC INCL FLUOR GDNCE DX W/CELL WASHG SPX Right 01/16/2019    Procedure: BRONCHOSCOPY FLEXIBLE,;  Surgeon: Julia Mccord MD;  [facility];  Service: Thoracic    MT MEDIASTINOSCOPY WITH LYMPH NODE BIOPSY/IES Right 01/16/2019    Procedure: MEDIASTINOSCOPY;  Surgeon: Julia Mccord MD;  [facility];  Service: Thoracic    MT THORACOSCOPY W/LOBECTOMY SINGLE LOBE Right 01/16/2019    Procedure: LOBECTOMY LUNG;  Surgeon: Julia Mccord MD;  [facility];  Service: Thoracic    TRIGGER FINGER RELEASE Right 10/10/2019    ULNAR NERVE TRANSPOSITION Right 10/10/2019    US GUIDED BREAST BIOPSY RIGHT COMPLETE Right 09/24/2024    US GUIDED BREAST BIOPSY RIGHT COMPLETE Right 10/08/2024     Family History   Problem Relation Age of Onset    Hypertension Mother     Glaucoma Mother     Macular degeneration Mother     Prostate cancer Father     Cancer Father     No Known Problems Daughter     No Known Problems Daughter     No Known Problems Maternal Grandmother     No Known Problems Paternal Grandmother     Heart attack Brother     No Known Problems Paternal Aunt     No Known Problems Paternal Aunt     Breast cancer Neg Hx      Social History     Socioeconomic History    Marital status:      Spouse name: Not on file    Number of children: Not on file    Years of education: Not on file    Highest education level: Not on file   Occupational History    Occupation: retired   Tobacco Use    Smoking status: Former     Current packs/day: 0.00     Average packs/day:  1 pack/day for 60.0 years (60.0 ttl pk-yrs)     Types: Cigarettes     Start date: 1958     Quit date: 2016     Years since quittin.4    Smokeless tobacco: Never   Vaping Use    Vaping status: Never Used   Substance and Sexual Activity    Alcohol use: Not Currently     Alcohol/week: 1.0 standard drink of alcohol     Types: 1 Cans of beer per week     Comment: 1 beer once or twice a week    Drug use: No    Sexual activity: Not Currently     Partners: Male     Birth control/protection: Abstinence, Post-menopausal   Other Topics Concern    Not on file   Social History Narrative    Not on file     Social Determinants of Health     Financial Resource Strain: Not on file   Food Insecurity: No Food Insecurity (2024)    Hunger Vital Sign     Worried About Running Out of Food in the Last Year: Never true     Ran Out of Food in the Last Year: Never true   Transportation Needs: No Transportation Needs (2024)    PRAPARE - Transportation     Lack of Transportation (Medical): No     Lack of Transportation (Non-Medical): No   Physical Activity: Not on file   Stress: Not on file   Social Connections: Not on file   Intimate Partner Violence: Not on file   Housing Stability: Low Risk  (2024)    Housing Stability Vital Sign     Unable to Pay for Housing in the Last Year: No     Number of Times Moved in the Last Year: 1     Homeless in the Last Year: No       Current Outpatient Medications:     acetaminophen-codeine (TYLENOL with CODEINE #3) 300-30 MG per tablet, Take 1 tablet by mouth every 6 (six) hours as needed for severe pain, Disp: 20 tablet, Rfl: 0    aspirin 81 mg chewable tablet, Chew 1 tablet (81 mg total) daily, Disp: 30 tablet, Rfl: 0    donepezil (ARICEPT) 10 mg tablet, Take 1 tablet (10 mg total) by mouth daily at bedtime, Disp: 30 tablet, Rfl: 5    gabapentin (NEURONTIN) 100 mg capsule, Take 100 mg by mouth daily, Disp: , Rfl:     memantine (NAMENDA) 10 mg tablet, Take 1 tablet (10 mg total) by  mouth 2 (two) times a day, Disp: 60 tablet, Rfl: 5    oxybutynin (DITROPAN-XL) 10 MG 24 hr tablet, Take 10 mg by mouth daily, Disp: , Rfl:     rosuvastatin (CRESTOR) 10 MG tablet, Take 1 tablet (10 mg total) by mouth daily, Disp: 30 tablet, Rfl: 0    tolterodine (DETROL LA) 4 mg 24 hr capsule, Take 4 mg by mouth daily, Disp: , Rfl:   No current facility-administered medications for this visit.  No Known Allergies    Physical Exam:     Vitals:    10/16/24 1356   BP: (!) 184/94   Pulse: 84   Temp: 97.9 °F (36.6 °C)   SpO2: 99%     Physical Exam  Constitutional:       Appearance: Normal appearance.   HENT:      Head: Normocephalic and atraumatic.      Nose: Nose normal.      Mouth/Throat:      Mouth: Mucous membranes are moist.   Eyes:      Pupils: Pupils are equal, round, and reactive to light.   Cardiovascular:      Rate and Rhythm: Normal rate.      Pulses: Normal pulses.      Heart sounds: Normal heart sounds.   Pulmonary:      Effort: Pulmonary effort is normal.      Breath sounds: Normal breath sounds.   Chest:          Comments: Well-healed left breast surgical scar.  Bilateral breast examination no palpable mass or masses.  Bilateral axilla and supraclavicular examination no palpable adenopathy.  Patient was examined seated as well as supine position.  Abdominal:      General: Bowel sounds are normal.      Palpations: Abdomen is soft.   Musculoskeletal:         General: Normal range of motion.      Cervical back: Normal range of motion and neck supple.   Skin:     General: Skin is warm.   Neurological:      General: No focal deficit present.      Mental Status: She is alert and oriented to person, place, and time.   Psychiatric:         Mood and Affect: Mood normal.         Behavior: Behavior normal.         Thought Content: Thought content normal.         Judgment: Judgment normal.           Results & Discussion:   Breast, Right, US right brst bx 1200 3cmfn, 3 passes, 12g marquee:  -  Atypical lobular  hyperplasia (ALH). See comment.   -  Negative for atypical ductal hyperplasia, ductal carcinoma in situ and invasive carcinoma.     I did review additional biopsy results.  Given she is undergoing to undergo right breast MIREYA directed lumpectomy for DCIS we will resect ALH at the same time.  Between 2 lesions approximately 2 and half to 3 cm apart we will try to obtain MIREYA bracketed lumpectomy.  She understand cosmetic concern.she is well aware of cosmetic concern in fact she requested mastectomy at this point I did explain to her that probably too much of her surgery for her low grade DCIS at her age. I did discussed in detail nature of breast DCIS and ALH she understands and  agrees . All patient questions were answered.       Advance Care Planning/Advance Directives:  I Isai Valle MD discussed the disease status with Aurelia Jaime  today 10/16/24  treatment plans and follow-up with the patient.

## 2024-10-17 ENCOUNTER — CLINICAL SUPPORT (OUTPATIENT)
Facility: OTHER | Age: 84
End: 2024-10-17

## 2024-10-17 NOTE — PROGRESS NOTES
Post procedure call completed    Bleeding: _____yes __X___no (pt denies)    Pain: _____yes ___X___no (pt denies, used ice, no need for OTC pain meds)    Redness/Swelling: ______yes ___X___no (pt denies)    Band aid removed: ___X__yes _____no     Pt with no questions at this time, adv to call with any questions or concerns, has name/# for contact

## 2024-10-17 NOTE — PROGRESS NOTES
Mastectomy Bra Fitting Order Details    Aurelia Jaime  1940  [id number]    Reason For Visit  Pre-op visit for post op garments    Precautions   R BCA - DCIS    Subjective  Patient is having lumpectomy for DCIS in 2 areas.      Surgery Type: Lumpectomy    Lymph node removal yes    Date of surgery 10/22/24    Surgical side right    Objective  Client would like bras and camisoles     Assessment  Client typical wears a medium garment and a 34 AB bra    Trial of Fay Camisole L and M  Trial of Fay Bra Med AB,   Trial of Fay Bra Large CD  Theraport L   [place] 36/38     Plan  Client did not place an order;  May return post op    Order Details

## 2024-10-22 ENCOUNTER — ANESTHESIA (OUTPATIENT)
Dept: PERIOP | Facility: HOSPITAL | Age: 84
End: 2024-10-22
Payer: COMMERCIAL

## 2024-10-22 ENCOUNTER — HOSPITAL ENCOUNTER (OUTPATIENT)
Facility: HOSPITAL | Age: 84
Setting detail: OUTPATIENT SURGERY
Discharge: HOME | End: 2024-10-22
Attending: SURGERY | Admitting: SURGERY
Payer: COMMERCIAL

## 2024-10-22 ENCOUNTER — APPOINTMENT (OUTPATIENT)
Dept: RADIOLOGY | Facility: HOSPITAL | Age: 84
End: 2024-10-22
Payer: COMMERCIAL

## 2024-10-22 ENCOUNTER — ANESTHESIA EVENT (OUTPATIENT)
Dept: PERIOP | Facility: HOSPITAL | Age: 84
End: 2024-10-22
Payer: COMMERCIAL

## 2024-10-22 VITALS
HEIGHT: 65 IN | BODY MASS INDEX: 21.45 KG/M2 | WEIGHT: 128.75 LBS | HEART RATE: 81 BPM | DIASTOLIC BLOOD PRESSURE: 75 MMHG | RESPIRATION RATE: 22 BRPM | OXYGEN SATURATION: 97 % | SYSTOLIC BLOOD PRESSURE: 143 MMHG | TEMPERATURE: 97.2 F

## 2024-10-22 DIAGNOSIS — N60.91 ATYPICAL DUCTAL HYPERPLASIA OF RIGHT BREAST: ICD-10-CM

## 2024-10-22 DIAGNOSIS — D05.11 DUCTAL CARCINOMA IN SITU (DCIS) OF RIGHT BREAST: ICD-10-CM

## 2024-10-22 PROCEDURE — 88307 TISSUE EXAM BY PATHOLOGIST: CPT | Performed by: PATHOLOGY

## 2024-10-22 PROCEDURE — 19301 PARTIAL MASTECTOMY: CPT | Performed by: CLINICAL NURSE SPECIALIST

## 2024-10-22 PROCEDURE — 88341 IMHCHEM/IMCYTCHM EA ADD ANTB: CPT | Performed by: PATHOLOGY

## 2024-10-22 PROCEDURE — 88360 TUMOR IMMUNOHISTOCHEM/MANUAL: CPT | Performed by: PATHOLOGY

## 2024-10-22 PROCEDURE — 19301 PARTIAL MASTECTOMY: CPT | Performed by: SURGERY

## 2024-10-22 PROCEDURE — 88342 IMHCHEM/IMCYTCHM 1ST ANTB: CPT | Performed by: PATHOLOGY

## 2024-10-22 RX ORDER — FENTANYL CITRATE 50 UG/ML
INJECTION, SOLUTION INTRAMUSCULAR; INTRAVENOUS AS NEEDED
Status: DISCONTINUED | OUTPATIENT
Start: 2024-10-22 | End: 2024-10-22

## 2024-10-22 RX ORDER — HYDROMORPHONE HCL IN WATER/PF 6 MG/30 ML
0.2 PATIENT CONTROLLED ANALGESIA SYRINGE INTRAVENOUS
Status: DISCONTINUED | OUTPATIENT
Start: 2024-10-22 | End: 2024-10-22 | Stop reason: HOSPADM

## 2024-10-22 RX ORDER — PROPOFOL 10 MG/ML
INJECTION, EMULSION INTRAVENOUS AS NEEDED
Status: DISCONTINUED | OUTPATIENT
Start: 2024-10-22 | End: 2024-10-22

## 2024-10-22 RX ORDER — ONDANSETRON 2 MG/ML
INJECTION INTRAMUSCULAR; INTRAVENOUS AS NEEDED
Status: DISCONTINUED | OUTPATIENT
Start: 2024-10-22 | End: 2024-10-22

## 2024-10-22 RX ORDER — ONDANSETRON 2 MG/ML
4 INJECTION INTRAMUSCULAR; INTRAVENOUS ONCE AS NEEDED
Status: DISCONTINUED | OUTPATIENT
Start: 2024-10-22 | End: 2024-10-22 | Stop reason: HOSPADM

## 2024-10-22 RX ORDER — FENTANYL CITRATE/PF 50 MCG/ML
25 SYRINGE (ML) INJECTION
Status: DISCONTINUED | OUTPATIENT
Start: 2024-10-22 | End: 2024-10-22 | Stop reason: HOSPADM

## 2024-10-22 RX ORDER — DEXAMETHASONE SODIUM PHOSPHATE 10 MG/ML
INJECTION, SOLUTION INTRAMUSCULAR; INTRAVENOUS AS NEEDED
Status: DISCONTINUED | OUTPATIENT
Start: 2024-10-22 | End: 2024-10-22

## 2024-10-22 RX ORDER — OXYCODONE AND ACETAMINOPHEN 5; 325 MG/1; MG/1
1 TABLET ORAL EVERY 4 HOURS PRN
Status: DISCONTINUED | OUTPATIENT
Start: 2024-10-22 | End: 2024-10-22 | Stop reason: HOSPADM

## 2024-10-22 RX ORDER — CEFAZOLIN SODIUM 1 G/50ML
1000 SOLUTION INTRAVENOUS ONCE
Status: COMPLETED | OUTPATIENT
Start: 2024-10-22 | End: 2024-10-22

## 2024-10-22 RX ORDER — LABETALOL HYDROCHLORIDE 5 MG/ML
INJECTION, SOLUTION INTRAVENOUS AS NEEDED
Status: DISCONTINUED | OUTPATIENT
Start: 2024-10-22 | End: 2024-10-22

## 2024-10-22 RX ORDER — LIDOCAINE HYDROCHLORIDE 10 MG/ML
INJECTION, SOLUTION EPIDURAL; INFILTRATION; INTRACAUDAL; PERINEURAL AS NEEDED
Status: DISCONTINUED | OUTPATIENT
Start: 2024-10-22 | End: 2024-10-22

## 2024-10-22 RX ORDER — SODIUM CHLORIDE, SODIUM GLUCONATE, SODIUM ACETATE, POTASSIUM CHLORIDE, MAGNESIUM CHLORIDE, SODIUM PHOSPHATE, DIBASIC, AND POTASSIUM PHOSPHATE .53; .5; .37; .037; .03; .012; .00082 G/100ML; G/100ML; G/100ML; G/100ML; G/100ML; G/100ML; G/100ML
INJECTION, SOLUTION INTRAVENOUS CONTINUOUS PRN
Status: DISCONTINUED | OUTPATIENT
Start: 2024-10-22 | End: 2024-10-22

## 2024-10-22 RX ADMIN — PROPOFOL 120 MG: 10 INJECTION, EMULSION INTRAVENOUS at 12:32

## 2024-10-22 RX ADMIN — LIDOCAINE HYDROCHLORIDE 50 MG: 10 INJECTION, SOLUTION EPIDURAL; INFILTRATION; INTRACAUDAL; PERINEURAL at 12:32

## 2024-10-22 RX ADMIN — PROPOFOL 30 MG: 10 INJECTION, EMULSION INTRAVENOUS at 12:33

## 2024-10-22 RX ADMIN — FENTANYL CITRATE 25 MCG: 50 INJECTION, SOLUTION INTRAMUSCULAR; INTRAVENOUS at 13:30

## 2024-10-22 RX ADMIN — DEXAMETHASONE SODIUM PHOSPHATE 5 MG: 10 INJECTION INTRAMUSCULAR; INTRAVENOUS at 12:43

## 2024-10-22 RX ADMIN — LABETALOL HYDROCHLORIDE 5 MG: 5 INJECTION, SOLUTION INTRAVENOUS at 13:44

## 2024-10-22 RX ADMIN — SODIUM CHLORIDE, SODIUM GLUCONATE, SODIUM ACETATE, POTASSIUM CHLORIDE, MAGNESIUM CHLORIDE, SODIUM PHOSPHATE, DIBASIC, AND POTASSIUM PHOSPHATE: .53; .5; .37; .037; .03; .012; .00082 INJECTION, SOLUTION INTRAVENOUS at 12:29

## 2024-10-22 RX ADMIN — FENTANYL CITRATE 25 MCG: 50 INJECTION, SOLUTION INTRAMUSCULAR; INTRAVENOUS at 12:43

## 2024-10-22 RX ADMIN — PHENYLEPHRINE HYDROCHLORIDE 30 MCG/MIN: 10 INJECTION INTRAVENOUS at 12:36

## 2024-10-22 RX ADMIN — FENTANYL CITRATE 25 MCG: 50 INJECTION INTRAMUSCULAR; INTRAVENOUS at 14:11

## 2024-10-22 RX ADMIN — ONDANSETRON 4 MG: 2 INJECTION INTRAMUSCULAR; INTRAVENOUS at 12:43

## 2024-10-22 RX ADMIN — FENTANYL CITRATE 50 MCG: 50 INJECTION, SOLUTION INTRAMUSCULAR; INTRAVENOUS at 13:54

## 2024-10-22 RX ADMIN — FENTANYL CITRATE 25 MCG: 50 INJECTION, SOLUTION INTRAMUSCULAR; INTRAVENOUS at 12:46

## 2024-10-22 RX ADMIN — FENTANYL CITRATE 25 MCG: 50 INJECTION, SOLUTION INTRAMUSCULAR; INTRAVENOUS at 12:56

## 2024-10-22 RX ADMIN — FENTANYL CITRATE 25 MCG: 50 INJECTION, SOLUTION INTRAMUSCULAR; INTRAVENOUS at 12:40

## 2024-10-22 RX ADMIN — CEFAZOLIN SODIUM 1000 MG: 1 SOLUTION INTRAVENOUS at 12:36

## 2024-10-22 RX ADMIN — OXYCODONE HYDROCHLORIDE AND ACETAMINOPHEN 1 TABLET: 5; 325 TABLET ORAL at 15:17

## 2024-10-22 NOTE — ANESTHESIA PREPROCEDURE EVALUATION
Procedure:  RIGHT BREAST MIREYA  DIRECTED LUMPECTOMY-BRACKETED (Right: Breast)    Relevant Problems   CARDIO   (+) SOB (shortness of breath) on exertion      NEURO/PSYCH   (+) Alzheimer's dementia with behavioral disturbance (HCC)      PULMONARY   (+) SOB (shortness of breath) on exertion   (+) Simple chronic bronchitis (HCC)        Physical Exam    Airway    Mallampati score: II  TM Distance: >3 FB  Neck ROM: full     Dental   No notable dental hx     Cardiovascular  Rhythm: regular, Rate: normal    Pulmonary   Breath sounds clear to auscultation    Other Findings  post-pubertal.      Anesthesia Plan  ASA Score- 3     Anesthesia Type- general with ASA Monitors.         Additional Monitors:     Airway Plan: LMA.           Plan Factors-Exercise tolerance (METS): >4 METS.    Chart reviewed. EKG reviewed.  Existing labs reviewed. Patient summary reviewed.    Patient is not a current smoker.              Induction- intravenous.    Postoperative Plan- Plan for postoperative opioid use.     Perioperative Resuscitation Plan - Level 1 - Full Code.       Informed Consent- Anesthetic plan and risks discussed with patient, daughter and healthcare power of .  I personally reviewed this patient with the CRNA. Discussed and agreed on the Anesthesia Plan with the CRNA..

## 2024-10-22 NOTE — INTERVAL H&P NOTE
H&P reviewed. After examining the patient I find no changes in the patients condition since the H&P had been written.    Vitals:    10/22/24 1137   BP: 135/77   Pulse: 68   Resp: 14   Temp: 99.9 °F (37.7 °C)

## 2024-10-22 NOTE — ANESTHESIA POSTPROCEDURE EVALUATION
Post-Op Assessment Note    CV Status:  Stable  Pain Score: 6    Pain management: adequate       Mental Status:  Awake   Hydration Status:  Euvolemic   PONV Controlled:  Controlled   Airway Patency:  Patent     Post Op Vitals Reviewed: Yes    No anethesia notable event occurred.    Staff: CRNA           Last Filed PACU Vitals:  Vitals Value Taken Time   Temp     Pulse 66 10/22/24 1352   /78 10/22/24 1349   Resp 12 10/22/24 1352   SpO2 100 % 10/22/24 1352   Vitals shown include unfiled device data.    Modified Jani:  No data recorded

## 2024-10-22 NOTE — OP NOTE
OPERATIVE REPORT  PATIENT NAME: Aurelia Jaime    :  1940  MRN: [id number]  Pt Location: [facility] ROOM 01    SURGERY DATE: 10/22/2024    Surgeons and Role:     * Isai Valle MD - Primary     * Bud Young PA-C - Assisting    Preop Diagnosis:  Ductal carcinoma in situ (DCIS) of right breast [D05.11]  Atypical ductal hyperplasia of right breast [N60.91]    Post-Op Diagnosis Codes:     * Ductal carcinoma in situ (DCIS) of right breast [D05.11]     * Atypical ductal hyperplasia of right breast [N60.91]    Procedure(s):  Right - RIGHT BREAST DILMA  DIRECTED LUMPECTOMY-BRACKETED    Specimen(s):  ID Type Source Tests Collected by Time Destination   1 : RIGHT breast dilma directed lumpectomy marked per margin protocol Tissue Breast, Right TISSUE EXAM Isai Valle MD 10/22/2024 1300    2 : Additonal ANTERIOR margin inked most distal margin GREEN Tissue Breast, Right TISSUE EXAM Isai Valle MD 10/22/2024 1301    3 : Additonal INFERIOR  margin inked most distal margin BLUE Tissue Breast, Right TISSUE EXAM Isai Valle MD 10/22/2024 1302    4 : Additonal LATERAL  margin inked most distal margin ORANGE Tissue Breast, Right TISSUE EXAM Isai Valle MD 10/22/2024 1303    5 : Additonal MEDIAL  margin inked most distal margin YELLOW Tissue Breast, Right TISSUE EXAM Isai Valle MD 10/22/2024 1303    6 : Additonal POSTERIOR margin inked most distal margin BLACK Tissue Breast, Right TISSUE EXAM Isai Valle MD 10/22/2024 1304    7 : Additonal SUPERIOR margin inked most distal margin RED Tissue Breast, Right TISSUE EXAM Isai Valle MD 10/22/2024 1304        Estimated Blood Loss:   Minimal    Drains:  * No LDAs found *    Anesthesia Type:   General    Operative Indications:  Ductal carcinoma in situ (DCIS) of right breast [D05.11]  Atypical ductal hyperplasia of right breast [N60.91]      Operative Findings:  Biopsy clip  and Madhavi clips are in the specimen.      Complications:   None    Procedure and Technique:  I previously reviewed the post biopsy images.      Lumpectomy  Aurelia Jaime was brought to the operation room and placed under general anesthesia.   Attention was paid to ensure appropriate padding and correct positioning.  The MADHAVI  detector was then used to locate the position of the MADHAVI deflectors and the skin was marked in this area.  The  right breast was prepped and draped in a sterile fashion.  I initiated a time-out, identifying the patient, the correct side and the above procedure.  All parties agreed with the time out.     The planned incision was then injected with 0.25% Marcaine for local anesthesia.  The incision was sharply incised.  The MADHAVI detector was used to guide the dissection.  Superior, inferior, medial and lateral planes were developed around the MADHAVI deflectors and the specimen truncated with electrocautery beyond the deflector.  The specimen was then inked for orientation purposes.  A specimen radiograph was taken in two dimensions.  6 Additional margins were removed to optimize complete removal of the tumor.  The additional margins were inked on the most distal area.  All specimens were sent to pathology for permanent analysis.  Superficial bleeding controlled with electrocautery and the wound was extensively irrigated.  The wound was closed with two layers, an inner layer of 3-0 vicryl and a subcuticular layer of 4-0 monocryl.  Exofin were applied.  The counts were correct x 2.   I was present for the entire procedure. and A physician assistant was required during the procedure for retraction, tissue handling, dissection and suturing.    Patient Disposition:  PACU     This procedure was not performed to treat breast cancer through sentinel node biopsy           SIGNATURE: Isai Valle MD  DATE: October 22, 2024  TIME: 1:17 PM

## 2024-10-23 NOTE — ANESTHESIA POSTPROCEDURE EVALUATION
Post-Op Assessment Note    Last Filed PACU Vitals:  Vitals Value Taken Time   Temp 97 °F (36.1 °C) 10/22/24 1415   Pulse 65 10/22/24 1418   /75 10/22/24 1415   Resp 10 10/22/24 1418   SpO2 98 % 10/22/24 1418   Vitals shown include unfiled device data.    Modified Jani:  Activity: 2 (10/22/2024  3:45 PM)  Respiration: 2 (10/22/2024  3:45 PM)  Circulation: 2 (10/22/2024  3:45 PM)  Consciousness: 2 (10/22/2024  3:45 PM)  Oxygen Saturation: 2 (10/22/2024  3:45 PM)  Modified Jani Score: 10 (10/22/2024  3:45 PM)

## 2024-10-25 ENCOUNTER — TELEPHONE (OUTPATIENT)
Age: 84
End: 2024-10-25

## 2024-10-25 NOTE — TELEPHONE ENCOUNTER
How does the incision look? WNL    Do you have fever or chills? No    Are you having any pain? Yes     Is it controlled with your pain medication? No    Do you have a drain(s)? No    Verify post-op appointment date and time  [x]  11/13/2024 @ 9:45    Do you have any other questions or concerns? Constipation; discussed used of Colace 2x daily and Miralax up to 3 times daily. Will call back with any further questions or concerns.    **NOTE TO TRIAGER: If patient requires further triage, based upon the answers above, move to appropriate triage protocol.

## 2024-11-07 ENCOUNTER — NURSE TRIAGE (OUTPATIENT)
Age: 84
End: 2024-11-07

## 2024-11-07 NOTE — TELEPHONE ENCOUNTER
Regarding: right breast pain  ----- Message from Mary JONES sent at 11/7/2024  7:55 AM EST -----  Ragini is calling regarding her mom, she stated that he mom had surgery on 10/22 and has recently developed some pain in the the breast that she had the surgery on.

## 2024-11-07 NOTE — TELEPHONE ENCOUNTER
FYI:  Call back to pts Dgtr Mckenna LMOM to cont with the plan with warm compresses,take medication(tylenol/Motrin) as needed and to call tomorrow with update on pt condition.

## 2024-11-07 NOTE — TELEPHONE ENCOUNTER
FYI:  Pt dgtr Becki(verified on med comm consent) to states that pt having some pain R outer breast/armpit area. Pt s/p R lumpectomy 10/22    Denies any redness,drainage ,incision intact glue in place. No visible lump noted to area.Otherwise per dgtr pt is feeling well.  Dgtr did states that pt does sleep on her R side all night.    Advised to sleep L side/back.Also can apply moist warm compress 3-4x/day for comfort and take Tylenol/Aleve prn for pain.gtr understands and agrees with the plan.    Informed that if any s/s of infection/fever occur to call back or should she develop any significant swelling as well to call back,dgtr understands  Pt has F/U appt shced fpor 11/13.  Pls advise

## 2024-11-08 PROCEDURE — 88341 IMHCHEM/IMCYTCHM EA ADD ANTB: CPT | Performed by: PATHOLOGY

## 2024-11-08 PROCEDURE — 88307 TISSUE EXAM BY PATHOLOGIST: CPT | Performed by: PATHOLOGY

## 2024-11-08 PROCEDURE — 88360 TUMOR IMMUNOHISTOCHEM/MANUAL: CPT | Performed by: PATHOLOGY

## 2024-11-08 PROCEDURE — 88342 IMHCHEM/IMCYTCHM 1ST ANTB: CPT | Performed by: PATHOLOGY

## 2024-11-12 PROBLEM — D24.2 PAPILLOMA OF LEFT BREAST: Status: RESOLVED | Noted: 2018-11-26 | Resolved: 2024-11-12

## 2024-11-13 ENCOUNTER — TELEPHONE (OUTPATIENT)
Age: 84
End: 2024-11-13

## 2024-11-13 ENCOUNTER — OFFICE VISIT (OUTPATIENT)
Dept: SURGICAL ONCOLOGY | Facility: CLINIC | Age: 84
End: 2024-11-13
Payer: COMMERCIAL

## 2024-11-13 VITALS
TEMPERATURE: 97.2 F | SYSTOLIC BLOOD PRESSURE: 140 MMHG | WEIGHT: 128 LBS | BODY MASS INDEX: 21.33 KG/M2 | DIASTOLIC BLOOD PRESSURE: 90 MMHG | OXYGEN SATURATION: 98 % | HEART RATE: 71 BPM | HEIGHT: 65 IN

## 2024-11-13 DIAGNOSIS — D05.11 DUCTAL CARCINOMA IN SITU (DCIS) OF RIGHT BREAST: Primary | ICD-10-CM

## 2024-11-13 DIAGNOSIS — Z98.890 STATUS POST RIGHT BREAST LUMPECTOMY: ICD-10-CM

## 2024-11-13 DIAGNOSIS — Z85.3 ENCOUNTER FOR FOLLOW-UP SURVEILLANCE OF BREAST CANCER: ICD-10-CM

## 2024-11-13 DIAGNOSIS — N60.91 ATYPICAL LOBULAR HYPERPLASIA OF RIGHT BREAST: ICD-10-CM

## 2024-11-13 DIAGNOSIS — Z08 ENCOUNTER FOR FOLLOW-UP SURVEILLANCE OF BREAST CANCER: ICD-10-CM

## 2024-11-13 DIAGNOSIS — N60.91 ATYPICAL DUCTAL HYPERPLASIA OF RIGHT BREAST: ICD-10-CM

## 2024-11-13 PROCEDURE — 99215 OFFICE O/P EST HI 40 MIN: CPT | Performed by: SURGERY

## 2024-11-13 NOTE — PROGRESS NOTES
Surgical Oncology Follow Up  [facility]  [facility] [place]  [address] [address]    Aurelia Jaime  1940  [id number]      Chief Complaint   Patient presents with   • Post-op     Post op 10/22/24 right breast lumpectomy        Assessment & Plan:   83-year-old female follow-up right breast lumpectomy.  She is overall doing well.  Well-healed surgical scars no evidence of surgical site infection.  Pathology was reviewed extensively discussed and further management.  We also discussed the benefits of genetic testing for her children.  Her daughter prefer no genetic testing at this time.  I will see her in 3 months time.    Cancer History:     Oncology History   Ductal carcinoma in situ (DCIS) of right breast   9/24/2024 Biopsy    A.  Right breast, 12:00 periareolar (ultrasound-guided 12-gauge marquee core biopsy, 4 passes):     - Atypical ductal hyperplasia (ADH) bordering on low grade DCIS, involving papilloma (3 mm).    In review of the patient’s recent imaging, the area of atypia in the right breast appears unifocal. It measured 5 mm on ultrasound. Ultrasound of the right axilla has not recently been performed.  Ultrasound-guided aspiration of the cyst in the 12 o'clock position right breast, 3 cm from the nipple is recommended for confirmation. Recent imaging of the contralateral left breast dated 7/29/2024 was reviewed and shows no suspicious findings.     10/1/2024 Initial Diagnosis    Ductal carcinoma in situ (DCIS) of right breast     10/8/2024 Biopsy    Right ultrasound guided biopsy  12 o'clock 3 cm from nipple  Atypical lobular hyperplasia (ALH)     The pathology results are technically benign and concordant with imaging; however, contains atypical lobular hyperplasia. Surgical excision is recommended. The 2 recent biopsy sites are 2.4 cm apart.      10/22/2024 Surgery    Right Breast dilma  directed lumpectomy   Two foci of low-grade  ductal carcinoma in situ (DCIS)   Both foci measuring 3 mm  Margins negative   ER 91  AL 91           Interval History:   Follow-up with right breast cancer    Review of Systems:   Review of Systems   Constitutional:  Negative for chills and fever.   HENT:  Negative for ear pain and sore throat.    Eyes:  Negative for pain and visual disturbance.   Respiratory:  Negative for cough and shortness of breath.    Cardiovascular:  Negative for chest pain and palpitations.   Gastrointestinal:  Negative for abdominal pain and vomiting.   Genitourinary:  Negative for dysuria and hematuria.   Musculoskeletal:  Negative for arthralgias and back pain.   Skin:  Negative for color change and rash.   Neurological:  Negative for seizures and syncope.   All other systems reviewed and are negative.    Past Medical History     Patient Active Problem List   Diagnosis   • Atypical ductal hyperplasia of right breast   • Lung nodule, solitary   • Right lower lobe lung mass   • Lung nodule   • Primary adenocarcinoma of right lung (HCC)   • History of lung cancer   • Encounter for follow-up surveillance of lung cancer   • Multiple lung nodules   • SOB (shortness of breath) on exertion   • Alzheimer's dementia with behavioral disturbance (HCC)   • Simple chronic bronchitis (HCC)   • dizziness   • Orthostatic hypotension   • Carotid stenosis, bilateral   • Ductal carcinoma in situ (DCIS) of right breast   • Atypical lobular hyperplasia of right breast     Past Medical History:   Diagnosis Date   • Abnormal chest x-ray    • Abnormal mammogram    • COPD (chronic obstructive pulmonary disease) (HCC)    • Dementia (HCC)     Early stage    • Ductal hyperplasia of breast     Left    • History of postoperative delirium    • Hyperlipidemia     diet controled   • Lung cancer (HCC) 10/2018    Resolved with surgery   • Nodule of lower lobe of right lung     Right Lower Lobe    • SOB (shortness of breath)    • SOB (shortness of breath)    • Wheezing       Past Surgical History:   Procedure Laterality Date   • BREAST BIOPSY Left 10/12/2018   • BREAST BIOPSY Right 09/24/2024   • BREAST CYST EXCISION Left 10/31/2018   • BREAST LUMPECTOMY Left 11/13/2018    Procedure: BREAST LUMPECTOMY; BREAST NEEDLE LOCALIZATION (NEEDLE LOC AT 0830);  Surgeon: Mian Valenzuela MD;  [facility];  Service: Surgical Oncology   • BREAST LUMPECTOMY Right 10/22/2024    Procedure: RIGHT BREAST MIREYA  DIRECTED LUMPECTOMY-BRACKETED;  Surgeon: Isai Valle MD;  Location: [facility];  Service: Surgical Oncology   • COLONOSCOPY     • LUNG SURGERY     • MAMMO NEEDLE LOCALIZATION LEFT (ALL INC) Left 11/13/2018   • MAMMO STEREOTACTIC BREAST BIOPSY LEFT (ALL INC) Left 10/16/2018   • CT BRNCHSC INCL FLUOR GDNCE DX W/CELL WASHG SPX Right 01/16/2019    Procedure: BRONCHOSCOPY FLEXIBLE,;  Surgeon: Julia Mccord MD;  [facility];  Service: Thoracic   • CT MEDIASTINOSCOPY WITH LYMPH NODE BIOPSY/IES Right 01/16/2019    Procedure: MEDIASTINOSCOPY;  Surgeon: Julia Mccord MD;  [facility];  Service: Thoracic   • CT THORACOSCOPY W/LOBECTOMY SINGLE LOBE Right 01/16/2019    Procedure: LOBECTOMY LUNG;  Surgeon: Julia Mccord MD;  [facility];  Service: Thoracic   • TRIGGER FINGER RELEASE Right 10/10/2019   • ULNAR NERVE TRANSPOSITION Right 10/10/2019   • US BREAST CLIP NEEDLE LOC RIGHT Right 10/16/2024   • US GUIDED BREAST BIOPSY RIGHT COMPLETE Right 09/24/2024   • US GUIDED BREAST BIOPSY RIGHT COMPLETE Right 10/08/2024     Family History   Problem Relation Age of Onset   • Hypertension Mother    • Glaucoma Mother    • Macular degeneration Mother    • Prostate cancer Father    • Cancer Father    • No Known Problems Daughter    • No Known Problems Daughter    • No Known Problems Maternal Grandmother    • No Known Problems Paternal Grandmother    • Heart attack Brother    • No Known Problems Paternal Aunt    • No Known Problems Paternal Aunt    •  Breast cancer Neg Hx      Social History     Socioeconomic History   • Marital status:      Spouse name: Not on file   • Number of children: Not on file   • Years of education: Not on file   • Highest education level: Not on file   Occupational History   • Occupation: retired   Tobacco Use   • Smoking status: Former     Current packs/day: 0.00     Average packs/day: 1 pack/day for 60.0 years (60.0 ttl pk-yrs)     Types: Cigarettes     Start date: 1958     Quit date: 2016     Years since quittin.5   • Smokeless tobacco: Never   Vaping Use   • Vaping status: Never Used   Substance and Sexual Activity   • Alcohol use: Not Currently     Alcohol/week: 1.0 standard drink of alcohol     Types: 1 Cans of beer per week     Comment: 1 beer once or twice a week   • Drug use: No   • Sexual activity: Not Currently     Partners: Male     Birth control/protection: Abstinence, Post-menopausal   Other Topics Concern   • Not on file   Social History Narrative   • Not on file     Social Drivers of Health     Financial Resource Strain: Not on file   Food Insecurity: No Food Insecurity (2024)    Nursing - Inadequate Food Risk Classification    • Worried About Running Out of Food in the Last Year: Never true    • Ran Out of Food in the Last Year: Never true    • Ran Out of Food in the Last Year: Not on file   Transportation Needs: No Transportation Needs (2024)    PRAPARE - Transportation    • Lack of Transportation (Medical): No    • Lack of Transportation (Non-Medical): No   Physical Activity: Not on file   Stress: Not on file   Social Connections: Not on file   Intimate Partner Violence: Not on file   Housing Stability: Low Risk  (2024)    Housing Stability Vital Sign    • Unable to Pay for Housing in the Last Year: No    • Number of Times Moved in the Last Year: 1    • Homeless in the Last Year: No       Current Outpatient Medications:   •  aspirin 81 mg chewable tablet, Chew 1 tablet (81 mg total)  daily, Disp: 30 tablet, Rfl: 0  •  donepezil (ARICEPT) 10 mg tablet, Take 1 tablet (10 mg total) by mouth daily at bedtime, Disp: 30 tablet, Rfl: 5  •  gabapentin (NEURONTIN) 100 mg capsule, Take 100 mg by mouth daily, Disp: , Rfl:   •  memantine (NAMENDA) 10 mg tablet, Take 1 tablet (10 mg total) by mouth 2 (two) times a day, Disp: 60 tablet, Rfl: 5  •  rosuvastatin (CRESTOR) 10 MG tablet, Take 1 tablet (10 mg total) by mouth daily, Disp: 30 tablet, Rfl: 0  •  tolterodine (DETROL LA) 4 mg 24 hr capsule, Take 4 mg by mouth daily, Disp: , Rfl:   •  oxybutynin (DITROPAN-XL) 10 MG 24 hr tablet, Take 10 mg by mouth daily, Disp: , Rfl:   No Known Allergies    Physical Exam:     Vitals:    11/13/24 0940   BP: 140/90   Pulse: 71   Temp: (!) 97.2 °F (36.2 °C)   SpO2: 98%     Physical Exam  Constitutional:       Appearance: Normal appearance.   HENT:      Head: Normocephalic and atraumatic.      Nose: Nose normal.      Mouth/Throat:      Mouth: Mucous membranes are moist.   Eyes:      Pupils: Pupils are equal, round, and reactive to light.   Cardiovascular:      Rate and Rhythm: Normal rate.      Pulses: Normal pulses.      Heart sounds: Normal heart sounds.   Pulmonary:      Effort: Pulmonary effort is normal.      Breath sounds: Normal breath sounds.   Chest:          Comments: Well-healed right breast incision.  No palpable mass masses.  No evidence of surgical site infection.  No palpable mass or masses.  Bilateral axilla and supraclavicular examination no palpable adenopathy.  Abdominal:      General: Bowel sounds are normal.      Palpations: Abdomen is soft.   Musculoskeletal:         General: Normal range of motion.      Cervical back: Normal range of motion and neck supple.   Skin:     General: Skin is warm.   Neurological:      General: No focal deficit present.      Mental Status: She is alert and oriented to person, place, and time.   Psychiatric:         Mood and Affect: Mood normal.         Behavior: Behavior  normal.         Thought Content: Thought content normal.         Judgment: Judgment normal.       Results & Discussion:   A. Breast, Right, RIGHT breast dilma directed lumpectomy marked per margin protocol:  -Two foci of low-grade ductal carcinoma in situ (DCIS) (slides A17 and A21, A63), cribriform type, DCIS focally involving intraductal papilloma (slide A63). See note  - Both foci of DCIS measuring 3 mm (0.3 cm)  - Margins negative for DCIS. DCIS about 6 mm (0.6 cm) from closest posterior margin (slide A21)  - Additional margins excised are negative for DCIS (parts B to G)  - Atypical ductal hyperplasia (ADH) (slides A28, A29, and A70)  - Atypical lobular hyperplasia (ALH) (slides A18, A27).  - Several foci of radial sclerosing lesion.  - Small fibroadenomas  - Biopsy site changes present  - Benign skin  - Microcalcifications present in benign breast tissue and vessel wall  - Receptor study will be performed. ADDENDUM TO FOLLOW  - Please see CAP checklist.         NOTE A: Immunostains with antibodies for pancytokeratin AE1/AE3, smooth muscle myosin heavy chain, p63, CK5/6, and ER were performed on blocks A17 and A63.  Immunostains support the diagnosis of DCIS.     Immunostains with antibodies for E-cadherin and p120 catenin were performed on blocks A18 and A29.  Immunostains support diagnosis ofthe ALH on slide A18 and ADH on slide A29.      B. Breast, Right, Additonal ANTERIOR margin inked most distal margin GREEN:  - Benign fibroadipose tissue with focal biopsy site changes     C. Breast, Right, Additonal INFERIOR  margin inked most distal margin BLUE:  - Benign breast tissue      D. Breast, Right, Additonal LATERAL  margin inked most distal margin ORANGE:  - Benign fibroadipose tissue     E. Breast, Right, Additonal MEDIAL  margin inked most distal margin YELLOW:  - Benign breast tissue      F. Breast, Right, Additonal POSTERIOR margin inked most distal margin BLACK:  - Benign fibroadipose tissue      G.  Breast, Right, Additonal SUPERIOR margin inked most distal margin RED:  - Benign breast tissue  I did review pathology and discussed.  Will refer her to medical and radiation oncologist.  Will also obtain decision DX.  I did discussed in detail nature of breast cancer in elderly people.  Will see her in 3 months she understands and  agrees . All patient questions were answered.       Advance Care Planning/Advance Directives:  I Isai Valle MD discussed the disease status with Aurelia Jaime  today 11/13/24  treatment plans and follow-up with the patient.

## 2024-11-13 NOTE — TELEPHONE ENCOUNTER
Please assist the patient with a sooner appointment. As per the clinical review patient should be seen within 7 days. The patient would like to be seen at the [facility] only.   Patient daughter Ragini borden contact number [phone]

## 2024-11-15 ENCOUNTER — LAB REQUISITION (OUTPATIENT)
Dept: LAB | Facility: HOSPITAL | Age: 84
End: 2024-11-15

## 2024-11-15 DIAGNOSIS — C50.111 MALIGNANT NEOPLASM OF CENTRAL PORTION OF RIGHT FEMALE BREAST (HCC): ICD-10-CM

## 2024-11-26 ENCOUNTER — CONSULT (OUTPATIENT)
Dept: HEMATOLOGY ONCOLOGY | Facility: CLINIC | Age: 84
End: 2024-11-26
Payer: COMMERCIAL

## 2024-11-26 VITALS
HEART RATE: 80 BPM | HEIGHT: 65 IN | WEIGHT: 132.5 LBS | RESPIRATION RATE: 15 BRPM | BODY MASS INDEX: 22.08 KG/M2 | OXYGEN SATURATION: 98 % | TEMPERATURE: 97.4 F | DIASTOLIC BLOOD PRESSURE: 80 MMHG | SYSTOLIC BLOOD PRESSURE: 140 MMHG

## 2024-11-26 DIAGNOSIS — D05.11 DUCTAL CARCINOMA IN SITU (DCIS) OF RIGHT BREAST: ICD-10-CM

## 2024-11-26 PROCEDURE — 99215 OFFICE O/P EST HI 40 MIN: CPT | Performed by: INTERNAL MEDICINE

## 2024-11-26 NOTE — PROGRESS NOTES
Aurelia Jaime  1940  [facility] [place]  [address] [id number]    Chief Complaint   Patient presents with    Consult     83 Y old WF referred for evaluation of a newly diagnosis of DCIS of the RT breast S/P lumpectomy       Oncology History Overview Note   With a history of lung cancer S/P right lower lobectomy in January 2019, was diagnosed with DCIS, ER/ME positive of the right breast. She presented with abnormal screening mammogram and underwent biopsy followed by right breast lumpectomy on 10/22/24. She is being referred by Dr. Valle and presents today for consult.      7/29/24 B/L screening mammogram  RIGHT  1) FOCAL ASYMMETRY [B]: There is a focal asymmetry seen in the upper central region of the right breast in the anterior depth.   Left  There are no suspicious masses, grouped microcalcifications or areas of unexplained architectural distortion. The skin and nipple areolar complex are unremarkable.    Benign-appearing calcifications are noted in each breast.  RECOMMENDATION:       - Diagnostic mammogram at the current time for the right breast.       - Ultrasound at the current time for the right breast.       - Routine screening mammogram in 1 year for the left breast.      9/9/24 Diagnostic right mammogram & right US  RIGHT  1) MASS [B]: Previously question right breast focal asymmetry persists as a small oval mass.  Sonographic evaluation shows a corresponding irregular indistinctly marginated hypoechoic mass 12:00 periareolar measuring 3 x 3 x 5 mm.  A benign complicated cyst at 12:00 3 cm from nipple measuring 4 x 3 mm is noted on ultrasound.  Elsewhere in the right breast on mammography, there are no suspicious masses, grouped microcalcifications or unexplained areas of architectural distortion.   RECOMMENDATION:       - Ultrasound-guided breast biopsy for the right breast.         9/24/24 US guided biopsy right  breast      0/3/24 Dr. Valle  discussed atypical ductal hyperplasia versus low-grade DCIS. She also wait for her right axillary ultrasound. Which is scheduled for next week.   Consent was obtained for right breast Madhavi  directed lumpectomy.       10/8/24 US right breast axilla and biopsy  Right axillary ultrasounds performed using a dedicated high-resolution probe. No right axillary adenopathy is identified.   RECOMMENDATION:       - Surgical consultation for the right breast.    A. Breast, Right, US right brst bx 1200 3cmfn, 3 passes, 12g marquee:  -  Atypical lobular hyperplasia (ALH). See comment.   -  Negative for atypical ductal hyperplasia, ductal carcinoma in situ and invasive carcinoma.       10/16/24 Dr. Valle  right breast low-grade DCIS. She had an additional right breast biopsies consistent with ALH. She is here after these 2 biopsies.   Plan for lumpectomy      10/22/24  RIGHT BREAST MADHAVI  DIRECTED LUMPECTOMY-BRACKETED    rocedure  Excision (less than total mastectomy)   Specimen Laterality  Right   TUMOR   Tumor Site  Clock position     12 o'clock   Tumor Site  Periareolar     Histologic Type  Ductal carcinoma in situ   Size (Extent) of DCIS  Estimated size (extent) of DCIS is at least (Millimeters): 3 mm   Number of Blocks with DCIS  3   Number of Blocks Examined  100   Architectural Patterns  Cribriform     Papillary   Nuclear Grade  Grade I (low)   Necrosis  Not identified   Microcalcifications  Present in nonneoplastic tissue   MARGINS   Margin Status  All margins negative for DCIS   Distance from DCIS to Closest Margin  6 mm   Closest Margin(s) to DCIS  Posterior   Margin Comment  Margins negative for DCIS. DCIS is about 6 mm (0.6 cm) from closest posterior margin (slide A21) . Additional margins excised are negative for DCIS (parts B to G)   REGIONAL LYMPH NODES   Regional Lymph Node Status  Not applicable (no regional lymph nodes submitted or found)   PATHOLOGIC STAGE CLASSIFICATION  (pTNM, AJCC 8th Edition)   Reporting of pT, pN, and (when applicable) pM categories is based on information available to the pathologist at the time the report is issued. As per the AJCC (Chapter 1, 8th Ed.) it is the managing physician’s responsibility to establish the final pathologic stage based upon all pertinent information, including but potentially not limited to this pathology report.   pT Category  pTis (DCIS)   pN Category  pN not assigned (no nodes submitted or found)   ADDITIONAL FINDINGS   Additional Findings  ADH and ALH   Comment(s)  Receptor study will be performed. ADDENDUM TO FOLLOW   .     Breast Biomarker Reporting Template   Protocol posted: 12/13/2023(Added in Addendum) BREAST BIOMARKER REPORTING TEMPLATE - All Specimens  Test(s) Performed     Estrogen Receptor (ER) Status  Positive (greater than 10% of cells demonstrate nuclear positivity)   Percentage of Cells with Nuclear Positivity  %   Average Intensity of Staining  Strong   Test Type  Food and Drug Administration (FDA) cleared (test / vendor): CONFIRM anti-Estrogen Receptor (ER)/[employer] Roche   Primary Antibody  SP1   Test(s) Performed     Progesterone Receptor (PgR) Status  Positive   Percentage of Cells with Nuclear Positivity  %   Average Intensity of Staining  Strong   Test Type  Food and Drug Administration (FDA) cleared (test / vendor): CONFIRM anti-Progesterone Receptor (AK)/[employer] Roche   Primary Antibody  1E2   Cold Ischemia and Fixation Times  Meet requirements specified in latest version of the ASCO / CAP Guidelines   Cold Ischemia Time (minutes)  1 min   Fixation Time (hours)  35 hours   Testing Performed on Block Number(s)  A63   METHODS   Fixative  Formalin   .       11/13/24 Dr. Valle  overall doing well. Well-healed surgical scars no evidence of surgical site infection.   Pathology was reviewed   discussed the benefits of genetic testing for her children. Her daughter prefer no genetic testing at this time.    Follow up 3 months        24 Dr. Ryan        Upcomin25 Dr. Valle  25 CT  3/4/25 Dr. Mccord     Primary adenocarcinoma of right lung (HCC)   2019 Initial Diagnosis    Primary adenocarcinoma of right lung (HCC)     Ductal carcinoma in situ (DCIS) of right breast   2024 Biopsy    A.  Right breast, 12:00 periareolar (ultrasound-guided 12-gauge marquee core biopsy, 4 passes):     - Atypical ductal hyperplasia (ADH) bordering on low grade DCIS, involving papilloma (3 mm).    In review of the patient’s recent imaging, the area of atypia in the right breast appears unifocal. It measured 5 mm on ultrasound. Ultrasound of the right axilla has not recently been performed.  Ultrasound-guided aspiration of the cyst in the 12 o'clock position right breast, 3 cm from the nipple is recommended for confirmation. Recent imaging of the contralateral left breast dated 2024 was reviewed and shows no suspicious findings.     10/1/2024 Initial Diagnosis    Ductal carcinoma in situ (DCIS) of right breast     10/8/2024 Biopsy    Right ultrasound guided biopsy  12 o'clock 3 cm from nipple  Atypical lobular hyperplasia (ALH)     The pathology results are technically benign and concordant with imaging; however, contains atypical lobular hyperplasia. Surgical excision is recommended. The 2 recent biopsy sites are 2.4 cm apart.      10/22/2024 Surgery    Right Breast dilma  directed lumpectomy   Two foci of low-grade ductal carcinoma in situ (DCIS)   Both foci measuring 3 mm  Margins negative   ER 91  MD 91     10/22/2024 -  Cancer Staged    Staging form: Breast, AJCC 8th Edition  - Clinical stage from 10/22/2024: Stage 0 (cTis (DCIS), cN0, cM0, ER+, MD+, HER2: Not Assessed) - Signed by Isai Valle MD on 2024  Stage prefix: Initial diagnosis  Nuclear grade: G1           History of Present Illness:  -Isai Valle Has*:    83 Y old WF with PMH of Dementia , Lung cancer S/P  resection 5 years ago , referred for a newly diagnosis of DCIS of the RT breast , she has lumpectomy on 9/24/2024 and came for evaluation of the need of any further treatment     Review of Systems   Constitutional:  Negative for chills and fever.   HENT:  Negative for ear pain and sore throat.    Eyes:  Negative for pain and visual disturbance.   Respiratory:  Negative for cough and shortness of breath.    Cardiovascular:  Negative for chest pain and palpitations.   Gastrointestinal:  Negative for abdominal pain and vomiting.   Genitourinary:  Negative for dysuria and hematuria.   Musculoskeletal:  Negative for arthralgias and back pain.   Skin:  Negative for color change and rash.   Neurological:  Negative for seizures and syncope.   All other systems reviewed and are negative.          Patient Active Problem List   Diagnosis    Atypical ductal hyperplasia of right breast    Lung nodule, solitary    Right lower lobe lung mass    Lung nodule    Primary adenocarcinoma of right lung (HCC)    History of lung cancer    Encounter for follow-up surveillance of breast cancer    Multiple lung nodules    SOB (shortness of breath) on exertion    Alzheimer's dementia with behavioral disturbance (HCC)    Simple chronic bronchitis (HCC)    dizziness    Orthostatic hypotension    Carotid stenosis, bilateral    Ductal carcinoma in situ (DCIS) of right breast    Atypical lobular hyperplasia of right breast    Status post right breast lumpectomy     Past Medical History:   Diagnosis Date    Abnormal chest x-ray     Abnormal mammogram     COPD (chronic obstructive pulmonary disease) (HCC)     Dementia (HCC)     Early stage     Ductal hyperplasia of breast     Left     History of postoperative delirium     Hyperlipidemia     diet controled    Lung cancer (HCC) 10/2018    Resolved with surgery    Nodule of lower lobe of right lung     Right Lower Lobe     SOB (shortness of breath)     SOB (shortness of breath)     Wheezing      Past  Surgical History:   Procedure Laterality Date    BREAST BIOPSY Left 10/12/2018    BREAST BIOPSY Right 09/24/2024    BREAST CYST EXCISION Left 10/31/2018    BREAST LUMPECTOMY Left 11/13/2018    Procedure: BREAST LUMPECTOMY; BREAST NEEDLE LOCALIZATION (NEEDLE LOC AT 0830);  Surgeon: Mian Valenzuela MD;  Location[facility];  Service: Surgical Oncology    BREAST LUMPECTOMY Right 10/22/2024    Procedure: RIGHT BREAST MIREYA  DIRECTED LUMPECTOMY-BRACKETED;  Surgeon: Isai Valle MD;  Location: [facility];  Service: Surgical Oncology    COLONOSCOPY      LUNG SURGERY      MAMMO NEEDLE LOCALIZATION LEFT (ALL INC) Left 11/13/2018    MAMMO STEREOTACTIC BREAST BIOPSY LEFT (ALL INC) Left 10/16/2018    NE BRNCHSC INCL FLUOR GDNCE DX W/CELL WASHG SPX Right 01/16/2019    Procedure: BRONCHOSCOPY FLEXIBLE,;  Surgeon: Julia Mccord MD;  Location: [facility];  Service: Thoracic    NE MEDIASTINOSCOPY WITH LYMPH NODE BIOPSY/IES Right 01/16/2019    Procedure: MEDIASTINOSCOPY;  Surgeon: Julia Mccord MD;  [facility];  Service: Thoracic    NE THORACOSCOPY W/LOBECTOMY SINGLE LOBE Right 01/16/2019    Procedure: LOBECTOMY LUNG;  Surgeon: Julia Mccord MD;  [facility];  Service: Thoracic    TRIGGER FINGER RELEASE Right 10/10/2019    ULNAR NERVE TRANSPOSITION Right 10/10/2019    US BREAST CLIP NEEDLE LOC RIGHT Right 10/16/2024    US GUIDED BREAST BIOPSY RIGHT COMPLETE Right 09/24/2024    US GUIDED BREAST BIOPSY RIGHT COMPLETE Right 10/08/2024     Family History   Problem Relation Age of Onset    Hypertension Mother     Glaucoma Mother     Macular degeneration Mother     Prostate cancer Father     Cancer Father     No Known Problems Daughter     No Known Problems Daughter     No Known Problems Maternal Grandmother     No Known Problems Paternal Grandmother     Heart attack Brother     No Known Problems Paternal Aunt     No Known Problems Paternal Aunt     Breast cancer Neg Hx      Social  History     Socioeconomic History    Marital status:      Spouse name: Not on file    Number of children: Not on file    Years of education: Not on file    Highest education level: Not on file   Occupational History    Occupation: retired   Tobacco Use    Smoking status: Former     Current packs/day: 0.00     Average packs/day: 1 pack/day for 60.0 years (60.0 ttl pk-yrs)     Types: Cigarettes     Start date: 1958     Quit date: 2016     Years since quittin.5    Smokeless tobacco: Never   Vaping Use    Vaping status: Never Used   Substance and Sexual Activity    Alcohol use: Not Currently     Alcohol/week: 1.0 standard drink of alcohol     Types: 1 Cans of beer per week     Comment: 1 beer once or twice a week    Drug use: No    Sexual activity: Not Currently     Partners: Male     Birth control/protection: Abstinence, Post-menopausal   Other Topics Concern    Not on file   Social History Narrative    Not on file     Social Drivers of Health     Financial Resource Strain: Not on file   Food Insecurity: No Food Insecurity (2024)    Nursing - Inadequate Food Risk Classification     Worried About Running Out of Food in the Last Year: Never true     Ran Out of Food in the Last Year: Never true     Ran Out of Food in the Last Year: Not on file   Transportation Needs: No Transportation Needs (2024)    PRAPARE - Transportation     Lack of Transportation (Medical): No     Lack of Transportation (Non-Medical): No   Physical Activity: Not on file   Stress: Not on file   Social Connections: Not on file   Intimate Partner Violence: Not on file   Housing Stability: Low Risk  (2024)    Housing Stability Vital Sign     Unable to Pay for Housing in the Last Year: No     Number of Times Moved in the Last Year: 1     Homeless in the Last Year: No       Current Outpatient Medications:     aspirin 81 mg chewable tablet, Chew 1 tablet (81 mg total) daily, Disp: 30 tablet, Rfl: 0    donepezil (ARICEPT) 10  "mg tablet, Take 1 tablet (10 mg total) by mouth daily at bedtime, Disp: 30 tablet, Rfl: 5    gabapentin (NEURONTIN) 100 mg capsule, Take 100 mg by mouth daily, Disp: , Rfl:     memantine (NAMENDA) 10 mg tablet, Take 1 tablet (10 mg total) by mouth 2 (two) times a day, Disp: 60 tablet, Rfl: 5    rosuvastatin (CRESTOR) 10 MG tablet, Take 1 tablet (10 mg total) by mouth daily, Disp: 30 tablet, Rfl: 0    tolterodine (DETROL LA) 4 mg 24 hr capsule, Take 4 mg by mouth daily, Disp: , Rfl:     oxybutynin (DITROPAN-XL) 10 MG 24 hr tablet, Take 10 mg by mouth daily (Patient not taking: Reported on 11/26/2024), Disp: , Rfl:   No Known Allergies  Vitals:    11/26/24 1040   BP: 140/80   Pulse: 80   Resp: 15   Temp: (!) 97.4 °F (36.3 °C)   SpO2: 98%         /80 (BP Location: Left arm, Patient Position: Sitting, Cuff Size: Standard)   Pulse 80   Temp (!) 97.4 °F (36.3 °C) (Temporal)   Resp 15   Ht 5' 5\" (1.651 m)   Wt 60.1 kg (132 lb 8 oz)   SpO2 98%   BMI 22.05 kg/m²     Physical examination :     General Appearance:    Alert, cooperative, no distress, appears stated age   Head:    Normocephalic, without obvious abnormality, atraumatic   Eyes:    PERRL, conjunctiva/corneas clear, EOM's intact, fundi     benign, both eyes        Ears:    Normal TM's and external ear canals, both ears   Nose:   Nares normal, septum midline, mucosa normal, no drainage    or sinus tenderness   Throat:   Lips, mucosa, and tongue normal; teeth and gums normal   Neck:   Supple, symmetrical, trachea midline, no adenopathy;        thyroid:  No enlargement/tenderness/nodules; no carotid    bruit or JVD   Back:     Symmetric, no curvature, ROM normal, no CVA tenderness   Lungs:     Clear to auscultation bilaterally, respirations unlabored   Chest wall:    No tenderness or deformity   Heart:    Regular rate and rhythm, S1 and S2 normal, no murmur, rub    or gallop   Abdomen:     Soft, non-tender, bowel sounds active all four quadrants,     no " "masses, no organomegaly           Extremities:   Extremities normal, atraumatic, no cyanosis or edema   Pulses:   2+ and symmetric all extremities   Skin:   Skin color, texture, turgor normal, no rashes or lesions   Lymph nodes:   Cervical, supraclavicular, and axillary nodes normal   Neurologic:   CNII-XII intact. Normal strength, sensation and reflexes       throughout                          Breast :  RT lumpectomy   Labs:    Final Diagnosis   A.  Right breast, 12:00 periareolar (ultrasound-guided 12-gauge marquee core biopsy, 4 passes):     - Atypical ductal hyperplasia (ADH) bordering on low grade DCIS, involving papilloma (3 mm).     Comment:  Definitive diagnosis is deferred to the resection specimen.   Electronically signed by Tay Russell MD on 9/27/2024 at  9:59 AM   Microscopic Description    P63 and SMM-HC present. ALICIA-3 positive. Patchy admixed CK5/6. Membranous e-cadherin and P120 catenin.  Controls stain appropriately.   Note    - Portion of ultrasound report from 09/09/2024, \"Irregular indistinctly marginated hypoechoic mass 12:00 periareolar measuring 3 x 3 x 5 mm.\"  - Intradepartmental consultation concurs with the diagnosis.  - [facility] nurse navigators notified via Epic chat on 09/27/2024.   Additional Information    All reported additional testing was performed with appropriately reactive controls.  These tests were developed and their performance characteristics determined by [facility] or appropriate performing facility, though some tests may be performed on tissues which have not been validated for performance characteristics (such as staining performed on alcohol exposed cell blocks and decalcified tissues).  Results should be interpreted with caution and in the context of the patients’ clinical condition. These tests may not be cleared or approved by the U.S. Food and Drug Administration, though the FDA has determined that such clearance or " "approval is not necessary. These tests are used for clinical purposes and they should not be regarded as investigational or for research. This laboratory has been approved by [facility], designated as a high-complexity laboratory and is qualified to perform these tests. Interpretation performed at [facility], [address] [place], [place] [id number].   Gross Description    A. The specimen is received in formalin, labeled with the patient's name and hospital number, and is designated \" ultrasound-guided right breast biopsy 12:00 periareolar 4 passes 12G marquee\".  The specimen consists of 4 fibrofatty cores of soft tissue measuring 0.8 to 2.3 cm in length, and ranging from less than 0.1 to 0.5 cm in diameter.  Entirely submitted.  2 cassettes.  2 cores per cassette.  Between sponges.     Note: The estimated total formalin fixation time based upon information provided by the submitting clinician and the standard processing schedule is 9.50 hours. The cold ischemia time based upon information provided by the submitting clinician and receiving staff in the laboratory is within 1 minute.        Discussion/Summary:    1) DCIS of the RT breast S/P lumpectomy     I had a lengthy discussion with the patient in the presence of her daughter ,. I explained to her the treatment option which is observation vs radiation therapy followed by hormonal treatment     I explained to them that the value of the adjuvant treatment is to decrease the recurrence by 50 % also to decrease the chance of occurrence of the same pathology in the other breast     After a lengthy discussion the daughter express their wish for just observation , due to the age and the advance dementia I think this is a reasonable option     F/U in 3 months         Jordy Ryan MD         "

## 2024-12-13 LAB — SCAN RESULT: NORMAL

## 2024-12-23 ENCOUNTER — APPOINTMENT (OUTPATIENT)
Age: 84
End: 2024-12-23
Payer: COMMERCIAL

## 2024-12-23 ENCOUNTER — OFFICE VISIT (OUTPATIENT)
Age: 84
End: 2024-12-23
Payer: COMMERCIAL

## 2024-12-23 VITALS
SYSTOLIC BLOOD PRESSURE: 164 MMHG | HEART RATE: 64 BPM | DIASTOLIC BLOOD PRESSURE: 90 MMHG | BODY MASS INDEX: 23.13 KG/M2 | TEMPERATURE: 98.6 F | WEIGHT: 139 LBS | RESPIRATION RATE: 18 BRPM | OXYGEN SATURATION: 100 %

## 2024-12-23 DIAGNOSIS — S99.922A FOOT INJURY, LEFT, INITIAL ENCOUNTER: ICD-10-CM

## 2024-12-23 DIAGNOSIS — S93.402A SPRAIN OF LEFT ANKLE, UNSPECIFIED LIGAMENT, INITIAL ENCOUNTER: ICD-10-CM

## 2024-12-23 DIAGNOSIS — S99.922A FOOT INJURY, LEFT, INITIAL ENCOUNTER: Primary | ICD-10-CM

## 2024-12-23 PROCEDURE — 99213 OFFICE O/P EST LOW 20 MIN: CPT | Performed by: PHYSICIAN ASSISTANT

## 2024-12-23 PROCEDURE — S9083 URGENT CARE CENTER GLOBAL: HCPCS | Performed by: PHYSICIAN ASSISTANT

## 2024-12-23 PROCEDURE — 73630 X-RAY EXAM OF FOOT: CPT

## 2024-12-23 NOTE — PATIENT INSTRUCTIONS
Rest and elevate foot as often as able  Take tylenol for pain as needed  Use ace wrap for swelling. Loosen wrap if toes become cold, purple, numb, cannot move  Use boot and walker to walk  See podiatrist in one week for follow up.

## 2024-12-23 NOTE — PROGRESS NOTES
[facility]        NAME: Aurelia Jaime is a 84 y.o. female  : 1940    MRN: [id number]  DATE: 2024  TIME: 12:53 PM    Assessment and Plan   Foot injury, left, initial encounter [S99.922A]  1. Foot injury, left, initial encounter  XR foot 3+ vw left      2. Sprain of left ankle, unspecified ligament, initial encounter          No obvious fracture seen on xray by my read, arthritic changes noted. AI read question possible fracture. Will treat conservatively, patient given walking boot and ace wrap. Will use walker at home, unable to use crutches. Has podiatrist for f/u.    Patient Instructions     Patient Instructions   Rest and elevate foot as often as able  Take tylenol for pain as needed  Use ace wrap for swelling. Loosen wrap if toes become cold, purple, numb, cannot move  Use boot and walker to walk  See podiatrist in one week for follow up.    Chief Complaint     Chief Complaint   Patient presents with    Foot Pain     L foot pain X several days, unknown mechanism of injury         History of Present Illness       HPI    Her daughter brings her for evaluation of left foot bruising, swelling, and pain.  No known trauma.  Patient has Alzheimer's and is unable to recall a specific injury.  Is known to have outbursts which might have been a cause.  Started with bruising on the inside of the foot and limp  Now with bruising on the lateral foot and swelling of the proximal lateral foot  Pain with weightbearing, extreme dorsiflexion  No open wounds, redness, drainage, warmth, streaking, fever  She follows regularly with a podiatrist.     Review of Systems   Review of Systems  As per hpi    Current Medications       Current Outpatient Medications:     aspirin 81 mg chewable tablet, Chew 1 tablet (81 mg total) daily, Disp: 30 tablet, Rfl: 0    donepezil (ARICEPT) 10 mg tablet, Take 1 tablet (10 mg total) by mouth daily at bedtime, Disp: 30 tablet, Rfl: 5    gabapentin (NEURONTIN) 100 mg  capsule, Take 100 mg by mouth daily, Disp: , Rfl:     memantine (NAMENDA) 10 mg tablet, Take 1 tablet (10 mg total) by mouth 2 (two) times a day, Disp: 60 tablet, Rfl: 5    rosuvastatin (CRESTOR) 10 MG tablet, Take 1 tablet (10 mg total) by mouth daily, Disp: 30 tablet, Rfl: 0    tolterodine (DETROL LA) 4 mg 24 hr capsule, Take 4 mg by mouth daily, Disp: , Rfl:     Current Allergies     Allergies as of 12/23/2024    (No Known Allergies)            The following portions of the patient's history were reviewed and updated as appropriate: allergies, current medications, past family history, past medical history, past social history, past surgical history and problem list.     Past Medical History:   Diagnosis Date    Abnormal chest x-ray     Abnormal mammogram     COPD (chronic obstructive pulmonary disease) (HCC)     Dementia (HCC)     Early stage     Ductal hyperplasia of breast     Left     History of postoperative delirium     Hyperlipidemia     diet controled    Lung cancer (HCC) 10/2018    Resolved with surgery    Nodule of lower lobe of right lung     Right Lower Lobe     SOB (shortness of breath)     SOB (shortness of breath)     Wheezing        Past Surgical History:   Procedure Laterality Date    BREAST BIOPSY Left 10/12/2018    BREAST BIOPSY Right 09/24/2024    BREAST CYST EXCISION Left 10/31/2018    BREAST LUMPECTOMY Left 11/13/2018    Procedure: BREAST LUMPECTOMY; BREAST NEEDLE LOCALIZATION (NEEDLE LOC AT 0830);  Surgeon: Mian Valenzuela MD;  [facility];  Service: Surgical Oncology    BREAST LUMPECTOMY Right 10/22/2024    Procedure: RIGHT BREAST MIREYA  DIRECTED LUMPECTOMY-BRACKETED;  Surgeon: Isai Valle MD;  [facility];  Service: Surgical Oncology    COLONOSCOPY      LUNG SURGERY      MAMMO NEEDLE LOCALIZATION LEFT (ALL INC) Left 11/13/2018    MAMMO STEREOTACTIC BREAST BIOPSY LEFT (ALL INC) Left 10/16/2018    [place] BRNCHSC INCL FLUOR GDNCE DX W/CELL WASHG SPX Right 01/16/2019     Procedure: BRONCHOSCOPY FLEXIBLE,;  Surgeon: Julia Mccord MD;  [facility];  Service: Thoracic    [place] MEDIASTINOSCOPY WITH LYMPH NODE BIOPSY/IES Right 01/16/2019    Procedure: MEDIASTINOSCOPY;  Surgeon: Julia Mccord MD;  [facility];  Service: Thoracic    [place] THORACOSCOPY W/LOBECTOMY SINGLE LOBE Right 01/16/2019    Procedure: LOBECTOMY LUNG;  Surgeon: Julia Mccord MD;  [facility];  Service: Thoracic    TRIGGER FINGER RELEASE Right 10/10/2019    ULNAR NERVE TRANSPOSITION Right 10/10/2019    US BREAST CLIP NEEDLE LOC RIGHT Right 10/16/2024    US GUIDED BREAST BIOPSY RIGHT COMPLETE Right 09/24/2024    US GUIDED BREAST BIOPSY RIGHT COMPLETE Right 10/08/2024       Family History   Problem Relation Age of Onset    Hypertension Mother     Glaucoma Mother     Macular degeneration Mother     Prostate cancer Father     Cancer Father     No Known Problems Daughter     No Known Problems Daughter     No Known Problems Maternal Grandmother     No Known Problems Paternal Grandmother     Heart attack Brother     No Known Problems Paternal Aunt     No Known Problems Paternal Aunt     Breast cancer Neg Hx          Medications have been verified.        Objective   /90 (BP Location: Left arm, Patient Position: Sitting, Cuff Size: Adult)   Pulse 64   Temp 98.6 °F (37 °C) (Oral)   Resp 18   Wt 63 kg (139 lb)   SpO2 100%   BMI 23.13 kg/m²        Physical Exam     Physical Exam  Vitals and nursing note reviewed.   Constitutional:       General: She is not in acute distress.     Appearance: Normal appearance. She is not ill-appearing or toxic-appearing.   HENT:      Head: Normocephalic and atraumatic.      Right Ear: External ear normal.      Left Ear: External ear normal.      Nose: Nose normal.      Mouth/Throat:      Mouth: Mucous membranes are moist.   Eyes:      Extraocular Movements: Extraocular movements intact.      Conjunctiva/sclera: Conjunctivae normal.       Pupils: Pupils are equal, round, and reactive to light.   Cardiovascular:      Pulses: Normal pulses.   Pulmonary:      Effort: Pulmonary effort is normal. No respiratory distress.   Musculoskeletal:         General: Normal range of motion.      Comments: Left foot with arthritic deformity of the toes noted.  No distal foot tenderness.  Moderate patch of ecchymosis on the medial plantar arch with tenderness in front of the heel.  Faint lateral foot and ankle ecchymosis with mild edema.  No tenderness at the base of the fifth metatarsal or distal fibula.  Full range of motion of ankle with pain on extreme forced dorsiflexion.  No diffuse edema or erythema of the foot, warmth, open wounds, streaking, redness.  Thickened nails noted.  There are 2 corns on the plantar aspect of the foot without surrounding edema, erythema, tenderness.   Skin:     General: Skin is warm and dry.      Capillary Refill: Capillary refill takes less than 2 seconds.   Neurological:      Mental Status: She is alert.   Psychiatric:         Mood and Affect: Mood normal.         Behavior: Behavior normal.

## 2025-01-10 ENCOUNTER — OFFICE VISIT (OUTPATIENT)
Dept: NEUROLOGY | Facility: CLINIC | Age: 85
End: 2025-01-10
Payer: COMMERCIAL

## 2025-01-10 VITALS
BODY MASS INDEX: 22.49 KG/M2 | HEIGHT: 65 IN | HEART RATE: 73 BPM | DIASTOLIC BLOOD PRESSURE: 82 MMHG | SYSTOLIC BLOOD PRESSURE: 160 MMHG | OXYGEN SATURATION: 98 % | WEIGHT: 135 LBS

## 2025-01-10 DIAGNOSIS — F02.B18 MODERATE LATE ONSET ALZHEIMER'S DEMENTIA WITH OTHER BEHAVIORAL DISTURBANCE (HCC): Primary | ICD-10-CM

## 2025-01-10 DIAGNOSIS — G30.1 MODERATE LATE ONSET ALZHEIMER'S DEMENTIA WITH OTHER BEHAVIORAL DISTURBANCE (HCC): Primary | ICD-10-CM

## 2025-01-10 PROCEDURE — 99215 OFFICE O/P EST HI 40 MIN: CPT | Performed by: PSYCHIATRY & NEUROLOGY

## 2025-01-10 RX ORDER — DIVALPROEX SODIUM 125 MG/1
TABLET, DELAYED RELEASE ORAL
Qty: 90 TABLET | Refills: 1 | Status: SHIPPED | OUTPATIENT
Start: 2025-01-10

## 2025-01-10 RX ORDER — DONEPEZIL HYDROCHLORIDE 10 MG/1
10 TABLET, FILM COATED ORAL
Qty: 30 TABLET | Refills: 5 | Status: SHIPPED | OUTPATIENT
Start: 2025-01-10

## 2025-01-10 RX ORDER — MEMANTINE HYDROCHLORIDE 10 MG/1
10 TABLET ORAL 2 TIMES DAILY
Qty: 60 TABLET | Refills: 5 | Status: SHIPPED | OUTPATIENT
Start: 2025-01-10

## 2025-01-10 NOTE — PROGRESS NOTES
Neurology Ambulatory Visit  Name: Aurelia Jaime       : 1940       MRN: [id number]   Encounter Provider: Hamzah Nino MD   Encounter Date: 1/10/2025  Encounter [facility]: [facility]      Aurelia Jaime is a 84 y.o. female.       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 Agarwal's prior notes and imaging study results were reviewed, she has had an MRI of the brain with and without contrast from 2024 which was reported as no acute intracranial abnormality and mild chronic microangiopathy  MRA of the carotids shows moderate stenosis 50 to 70% narrowing in bilateral ICA proximal cervical segments patient is on aspirin and Crestor.  She is on Aricept and Namenda for her history of dementia    Discussed with patient regarding the agitation we discussed different medication options the family and the patient are agreeable to go on Depakote side effects discussed with them we will start her on 125 mg a day, send patient to stop if experiences any side effects, also would recommend patient to go for cognitive therapy.    Stroke education given to the patient, she was advised to keep her blood pressure cholesterol sugar under control to go to the hospital if has any recurrence of strokelike symptoms and call us to be under constant supervision patient needs help with her ADLs have advised the family to call office of aging and see if they can help her since she needs to be under constant supervision and to eat a healthy nutritious diet to take a multivitamin and also discuss with family physician and keep  the vitamin B12 level in the 500 range also to follow-up with the PCP regarding her left foot injury she is in an Aircast , she was advised to follow-up with vascular surgery regarding her bilateral carotid stenosis and see me back in 6 months or sooner if needed.  Subjective:  Chief Complaint   Patient presents with    Alzheimer's Disease       HISTORY OF PRESENT ILLNESS    Patient is here in follow-up for her history of dementia, she is accompanied with her daughter since the last visit with Dr. Agarwal in February 2024 she was admitted into the hospital for an episode of dizziness and had an MRI scan of the brain that did not show evidence of any stroke she does have bilateral carotid stenosis, denies having any symptoms, according to the daughter she has noticed that she is having a slight decline in her short-term memory she needs some help with her ADLs and does get agitated while showering she recently had a left foot injury they are not sure as to how they took her to the urgent care center and had an x-ray that did not show evidence of any fracture she was put in an Aircast she denies having any episodes of loss of consciousness she does have bladder issues and wears depends denies having bowel incontinence she does not like change in her routine and gets confused, she is also in follow-up with her surgical oncologist and medical oncologist regarding her history of lung cancer and breast lumpectomy .her appetite is decent weight has been stable no other complaints.           Past Medical History:    Past Medical History:   Diagnosis Date    Abnormal chest x-ray     Abnormal mammogram     COPD (chronic obstructive pulmonary disease) (HCC)     Dementia (HCC)     Early stage     Ductal hyperplasia of breast     Left     History of postoperative delirium     Hyperlipidemia     diet controled    Lung cancer (HCC) 10/2018    Resolved with surgery    Nodule of lower lobe of right lung     Right Lower Lobe     SOB  (shortness of breath)     SOB (shortness of breath)     Wheezing      Past Surgical History:   Procedure Laterality Date    BREAST BIOPSY Left 10/12/2018    BREAST BIOPSY Right 09/24/2024    BREAST CYST EXCISION Left 10/31/2018    BREAST LUMPECTOMY Left 11/13/2018    Procedure: BREAST LUMPECTOMY; BREAST NEEDLE LOCALIZATION (NEEDLE LOC AT 0830);  Surgeon: Mian Valenzuela MD;  [facility];  Service: Surgical Oncology    BREAST LUMPECTOMY Right 10/22/2024    Procedure: RIGHT BREAST MIREYA  DIRECTED LUMPECTOMY-BRACKETED;  Surgeon: Isai Valle MD;  [facility];  Service: Surgical Oncology    COLONOSCOPY      LUNG SURGERY      MAMMO NEEDLE LOCALIZATION LEFT (ALL INC) Left 11/13/2018    MAMMO STEREOTACTIC BREAST BIOPSY LEFT (ALL INC) Left 10/16/2018    [place] BRNCHSC INCL FLUOR GDNCE DX W/CELL WASHG SPX Right 01/16/2019    Procedure: BRONCHOSCOPY FLEXIBLE,;  Surgeon: Julia Mccord MD;  [facility];  Service: Thoracic    [place] MEDIASTINOSCOPY WITH LYMPH NODE BIOPSY/IES Right 01/16/2019    Procedure: MEDIASTINOSCOPY;  Surgeon: Julia Mccord MD;  [facility];  Service: Thoracic    [place] THORACOSCOPY W/LOBECTOMY SINGLE LOBE Right 01/16/2019    Procedure: LOBECTOMY LUNG;  Surgeon: Julia Mccord MD;  [facility];  Service: Thoracic    TRIGGER FINGER RELEASE Right 10/10/2019    ULNAR NERVE TRANSPOSITION Right 10/10/2019    US BREAST CLIP NEEDLE LOC RIGHT Right 10/16/2024    US GUIDED BREAST BIOPSY RIGHT COMPLETE Right 09/24/2024    US GUIDED BREAST BIOPSY RIGHT COMPLETE Right 10/08/2024       Family History:  Family History   Problem Relation Age of Onset    Hypertension Mother     Glaucoma Mother     Macular degeneration Mother     Prostate cancer Father     Cancer Father     No Known Problems Daughter     No Known Problems Daughter     No Known Problems Maternal Grandmother     No Known Problems Paternal Grandmother     Heart attack Brother     No Known  "Problems Paternal Aunt     No Known Problems Paternal Aunt     Breast cancer Neg Hx        Social History:  Social History     Tobacco Use    Smoking status: Former     Current packs/day: 0.00     Average packs/day: 1 pack/day for 60.0 years (60.0 ttl pk-yrs)     Types: Cigarettes     Start date: 1958     Quit date: 2016     Years since quittin.7    Smokeless tobacco: Never   Vaping Use    Vaping status: Never Used   Substance Use Topics    Alcohol use: Not Currently     Alcohol/week: 1.0 standard drink of alcohol     Types: 1 Cans of beer per week     Comment: 1 beer once or twice a week    Drug use: No      Living situation:    Work:      Allergies:  No Known Allergies    Medications:    Current Outpatient Medications:     aspirin 81 mg chewable tablet, Chew 1 tablet (81 mg total) daily, Disp: 30 tablet, Rfl: 0    divalproex sodium (Depakote) 125 mg DR tablet, Once a day, Disp: 90 tablet, Rfl: 1    donepezil (ARICEPT) 10 mg tablet, Take 1 tablet (10 mg total) by mouth daily at bedtime, Disp: 30 tablet, Rfl: 5    gabapentin (NEURONTIN) 100 mg capsule, Take 100 mg by mouth daily, Disp: , Rfl:     memantine (NAMENDA) 10 mg tablet, Take 1 tablet (10 mg total) by mouth 2 (two) times a day, Disp: 60 tablet, Rfl: 5    rosuvastatin (CRESTOR) 10 MG tablet, Take 1 tablet (10 mg total) by mouth daily, Disp: 30 tablet, Rfl: 0    tolterodine (DETROL LA) 4 mg 24 hr capsule, Take 4 mg by mouth daily, Disp: , Rfl:     Objective:  /82 (BP Location: Left arm, Patient Position: Sitting, Cuff Size: Standard)   Pulse 73   Ht 5' 5\" (1.651 m)   Wt 61.2 kg (135 lb)   SpO2 98%   BMI 22.47 kg/m²      Labs  I have reviewed pertinent labs:  CBC:   Lab Results   Component Value Date    WBC 6.38 10/03/2024    RBC 4.37 10/03/2024    HGB 12.8 10/03/2024    HCT 40.8 10/03/2024    MCV 93 10/03/2024     10/03/2024    MCH 29.3 10/03/2024    MCHC 31.4 10/03/2024    RDW 14.0 10/03/2024    MPV 10.3 10/03/2024    NEUTROABS " 4.55 10/03/2024     CMP:   Lab Results   Component Value Date    SODIUM 139 10/03/2024    K 4.5 10/03/2024     10/03/2024    CO2 29 10/03/2024    AGAP 4 10/03/2024    BUN 17 10/03/2024    CREATININE 1.02 10/03/2024    GLUC 92 08/19/2024    GLUF 87 10/03/2024    CALCIUM 9.4 10/03/2024    AST 15 10/03/2024    ALT 12 10/03/2024    ALKPHOS 68 10/03/2024    TP 6.6 10/03/2024    ALB 4.0 10/03/2024    TBILI 0.42 10/03/2024    EGFR 51 10/03/2024     Thyroid Studies:   Lab Results   Component Value Date    FREET4 0.77 08/19/2024     Vitamin B12   Lab Results   Component Value Date    WRSUEDXI05 271 08/21/2023     Folate   Lab Results   Component Value Date    FOLATE 10.6 08/21/2023              General Exam  GENERAL APPEARANCE:  No distress, alert, interactive and cooperative.  CARDIOVASCULAR: Warm and well perfused  LUNGS: normal work of breathing on room air  EXTREMITIES: no peripheral edema     Neurologic Exam  MENTAL STATE:  Orientation was normal to time, place and person without aphasia or apraxia.  MoCA is 13/30    CRANIAL NERVES:  CN 2       visual fields full to confrontation.  CN 3, 4, 6  Pupils round, 4 mm in diameter, equally reactive to light. Lids symmetric; no ptosis. EOMs normal alignment, full range. No nystagmus.  CN 5  Facial sensation intact bilaterally.  CN 7  Normal and symmetric facial strength.   CN 8  Hearing intact to finger rub bilaterally.  CN 9  Palate elevates symmetrically.  CN 11  Normal strength of shoulder shrug and neck turning.  CN 12  Tongue midline, with normal bulk and strength.     MOTOR:  Motor exam was normal. Muscle bulk and tone were normal in both upper and lower extremities. Muscle strength was 5/5 in distal and proximal muscles in both upper and lower extremities. No fasciculations, tremor or other abnormal movements were present.  Except for the left foot examination was deferred secondary to patient being in an Aircast     REFLEXES:  RIGHT UE   LEFT UE  BR:2               BR:2    Biceps:2      Biceps:2    Triceps:2     Triceps:2       RIGHT LLE   LEFT LLE    Knee:2           Knee:2    Ankle:2         Ankle:2    Babinski: toes downgoing to plantar stimulation. No clonus.     SENSORY:  Intact to temperature and vibratory sensation in the upper and lower extremities bilaterally. Cortical function is intact.    COORDINATION:   Coordination exam was normal. In both upper extremities, finger-nose-finger was intact without dysmetria or overshoot.      GAIT:  Ambulates with a cane      ROS:  Review of Systems   Constitutional:  Negative for appetite change, fatigue and fever.   HENT: Negative.  Negative for hearing loss, tinnitus, trouble swallowing and voice change.    Eyes: Negative.  Negative for photophobia, pain and visual disturbance.   Respiratory: Negative.  Negative for shortness of breath.    Cardiovascular: Negative.  Negative for palpitations.   Gastrointestinal: Negative.  Negative for nausea and vomiting.   Endocrine: Negative.  Negative for cold intolerance.   Genitourinary: Negative.  Negative for dysuria, frequency and urgency.   Musculoskeletal:  Negative for back pain, gait problem, myalgias, neck pain and neck stiffness.   Skin: Negative.  Negative for rash.   Allergic/Immunologic: Negative.    Neurological: Negative.  Negative for dizziness, tremors, seizures, syncope, facial asymmetry, speech difficulty, weakness, light-headedness, numbness and headaches.   Hematological: Negative.  Does not bruise/bleed easily.   Psychiatric/Behavioral:  Positive for agitation, behavioral problems, confusion and decreased concentration. Negative for hallucinations and sleep disturbance. The patient is nervous/anxious.        I have reviewed the past medical history, surgical history, social and family history, current medications, allergies vitals, review of systems, and updated this information as appropriate today.    Administrative Statements   The total amount of time spent with  the patient and on chart review and documentation was  45 minutes. Issues addressed during this clinic visit included overall management, medication counseling or monitoring, and counseling and coordination of care.         Hamzah Nino MD

## 2025-01-21 ENCOUNTER — TELEPHONE (OUTPATIENT)
Dept: HEMATOLOGY ONCOLOGY | Facility: CLINIC | Age: 85
End: 2025-01-21

## 2025-01-21 ENCOUNTER — PATIENT OUTREACH (OUTPATIENT)
Dept: HEMATOLOGY ONCOLOGY | Facility: CLINIC | Age: 85
End: 2025-01-21

## 2025-01-28 ENCOUNTER — PATIENT OUTREACH (OUTPATIENT)
Dept: HEMATOLOGY ONCOLOGY | Facility: CLINIC | Age: 85
End: 2025-01-28

## 2025-01-28 NOTE — PROGRESS NOTES
Patient Navigation attempted to outreach patient for the second time after Oncology consult to complete the Distress Thermometer.     A voicemail was left stating a reason for my call and my contact information was provided. I requested a return call at patient's earliest convenience.

## 2025-02-04 ENCOUNTER — EVALUATION (OUTPATIENT)
Age: 85
End: 2025-02-04
Payer: COMMERCIAL

## 2025-02-04 DIAGNOSIS — F02.B18 MODERATE LATE ONSET ALZHEIMER'S DEMENTIA WITH OTHER BEHAVIORAL DISTURBANCE (HCC): Primary | ICD-10-CM

## 2025-02-04 DIAGNOSIS — G30.1 MODERATE LATE ONSET ALZHEIMER'S DEMENTIA WITH OTHER BEHAVIORAL DISTURBANCE (HCC): Primary | ICD-10-CM

## 2025-02-04 PROCEDURE — 97166 OT EVAL MOD COMPLEX 45 MIN: CPT

## 2025-02-04 PROCEDURE — 97530 THERAPEUTIC ACTIVITIES: CPT

## 2025-02-04 NOTE — PROGRESS NOTES
OCCUPATIONAL THERAPY INITIAL EVALUATION    POC expires Auth Status Total   Visits  Start date  Expiration date PT/OT + Visit Limit? Co-Insurance   25 BOMN  25   $35 Co-pay                                             Visit/Unit Tracking  AUTH Status: BOMN Date               Visits  Authed:  Used 1 (IE)               Remaining                    PLAN OF CARE START:25   PLAN OF CARE END: 2025  PROGRESS NOTE DUE: follow up next visit   FREQUENCY: 1-2x/week for 8-12 weeks   PRECAUTIONS: none  DIAGNOSIS: alzheimers, maintenance program   VISITS: 1 (IE)       Today's date: 2025  Patient name: Aurelia Jaime  : 1940  MRN: [id number]  Referring provider: Hamzah Nino MD  Dx:   Encounter Diagnosis     ICD-10-CM    1. Moderate late onset Alzheimer's dementia with other behavioral disturbance (HCC)  G30.1     F02.B18           SKILLED ANALYSIS:  Pt is a 84 year old female presenting to OP OT s/p referral from neurology for alzheimers.  Pt currently reports difficulty with overall memory, ADL care, and IADL care. Pt is demonstrating deficits based on clinical observation and the following assessments: SLUMs, FAQ, and trail making part A and B. Pt has been seen for OT services and would benefit from transitioning to maintenance program. Pt presents with decreased cognitive functions due to effects of alzheimers and demonstrates difficulty in daily tasks, including remembering recent events and completing health management. Skilled Maintenance occupational therapy is required to slow the patients functional decline due to alzheimers. Given the progressive nature of disease a maintenance program will help reduce the risk of further deterioration, loss of functional mobility or independence as well as decrease risk of secondary impairments form disease progress. Recommend OP OT 1-2x/wk for the next 8-12 weeks with focus on aforementioned deficits to maintain functional performance and improve QOL.  "Findings and recommendations discussed with pt, and they are in agreement. Educated pt on charges of insurance, skilled maintenance services, assessments performed with standardized norms, POC, goal creation, and OP OT services.       Subjective  Occupational Profile  *type of home: private home  *lives with: Son, daughter lives next door  *vocation: retired, homemaker   *driving: no  *DME: grab bars   *Assistive device for inside home: NBQC  *Assistive device for outside home: NBPC  *ADL status: supervision and set up for all ADLs (step by step instruction for morning ADL care)   *IADL status (cooking, cleaning, community mobility, medication management, finances): receives assistance     PATIENT GOAL: to work my brain    PAIN: none, occasional pain in R UE    HISTORY OF PRESENT ILLNESS:   Pt is a 84 y.o. 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 Per neuro on 1/10 :\"Patient is here in follow-up for her history of dementia, she is accompanied with her daughter since the last visit with Dr. Agarwal in February 2024 she was admitted into the hospital for an episode of dizziness and had an MRI scan of the brain that did not show evidence of any stroke she does have bilateral carotid stenosis, denies having any symptoms, according to the daughter she has noticed that she is having a slight decline in her short-term memory she needs some help with her ADLs and does get agitated while showering she recently. \" No hx of OP cognitive OT.     PMH:   Past Medical History:   Diagnosis Date    Abnormal chest x-ray     Abnormal mammogram     COPD (chronic obstructive pulmonary disease) (HCC)     Dementia (HCC)     Early stage     Ductal hyperplasia of breast     Left     History of postoperative delirium     Hyperlipidemia     diet controled    Lung cancer (HCC) 10/2018    Resolved with surgery    Nodule of lower lobe " of right lung     Right Lower Lobe     SOB (shortness of breath)     SOB (shortness of breath)     Wheezing        Past Surgical Hx:   Past Surgical History:   Procedure Laterality Date    BREAST BIOPSY Left 10/12/2018    BREAST BIOPSY Right 09/24/2024    BREAST CYST EXCISION Left 10/31/2018    BREAST LUMPECTOMY Left 11/13/2018    Procedure: BREAST LUMPECTOMY; BREAST NEEDLE LOCALIZATION (NEEDLE LOC AT 0830);  Surgeon: Mian Valenzuela MD;  Location: [facility];  Service: Surgical Oncology    BREAST LUMPECTOMY Right 10/22/2024    Procedure: RIGHT BREAST MIREYA  DIRECTED LUMPECTOMY-BRACKETED;  Surgeon: Isai Valle MD;  Location: [facility];  Service: Surgical Oncology    COLONOSCOPY      LUNG SURGERY      MAMMO NEEDLE LOCALIZATION LEFT (ALL INC) Left 11/13/2018    MAMMO STEREOTACTIC BREAST BIOPSY LEFT (ALL INC) Left 10/16/2018    VT BRNCHSC INCL FLUOR GDNCE DX W/CELL WASHG SPX Right 01/16/2019    Procedure: BRONCHOSCOPY FLEXIBLE,;  Surgeon: Julia Mccord MD;  Location: [facility];  Service: Thoracic    VT MEDIASTINOSCOPY WITH LYMPH NODE BIOPSY/IES Right 01/16/2019    Procedure: MEDIASTINOSCOPY;  Surgeon: Julia Mccord MD;  Location: [facility];  Service: Thoracic    VT THORACOSCOPY W/LOBECTOMY SINGLE LOBE Right 01/16/2019    Procedure: LOBECTOMY LUNG;  Surgeon: Julia Mccord MD;  Location: [facility];  Service: Thoracic    TRIGGER FINGER RELEASE Right 10/10/2019    ULNAR NERVE TRANSPOSITION Right 10/10/2019    US BREAST CLIP NEEDLE LOC RIGHT Right 10/16/2024    US GUIDED BREAST BIOPSY RIGHT COMPLETE Right 09/24/2024    US GUIDED BREAST BIOPSY RIGHT COMPLETE Right 10/08/2024          Assessments    FUNCTIONAL ACTIVITY QUESTIONAIRE   The Functional Activities Questionnaire (FAQ) measures instrumental activities of daily living (IADLs). The FAQ may be used to differentiate those with mild cognitive impairment and mild Alzheimer's disease   TASK Completely unable to perform task (3)   Requires assistance (2)  Has difficulty but accomplishes task, or has never done, but the informant feels could do the task with difficulty (1)  Normal performance, or has never don the task, but the informant feels the patient could do the task if necessary    Writing checks, paying bills, balancing checkbook  X      Assembling tax records, business affairs, papers  X      Shopping alone for clothes, house hold necessities, or groceries  X      Playing a game of skill, working on a hobby  X      Heating water, making a cup of coffee, turning off the stove X      Preparing a balance meal X      Keeping track of current events X      Paying attention to, understanding, discussing a TV show, book, or magazine   X    Remembering appointments, family occasions, holidays, medications X      Traveling our of the neighborhood, arranging or taking buses  X      Total points: 28/30     Evaluation   Sum scores (range 0-30). Cut-point of 9 (dependent in 3 or more activities) is recommended to indicate impaired function and possible cognitive impairment.    SLUMS:  *screen individuals to look for the presence of cognitive deficits, and to identify changes in cognition over time*  SCORING AND NORMS  HIGH SCHOOL EDUCATION  27-30, NORMAL   21-26, MILD NEUROCOGNITIVE DISORDER   1-20, DEMENTIA   LESS THEN HIGH SCHOOL EDUCATION  25-30, NORMAL   20-24, MILD NEUROCOGNITIVE DISORDER  1-19, DEMENTIA     QUESTION Status on 2/4/25   What day of the week is it 0/1   What is the year 0/1   What state are we in 0/1   How much did you spend 1/1   How much do you have left 0/2   Naming animals in 1 minute 1/3   Recall of 5 objects 0/5   Series of numbers 2/2   Clock face hour markers 2/2   Clock face time  0/2   X in triangle 1/1   Largest figure 1/1   Short story, females name 2/2   Short story, when did she go back to work 0/2   Short story, what work did she do 0/2   Short story, what state did she live in 0/2   Total score 10/30, high  "school education       TRAIL MAKING TEST:   \"widely used test to assess executive function in patients with neuro injury. Successful performance of the TMT requires a variety of mental abilities including letter and number recognition mental flexibility, visual scanning, and motor function. Norms are based on age related scores\"   STATUS ON IE: 2/4/25   TRAIL MAKING PART A     NORM: 58 seconds 1 minute and 4 seconds   TRAIL MAKING PART B    NORM: 152 seonds 2 minutes and 53 seconds         SHORT TERM GOALS (8-12 weeks)    GOAL  STATUS ON IE  GOAL STATUS    Pt caregiver will report score of 28/30 or less on FAQ in order for patient to maintain life roles  28/30 Established      Pt will maintain sore of 10/30 (+/-3 points) on SLUMs to maintain life roles 10/30 Established      Pt will maintain score of 1 minute and 4 seconds on trail making part A (+/-5 seconds) to maintain life roles 1 minute and 4 seconds Established      Pt will maintain score of 2 minutes and 53 seconds on trail making part B (+/-5 seconds) to maintain life roles 2 minutes and 53 seconds Established      Patient and/or family will demo G understanding and use of memory book to inc pt's overall engagement in life roles and to dec frustrations with STM/delayed memory loss  Established      Pt will follow 1-2 written directions with 50% accuracy to maintain life roles  Established          PLANNED THERAPY INTERVENTIONS:  Internal and external memory aides  Hypersensitivity strategies education  Multi-modal environment  Sustained/alternating/divided attention  Temporal Awareness: Organize the Hour activities  Memory and mental manipulation  Auditory processing with immediate recall  Memory retention with immediate and delayed recall  Edu on cog/vision apps  "

## 2025-02-12 ENCOUNTER — PATIENT OUTREACH (OUTPATIENT)
Dept: HEMATOLOGY ONCOLOGY | Facility: CLINIC | Age: 85
End: 2025-02-12

## 2025-02-12 NOTE — PROGRESS NOTES
Patient Navigation attempted to outreach patient for the third time after Oncology consult to complete the Distress Thermometer.     A voicemail was left stating a reason for my call and my contact information was provided. I requested a return call at patient's earliest convenience.

## 2025-02-13 ENCOUNTER — OFFICE VISIT (OUTPATIENT)
Age: 85
End: 2025-02-13
Payer: COMMERCIAL

## 2025-02-13 DIAGNOSIS — F02.B18 MODERATE LATE ONSET ALZHEIMER'S DEMENTIA WITH OTHER BEHAVIORAL DISTURBANCE (HCC): Primary | ICD-10-CM

## 2025-02-13 DIAGNOSIS — G30.1 MODERATE LATE ONSET ALZHEIMER'S DEMENTIA WITH OTHER BEHAVIORAL DISTURBANCE (HCC): Primary | ICD-10-CM

## 2025-02-13 PROCEDURE — 97530 THERAPEUTIC ACTIVITIES: CPT

## 2025-02-13 NOTE — PROGRESS NOTES
POC expires Auth Status Total   Visits  Start date  Expiration date PT/OT + Visit Limit? Co-Insurance   25 BOMN  25   $35 Co-pay                                             Visit/Unit Tracking  AUTH Status: BOMN Date              Visits  Authed:  Used 1 (IE) 1              Remaining                    PLAN OF CARE START:25   PLAN OF CARE END: 2025  PROGRESS NOTE DUE: 3/11/25 (PN on schedule)   FREQUENCY: 1-2x/week for 8-12 weeks   PRECAUTIONS: none  DIAGNOSIS: alzheimers, maintenance program   VISITS: 1 (IE)       Daily Note         Today's date: 2025  Patient name: Aurelia Jaime  : 1940  MRN: [id number]  Referring provider: Hamzah Nino MD  Dx:   Encounter Diagnosis     ICD-10-CM    1. Moderate late onset Alzheimer's dementia with other behavioral disturbance (HCC)  G30.1     F02.B18                          Subjective: Pt caregiver reports no changes since last visit. Reports this morning was difficult for patient      Objective: See treatment below.  TA:   120 number board-placing pieces>board following exertnal key of highlight shapes only. Required no assist for accuracy     *letter/number board using red vs green tiles to place alternating in ascending order. Named food starting with letter she was placing tile down onto. Max A for sequencing and direction following multi step verbal instructions. Increased time required.     *basic problem solving worksheet focused on addition based on functional scenarios. Required mod-max A for basic calculations and problem solving.       Assessment: Tolerated treatment well. Focus of session on direction following, sequencing, and attention. Pt would benefit from continued OT services to address basic direction following, working memory, basic  skills, sequencing, and attention.       Plan: Continued skilled OT per POC.

## 2025-02-20 ENCOUNTER — OFFICE VISIT (OUTPATIENT)
Age: 85
End: 2025-02-20
Payer: COMMERCIAL

## 2025-02-20 DIAGNOSIS — G30.1 MODERATE LATE ONSET ALZHEIMER'S DEMENTIA WITH OTHER BEHAVIORAL DISTURBANCE (HCC): Primary | ICD-10-CM

## 2025-02-20 DIAGNOSIS — F02.B18 MODERATE LATE ONSET ALZHEIMER'S DEMENTIA WITH OTHER BEHAVIORAL DISTURBANCE (HCC): Primary | ICD-10-CM

## 2025-02-20 PROCEDURE — 97530 THERAPEUTIC ACTIVITIES: CPT | Performed by: OCCUPATIONAL THERAPIST

## 2025-02-20 NOTE — PROGRESS NOTES
POC expires Auth Status Total   Visits  Start date  Expiration date PT/OT + Visit Limit? Co-Insurance   25 BOMN  25   $35 Co-pay                                             Visit/Unit Tracking  AUTH Status: BOMN Date             Visits  Authed:  Used 1 (IE) 1 1             Remaining                    PLAN OF CARE START:25   PLAN OF CARE END: 2025  PROGRESS NOTE DUE: 3/11/25 (PN on schedule)   FREQUENCY: 1-2x/week for 8-12 weeks   PRECAUTIONS: none  DIAGNOSIS: alzheimers, maintenance program   VISITS: 3      Daily Note         Today's date: 2025  Patient name: Aurelia Jaime  : 1940  MRN: [id number]  Referring provider: Hamzah Nino MD  Dx:   Encounter Diagnosis     ICD-10-CM    1. Moderate late onset Alzheimer's dementia with other behavioral disturbance (HCC)  G30.1     F02.B18             Start Time: 1145  Stop Time: 1230  Total time in clinic (min): 45 minutes        Subjective: Pt caregiver reports no changes since last visit.       Objective: See treatment below.  TA:   *sequencing worksheet placing steps in order #1-4, good accuracy, min verbal cues for recalling directions throughout    *pixy cube puzzle with 1 row revealed at a time, increased verbal cues for sequencing and direction following throughout    *multimatrix, matching shape to card and matching to number in ascending order, min verbal cues for direction following throughout          Assessment: Tolerated treatment well. Focus of session on direction following, sequencing, and attention. Pt would benefit from continued OT services to address basic direction following, working memory, basic  skills, sequencing, and attention. Skilled Maintenance occupational therapy is required to slow the patients functional decline due to dementia. Given the progressive nature of dementia a maintenance program will help reduce the risk of further deterioration, loss of functional mobility or independence as well  as decrease risk of secondary impairments form disease progress.       Plan: Continued skilled OT per POC.

## 2025-02-25 ENCOUNTER — OFFICE VISIT (OUTPATIENT)
Age: 85
End: 2025-02-25
Payer: COMMERCIAL

## 2025-02-25 DIAGNOSIS — F02.B18 MODERATE LATE ONSET ALZHEIMER'S DEMENTIA WITH OTHER BEHAVIORAL DISTURBANCE (HCC): Primary | ICD-10-CM

## 2025-02-25 DIAGNOSIS — G30.1 MODERATE LATE ONSET ALZHEIMER'S DEMENTIA WITH OTHER BEHAVIORAL DISTURBANCE (HCC): Primary | ICD-10-CM

## 2025-02-25 PROCEDURE — 97530 THERAPEUTIC ACTIVITIES: CPT | Performed by: OCCUPATIONAL THERAPIST

## 2025-02-25 NOTE — PROGRESS NOTES
POC expires Auth Status Total   Visits  Start date  Expiration date PT/OT + Visit Limit? Co-Insurance   25 BOMN  25   $35 Co-pay                                             Visit/Unit Tracking  AUTH Status: BOMN Date            Visits  Authed:  Used 1 (IE) 1 1 1            Remaining                    PLAN OF CARE START:25   PLAN OF CARE END: 2025  PROGRESS NOTE DUE: 3/11/25 (PN on schedule)   FREQUENCY: 1-2x/week for 8-12 weeks   PRECAUTIONS: none  DIAGNOSIS: alzheimers, maintenance program   VISITS: 4      Daily Note         Today's date: 2025  Patient name: Aurelia Jaime  : 1940  MRN: [id number]  Referring provider: Hamzah Nino MD  Dx:   Encounter Diagnosis     ICD-10-CM    1. Moderate late onset Alzheimer's dementia with other behavioral disturbance (HCC)  G30.1     F02.B18               Start Time: 1230  Stop Time: 1315  Total time in clinic (min): 45 minutes        Subjective: Pt caregiver reports no changes since last visit.       Objective: See treatment below.  TA:     *trail making activity, searching for numbers in ascending order and matching to corresponding letter to address working memory and sustained attention, mod cues throughout to recall next number/letter in sequence    *16 piece flower puzzle to address sequencing, sustained attention, and working memory. Pt completed with min difficulty and min VC to accurately complete the puzzle.    *basic math worksheet to address executive functioning skills. Pt completed worksheet with min difficulty and min VC for accuracy.        Assessment: Tolerated treatment well. Focus of session on working memory, sustained attention, and sequencing. Pt would benefit from continued OT services to address basic direction following, working memory, basic  skills, sequencing, and attention. Skilled Maintenance occupational therapy is required to slow the patients functional decline due to dementia. Given the  progressive nature of dementia a maintenance program will help reduce the risk of further deterioration, loss of functional mobility or independence as well as decrease risk of secondary impairments form disease progress.       Plan: Continued skilled OT per POC.

## 2025-02-27 ENCOUNTER — HOSPITAL ENCOUNTER (OUTPATIENT)
Dept: CT IMAGING | Facility: CLINIC | Age: 85
Discharge: HOME | End: 2025-02-27
Payer: COMMERCIAL

## 2025-02-27 ENCOUNTER — DOCUMENTATION (OUTPATIENT)
Dept: HEMATOLOGY ONCOLOGY | Facility: CLINIC | Age: 85
End: 2025-02-27

## 2025-02-27 DIAGNOSIS — Z85.118 HISTORY OF LUNG CANCER: ICD-10-CM

## 2025-02-27 PROCEDURE — 71250 CT THORAX DX C-: CPT

## 2025-02-27 PROCEDURE — G1004 CDSM NDSC: HCPCS

## 2025-02-28 ENCOUNTER — TELEPHONE (OUTPATIENT)
Dept: SURGICAL ONCOLOGY | Facility: CLINIC | Age: 85
End: 2025-02-28

## 2025-03-04 ENCOUNTER — OFFICE VISIT (OUTPATIENT)
Dept: CARDIAC SURGERY | Facility: CLINIC | Age: 85
End: 2025-03-04
Payer: COMMERCIAL

## 2025-03-04 ENCOUNTER — APPOINTMENT (OUTPATIENT)
Age: 85
End: 2025-03-04
Payer: COMMERCIAL

## 2025-03-04 VITALS
BODY MASS INDEX: 22.49 KG/M2 | HEART RATE: 106 BPM | SYSTOLIC BLOOD PRESSURE: 160 MMHG | DIASTOLIC BLOOD PRESSURE: 90 MMHG | HEIGHT: 65 IN | WEIGHT: 135 LBS | OXYGEN SATURATION: 99 %

## 2025-03-04 DIAGNOSIS — C34.91 PRIMARY ADENOCARCINOMA OF RIGHT LUNG (HCC): Primary | ICD-10-CM

## 2025-03-04 DIAGNOSIS — R91.1 LUNG NODULE: ICD-10-CM

## 2025-03-04 DIAGNOSIS — J41.0 SIMPLE CHRONIC BRONCHITIS (HCC): ICD-10-CM

## 2025-03-04 DIAGNOSIS — D05.11 DUCTAL CARCINOMA IN SITU (DCIS) OF RIGHT BREAST: ICD-10-CM

## 2025-03-04 PROCEDURE — 99214 OFFICE O/P EST MOD 30 MIN: CPT | Performed by: THORACIC SURGERY (CARDIOTHORACIC VASCULAR SURGERY)

## 2025-03-04 PROCEDURE — G2211 COMPLEX E/M VISIT ADD ON: HCPCS | Performed by: THORACIC SURGERY (CARDIOTHORACIC VASCULAR SURGERY)

## 2025-03-04 NOTE — ASSESSMENT & PLAN NOTE
Multiple pulmonary nodules.  These remain stable at this time.  These will continue to be surveilled with annual surveillance

## 2025-03-04 NOTE — PROGRESS NOTES
Name: Aurelia Jaime      : 1940      MRN: [id number]  Encounter Provider: Julia Mccord MD  Encounter Date: 3/4/2025   Encounter department: [facility]  :  Assessment & Plan  Primary adenocarcinoma of right lung (HCC)  No significant change at this time.  She remains RAKEL.  She will be maintained on a annual surveillance schedule  Orders:    CT chest wo contrast; Future    Simple chronic bronchitis (HCC)         Lung nodule  Multiple pulmonary nodules.  These remain stable at this time.  These will continue to be surveilled with annual surveillance         Ductal carcinoma in situ (DCIS) of right breast  CT chest findings noted regarding soft tissue density in the previous site of breast biopsy.  Patient has follow-up with Dr. Tori connolly.  Will defer to him regarding this.             Thoracic History   Procedure: 2019 VATS RLLobectomy and mediastinoscopy  Pathology: Invasive adenocarcinoma, 3.6cm, 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, Stage IB, [id number]  Treatment: resection, no adjuvant      Cancer Staging   Primary adenocarcinoma of right lung (HCC)  Staging form: Lung, AJCC 8th Edition  - Clinical stage from 2019: Stage IB (cT2a, cN0, cM0) - Signed by Julia Mcocrd MD on 2019  Histopathologic type: Adenocarcinoma, NOS  Histologic grade (G): G1  Histologic grading system: 4 grade system  Laterality: Right  Tumor size (mm): 3.6  Lymph-vascular invasion (LVI): LVI not present (absent)/not identified  Specimen type: Excision  Type of lung cancer: Surgically resected non-small cell lung cancer  Perineural invasion (PNI): Absent  Adequacy of mediastinal dissection: Adequate  Stage used in treatment planning: Yes  National guidelines used in treatment planning: Yes  Type of national guideline used in treatment planning: NCCN    Oncology History Overview Note   With a history of lung cancer S/P right lower lobectomy in 2019, was diagnosed with DCIS,  ER/UT positive of the right breast. She presented with abnormal screening mammogram and underwent biopsy followed by right breast lumpectomy on 10/22/24. She is being referred by Dr. Valle and presents today for consult.      7/29/24 B/L screening mammogram  RIGHT  1) FOCAL ASYMMETRY [B]: There is a focal asymmetry seen in the upper central region of the right breast in the anterior depth.   Left  There are no suspicious masses, grouped microcalcifications or areas of unexplained architectural distortion. The skin and nipple areolar complex are unremarkable.    Benign-appearing calcifications are noted in each breast.  RECOMMENDATION:       - Diagnostic mammogram at the current time for the right breast.       - Ultrasound at the current time for the right breast.       - Routine screening mammogram in 1 year for the left breast.      9/9/24 Diagnostic right mammogram & right US  RIGHT  1) MASS [B]: Previously question right breast focal asymmetry persists as a small oval mass.  Sonographic evaluation shows a corresponding irregular indistinctly marginated hypoechoic mass 12:00 periareolar measuring 3 x 3 x 5 mm.  A benign complicated cyst at 12:00 3 cm from nipple measuring 4 x 3 mm is noted on ultrasound.  Elsewhere in the right breast on mammography, there are no suspicious masses, grouped microcalcifications or unexplained areas of architectural distortion.   RECOMMENDATION:       - Ultrasound-guided breast biopsy for the right breast.         9/24/24 US guided biopsy right breast      0/3/24 Dr. Valle  discussed atypical ductal hyperplasia versus low-grade DCIS. She also wait for her right axillary ultrasound. Which is scheduled for next week.   Consent was obtained for right breast Madhavi  directed lumpectomy.       10/8/24 US right breast axilla and biopsy  Right axillary ultrasounds performed using a dedicated high-resolution probe. No right axillary adenopathy is identified.   RECOMMENDATION:       -  Surgical consultation for the right breast.    A. Breast, Right, US right brst bx 1200 3cmfn, 3 passes, 12g marlizette:  -  Atypical lobular hyperplasia (ALH). See comment.   -  Negative for atypical ductal hyperplasia, ductal carcinoma in situ and invasive carcinoma.       10/16/24 Dr. Valle  right breast low-grade DCIS. She had an additional right breast biopsies consistent with ALH. She is here after these 2 biopsies.   Plan for lumpectomy      10/22/24  RIGHT BREAST MIREYA  DIRECTED LUMPECTOMY-BRACKETED    rocedure  Excision (less than total mastectomy)   Specimen Laterality  Right   TUMOR   Tumor Site  Clock position     12 o'clock   Tumor Site  Periareolar     Histologic Type  Ductal carcinoma in situ   Size (Extent) of DCIS  Estimated size (extent) of DCIS is at least (Millimeters): 3 mm   Number of Blocks with DCIS  3   Number of Blocks Examined  100   Architectural Patterns  Cribriform     Papillary   Nuclear Grade  Grade I (low)   Necrosis  Not identified   Microcalcifications  Present in nonneoplastic tissue   MARGINS   Margin Status  All margins negative for DCIS   Distance from DCIS to Closest Margin  6 mm   Closest Margin(s) to DCIS  Posterior   Margin Comment  Margins negative for DCIS. DCIS is about 6 mm (0.6 cm) from closest posterior margin (slide A21) . Additional margins excised are negative for DCIS (parts B to G)   REGIONAL LYMPH NODES   Regional Lymph Node Status  Not applicable (no regional lymph nodes submitted or found)   PATHOLOGIC STAGE CLASSIFICATION (pTNM, AJCC 8th Edition)   Reporting of pT, pN, and (when applicable) pM categories is based on information available to the pathologist at the time the report is issued. As per the AJCC (Chapter 1, 8th Ed.) it is the managing physician’s responsibility to establish the final pathologic stage based upon all pertinent information, including but potentially not limited to this pathology report.   pT Category  pTis (DCIS)   pN Category  pN not  assigned (no nodes submitted or found)   ADDITIONAL FINDINGS   Additional Findings  ADH and ALH   Comment(s)  Receptor study will be performed. ADDENDUM TO FOLLOW   .     Breast Biomarker Reporting Template   Protocol posted: 2023(Added in Addendum) BREAST BIOMARKER REPORTING TEMPLATE - All Specimens  Test(s) Performed     Estrogen Receptor (ER) Status  Positive (greater than 10% of cells demonstrate nuclear positivity)   Percentage of Cells with Nuclear Positivity  %   Average Intensity of Staining  Strong   Test Type  Food and Drug Administration (FDA) cleared (test / vendor): CONFIRM anti-Estrogen Receptor (ER)/[place] Roche   Primary Antibody  SP1   Test(s) Performed     Progesterone Receptor (PgR) Status  Positive   Percentage of Cells with Nuclear Positivity  %   Average Intensity of Staining  Strong   Test Type  Food and Drug Administration (FDA) cleared (test / vendor): CONFIRM anti-Progesterone Receptor (CA)/[place] [facility] [employer]   Primary Antibody  1E2   Cold Ischemia and Fixation Times  Meet requirements specified in latest version of the ASCO / CAP Guidelines   Cold Ischemia Time (minutes)  1 min   Fixation Time (hours)  35 hours   Testing Performed on Block Number(s)  A63   METHODS   Fixative  Formalin   .       24 Dr. Valle  overall doing well. Well-healed surgical scars no evidence of surgical site infection.   Pathology was reviewed   discussed the benefits of genetic testing for her children. Her daughter prefer no genetic testing at this time.   Follow up 3 months        24 Dr. Ryan        Upcomin25 Dr. Valle  25 CT  3/4/25 Dr. Mccord     Primary adenocarcinoma of right lung (HCC)   2019 Initial Diagnosis    Primary adenocarcinoma of right lung (HCC)     Ductal carcinoma in situ (DCIS) of right breast   2024 Biopsy    A.  Right breast, 12:00 periareolar (ultrasound-guided 12-gauge marquee core biopsy, 4 passes):     - Atypical ductal hyperplasia  (ADH) bordering on low grade DCIS, involving papilloma (3 mm).    In review of the patient’s recent imaging, the area of atypia in the right breast appears unifocal. It measured 5 mm on ultrasound. Ultrasound of the right axilla has not recently been performed.  Ultrasound-guided aspiration of the cyst in the 12 o'clock position right breast, 3 cm from the nipple is recommended for confirmation. Recent imaging of the contralateral left breast dated 7/29/2024 was reviewed and shows no suspicious findings.     10/1/2024 Initial Diagnosis    Ductal carcinoma in situ (DCIS) of right breast     10/8/2024 Biopsy    Right ultrasound guided biopsy  12 o'clock 3 cm from nipple  Atypical lobular hyperplasia (ALH)     The pathology results are technically benign and concordant with imaging; however, contains atypical lobular hyperplasia. Surgical excision is recommended. The 2 recent biopsy sites are 2.4 cm apart.      10/22/2024 Surgery    Right Breast dilma  directed lumpectomy   Two foci of low-grade ductal carcinoma in situ (DCIS)   Both foci measuring 3 mm  Margins negative   ER 91  KS 91     10/22/2024 -  Cancer Staged    Staging form: Breast, AJCC 8th Edition  - Clinical stage from 10/22/2024: Stage 0 (cTis (DCIS), cN0, cM0, ER+, KS+, HER2: Not Assessed) - Signed by Isai Valle MD on 11/13/2024  Stage prefix: Initial diagnosis  Nuclear grade: G1            History of Present Illness   HPI  Aurelia Jaime is a 84 y.o. female who is well-known to me.  She underwent a VATS right lower lobectomy in 2019.  She presents today for routine surveillance.  I personally reviewed her CT scan in PACS.  She has pulmonary nodules that remained stable.  Additionally, she recently had a breast cancer with partial mastectomy performed.  The CT scan comments on a 2.8 x 1.3 cm soft tissue density.    Today in the office, she is overall doing okay.  She has had no significant changes in her health since her last visit  besides the diagnosis of breast cancer and subsequent resection.  She tolerated this without issue.  She denies any recent upper respiratory infection or pneumonia.  She denies a chronic cough.  She denies any significant shortness of breath or chest pain.    All the most relevant notes in the chart have been reviewed including from Dr. Valle of surgical oncology    Review of Systems   Constitutional:  Negative for chills, fatigue, fever and unexpected weight change.   HENT: Negative.     Eyes: Negative.  Negative for visual disturbance.   Respiratory:  Negative for cough, shortness of breath and stridor.    Cardiovascular:  Negative for chest pain.   Gastrointestinal: Negative.    Endocrine: Negative.    Genitourinary: Negative.    Musculoskeletal: Negative.    Skin: Negative.    Neurological:  Negative for dizziness, light-headedness and headaches.   Hematological:  Negative for adenopathy.   Psychiatric/Behavioral: Negative.        Medical History Reviewed by provider this encounter:  Tobacco  Allergies  Meds  Problems  Med Hx  Surg Hx  Fam Hx     .  Past Medical History   Past Medical History:   Diagnosis Date    Abnormal chest x-ray     Abnormal mammogram     Cancer (HCC) October 2019    Cancer free surgically    COPD (chronic obstructive pulmonary disease) (HCC)     Dementia (HCC)     Early stage     Ductal hyperplasia of breast     Left     History of postoperative delirium     Hyperlipidemia     diet controled    Lung cancer (HCC) 10/2018    Resolved with surgery    Memory loss     Nodule of lower lobe of right lung     Right Lower Lobe     Shingles 2013    SOB (shortness of breath)     SOB (shortness of breath)     Wheezing      Past Surgical History:   Procedure Laterality Date    BREAST BIOPSY Left 10/12/2018    BREAST BIOPSY Right 09/24/2024    BREAST CYST EXCISION Left 10/31/2018    BREAST LUMPECTOMY Left 11/13/2018    Procedure: BREAST LUMPECTOMY; BREAST NEEDLE LOCALIZATION (NEEDLE LOC AT  0830);  Surgeon: Mian Valenzuela MD;  Location: [facility];  Service: Surgical Oncology    BREAST LUMPECTOMY Right 10/22/2024    Procedure: RIGHT BREAST MIREYA  DIRECTED LUMPECTOMY-BRACKETED;  Surgeon: Isai Valle MD;  Location: [facility];  Service: Surgical Oncology    COLONOSCOPY      LUNG SURGERY      MAMMO NEEDLE LOCALIZATION LEFT (ALL INC) Left 11/13/2018    MAMMO STEREOTACTIC BREAST BIOPSY LEFT (ALL INC) Left 10/16/2018    IA BRNCHSC INCL FLUOR GDNCE DX W/CELL WASHG SPX Right 01/16/2019    Procedure: BRONCHOSCOPY FLEXIBLE,;  Surgeon: Julia Mccord MD;  Location: [facility];  Service: Thoracic    IA MEDIASTINOSCOPY WITH LYMPH NODE BIOPSY/IES Right 01/16/2019    Procedure: MEDIASTINOSCOPY;  Surgeon: Julia Mccord MD;  Location: [facility];  Service: Thoracic    IA THORACOSCOPY W/LOBECTOMY SINGLE LOBE Right 01/16/2019    Procedure: LOBECTOMY LUNG;  Surgeon: Julia Mccord MD;  Location: [facility];  Service: Thoracic    TRIGGER FINGER RELEASE Right 10/10/2019    ULNAR NERVE TRANSPOSITION Right 10/10/2019    US BREAST CLIP NEEDLE LOC RIGHT Right 10/16/2024    US GUIDED BREAST BIOPSY RIGHT COMPLETE Right 09/24/2024    US GUIDED BREAST BIOPSY RIGHT COMPLETE Right 10/08/2024     Family History   Problem Relation Age of Onset    Hypertension Mother     Glaucoma Mother     Macular degeneration Mother     Prostate cancer Father     Cancer Father     No Known Problems Daughter     No Known Problems Daughter     No Known Problems Maternal Grandmother     No Known Problems Paternal Grandmother     Heart attack Brother     No Known Problems Paternal Aunt     No Known Problems Paternal Aunt     Breast cancer Neg Hx       reports that she quit smoking about 8 years ago. Her smoking use included cigarettes. She started smoking about 67 years ago. She has a 60 pack-year smoking history. She has never used smokeless tobacco. She reports current alcohol use of about 1.0 standard drink of alcohol  "per week. She reports that she does not use drugs.  Current Outpatient Medications   Medication Instructions    aspirin 81 mg, Oral, Daily    divalproex sodium (Depakote) 125 mg DR tablet Once a day    donepezil (ARICEPT) 10 mg, Oral, Daily at bedtime,       gabapentin (NEURONTIN) 100 mg, Daily    memantine (NAMENDA) 10 mg, Oral, 2 times daily    rosuvastatin (CRESTOR) 10 mg, Oral, Daily    tolterodine (DETROL LA) 4 mg, Daily   No Known Allergies   Current Outpatient Medications on File Prior to Visit   Medication Sig Dispense Refill    aspirin 81 mg chewable tablet Chew 1 tablet (81 mg total) daily 30 tablet 0    divalproex sodium (Depakote) 125 mg DR tablet Once a day 90 tablet 1    donepezil (ARICEPT) 10 mg tablet Take 1 tablet (10 mg total) by mouth daily at bedtime 30 tablet 5    gabapentin (NEURONTIN) 100 mg capsule Take 100 mg by mouth daily      memantine (NAMENDA) 10 mg tablet Take 1 tablet (10 mg total) by mouth 2 (two) times a day 60 tablet 5    rosuvastatin (CRESTOR) 10 MG tablet Take 1 tablet (10 mg total) by mouth daily 30 tablet 0    tolterodine (DETROL LA) 4 mg 24 hr capsule Take 4 mg by mouth daily       No current facility-administered medications on file prior to visit.      Social History     Tobacco Use    Smoking status: Former     Current packs/day: 0.00     Average packs/day: 1 pack/day for 60.0 years (60.0 ttl pk-yrs)     Types: Cigarettes     Start date: 1958     Quit date: 2016     Years since quittin.8    Smokeless tobacco: Never   Vaping Use    Vaping status: Never Used   Substance and Sexual Activity    Alcohol use: Yes     Alcohol/week: 1.0 standard drink of alcohol     Types: 1 Cans of beer per week     Comment: 1 beer once or twice a week    Drug use: No    Sexual activity: Not Currently     Partners: Male     Birth control/protection: Abstinence        Objective   /90 (BP Location: Left arm, Patient Position: Sitting)   Pulse (!) 106   Ht 5' 5\" (1.651 m)   Wt " 61.2 kg (135 lb)   SpO2 99%   BMI 22.47 kg/m²     Pain Screening:     ECOG    Physical Exam  Vitals and nursing note reviewed.   Constitutional:       General: She is not in acute distress.     Appearance: Normal appearance. She is well-developed. She is not diaphoretic.   HENT:      Head: Normocephalic and atraumatic.      Nose: Nose normal. No congestion or rhinorrhea.      Mouth/Throat:      Mouth: Mucous membranes are moist.      Pharynx: Oropharynx is clear. No oropharyngeal exudate.   Eyes:      General: No scleral icterus.     Pupils: Pupils are equal, round, and reactive to light.   Neck:      Trachea: No tracheal deviation.   Cardiovascular:      Rate and Rhythm: Normal rate and regular rhythm.      Pulses: Normal pulses.      Heart sounds: Normal heart sounds. No murmur heard.  Pulmonary:      Effort: Pulmonary effort is normal. No respiratory distress.      Breath sounds: Normal breath sounds. No stridor. No wheezing or rales.   Chest:      Chest wall: No tenderness.   Abdominal:      General: Bowel sounds are normal. There is no distension.      Palpations: Abdomen is soft.      Tenderness: There is no abdominal tenderness. There is no rebound.   Musculoskeletal:         General: Normal range of motion.      Cervical back: Normal range of motion and neck supple. No muscular tenderness.   Lymphadenopathy:      Cervical: No cervical adenopathy.   Skin:     General: Skin is warm and dry.      Coloration: Skin is not jaundiced or pale.      Findings: No erythema or rash.   Neurological:      General: No focal deficit present.      Mental Status: She is alert and oriented to person, place, and time.   Psychiatric:         Mood and Affect: Mood normal.         Behavior: Behavior normal.         Thought Content: Thought content normal.         Judgment: Judgment normal.         Labs:   Results for orders placed or performed in visit on 11/15/24   Ancillary Pathology Sendout (AP only)   Result Value Ref Range     Scan Result DCISION RT REPORT         Radiology Results Review : I have reviewed images/report studies in PACS as described above (in the HPI).  Pathology: I have reviewed pathology reports described above.

## 2025-03-04 NOTE — ASSESSMENT & PLAN NOTE
CT chest findings noted regarding soft tissue density in the previous site of breast biopsy.  Patient has follow-up with Dr. Valle soon.  Will defer to him regarding this.

## 2025-03-04 NOTE — ASSESSMENT & PLAN NOTE
No significant change at this time.  She remains RAKEL.  She will be maintained on a annual surveillance schedule  Orders:    CT chest wo contrast; Future

## 2025-03-11 ENCOUNTER — EVALUATION (OUTPATIENT)
Age: 85
End: 2025-03-11
Payer: COMMERCIAL

## 2025-03-11 DIAGNOSIS — F02.B18 MODERATE LATE ONSET ALZHEIMER'S DEMENTIA WITH OTHER BEHAVIORAL DISTURBANCE (HCC): Primary | ICD-10-CM

## 2025-03-11 DIAGNOSIS — G30.1 MODERATE LATE ONSET ALZHEIMER'S DEMENTIA WITH OTHER BEHAVIORAL DISTURBANCE (HCC): Primary | ICD-10-CM

## 2025-03-11 PROCEDURE — 97530 THERAPEUTIC ACTIVITIES: CPT

## 2025-03-11 NOTE — PROGRESS NOTES
POC expires Auth Status Total   Visits  Start date  Expiration date PT/OT + Visit Limit? Co-Insurance   25 BOMN  25   $35 Co-pay                                             Visit/Unit Tracking  AUTH Status: BOMN Date 2/4 2/13 2/20 2/25 3/11          Visits  Authed:  Used 1 (IE) 1 1 1 1(PN)           Remaining                    PLAN OF CARE START:25   PLAN OF CARE END: 2025  PROGRESS NOTE DUE: 4/15/25 (PN on schedule)   FREQUENCY: 1-2x/week for 8-12 weeks   PRECAUTIONS: none  DIAGNOSIS: alzheimers, maintenance program   VISITS: 5      Daily Note         Today's date: 3/11/2025  Patient name: Aurelia Jaime  : 1940  MRN: [id number]  Referring provider: Hamzah Nino MD  Dx:   Encounter Diagnosis     ICD-10-CM    1. 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       Objective: See treatment below.  TA:   Assessed the following this session to assess progress in therapy: Trial Making part A and B. All other observations/assessments carried forward from previous evaluation. See below for goal updates.         Assessments    FUNCTIONAL ACTIVITY QUESTIONAIRE   The Functional Activities Questionnaire (FAQ) measures instrumental activities of daily living (IADLs). The FAQ may be used to differentiate those with mild cognitive impairment and mild Alzheimer's disease   TASK Completely unable to perform task (3)  Requires assistance (2)  Has difficulty but accomplishes task, or has never done, but the informant feels could do the task with difficulty (1)  Normal performance, or has never don the task, but the informant feels the patient could do the task if necessary    Writing checks, paying bills, balancing checkbook  X      Assembling tax records, business affairs, papers  X      Shopping alone for clothes, house hold necessities, or groceries  X     "  Playing a game of skill, working on a hobby  X      Heating water, making a cup of coffee, turning off the stove X      Preparing a balance meal X      Keeping track of current events X      Paying attention to, understanding, discussing a TV show, book, or magazine   X    Remembering appointments, family occasions, holidays, medications X      Traveling our of the neighborhood, arranging or taking buses  X      Total points: 28/30     Evaluation   Sum scores (range 0-30). Cut-point of 9 (dependent in 3 or more activities) is recommended to indicate impaired function and possible cognitive impairment.    SLUMS:  *screen individuals to look for the presence of cognitive deficits, and to identify changes in cognition over time*  SCORING AND NORMS  HIGH SCHOOL EDUCATION  27-30, NORMAL   21-26, MILD NEUROCOGNITIVE DISORDER   1-20, DEMENTIA   LESS THEN HIGH SCHOOL EDUCATION  25-30, NORMAL   20-24, MILD NEUROCOGNITIVE DISORDER  1-19, DEMENTIA     QUESTION Status on 2/4/25   What day of the week is it 0/1   What is the year 0/1   What state are we in 0/1   How much did you spend 1/1   How much do you have left 0/2   Naming animals in 1 minute 1/3   Recall of 5 objects 0/5   Series of numbers 2/2   Clock face hour markers 2/2   Clock face time  0/2   X in triangle 1/1   Largest figure 1/1   Short story, females name 2/2   Short story, when did she go back to work 0/2   Short story, what work did she do 0/2   Short story, what state did she live in 0/2   Total score 10/30, high school education       TRAIL MAKING TEST:   \"widely used test to assess executive function in patients with neuro injury. Successful performance of the TMT requires a variety of mental abilities including letter and number recognition mental flexibility, visual scanning, and motor function. Norms are based on age related scores\"   Status on PN 3/11 STATUS ON IE: 2/4/25   TRAIL MAKING PART A     NORM: 58 seconds 36 seconds 1 minute and 4 seconds " "  TRAIL MAKING PART B    NORM: 152 seonds 2 minutes and 23 seconds, min VC for accuracy 2 minutes and 53 seconds         SHORT TERM GOALS (8-12 weeks)    GOAL  STATUS ON IE  GOAL STATUS    Pt caregiver will report score of 28/30 or less on FAQ in order for patient to maintain life roles  28/30 Established      Pt will maintain sore of 10/30 (+/-3 points) on SLUMs to maintain life roles 10/30 Established      Pt will maintain score of 1 minute and 4 seconds on trail making part A (+/-5 seconds) to maintain life roles 1 minute and 4 seconds CONTINUE GOAL   Pt will maintain score of 2 minutes and 53 seconds on trail making part B (+/-5 seconds) to maintain life roles 2 minutes and 53 seconds CONTINUE GOAL   Patient and/or family will demo G understanding and use of memory book to inc pt's overall engagement in life roles and to dec frustrations with STM/delayed memory loss  Established      Pt will follow 1-2 written directions with 50% accuracy to maintain life roles  Established          *wooden geoboard seated tabletop-recreation of 2D image>3D structure using rubber bands correlating with correct size and color. 2 rounds total. Required mod-max A overall for accuracy.     *\"to do list\" answering multiple choice questions based on chart with events and times. Required mod VC for alternating attention and working memory. Answered 12/12 questions accurately with assist for problem solving.       Assessment: Tolerated treatment well. Improved Trail making scores noted since previous testing.  Focus of session on working memory, sustained attention, and sequencing. Pt would benefit from continued OT services to address basic direction following, working memory, basic  skills, sequencing, and attention. Skilled Maintenance occupational therapy is required to slow the patients functional decline due to dementia. Given the progressive nature of dementia a maintenance program will help reduce the risk of further " deterioration, loss of functional mobility or independence as well as decrease risk of secondary impairments form disease progress.       Plan: Continued skilled OT per POC.

## 2025-03-18 ENCOUNTER — OFFICE VISIT (OUTPATIENT)
Age: 85
End: 2025-03-18
Payer: COMMERCIAL

## 2025-03-18 DIAGNOSIS — G30.1 MODERATE LATE ONSET ALZHEIMER'S DEMENTIA WITH OTHER BEHAVIORAL DISTURBANCE (HCC): Primary | ICD-10-CM

## 2025-03-18 DIAGNOSIS — F02.B18 MODERATE LATE ONSET ALZHEIMER'S DEMENTIA WITH OTHER BEHAVIORAL DISTURBANCE (HCC): Primary | ICD-10-CM

## 2025-03-18 PROBLEM — Z85.3 HISTORY OF BREAST CANCER: Status: ACTIVE | Noted: 2025-03-18

## 2025-03-18 PROCEDURE — 97530 THERAPEUTIC ACTIVITIES: CPT

## 2025-03-18 NOTE — PROGRESS NOTES
"  POC expires Auth Status Total   Visits  Start date  Expiration date PT/OT + Visit Limit? Co-Insurance   25 BOMN  25   $35 Co-pay                                             Visit/Unit Tracking  AUTH Status: BOMN Date 2/4 2/13 2/20 2/25 3/11 3/18         Visits  Authed:  Used 1 (IE) 1 1 1 1(PN) 1          Remaining                    PLAN OF CARE START:25   PLAN OF CARE END: 2025  PROGRESS NOTE DUE: 4/15/25 (PN on schedule)   FREQUENCY: 1-2x/week for 8-12 weeks   PRECAUTIONS: none  DIAGNOSIS: alzheimers, maintenance program   VISITS: 6      Daily Note         Today's date: 3/18/2025  Patient name: Aurelia Jaime  : 1940  MRN: [id number]  Referring provider: Hamzah Nino MD  Dx:   Encounter Diagnosis     ICD-10-CM    1. 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       Objective: See treatment below.  TA:   *word unscramble seated tabletop, identifying word based off of 1-2 missing letters and provided category (ie. \"Clothing\"). Utilized banana grams to recreate tabletop. Min VC for accuracy    *following directions, understanding spatial relations; answering questions based on figures with numbers. Required min A for accuracy.     Assessment: Tolerated treatment well. Focus of session on working memory,  skills, sustained attention, and sequencing. Provided with cognitive HEPs. Pt would benefit from continued OT services to address basic direction following, working memory, basic  skills, sequencing, and attention. Skilled Maintenance occupational therapy is required to slow the patients functional decline due to dementia. Given the progressive nature of dementia a maintenance program will help reduce the risk of further deterioration, loss of functional mobility or independence as well as decrease risk of secondary impairments form " disease progress.       Plan: Continued skilled OT per POC.

## 2025-03-20 ENCOUNTER — OFFICE VISIT (OUTPATIENT)
Dept: SURGICAL ONCOLOGY | Facility: CLINIC | Age: 85
End: 2025-03-20
Payer: COMMERCIAL

## 2025-03-20 ENCOUNTER — DOCUMENTATION (OUTPATIENT)
Dept: HEMATOLOGY ONCOLOGY | Facility: CLINIC | Age: 85
End: 2025-03-20

## 2025-03-20 VITALS
HEART RATE: 64 BPM | HEIGHT: 65 IN | DIASTOLIC BLOOD PRESSURE: 62 MMHG | WEIGHT: 135 LBS | BODY MASS INDEX: 22.49 KG/M2 | TEMPERATURE: 98 F | SYSTOLIC BLOOD PRESSURE: 138 MMHG | OXYGEN SATURATION: 98 %

## 2025-03-20 DIAGNOSIS — N60.91 ATYPICAL LOBULAR HYPERPLASIA OF RIGHT BREAST: ICD-10-CM

## 2025-03-20 DIAGNOSIS — Z85.3 ENCOUNTER FOR FOLLOW-UP SURVEILLANCE OF BREAST CANCER: Primary | ICD-10-CM

## 2025-03-20 DIAGNOSIS — Z85.3 HISTORY OF BREAST CANCER: ICD-10-CM

## 2025-03-20 DIAGNOSIS — Z98.890 STATUS POST RIGHT BREAST LUMPECTOMY: ICD-10-CM

## 2025-03-20 DIAGNOSIS — N60.91 ATYPICAL DUCTAL HYPERPLASIA OF RIGHT BREAST: ICD-10-CM

## 2025-03-20 DIAGNOSIS — Z08 ENCOUNTER FOR FOLLOW-UP SURVEILLANCE OF BREAST CANCER: Primary | ICD-10-CM

## 2025-03-20 DIAGNOSIS — D05.11 DUCTAL CARCINOMA IN SITU (DCIS) OF RIGHT BREAST: ICD-10-CM

## 2025-03-20 PROCEDURE — 99214 OFFICE O/P EST MOD 30 MIN: CPT | Performed by: SURGERY

## 2025-03-20 NOTE — PROGRESS NOTES
Referral to radiation oncology received from Dr Valle.  Chart reviewed by oncology nurse navigator at this time.      Diagnosis: DCIS/ADH of right breast.  Patient had a lumpectomy on 10/22/24. Has a h/o right lung cancer in 2019.  She suffers from dementia.  No known previous RT.  No implantable devices.  After review of chart, instructions for scheduling added to referral and sent to be scheduled as advised.

## 2025-03-20 NOTE — PROGRESS NOTES
Surgical Oncology Follow Up  [facility]  [facility] [place]  [address] [facility] [id number]    Aurelia Jaime  1940  [id number]      Chief Complaint   Patient presents with    Follow-up     3 mo f/u- RIGHT DCIS        Assessment & Plan:   82-year-old female with history of adenocarcinoma of her lung.  She had a right breast biopsy which demonstrated atypical ductal hyperplasia bordering DCIS.  She underwent right breast lumpectomy and no evidence of residual DCIS.  She is overall doing well.  She had a scan recently for surveillance for lung cancer this demonstrated right breast density anterior to previous lumpectomy site.  No discrete solid mass or masses.  We will obtain right breast ultrasound and right breast diagnostic mammogram and see her after.    Cancer History:     Oncology History Overview Note   With a history of lung cancer S/P right lower lobectomy in January 2019, was diagnosed with DCIS, ER/FL positive of the right breast. She presented with abnormal screening mammogram and underwent biopsy followed by right breast lumpectomy on 10/22/24. She is being referred by Dr. Valle and presents today for consult.      7/29/24 B/L screening mammogram  RIGHT  1) FOCAL ASYMMETRY [B]: There is a focal asymmetry seen in the upper central region of the right breast in the anterior depth.   Left  There are no suspicious masses, grouped microcalcifications or areas of unexplained architectural distortion. The skin and nipple areolar complex are unremarkable.    Benign-appearing calcifications are noted in each breast.  RECOMMENDATION:       - Diagnostic mammogram at the current time for the right breast.       - Ultrasound at the current time for the right breast.       - Routine screening mammogram in 1 year for the left breast.      9/9/24 Diagnostic right mammogram & right US  RIGHT  1) MASS [B]: Previously question right breast focal asymmetry  persists as a small oval mass.  Sonographic evaluation shows a corresponding irregular indistinctly marginated hypoechoic mass 12:00 periareolar measuring 3 x 3 x 5 mm.  A benign complicated cyst at 12:00 3 cm from nipple measuring 4 x 3 mm is noted on ultrasound.  Elsewhere in the right breast on mammography, there are no suspicious masses, grouped microcalcifications or unexplained areas of architectural distortion.   RECOMMENDATION:       - Ultrasound-guided breast biopsy for the right breast.         9/24/24 US guided biopsy right breast      0/3/24 Dr. Valle  discussed atypical ductal hyperplasia versus low-grade DCIS. She also wait for her right axillary ultrasound. Which is scheduled for next week.   Consent was obtained for right breast Madhavi  directed lumpectomy.       10/8/24 US right breast axilla and biopsy  Right axillary ultrasounds performed using a dedicated high-resolution probe. No right axillary adenopathy is identified.   RECOMMENDATION:       - Surgical consultation for the right breast.    A. Breast, Right, US right brst bx 1200 3cmfn, 3 passes, 12g marquee:  -  Atypical lobular hyperplasia (ALH). See comment.   -  Negative for atypical ductal hyperplasia, ductal carcinoma in situ and invasive carcinoma.       10/16/24 Dr. Valle  right breast low-grade DCIS. She had an additional right breast biopsies consistent with ALH. She is here after these 2 biopsies.   Plan for lumpectomy      10/22/24  RIGHT BREAST MADHAVI  DIRECTED LUMPECTOMY-BRACKETED    rocedure  Excision (less than total mastectomy)   Specimen Laterality  Right   TUMOR   Tumor Site  Clock position     12 o'clock   Tumor Site  Periareolar     Histologic Type  Ductal carcinoma in situ   Size (Extent) of DCIS  Estimated size (extent) of DCIS is at least (Millimeters): 3 mm   Number of Blocks with DCIS  3   Number of Blocks Examined  100   Architectural Patterns  Cribriform     Papillary   Nuclear Grade  Grade I (low)   Necrosis   Not identified   Microcalcifications  Present in nonneoplastic tissue   MARGINS   Margin Status  All margins negative for DCIS   Distance from DCIS to Closest Margin  6 mm   Closest Margin(s) to DCIS  Posterior   Margin Comment  Margins negative for DCIS. DCIS is about 6 mm (0.6 cm) from closest posterior margin (slide A21) . Additional margins excised are negative for DCIS (parts B to G)   REGIONAL LYMPH NODES   Regional Lymph Node Status  Not applicable (no regional lymph nodes submitted or found)   PATHOLOGIC STAGE CLASSIFICATION (pTNM, AJCC 8th Edition)   Reporting of pT, pN, and (when applicable) pM categories is based on information available to the pathologist at the time the report is issued. As per the AJCC (Chapter 1, 8th Ed.) it is the managing physician’s responsibility to establish the final pathologic stage based upon all pertinent information, including but potentially not limited to this pathology report.   pT Category  pTis (DCIS)   pN Category  pN not assigned (no nodes submitted or found)   ADDITIONAL FINDINGS   Additional Findings  ADH and ALH   Comment(s)  Receptor study will be performed. ADDENDUM TO FOLLOW   .     Breast Biomarker Reporting Template   Protocol posted: 12/13/2023(Added in Addendum) BREAST BIOMARKER REPORTING TEMPLATE - All Specimens  Test(s) Performed     Estrogen Receptor (ER) Status  Positive (greater than 10% of cells demonstrate nuclear positivity)   Percentage of Cells with Nuclear Positivity  %   Average Intensity of Staining  Strong   Test Type  Food and Drug Administration (FDA) cleared (test / vendor): CONFIRM anti-Estrogen Receptor (ER)/[place] [employer]   Primary Antibody  SP1   Test(s) Performed     Progesterone Receptor (PgR) Status  Positive   Percentage of Cells with Nuclear Positivity  %   Average Intensity of Staining  Strong   Test Type  Food and Drug Administration (FDA) cleared (test / vendor): CONFIRM anti-Progesterone Receptor ([place])/[place] [employer]    Primary Antibody  1E2   Cold Ischemia and Fixation Times  Meet requirements specified in latest version of the ASCO / CAP Guidelines   Cold Ischemia Time (minutes)  1 min   Fixation Time (hours)  35 hours   Testing Performed on Block Number(s)  A63   METHODS   Fixative  Formalin   .       24 Dr. Valle  overall doing well. Well-healed surgical scars no evidence of surgical site infection.   Pathology was reviewed   discussed the benefits of genetic testing for her children. Her daughter prefer no genetic testing at this time.   Follow up 3 months        24 Dr. Ryan        Upcomin25 Dr. Valle  25 CT  3/4/25 Dr. Mccord     Primary adenocarcinoma of right lung (HCC)   2019 Initial Diagnosis    Primary adenocarcinoma of right lung (HCC)     Ductal carcinoma in situ (DCIS) of right breast   2024 Biopsy    A.  Right breast, 12:00 periareolar (ultrasound-guided 12-gauge marquee core biopsy, 4 passes):     - Atypical ductal hyperplasia (ADH) bordering on low grade DCIS, involving papilloma (3 mm).    In review of the patient’s recent imaging, the area of atypia in the right breast appears unifocal. It measured 5 mm on ultrasound. Ultrasound of the right axilla has not recently been performed.  Ultrasound-guided aspiration of the cyst in the 12 o'clock position right breast, 3 cm from the nipple is recommended for confirmation. Recent imaging of the contralateral left breast dated 2024 was reviewed and shows no suspicious findings.     10/1/2024 Initial Diagnosis    Ductal carcinoma in situ (DCIS) of right breast     10/8/2024 Biopsy    Right ultrasound guided biopsy  12 o'clock 3 cm from nipple  Atypical lobular hyperplasia (ALH)     The pathology results are technically benign and concordant with imaging; however, contains atypical lobular hyperplasia. Surgical excision is recommended. The 2 recent biopsy sites are 2.4 cm apart.      10/22/2024 Surgery    Right Breast dilma   directed lumpectomy   Two foci of low-grade ductal carcinoma in situ (DCIS)   Both foci measuring 3 mm  Margins negative   ER 91  NV 91     10/22/2024 -  Cancer Staged    Staging form: Breast, AJCC 8th Edition  - Clinical stage from 10/22/2024: Stage 0 (cTis (DCIS), cN0, cM0, ER+, NV+, HER2: Not Assessed) - Signed by Isai Valle MD on 11/13/2024  Stage prefix: Initial diagnosis  Nuclear grade: G1             Interval History:   Follow-up with right breast lumpectomy    Review of Systems:   Review of Systems   Constitutional:  Negative for chills and fever.   HENT:  Negative for ear pain and sore throat.    Eyes:  Negative for pain and visual disturbance.   Respiratory:  Negative for cough and shortness of breath.    Cardiovascular:  Negative for chest pain and palpitations.   Gastrointestinal:  Negative for abdominal pain and vomiting.   Genitourinary:  Negative for dysuria and hematuria.   Musculoskeletal:  Negative for arthralgias and back pain.   Skin:  Negative for color change and rash.   Neurological:  Negative for seizures and syncope.   All other systems reviewed and are negative.      Past Medical History     Patient Active Problem List   Diagnosis    Atypical ductal hyperplasia of right breast    Lung nodule, solitary    Right lower lobe lung mass    Lung nodule    Primary adenocarcinoma of right lung (HCC)    History of lung cancer    Encounter for follow-up surveillance of breast cancer    Multiple lung nodules    SOB (shortness of breath) on exertion    Alzheimer's dementia with behavioral disturbance (HCC)    Simple chronic bronchitis (HCC)    dizziness    Orthostatic hypotension    Carotid stenosis, bilateral    Ductal carcinoma in situ (DCIS) of right breast    Atypical lobular hyperplasia of right breast    Status post right breast lumpectomy    History of breast cancer     Past Medical History:   Diagnosis Date    Abnormal chest x-ray     Abnormal mammogram     Cancer (HCC) October  2019    Cancer free surgically    COPD (chronic obstructive pulmonary disease) (HCC)     Dementia (HCC)     Early stage     Ductal hyperplasia of breast     Left     History of postoperative delirium     Hyperlipidemia     diet controled    Lung cancer (HCC) 10/2018    Resolved with surgery    Memory loss     Nodule of lower lobe of right lung     Right Lower Lobe     Shingles 2013    SOB (shortness of breath)     SOB (shortness of breath)     Wheezing      Past Surgical History:   Procedure Laterality Date    BREAST BIOPSY Left 10/12/2018    BREAST BIOPSY Right 09/24/2024    BREAST CYST EXCISION Left 10/31/2018    BREAST LUMPECTOMY Left 11/13/2018    Procedure: BREAST LUMPECTOMY; BREAST NEEDLE LOCALIZATION (NEEDLE LOC AT 0830);  Surgeon: Mian Valenzuela MD;  [facility];  Service: Surgical Oncology    BREAST LUMPECTOMY Right 10/22/2024    Procedure: RIGHT BREAST MIREYA  DIRECTED LUMPECTOMY-BRACKETED;  Surgeon: Isai Valle MD;  Location: [facility];  Service: Surgical Oncology    COLONOSCOPY      LUNG SURGERY      MAMMO NEEDLE LOCALIZATION LEFT (ALL INC) Left 11/13/2018    MAMMO STEREOTACTIC BREAST BIOPSY LEFT (ALL INC) Left 10/16/2018    CT BRNCHSC INCL FLUOR GDNCE DX W/CELL WASHG SPX Right 01/16/2019    Procedure: BRONCHOSCOPY FLEXIBLE,;  Surgeon: Julia Mccord MD;  [facility];  Service: Thoracic    CT MEDIASTINOSCOPY WITH LYMPH NODE BIOPSY/IES Right 01/16/2019    Procedure: MEDIASTINOSCOPY;  Surgeon: Julia Mccord MD;  [facility];  Service: Thoracic    CT THORACOSCOPY W/LOBECTOMY SINGLE LOBE Right 01/16/2019    Procedure: LOBECTOMY LUNG;  Surgeon: Julia Mccord MD;  [facility];  Service: Thoracic    TRIGGER FINGER RELEASE Right 10/10/2019    ULNAR NERVE TRANSPOSITION Right 10/10/2019    US BREAST CLIP NEEDLE LOC RIGHT Right 10/16/2024    US GUIDED BREAST BIOPSY RIGHT COMPLETE Right 09/24/2024    US GUIDED BREAST BIOPSY RIGHT COMPLETE Right  10/08/2024     Family History   Problem Relation Age of Onset    Hypertension Mother     Glaucoma Mother     Macular degeneration Mother     Prostate cancer Father     Cancer Father     No Known Problems Daughter     No Known Problems Daughter     No Known Problems Maternal Grandmother     No Known Problems Paternal Grandmother     Heart attack Brother     No Known Problems Paternal Aunt     No Known Problems Paternal Aunt     Breast cancer Neg Hx      Social History     Socioeconomic History    Marital status:      Spouse name: Not on file    Number of children: Not on file    Years of education: Not on file    Highest education level: Not on file   Occupational History    Occupation: retired   Tobacco Use    Smoking status: Former     Current packs/day: 0.00     Average packs/day: 1 pack/day for 60.0 years (60.0 ttl pk-yrs)     Types: Cigarettes     Start date: 1958     Quit date: 2016     Years since quittin.8    Smokeless tobacco: Never   Vaping Use    Vaping status: Never Used   Substance and Sexual Activity    Alcohol use: Yes     Alcohol/week: 1.0 standard drink of alcohol     Types: 1 Cans of beer per week     Comment: 1 beer once or twice a week    Drug use: No    Sexual activity: Not Currently     Partners: Male     Birth control/protection: Abstinence   Other Topics Concern    Not on file   Social History Narrative    Not on file     Social Drivers of Health     Financial Resource Strain: Patient Declined (2025)    Received from [facility]    Financial Insecurity     In the last 12 months did you skip medications to save money?: Decline to Answer     In the last 12 months was there a time when you needed to see a doctor but could not because of cost?: Decline to Answer   Food Insecurity: Patient Declined (2025)    Received from [facility]    Food Insecurity     In the last 12 months did you ever eat less than you felt you should because  there wasn't enough money for food?: Decline to Answer   Transportation Needs: Patient Declined (2/28/2025)    Received from [facility]    Transportation Needs     In the last 12 months have you ever had to go without healthcare because you didn't have a way to get there?: Decline to Answer   Physical Activity: Not on file   Stress: Not on file   Social Connections: Patient Declined (2/28/2025)    Received from [facility]    Social Connection     Do you often feel lonely?: Decline to Answer   Intimate Partner Violence: Not on file   Housing Stability: Patient Declined (2/28/2025)    Received from [facility]    Housing Stability     Are you worried that in the next 2 months you may not have stable housing?: Decline to Answer       Current Outpatient Medications:     aspirin 81 mg chewable tablet, Chew 1 tablet (81 mg total) daily, Disp: 30 tablet, Rfl: 0    divalproex sodium (Depakote) 125 mg DR tablet, Once a day, Disp: 90 tablet, Rfl: 1    donepezil (ARICEPT) 10 mg tablet, Take 1 tablet (10 mg total) by mouth daily at bedtime, Disp: 30 tablet, Rfl: 5    gabapentin (NEURONTIN) 100 mg capsule, Take 100 mg by mouth daily, Disp: , Rfl:     memantine (NAMENDA) 10 mg tablet, Take 1 tablet (10 mg total) by mouth 2 (two) times a day, Disp: 60 tablet, Rfl: 5    rosuvastatin (CRESTOR) 10 MG tablet, Take 1 tablet (10 mg total) by mouth daily, Disp: 30 tablet, Rfl: 0    tolterodine (DETROL LA) 4 mg 24 hr capsule, Take 4 mg by mouth daily, Disp: , Rfl:   No Known Allergies    Physical Exam:     Vitals:    03/20/25 1439   BP: 138/62   Pulse: 64   Temp: 98 °F (36.7 °C)   SpO2: 98%     Physical Exam  Constitutional:       Appearance: Normal appearance.   HENT:      Head: Normocephalic and atraumatic.      Nose: Nose normal.      Mouth/Throat:      Mouth: Mucous membranes are moist.   Eyes:      Pupils: Pupils are equal, round, and reactive to light.   Cardiovascular:      Rate  and Rhythm: Normal rate.      Pulses: Normal pulses.      Heart sounds: Normal heart sounds.   Pulmonary:      Effort: Pulmonary effort is normal.      Breath sounds: Normal breath sounds.   Chest:          Comments: Right breast well-healed surgical scar.  Bilateral breast no palpable mass or masses nipple discharge nipple retraction or skin changes bilateral axilla and supraclavicular examination no palpable adenopathy.  Abdominal:      General: Bowel sounds are normal.      Palpations: Abdomen is soft.   Musculoskeletal:         General: Normal range of motion.      Cervical back: Normal range of motion and neck supple.   Skin:     General: Skin is warm.   Neurological:      General: No focal deficit present.      Mental Status: She is alert and oriented to person, place, and time.   Psychiatric:         Mood and Affect: Mood normal.         Behavior: Behavior normal.         Thought Content: Thought content normal.         Judgment: Judgment normal.           Results & Discussion:   CT CHEST WITHOUT IV CONTRAST     INDICATION: Z85.118: Personal history of other malignant neoplasm of bronchus and lung.     COMPARISON: CT chest 2/14/2024     TECHNIQUE: CT examination of the chest was performed without intravenous contrast. Multiplanar 2D reformatted images were created from the source data.     This examination, like all CT scans performed in the [facility], was performed utilizing techniques to minimize radiation dose exposure, including the use of iterative reconstruction and automated exposure control. Radiation dose length   product (DLP) for this visit: 204 mGy-cm     FINDINGS:     LUNGS: Stable postsurgical changes from right lower lobectomy. Multiple bilateral scattered small groundglass nodules similar to prior. For example:  - Right upper lobe groundglass nodule measuring 9 mm (series 202 image 71) with 2 mm solid nodular component, grossly unchanged.  - Left upper lobe 5 mm groundglass  nodule (series 302 image 49) with 2 mm solid component, grossly unchanged.  - Left upper lobe 6 mm groundglass nodule (series 302 image 62), unchanged.     Stable biapical scarring.     Scattered calcified granulomata.     Previously seen endobronchial filling defect in right middle lobe no longer visualized.     PLEURA: Unremarkable.     HEART/GREAT VESSELS: Moderate coronary artery calcifications. No thoracic aortic aneurysm.     MEDIASTINUM AND ETTA: Mildly enlarged precarinal lymph node measuring 1.2 cm (series 301 image 42) stable since at least 7/18/2019.     CHEST WALL AND LOWER NECK: Irregular spiculated soft tissue density in the right breast measuring approximately 2.8 x 1.3 cm (series 301 image 54). Surgical clips are seen deep to this in the right breast. There is overlying skin thickening of the right   breast.  Unchanged multinodular thyroid gland.     VISUALIZED STRUCTURES IN THE UPPER ABDOMEN: Hepatic cyst. Hyperdense right renal proteinaceous/hemorrhagic cyst in the upper pole measuring 1.3 cm. Atherosclerosis. Stable left adrenal gland thickening since 7/18/2019.     OSSEOUS STRUCTURES: Spinal degenerative changes are noted. No acute fracture or destructive osseous lesion.     IMPRESSION:     1. No significant interval change in multiple bilateral groundglass nodules, including nodules in the right upper lobe measuring 9 mm and in the left upper lobe measuring 5 mm with 2 mm solid component. Continued attention on follow-up.     2. Irregular spiculated soft tissue density in the right breast measuring approximately 2.8 x 1.3 cm. Surgical clips are seen deep to this in the right breast. There is overlying skin thickening of the right breast. There has been prior biopsy. Recommend   follow-up with breast clinic.     I did review CT films and discussed.  This is very low index of suspicious for new mass.  However we will obtain right breast diagnostic mammogram and ultrasound and see her.  I did  discussed in detail nature of breast postsurgery changes, risk of local regional recurrence.  she understands and  agrees . All patient questions were answered.       Advance Care Planning/Advance Directives:  I Isai Valle MD discussed the disease status with Aurelia Jaime  today 03/20/25  treatment plans and follow-up with the patient.

## 2025-03-25 ENCOUNTER — OFFICE VISIT (OUTPATIENT)
Age: 85
End: 2025-03-25
Payer: COMMERCIAL

## 2025-03-25 DIAGNOSIS — G30.1 MODERATE LATE ONSET ALZHEIMER'S DEMENTIA WITH OTHER BEHAVIORAL DISTURBANCE (HCC): Primary | ICD-10-CM

## 2025-03-25 DIAGNOSIS — F02.B18 MODERATE LATE ONSET ALZHEIMER'S DEMENTIA WITH OTHER BEHAVIORAL DISTURBANCE (HCC): Primary | ICD-10-CM

## 2025-03-25 PROCEDURE — 97530 THERAPEUTIC ACTIVITIES: CPT

## 2025-03-25 NOTE — PROGRESS NOTES
POC expires Auth Status Total   Visits  Start date  Expiration date PT/OT + Visit Limit? Co-Insurance   25 BOMN  25   $35 Co-pay                                             Visit/Unit Tracking  AUTH Status: BOMN Date 2/4 2/13 2/20 2/25 3/11 3/18 3/25        Visits  Authed:  Used 1 (IE) 1 1 1 1(PN) 1 1         Remaining                    PLAN OF CARE START:25   PLAN OF CARE END: 2025  PROGRESS NOTE DUE: 4/15/25 (PN on schedule)   FREQUENCY: 1-2x/week for 8-12 weeks   PRECAUTIONS: none  DIAGNOSIS: alzheimers, maintenance program   VISITS: 7      Daily Note         Today's date: 3/25/2025  Patient name: Aurelia Jaime  : 1940  MRN: [id number]  Referring provider: Hamzah Nino MD  Dx:   Encounter Diagnosis     ICD-10-CM    1. 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       Objective: See treatment below.  TA:   *coogram puzzle, copying 2D image into 3D structure. Required min A for accuracy     * looking chart-identifying 1 image at a time by looking at card for short bout then locating on chart. Focused on sustained attention and working memory. Required assist to utilize internal memory aides of visualization to recall image she is looking for. Mod A tor region of board to look on when finding image.       Assessment: Tolerated treatment well. Focus of session on working memory,  skills, sustained attention, and sequencing. Provided with cognitive HEPs. Pt would benefit from continued OT services to address basic direction following, working memory, basic  skills, sequencing, and attention. Skilled Maintenance occupational therapy is required to slow the patients functional decline due to dementia. Given the progressive nature of dementia a maintenance program will help reduce the risk of further deterioration, loss of  functional mobility or independence as well as decrease risk of secondary impairments form disease progress.       Plan: Continued skilled OT per POC.

## 2025-03-27 ENCOUNTER — HOSPITAL ENCOUNTER (OUTPATIENT)
Dept: MAMMOGRAPHY | Facility: CLINIC | Age: 85
End: 2025-03-27
Payer: COMMERCIAL

## 2025-03-27 ENCOUNTER — HOSPITAL ENCOUNTER (OUTPATIENT)
Dept: ULTRASOUND IMAGING | Facility: CLINIC | Age: 85
End: 2025-03-27
Payer: COMMERCIAL

## 2025-03-27 DIAGNOSIS — Z85.3 ENCOUNTER FOR FOLLOW-UP SURVEILLANCE OF BREAST CANCER: ICD-10-CM

## 2025-03-27 DIAGNOSIS — Z08 ENCOUNTER FOR FOLLOW-UP SURVEILLANCE OF BREAST CANCER: ICD-10-CM

## 2025-03-27 PROCEDURE — G0279 TOMOSYNTHESIS, MAMMO: HCPCS

## 2025-03-27 PROCEDURE — 76642 ULTRASOUND BREAST LIMITED: CPT

## 2025-03-27 PROCEDURE — 77065 DX MAMMO INCL CAD UNI: CPT

## 2025-04-01 ENCOUNTER — APPOINTMENT (OUTPATIENT)
Age: 85
End: 2025-04-01
Payer: COMMERCIAL

## 2025-04-01 ENCOUNTER — OFFICE VISIT (OUTPATIENT)
Age: 85
End: 2025-04-01
Payer: COMMERCIAL

## 2025-04-01 DIAGNOSIS — G30.1 MODERATE LATE ONSET ALZHEIMER'S DEMENTIA WITH OTHER BEHAVIORAL DISTURBANCE (HCC): Primary | ICD-10-CM

## 2025-04-01 DIAGNOSIS — F02.B18 MODERATE LATE ONSET ALZHEIMER'S DEMENTIA WITH OTHER BEHAVIORAL DISTURBANCE (HCC): Primary | ICD-10-CM

## 2025-04-01 PROCEDURE — 97530 THERAPEUTIC ACTIVITIES: CPT | Performed by: OCCUPATIONAL THERAPIST

## 2025-04-01 NOTE — PROGRESS NOTES
"  POC expires Auth Status Total   Visits  Start date  Expiration date PT/OT + Visit Limit? Co-Insurance   25 BOMN  25   $35 Co-pay                                             Visit/Unit Tracking  AUTH Status: BOMN Date 2/4 2/13 2/20 2/25 3/11 3/18 3/25 4/1       Visits  Authed:  Used 1 (IE) 1 1 1 1(PN) 1 1 1        Remaining                    PLAN OF CARE START:25   PLAN OF CARE END: 2025  PROGRESS NOTE DUE: 4/15/25 (PN on schedule)   FREQUENCY: 1-2x/week for 8-12 weeks   PRECAUTIONS: none  DIAGNOSIS: alzheimers, maintenance program   VISITS: 8      Daily Note         Today's date: 2025  Patient name: Aurelia Jaime  : 1940  MRN: [id number]  Referring provider: Hamzah Nino MD  Dx:   Encounter Diagnosis     ICD-10-CM    1. 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  TA:     *pt recreating simple crossword puzzle on table with bananagrams, min VC for accuracy     *Pt completed 16 piece puzzle with emphasis on problem solving, sustained attention, pt completed with good accuracy in a good amount of time, min VC for accuracy    *odd one out, pt correctly identified odd one out on approximately 20 cards, good accuracy noted, min VC for accuracy    *parquetry puzzle, pt recreating 2 images with parquetry blocks, emphasis on  skills      Assessment: Tolerated treatment well. Focus of session on  skills, sustained attention, and basic problem solving skills. Pt would benefit from continued OT services to address basic direction following, working memory, basic  skills, sequencing, and attention. Skilled Maintenance occupational therapy is required to slow the patients functional decline due to dementia. Given the progressive nature of dementia a maintenance " program will help reduce the risk of further deterioration, loss of functional mobility or independence as well as decrease risk of secondary impairments form disease progress.       Plan: Continued skilled OT per POC.

## 2025-04-07 ENCOUNTER — OFFICE VISIT (OUTPATIENT)
Dept: SURGICAL ONCOLOGY | Facility: CLINIC | Age: 85
End: 2025-04-07
Payer: COMMERCIAL

## 2025-04-07 VITALS
BODY MASS INDEX: 22.49 KG/M2 | OXYGEN SATURATION: 99 % | HEART RATE: 76 BPM | DIASTOLIC BLOOD PRESSURE: 82 MMHG | HEIGHT: 65 IN | TEMPERATURE: 97.6 F | WEIGHT: 135 LBS | SYSTOLIC BLOOD PRESSURE: 134 MMHG

## 2025-04-07 DIAGNOSIS — D05.11 DUCTAL CARCINOMA IN SITU (DCIS) OF RIGHT BREAST: ICD-10-CM

## 2025-04-07 DIAGNOSIS — Z98.890 STATUS POST RIGHT BREAST LUMPECTOMY: ICD-10-CM

## 2025-04-07 DIAGNOSIS — Z85.3 ENCOUNTER FOR FOLLOW-UP SURVEILLANCE OF BREAST CANCER: Primary | ICD-10-CM

## 2025-04-07 DIAGNOSIS — Z85.3 HISTORY OF BREAST CANCER: ICD-10-CM

## 2025-04-07 DIAGNOSIS — Z08 ENCOUNTER FOR FOLLOW-UP SURVEILLANCE OF BREAST CANCER: Primary | ICD-10-CM

## 2025-04-07 DIAGNOSIS — N60.91 ATYPICAL LOBULAR HYPERPLASIA OF RIGHT BREAST: ICD-10-CM

## 2025-04-07 PROCEDURE — 99215 OFFICE O/P EST HI 40 MIN: CPT | Performed by: SURGERY

## 2025-04-07 NOTE — PROGRESS NOTES
Name: Aurelia Jaime      : 1940      MRN: [id number]  Encounter Provider: Isai Valle MD  Encounter Date: 2025   Encounter department: [facility] [place]  :  Assessment & Plan  Encounter for follow-up surveillance of breast cancer         History of breast cancer         Ductal carcinoma in situ (DCIS) of right breast         Atypical lobular hyperplasia of right breast         Status post right breast lumpectomy         84-year-old female with history of right breast cancer ductal carcinoma in situ she had recently right breast mammogram recommended repeat diagnostic mammogram and ultrasound.  She is here after the studies.  Films were reviewed and discussed there are no worrisome features she her next mammogram is in  we will obtain that and follow her.  She was told to call us with any questions or concerns in the interim she understand and agreed to do so.    Survivorship  Discussed symptoms related to disease recurrence, Yes     Evaluated for late effects related to cancer treatment, Yes     Screening current for cervical cancer, No     Screening current for colon cancer, Yes     Cancer rehabilitation services addressed, Yes     Screening current for osteoporosis, Yes     Oncology Treatment Summary reviewed with patient and copy provided, Yes     Referral placed for psychosocial evaluation/screening to oncology social work  Yes    History of Present Illness   Aurelia Jaime is a 84 y.o. year old female who presents follow-up with right breast cancer with repeat diagnostic mammograms..     Oncology History   Cancer Staging   Ductal carcinoma in situ (DCIS) of right breast  Staging form: Breast, AJCC 8th Edition  - Clinical stage from 10/22/2024: Stage 0 (cTis (DCIS), cN0, cM0, ER+, CT+, HER2: Not Assessed) - Signed by Isai Valle MD on 2024  Stage prefix: Initial diagnosis  Nuclear grade: G1    Primary  adenocarcinoma of right lung (HCC)  Staging form: Lung, AJCC 8th Edition  - Clinical stage from 2/4/2019: Stage IB (cT2a, cN0, cM0) - Signed by Julia Mccord MD on 2/4/2019  Histopathologic type: Adenocarcinoma, NOS  Histologic grade (G): G1  Histologic grading system: 4 grade system  Laterality: Right  Tumor size (mm): 3.6  Lymph-vascular invasion (LVI): LVI not present (absent)/not identified  Specimen type: Excision  Type of lung cancer: Surgically resected non-small cell lung cancer  Perineural invasion (PNI): Absent  Adequacy of mediastinal dissection: Adequate  Stage used in treatment planning: Yes  National guidelines used in treatment planning: Yes  Type of national guideline used in treatment planning: NCCN  Oncology History Overview Note   With a history of lung cancer S/P right lower lobectomy in January 2019, was diagnosed with DCIS, ER/TN positive of the right breast. She presented with abnormal screening mammogram and underwent biopsy followed by right breast lumpectomy on 10/22/24. She is being referred by Dr. Valle and presents today for consult.      7/29/24 B/L screening mammogram  RIGHT  1) FOCAL ASYMMETRY [B]: There is a focal asymmetry seen in the upper central region of the right breast in the anterior depth.   Left  There are no suspicious masses, grouped microcalcifications or areas of unexplained architectural distortion. The skin and nipple areolar complex are unremarkable.    Benign-appearing calcifications are noted in each breast.  RECOMMENDATION:       - Diagnostic mammogram at the current time for the right breast.       - Ultrasound at the current time for the right breast.       - Routine screening mammogram in 1 year for the left breast.      9/9/24 Diagnostic right mammogram & right US  RIGHT  1) MASS [B]: Previously question right breast focal asymmetry persists as a small oval mass.  Sonographic evaluation shows a corresponding irregular indistinctly marginated hypoechoic  mass 12:00 periareolar measuring 3 x 3 x 5 mm.  A benign complicated cyst at 12:00 3 cm from nipple measuring 4 x 3 mm is noted on ultrasound.  Elsewhere in the right breast on mammography, there are no suspicious masses, grouped microcalcifications or unexplained areas of architectural distortion.   RECOMMENDATION:       - Ultrasound-guided breast biopsy for the right breast.         9/24/24 US guided biopsy right breast      0/3/24 Dr. Valle  discussed atypical ductal hyperplasia versus low-grade DCIS. She also wait for her right axillary ultrasound. Which is scheduled for next week.   Consent was obtained for right breast Madhavi  directed lumpectomy.       10/8/24 US right breast axilla and biopsy  Right axillary ultrasounds performed using a dedicated high-resolution probe. No right axillary adenopathy is identified.   RECOMMENDATION:       - Surgical consultation for the right breast.    A. Breast, Right, US right brst bx 1200 3cmfn, 3 passes, 12g marquee:  -  Atypical lobular hyperplasia (ALH). See comment.   -  Negative for atypical ductal hyperplasia, ductal carcinoma in situ and invasive carcinoma.       10/16/24 Dr. Valle  right breast low-grade DCIS. She had an additional right breast biopsies consistent with ALH. She is here after these 2 biopsies.   Plan for lumpectomy      10/22/24  RIGHT BREAST MADHAVI  DIRECTED LUMPECTOMY-BRACKETED    rocedure  Excision (less than total mastectomy)   Specimen Laterality  Right   TUMOR   Tumor Site  Clock position     12 o'clock   Tumor Site  Periareolar     Histologic Type  Ductal carcinoma in situ   Size (Extent) of DCIS  Estimated size (extent) of DCIS is at least (Millimeters): 3 mm   Number of Blocks with DCIS  3   Number of Blocks Examined  100   Architectural Patterns  Cribriform     Papillary   Nuclear Grade  Grade I (low)   Necrosis  Not identified   Microcalcifications  Present in nonneoplastic tissue   MARGINS   Margin Status  All margins negative  for DCIS   Distance from DCIS to Closest Margin  6 mm   Closest Margin(s) to DCIS  Posterior   Margin Comment  Margins negative for DCIS. DCIS is about 6 mm (0.6 cm) from closest posterior margin (slide A21) . Additional margins excised are negative for DCIS (parts B to G)   REGIONAL LYMPH NODES   Regional Lymph Node Status  Not applicable (no regional lymph nodes submitted or found)   PATHOLOGIC STAGE CLASSIFICATION (pTNM, AJCC 8th Edition)   Reporting of pT, pN, and (when applicable) pM categories is based on information available to the pathologist at the time the report is issued. As per the AJCC (Chapter 1, 8th Ed.) it is the managing physician’s responsibility to establish the final pathologic stage based upon all pertinent information, including but potentially not limited to this pathology report.   pT Category  pTis (DCIS)   pN Category  pN not assigned (no nodes submitted or found)   ADDITIONAL FINDINGS   Additional Findings  ADH and ALH   Comment(s)  Receptor study will be performed. ADDENDUM TO FOLLOW   .     Breast Biomarker Reporting Template   Protocol posted: 12/13/2023(Added in Addendum) BREAST BIOMARKER REPORTING TEMPLATE - All Specimens  Test(s) Performed     Estrogen Receptor (ER) Status  Positive (greater than 10% of cells demonstrate nuclear positivity)   Percentage of Cells with Nuclear Positivity  %   Average Intensity of Staining  Strong   Test Type  Food and Drug Administration (FDA) cleared (test / vendor): CONFIRM anti-Estrogen Receptor (ER)/[employer]   Primary Antibody  SP1   Test(s) Performed     Progesterone Receptor (PgR) Status  Positive   Percentage of Cells with Nuclear Positivity  %   Average Intensity of Staining  Strong   Test Type  Food and Drug Administration (FDA) cleared (test / vendor): CONFIRM anti-Progesterone Receptor (DE)/[place] [employer]   Primary Antibody  1E2   Cold Ischemia and Fixation Times  Meet requirements specified in latest version of the ASCO /  CAP Guidelines   Cold Ischemia Time (minutes)  1 min   Fixation Time (hours)  35 hours   Testing Performed on Block Number(s)  [id number]   METHODS   Fixative  Formalin   .       24 Dr. Valle  overall doing well. Well-healed surgical scars no evidence of surgical site infection.   Pathology was reviewed   discussed the benefits of genetic testing for her children. Her daughter prefer no genetic testing at this time.   Follow up 3 months        24 Dr. Ryan        Upcomin25 Dr. Valle  25 CT  3/4/25 Dr. Mccord     Primary adenocarcinoma of right lung (HCC)   2019 Initial Diagnosis    Primary adenocarcinoma of right lung (HCC)     Ductal carcinoma in situ (DCIS) of right breast   2024 Biopsy    A.  Right breast, 12:00 periareolar (ultrasound-guided 12-gauge marquee core biopsy, 4 passes):     - Atypical ductal hyperplasia (ADH) bordering on low grade DCIS, involving papilloma (3 mm).    In review of the patient’s recent imaging, the area of atypia in the right breast appears unifocal. It measured 5 mm on ultrasound. Ultrasound of the right axilla has not recently been performed.  Ultrasound-guided aspiration of the cyst in the 12 o'clock position right breast, 3 cm from the nipple is recommended for confirmation. Recent imaging of the contralateral left breast dated 2024 was reviewed and shows no suspicious findings.     10/1/2024 Initial Diagnosis    Ductal carcinoma in situ (DCIS) of right breast     10/8/2024 Biopsy    Right ultrasound guided biopsy  12 o'clock 3 cm from nipple  Atypical lobular hyperplasia (ALH)     The pathology results are technically benign and concordant with imaging; however, contains atypical lobular hyperplasia. Surgical excision is recommended. The 2 recent biopsy sites are 2.4 cm apart.      10/22/2024 Surgery    Right Breast dilma  directed lumpectomy   Two foci of low-grade ductal carcinoma in situ (DCIS)   Both foci measuring 3 mm  Margins  "negative   ER 91  SD 91     10/22/2024 -  Cancer Staged    Staging form: Breast, AJCC 8th Edition  - Clinical stage from 10/22/2024: Stage 0 (cTis (DCIS), cN0, cM0, ER+, SD+, HER2: Not Assessed) - Signed by Isai Valle MD on 11/13/2024  Stage prefix: Initial diagnosis  Nuclear grade: G1          Review of Systems   Constitutional:  Negative for chills and fever.   HENT:  Negative for ear pain and sore throat.    Eyes:  Negative for pain and visual disturbance.   Respiratory:  Negative for cough and shortness of breath.    Cardiovascular:  Negative for chest pain and palpitations.   Gastrointestinal:  Negative for abdominal pain and vomiting.   Genitourinary:  Negative for dysuria and hematuria.   Musculoskeletal:  Negative for arthralgias and back pain.   Skin:  Negative for color change and rash.   Neurological:  Negative for seizures and syncope.   All other systems reviewed and are negative.   A complete review of systems is negative other than that noted above in the HPI.    Medical History Reviewed by provider this encounter:  Tobacco  Allergies  Meds  Problems  Med Hx  Surg Hx  Fam Hx     .       Objective   /82 (BP Location: Left arm, Patient Position: Sitting)   Pulse 76   Temp 97.6 °F (36.4 °C) (Temporal)   Ht 5' 5\" (1.651 m)   Wt 61.2 kg (135 lb)   SpO2 99%   BMI 22.47 kg/m²     Pain Screening:     ECOG    Physical Exam  Constitutional:       Appearance: Normal appearance.   HENT:      Head: Normocephalic and atraumatic.      Nose: Nose normal.      Mouth/Throat:      Mouth: Mucous membranes are moist.   Eyes:      Pupils: Pupils are equal, round, and reactive to light.   Cardiovascular:      Rate and Rhythm: Normal rate.      Pulses: Normal pulses.      Heart sounds: Normal heart sounds.   Pulmonary:      Effort: Pulmonary effort is normal.      Breath sounds: Normal breath sounds.   Chest:          Comments: Right breast incision.  Bilateral breast examination no palpable " mass, masses, nipple discharge, nipple retraction or skin changes.  Bilateral axillary and supraclavicular examination no palpable adenopathy.  Patient was examined seated as well as supine position.  Abdominal:      General: Bowel sounds are normal.      Palpations: Abdomen is soft.   Musculoskeletal:         General: Normal range of motion.      Cervical back: Normal range of motion and neck supple.   Skin:     General: Skin is warm.   Neurological:      General: No focal deficit present.      Mental Status: She is alert and oriented to person, place, and time.   Psychiatric:         Mood and Affect: Mood normal.         Behavior: Behavior normal.         Thought Content: Thought content normal.         Judgment: Judgment normal.          Labs: I have reviewed pertinent labs.   Lab Results   Component Value Date/Time    WBC 6.38 10/03/2024 09:04 AM    RBC 4.37 10/03/2024 09:04 AM    Hemoglobin 12.8 10/03/2024 09:04 AM    Hematocrit 40.8 10/03/2024 09:04 AM    MCV 93 10/03/2024 09:04 AM    MCH 29.3 10/03/2024 09:04 AM    RDW 14.0 10/03/2024 09:04 AM    Platelets 234 10/03/2024 09:04 AM    Segmented % 70 10/03/2024 09:04 AM    Lymphocytes % 18 10/03/2024 09:04 AM    Monocytes % 8 10/03/2024 09:04 AM    Eosinophils Relative 2 10/03/2024 09:04 AM    Basophils Relative 1 10/03/2024 09:04 AM    Immature Grans % 1 10/03/2024 09:04 AM    Absolute Neutrophils 4.55 10/03/2024 09:04 AM      Lab Results   Component Value Date/Time    Sodium 139 10/03/2024 09:04 AM    Sodium 141 08/19/2024 07:56 AM    Potassium 4.5 10/03/2024 09:04 AM    Potassium 4.4 08/19/2024 07:56 AM    Chloride 106 10/03/2024 09:04 AM    Chloride 106 08/19/2024 07:56 AM    Carbon Dioxide 26 08/19/2024 07:56 AM    CO2 29 10/03/2024 09:04 AM    ANION GAP 4 10/03/2024 09:04 AM    ANION GAP 9 08/19/2024 07:56 AM    BUN 17 10/03/2024 09:04 AM    BUN 18 08/19/2024 07:56 AM    Creatinine 1.02 10/03/2024 09:04 AM    Creatinine 1.12 (H) 08/19/2024 07:56 AM     Glucose 92 08/19/2024 07:56 AM    Glucose, Fasting 87 10/03/2024 09:04 AM    Calcium 9.4 10/03/2024 09:04 AM    Calcium 9.4 08/19/2024 07:56 AM    AST 15 10/03/2024 09:04 AM    AST 15 08/19/2024 07:56 AM    ALT 12 10/03/2024 09:04 AM    ALT 12 08/19/2024 07:56 AM    Alkaline Phosphatase 68 10/03/2024 09:04 AM    Alkaline Phosphatase 69 08/19/2024 07:56 AM    Total Protein 6.6 10/03/2024 09:04 AM    Protein, Total 6.2 (L) 08/19/2024 07:56 AM    Albumin 4.0 10/03/2024 09:04 AM    ALBUMIN 3.9 08/19/2024 07:56 AM    Total Bilirubin 0.42 10/03/2024 09:04 AM    Total Bilirubin 0.4 08/19/2024 07:56 AM    eGFRcr 49 (L) 08/19/2024 07:56 AM    eGFR 51 10/03/2024 09:04 AM      No visits with results within 1 Month(s) from this visit.   Latest known visit with results is:   Lab Requisition on 11/15/2024   Component Date Value Ref Range Status    Scan Result 11/15/2024 DCISION RT REPORT   Final     Pathology: I have reviewed pathology reports described above.    Radiology Results Review: I personally reviewed the following image studies in PACS and associated radiology reports: Right breast mammogram and Ultrasound(s). My interpretation of the radiology images/reports is: Agree with B2 and postoperative changes we recommend bilateral diagnostic mammogram in 6 months...  Concordance: yes    Administrative Statements   I have spent a total time of 40 minutes in caring for this patient on the day of the visit/encounter including Diagnostic results, Prognosis, Risks and benefits of tx options, Instructions for management, Patient and family education, Risk factor reductions, Impressions, Counseling / Coordination of care, Documenting in the medical record, Reviewing/placing orders in the medical record (including tests, medications, and/or procedures), and Obtaining or reviewing history  .

## 2025-04-08 ENCOUNTER — OFFICE VISIT (OUTPATIENT)
Age: 85
End: 2025-04-08
Payer: COMMERCIAL

## 2025-04-08 DIAGNOSIS — G30.1 MODERATE LATE ONSET ALZHEIMER'S DEMENTIA WITH OTHER BEHAVIORAL DISTURBANCE (HCC): Primary | ICD-10-CM

## 2025-04-08 DIAGNOSIS — F02.B18 MODERATE LATE ONSET ALZHEIMER'S DEMENTIA WITH OTHER BEHAVIORAL DISTURBANCE (HCC): Primary | ICD-10-CM

## 2025-04-08 PROCEDURE — 97530 THERAPEUTIC ACTIVITIES: CPT | Performed by: OCCUPATIONAL THERAPIST

## 2025-04-08 NOTE — PROGRESS NOTES
"  POC expires Auth Status Total   Visits  Start date  Expiration date PT/OT + Visit Limit? Co-Insurance   25 BOMN  25   $35 Co-pay                                             Visit/Unit Tracking  AUTH Status: BOMN Date 2/4 2/13 2/20 2/25 3/11 3/18 3/25 4/1 4/8      Visits  Authed:  Used 1 (IE) 1 1 1 1(PN) 1 1 1 1       Remaining                    PLAN OF CARE START:25   PLAN OF CARE END: 2025  PROGRESS NOTE DUE: 4/15/25 (PN on schedule)   FREQUENCY: 1-2x/week for 8-12 weeks   PRECAUTIONS: none  DIAGNOSIS: alzheimers, maintenance program   VISITS: 9      Daily Note         Today's date: 2025  Patient name: Aurelia Jaime  : 1940  MRN: [id number]  Referring provider: Hamzah iNno MD  Dx:   Encounter Diagnosis     ICD-10-CM    1. 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  TA:     *pt sorting deck of 52 cards by suit and naming category based on suit corresponding to external cue sheet, emphasis on sustained/alternating attention, sequencing, and direction following, pt benefited from increased VC for direction following    *spot it, pt identifying matching image between 2 cards placed on tabletop    *16 piece beach puzzle, emphasis on sustained attention and problem solving, pt completed with good accuracy and in a good amount of time, no VC needed      Assessment: Tolerated treatment well. Focus of session on attention, sequencing, direction following, and problem solving. Pt would benefit from continued OT services to address basic direction following, working memory, basic  skills, sequencing, and attention. Skilled Maintenance occupational therapy is required to slow the patients functional decline due to dementia. Given the progressive nature of " dementia a maintenance program will help reduce the risk of further deterioration, loss of functional mobility or independence as well as decrease risk of secondary impairments form disease progress.       Plan: Continued skilled OT per POC.

## 2025-04-15 ENCOUNTER — EVALUATION (OUTPATIENT)
Age: 85
End: 2025-04-15
Payer: COMMERCIAL

## 2025-04-15 DIAGNOSIS — F02.B18 MODERATE LATE ONSET ALZHEIMER'S DEMENTIA WITH OTHER BEHAVIORAL DISTURBANCE (HCC): Primary | ICD-10-CM

## 2025-04-15 DIAGNOSIS — G30.1 MODERATE LATE ONSET ALZHEIMER'S DEMENTIA WITH OTHER BEHAVIORAL DISTURBANCE (HCC): Primary | ICD-10-CM

## 2025-04-15 PROCEDURE — 97530 THERAPEUTIC ACTIVITIES: CPT | Performed by: OCCUPATIONAL THERAPIST

## 2025-04-15 NOTE — PROGRESS NOTES
POC expires Auth Status Total   Visits  Start date  Expiration date PT/OT + Visit Limit? Co-Insurance   25 BOMN  25   $35 Co-pay                                             Visit/Unit Tracking  AUTH Status: BOMN Date 2/4 2/13 2/20 2/25 3/11 3/18 3/25 4/1 4/8 4/15     Visits  Authed:  Used 1 (IE) 1 1 1 1(PN) 1 1 1 1 1 (PN)      Remaining                    PLAN OF CARE START:25   PLAN OF CARE END: 2025  PROGRESS NOTE DUE: Follow up next visit post scheduling  FREQUENCY: 1-2x/week for 8-12 weeks   PRECAUTIONS: none  DIAGNOSIS: alzheimers, maintenance program   VISITS: 10      Progress Note        Today's date: 4/15/2025  Patient name: Aurelia Jaime  : 1940  MRN: [id number]  Referring provider: Hamzah Nino MD  Dx:   Encounter Diagnosis     ICD-10-CM    1. 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       Objective: See treatment below.    TA:   Assessed the following this session to assess progress in therapy: SLUMS. All other observations/assessments carried forward from previous evaluation. See below for goal updates.         Assessments    FUNCTIONAL ACTIVITY QUESTIONAIRE   The Functional Activities Questionnaire (FAQ) measures instrumental activities of daily living (IADLs). The FAQ may be used to differentiate those with mild cognitive impairment and mild Alzheimer's disease   TASK Completely unable to perform task (3)  Requires assistance (2)  Has difficulty but accomplishes task, or has never done, but the informant feels could do the task with difficulty (1)  Normal performance, or has never don the task, but the informant feels the patient could do the task if necessary    Writing checks, paying bills, balancing checkbook  X      Assembling tax records, business affairs, papers  X      Shopping alone for clothes, house hold  "necessities, or groceries  X      Playing a game of skill, working on a hobby  X      Heating water, making a cup of coffee, turning off the stove X      Preparing a balance meal X      Keeping track of current events X      Paying attention to, understanding, discussing a TV show, book, or magazine   X    Remembering appointments, family occasions, holidays, medications X      Traveling our of the neighborhood, arranging or taking buses  X      Total points: 28/30     Evaluation   Sum scores (range 0-30). Cut-point of 9 (dependent in 3 or more activities) is recommended to indicate impaired function and possible cognitive impairment.    SLUMS:  *screen individuals to look for the presence of cognitive deficits, and to identify changes in cognition over time*  SCORING AND NORMS  HIGH SCHOOL EDUCATION  27-30, NORMAL   21-26, MILD NEUROCOGNITIVE DISORDER   1-20, DEMENTIA   LESS THEN HIGH SCHOOL EDUCATION  25-30, NORMAL   20-24, MILD NEUROCOGNITIVE DISORDER  1-19, DEMENTIA     QUESTION Status on PN 4/15 Status on 2/4/25   What day of the week is it 1/1 0/1   What is the year 0/1 0/1   What state are we in 0/1 0/1   How much did you spend 0/1 1/1   How much do you have left 0/1 0/2   Naming animals in 1 minute 1/3 1/3   Recall of 5 objects 0/5 0/5   Series of numbers 1/2 2/2   Clock face hour markers 2/2 2/2   Clock face time  2/2 0/2   X in triangle 1/1 1/1   Largest figure 1/1 1/1   Short story, females name 2/2 2/2   Short story, when did she go back to work 0/2 0/2   Short story, what work did she do 0/2 0/2   Short story, what state did she live in 0/2 0/2   Total score 11/30 10/30, high school education       TRAIL MAKING TEST:   \"widely used test to assess executive function in patients with neuro injury. Successful performance of the TMT requires a variety of mental abilities including letter and number recognition mental flexibility, visual scanning, and motor function. Norms are based on age related scores\"   " Status on PN 3/11 STATUS ON IE: 2/4/25   TRAIL MAKING PART A     NORM: 58 seconds 36 seconds 1 minute and 4 seconds   TRAIL MAKING PART B    NORM: 152 seonds 2 minutes and 23 seconds, min VC for accuracy 2 minutes and 53 seconds         SHORT TERM GOALS (8-12 weeks)    GOAL  STATUS ON IE  GOAL STATUS    Pt caregiver will report score of 28/30 or less on FAQ in order for patient to maintain life roles  28/30 Established      Pt will maintain sore of 10/30 (+/-3 points) on SLUMs to maintain life roles 10/30 MAINTAINED, CONTINUE GOAL      Pt will maintain score of 1 minute and 4 seconds on trail making part A (+/-5 seconds) to maintain life roles 1 minute and 4 seconds CONTINUE GOAL   Pt will maintain score of 2 minutes and 53 seconds on trail making part B (+/-5 seconds) to maintain life roles 2 minutes and 53 seconds CONTINUE GOAL   Patient and/or family will demo G understanding and use of memory book to inc pt's overall engagement in life roles and to dec frustrations with STM/delayed memory loss  Established      Pt will follow 1-2 written directions with 50% accuracy to maintain life roles  Established          *Coogram puzzle, copying 2D image into 3D structure, no VC for accuracy     *Multimatrix, pt found letter blocks in ascending order and placed with open number block in ascending order, completed with good accuracy and in a good amount of time, min VC needed, emphasis on sequencing, sustained attention,  and working memory     *Q-bitz, Pt completed simple q-bitz puzzle with good accuracy and in a good amount of time, min VC required for accuracy, emphasis on /EF skills       Assessment: Tolerated treatment well. Improved SLUMS score by 1 point compared to previous evaluation. Focus of session on sequencing, sustained attention, working memory, and basic EF/ skills. Skilled Maintenance occupational therapy is required to slow the patients functional decline due to dementia. Given the progressive nature  of dementia a maintenance program will help reduce the risk of further deterioration, loss of functional mobility or independence as well as decrease risk of secondary impairments form disease progress.       Plan: Continued skilled OT per POC.

## 2025-04-21 ENCOUNTER — APPOINTMENT (OUTPATIENT)
Age: 85
End: 2025-04-21
Payer: COMMERCIAL

## 2025-04-22 ENCOUNTER — APPOINTMENT (OUTPATIENT)
Age: 85
End: 2025-04-22
Payer: COMMERCIAL

## 2025-04-29 ENCOUNTER — OFFICE VISIT (OUTPATIENT)
Age: 85
End: 2025-04-29
Payer: COMMERCIAL

## 2025-04-29 DIAGNOSIS — F02.B18 MODERATE LATE ONSET ALZHEIMER'S DEMENTIA WITH OTHER BEHAVIORAL DISTURBANCE (HCC): Primary | ICD-10-CM

## 2025-04-29 DIAGNOSIS — G30.1 MODERATE LATE ONSET ALZHEIMER'S DEMENTIA WITH OTHER BEHAVIORAL DISTURBANCE (HCC): Primary | ICD-10-CM

## 2025-04-29 PROCEDURE — 97530 THERAPEUTIC ACTIVITIES: CPT

## 2025-04-29 NOTE — PROGRESS NOTES
"    POC expires Auth Status Total   Visits  Start date  Expiration date PT/OT + Visit Limit? Co-Insurance   25 BOMN  25   $35 Co-pay                                             Visit/Unit Tracking  AUTH Status: BOMN Date 2/4 2/13 2/20 2/25 3/11 3/18 3/25 4/1 4/8 4/15 4/29    Visits  Authed:  Used 1 (IE) 1 1 1 1(PN) 1 1 1 1 1 (PN) 1     Remaining                    PLAN OF CARE START: 25   PLAN OF CARE END: 2025  PROGRESS NOTE DUE: 25 (full re-eval on schedule, update POC)  FREQUENCY: 1-2x/week for 8-12 weeks   PRECAUTIONS: none  DIAGNOSIS: alzheimers, maintenance program   VISITS: 11      Daily Note        Today's date: 2025  Patient name: Aurelia Jaime  : 1940  MRN: [id number]  Referring provider: Hamzah Nino MD  Dx:   Encounter Diagnosis     ICD-10-CM    1. Moderate late onset Alzheimer's dementia with other behavioral disturbance (HCC)  G30.1     F02.B18                   Start Time: 09  Stop Time: 1015  Total time in clinic (min): 43 minutes        Subjective: \"My right arm hurts\"       Objective: See treatment below.    TA: See treatment below.    *iTrax; emphasis on simple EF/ skills ad problem solving. Patient benefiting from demos to comprehend rules, and mod vcs throughout for completion of 4 designs. Increased time for processing throughout.      *Four colors; emphasis on EF skills (sequencing, orientation, mental manipulation, etc.). Once given demonstration, patient able to complete 4 designs with independence and in fair amount of time.     *Visual logic worksheet; mod vcs for problem solving and correct matching of items to descriptions.       Assessment: Tolerated treatment well. Focus of today's session on EF skills and sustained attention in a multimodal environment. Skilled Maintenance occupational therapy is required to slow the patients functional decline due to dementia. Given the progressive nature of dementia a maintenance program will help reduce " the risk of further deterioration, loss of functional mobility or independence as well as decrease risk of secondary impairments form disease progress.       Plan: Continued skilled OT per POC.

## 2025-05-06 ENCOUNTER — OFFICE VISIT (OUTPATIENT)
Age: 85
End: 2025-05-06
Payer: COMMERCIAL

## 2025-05-06 DIAGNOSIS — G30.1 MODERATE LATE ONSET ALZHEIMER'S DEMENTIA WITH OTHER BEHAVIORAL DISTURBANCE (HCC): Primary | ICD-10-CM

## 2025-05-06 DIAGNOSIS — F02.B18 MODERATE LATE ONSET ALZHEIMER'S DEMENTIA WITH OTHER BEHAVIORAL DISTURBANCE (HCC): Primary | ICD-10-CM

## 2025-05-06 PROCEDURE — 97530 THERAPEUTIC ACTIVITIES: CPT | Performed by: OCCUPATIONAL THERAPIST

## 2025-05-06 NOTE — PROGRESS NOTES
"    POC expires Auth Status Total   Visits  Start date  Expiration date PT/OT + Visit Limit? Co-Insurance   25 BOMN  25   $35 Co-pay                                             Visit/Unit Tracking  AUTH Status: BOMN Date 2/4 2/13 2/20 2/25 3/11 3/18 3/25 4/1 4/8 4/15 4/29 5   Visits  Authed:  Used 1 (IE) 1 1 1 1(PN) 1 1 1 1 1 (PN) 1 1    Remaining                    PLAN OF CARE START: 25   PLAN OF CARE END: 2025  PROGRESS NOTE DUE: 25 (full re-eval on schedule, update POC)  FREQUENCY: 1-2x/week for 8-12 weeks   PRECAUTIONS: none  DIAGNOSIS: alzheimers, maintenance program   VISITS: 12      Daily Note        Today's date: 2025  Patient name: Aurelia Jaime  : 1940  MRN: [id number]  Referring provider: Hamzah Nino MD  Dx:   Encounter Diagnosis     ICD-10-CM    1. Moderate late onset Alzheimer's dementia with other behavioral disturbance (HCC)  G30.1     F02.B18                     Start Time: 1100  Stop Time: 1144  Total time in clinic (min): 44 minutes        Subjective: \"I was sick last week but I'm better now.\"      Objective: See treatment below.    TA:  *Multimatrix, pt found shape block to match chart and then placed on top of open letter block in ascending order, completed with fair accuracy and in a fair amount of time, min VC needed, emphasis on sequencing, sustained attention,  and working memory     *Coogram puzzle, copying 2D image into 3D structure, min VC for accuracy     *word unscrambles worksheet, pt using bananagram pieces for assistance with unscrambling as needed, fair accuracy noted, pt to complete  as part of HEP        Assessment: Tolerated treatment well. Focus of today's session on basic sequencing, working memory, sustained attention, and /EF skills. Provided pt with cognitive HEP. Skilled Maintenance occupational therapy is required to slow the patients functional decline due to dementia. Given the progressive nature of dementia a maintenance " program will help reduce the risk of further deterioration, loss of functional mobility or independence as well as decrease risk of secondary impairments form disease progress.       Plan: Continued skilled OT per POC.

## 2025-05-13 ENCOUNTER — EVALUATION (OUTPATIENT)
Age: 85
End: 2025-05-13
Payer: COMMERCIAL

## 2025-05-13 DIAGNOSIS — F02.B18 MODERATE LATE ONSET ALZHEIMER'S DEMENTIA WITH OTHER BEHAVIORAL DISTURBANCE (HCC): Primary | ICD-10-CM

## 2025-05-13 DIAGNOSIS — G30.1 MODERATE LATE ONSET ALZHEIMER'S DEMENTIA WITH OTHER BEHAVIORAL DISTURBANCE (HCC): Primary | ICD-10-CM

## 2025-05-13 PROCEDURE — 97168 OT RE-EVAL EST PLAN CARE: CPT | Performed by: OCCUPATIONAL THERAPIST

## 2025-05-13 PROCEDURE — 97530 THERAPEUTIC ACTIVITIES: CPT | Performed by: OCCUPATIONAL THERAPIST

## 2025-05-13 NOTE — PROGRESS NOTES
OCCUPATIONAL THERAPY RE-EVALUATION    POC expires Auth Status Total   Visits  Start date  Expiration date PT/OT + Visit Limit? Co-Insurance   25 BOMN  25   $35 Co-pay                                             Visit/Unit Tracking  AUTH Status: BOMN Date 2/4 2/13 2/20 2/25 3/11 3/18 3/25 4/1 4/8 4/15 4/29 5/6 5/13   Visits  Authed:  Used 1 (IE) 1 1 1 1(PN) 1 1 1 1 1 (PN) 1 1 1 (re-eval)    Remaining                     PLAN OF CARE START: 25   PLAN OF CARE END: 25  PROGRESS NOTE DUE: FOLLOW UP NEXT VISIT  FREQUENCY: 1-2x/week for 8-12 weeks   PRECAUTIONS: none  DIAGNOSIS: alzheimers, maintenance program   VISITS: 13 (re-eval)        Today's date: 2025  Patient name: Aurelia Jaime  : 1940  MRN: [id number]  Referring provider: Hamzah Nino MD  Dx:   Encounter Diagnosis     ICD-10-CM    1. Moderate late onset Alzheimer's dementia with other behavioral disturbance (HCC)  G30.1     F02.B18             SKILLED ANALYSIS:  Pt is a 84 year old female presenting to OP OT for re-evaluation with her daughter s/p referral from neurology for Alzheimer's. Pt has been seen for occupational therapy services for the past 3 months. Overall, pt and her daughter are continuing to report difficulty with memory, as well as ADLs and IADLs. Pt's daughter reports that pt is able to get dressed independently when she isn't able to get a hold of her daughter for help. Her daughter also reports that she is requiring less help and is having more good days then bad. Pt is demonstrating deficits based on clinical observation and the following assessments: SLUMs, FAQ, and trail making part A and B. Overall, pt has maintained score on SLUMS with slight improvement noted. Pt has also maintained scores on trail making A and B with slight improvements noted as well. Pt presents with decreased cognitive functions due to effects of alzheimers and demonstrates difficulty in daily tasks, including remembering recent  "events and completing health management. Skilled Maintenance occupational therapy is required to slow the patients functional decline due to alzheimers. Given the progressive nature of disease a maintenance program will help reduce the risk of further deterioration, loss of functional mobility or independence as well as decrease risk of secondary impairments form disease progress. Recommend OP OT 1-2x/wk for the next 8-12 weeks with focus on aforementioned deficits to maintain functional performance and improve QOL. Findings and recommendations discussed with pt, and they are in agreement. Educated pt on charges of insurance, skilled maintenance services, assessments performed with standardized norms, POC, goal creation, and OP OT services.     Subjective  Occupational Profile  *type of home: private home  *lives with: Son, daughter lives next door  *vocation: retired, homemaker   *driving: no  *DME: grab bars in shower  *Assistive device for inside home: NBQC  *Assistive device for outside home: NBQC  *ADL status: supervision and set up for all ADLs (step by step instruction for morning ADL care)   *IADL status (cooking, cleaning, community mobility, medication management, finances): receives assistance     PATIENT GOAL: to keep my current skills    PAIN: Right hand 5/10    HISTORY OF PRESENT ILLNESS:   Pt is a 84 y.o. 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 Per neuro on 1/10 :\"Patient is here in follow-up for her history of dementia, she is accompanied with her daughter since the last visit with Dr. Agarwal in February 2024 she was admitted into the hospital for an episode of dizziness and had an MRI scan of the brain that did not show evidence of any stroke she does have bilateral carotid stenosis, denies having any symptoms, according to the daughter she has noticed that she is having a slight decline " "in her short-term memory she needs some help with her ADLs and does get agitated while showering she recently. \" No hx of OP cognitive OT.       5/13/25: Pt's daughter reports no recent updates. Appointment with neurology in September.     PMH:   Past Medical History:   Diagnosis Date    Abnormal chest x-ray     Abnormal mammogram     Breast cancer (HCC)     DCIS 10/2024    Cancer (HCC) October 2019    Cancer free surgically    COPD (chronic obstructive pulmonary disease) (HCC)     Dementia (HCC)     Early stage     Ductal hyperplasia of breast     Left     History of postoperative delirium     Hyperlipidemia     diet controled    Lung cancer (HCC) 10/2018    Resolved with surgery    Memory loss     Nodule of lower lobe of right lung     Right Lower Lobe     Shingles 2013    SOB (shortness of breath)     SOB (shortness of breath)     Wheezing        Past Surgical Hx:   Past Surgical History:   Procedure Laterality Date    BREAST BIOPSY Left 10/12/2018    BREAST BIOPSY Right 09/24/2024    BREAST CYST EXCISION Left 10/31/2018    BREAST LUMPECTOMY Left 11/13/2018    Procedure: BREAST LUMPECTOMY; BREAST NEEDLE LOCALIZATION (NEEDLE LOC AT 0830);  Surgeon: Mian Valenzuela MD;  Location: [facility];  Service: Surgical Oncology    BREAST LUMPECTOMY Right 10/22/2024    Procedure: RIGHT BREAST MIREYA  DIRECTED LUMPECTOMY-BRACKETED;  Surgeon: Isai Valle MD;  Location: [facility];  Service: Surgical Oncology    COLONOSCOPY      LUNG SURGERY      MAMMO NEEDLE LOCALIZATION LEFT (ALL INC) Left 11/13/2018    MAMMO STEREOTACTIC BREAST BIOPSY LEFT (ALL INC) Left 10/16/2018    AZ BRNCHSC INCL FLUOR GDNCE DX W/CELL WASHG SPX Right 01/16/2019    Procedure: BRONCHOSCOPY FLEXIBLE,;  Surgeon: Julia Mccord MD;  Location: [facility];  Service: Thoracic    AZ MEDIASTINOSCOPY WITH LYMPH NODE BIOPSY/IES Right 01/16/2019    Procedure: MEDIASTINOSCOPY;  Surgeon: Julia Mccord MD;  Location[facility];  Service: " Thoracic    CA THORACOSCOPY W/LOBECTOMY SINGLE LOBE Right 01/16/2019    Procedure: LOBECTOMY LUNG;  Surgeon: Julia Mccord MD;  Location: [facility];  Service: Thoracic    TRIGGER FINGER RELEASE Right 10/10/2019    ULNAR NERVE TRANSPOSITION Right 10/10/2019    US BREAST CLIP NEEDLE LOC RIGHT Right 10/16/2024    US GUIDED BREAST BIOPSY RIGHT COMPLETE Right 09/24/2024    US GUIDED BREAST BIOPSY RIGHT COMPLETE Right 10/08/2024          Assessments      FUNCTIONAL ACTIVITY QUESTIONAIRE (5/13)  The Functional Activities Questionnaire (FAQ) measures instrumental activities of daily living (IADLs). The FAQ may be used to differentiate those with mild cognitive impairment and mild Alzheimer's disease   TASK Completely unable to perform task (3)  Requires assistance (2)  Has difficulty but accomplishes task, or has never done, but the informant feels could do the task with difficulty (1)  Normal performance, or has never don the task, but the informant feels the patient could do the task if necessary    Writing checks, paying bills, balancing checkbook   x     Assembling tax records, business affairs, papers  x      Shopping alone for clothes, house hold necessities, or groceries  x      Playing a game of skill, working on a hobby   x     Heating water, making a cup of coffee, turning off the stove x      Preparing a balance meal x      Keeping track of current events x      Paying attention to, understanding, discussing a TV show, book, or magazine   x    Remembering appointments, family occasions, holidays, medications x      Traveling our of the neighborhood, arranging or taking buses  x      Total points: 26/30     Evaluation   Sum scores (range 0-30). Cut-point of 9 (dependent in 3 or more activities) is recommended to indicate impaired function and possible cognitive impairment.    SLUMS:  *screen individuals to look for the presence of cognitive deficits, and to identify changes in cognition over time*  SCORING  "AND NORMS  HIGH SCHOOL EDUCATION  27-30, NORMAL   21-26, MILD NEUROCOGNITIVE DISORDER   1-20, DEMENTIA   LESS THEN HIGH SCHOOL EDUCATION  25-30, NORMAL   20-24, MILD NEUROCOGNITIVE DISORDER  1-19, DEMENTIA     QUESTION Status on Re-eval 5/13 Status on PN 4/15 Status on 2/4/25   What day of the week is it 1/1 1/1 0/1   What is the year 0/1 0/1 0/1   What state are we in 1/1 0/1 0/1   How much did you spend 1/1 0/1 1/1   How much do you have left 0/2 0/1 0/2   Naming animals in 1 minute 1/3 1/3 1/3   Recall of 5 objects 0/5 0/5 0/5   Series of numbers 2/2 1/2 2/2   Clock face hour markers 2/2 2/2 2/2   Clock face time  0/2 2/2 0/2   X in triangle 1/1 1/1 1/1   Largest figure 1/1 1/1 1/1   Short story, females name 2/2 2/2 2/2   Short story, when did she go back to work 0/2 0/2 0/2   Short story, what work did she do 0/2 0/2 0/2   Short story, what state did she live in 0/2 0/2 0/2   Total score 12/30 11/30 10/30, high school education       TRAIL MAKING TEST:   \"widely used test to assess executive function in patients with neuro injury. Successful performance of the TMT requires a variety of mental abilities including letter and number recognition mental flexibility, visual scanning, and motor function. Norms are based on age related scores\"   Status on re-eval 5/13 Status on PN 3/11 STATUS ON IE: 2/4/25   TRAIL MAKING PART A     NORM: 58 seconds 59 seconds 36 seconds 1 minute and 4 seconds   TRAIL MAKING PART B    NORM: [address] 2 minutes and 16 seconds 2 minutes and 23 seconds, min VC for accuracy 2 minutes and 53 seconds         SHORT TERM GOALS (8-12 weeks)    GOAL  STATUS ON IE  GOAL STATUS    Pt caregiver will report score of 28/30 or less on FAQ in order for patient to maintain life roles  28/30 CONTINUE GOAL      Pt will maintain sore of 10/30 (+/-3 points) on SLUMs to maintain life roles 10/30 MAINTAINED, CONTINUE GOAL      Pt will maintain score of 1 minute and 4 seconds on trail making part A (+/-5 " seconds) to maintain life roles 1 minute and 4 seconds MAINTAINED, CONTINUE GOAL   Pt will maintain score of 2 minutes and 53 seconds on trail making part B (+/-5 seconds) to maintain life roles 2 minutes and 53 seconds CONTINUE GOAL   Patient and/or family will demo G understanding and use of memory book to inc pt's overall engagement in life roles and to dec frustrations with STM/delayed memory loss  CONTINUE GOAL      Pt will follow 1-2 written directions with 50% accuracy to maintain life roles  CONTINUE GOAL        PLANNED THERAPY INTERVENTIONS:  Internal and external memory aides  Hypersensitivity strategies education  Multi-modal environment  Sustained/alternating/divided attention  Temporal Awareness: Organize the Hour activities  Memory and mental manipulation  Auditory processing with immediate recall  Memory retention with immediate and delayed recall  Edu on cog/vision apps

## 2025-05-20 ENCOUNTER — APPOINTMENT (OUTPATIENT)
Age: 85
End: 2025-05-20
Payer: COMMERCIAL

## 2025-05-27 ENCOUNTER — OFFICE VISIT (OUTPATIENT)
Age: 85
End: 2025-05-27
Payer: COMMERCIAL

## 2025-05-27 DIAGNOSIS — F02.B18 MODERATE LATE ONSET ALZHEIMER'S DEMENTIA WITH OTHER BEHAVIORAL DISTURBANCE (HCC): Primary | ICD-10-CM

## 2025-05-27 DIAGNOSIS — G30.1 MODERATE LATE ONSET ALZHEIMER'S DEMENTIA WITH OTHER BEHAVIORAL DISTURBANCE (HCC): Primary | ICD-10-CM

## 2025-05-27 PROCEDURE — 97530 THERAPEUTIC ACTIVITIES: CPT | Performed by: OCCUPATIONAL THERAPIST

## 2025-05-27 NOTE — PROGRESS NOTES
POC expires Auth Status Total   Visits  Start date  Expiration date PT/OT + Visit Limit? Co-Insurance   25 BOMN  25   $35 Co-pay                                             Visit/Unit Tracking  AUTH Status: BOMN Date 2/4 2/13 2/20 2/25 3/11 3/18 3/25 4/1 4/8 4/15 4/29 5/6 5/13 5/27   Visits  Authed:  Used 1 (IE) 1 1 1 1(PN) 1 1 1 1 1 (PN) 1 1 1 (re-eval) 1    Remaining                      PLAN OF CARE START: 25   PLAN OF CARE END: 25  PROGRESS NOTE DUE: PN on schedule   FREQUENCY: 1-2x/week for 8-12 weeks   PRECAUTIONS: none  DIAGNOSIS: alzheimers, maintenance program   VISITS: 14        Daily Note        Today's date: 2025  Patient name: Aurelia Jaime  : 1940  MRN: [id number]  Referring provider: Hamzah Nino MD  Dx:   Encounter Diagnosis     ICD-10-CM    1. Moderate late onset Alzheimer's dementia with other behavioral disturbance (HCC)  G30.1     F02.B18                       Start Time: 1103  Stop Time: 1145  Total time in clinic (min): 42 minutes        Subjective: Pt and her daughter report no new updates since last session.      Objective: See treatment below.    TA:  *card matching by color, suit, and number, emphasis on basic  skills and sustained attention    *Trail making activity, searching for numbers in ascending order and matching letter, no VC given for accuracy, minimal loss of sequence noted, emphasis on working memory and alternating attention      Assessment: Tolerated treatment well. Focus of today's session on basic  skills, sustained and alternating attention, and working memory. Skilled Maintenance occupational therapy is required to slow the patients functional decline due to dementia. Given the progressive nature of dementia a maintenance program will help reduce the risk of further deterioration, loss of functional mobility or independence as well as decrease risk of secondary impairments form disease progress.       Plan: Continued  skilled OT per POC.

## 2025-06-03 ENCOUNTER — OFFICE VISIT (OUTPATIENT)
Age: 85
End: 2025-06-03
Payer: COMMERCIAL

## 2025-06-03 DIAGNOSIS — G30.1 MODERATE LATE ONSET ALZHEIMER'S DEMENTIA WITH OTHER BEHAVIORAL DISTURBANCE (HCC): Primary | ICD-10-CM

## 2025-06-03 DIAGNOSIS — F02.B18 MODERATE LATE ONSET ALZHEIMER'S DEMENTIA WITH OTHER BEHAVIORAL DISTURBANCE (HCC): Primary | ICD-10-CM

## 2025-06-03 PROCEDURE — 97530 THERAPEUTIC ACTIVITIES: CPT | Performed by: OCCUPATIONAL THERAPIST

## 2025-06-03 NOTE — PROGRESS NOTES
POC expires Auth Status Total   Visits  Start date  Expiration date PT/OT + Visit Limit? Co-Insurance   25 BOMN  25   $35 Co-pay                                             Visit/Unit Tracking  AUTH Status: BOMN Date 2/4 2/13 2/20 2/25 3/11 3/18 3/25 4/1 4/8 4/15 4/29 5/6 5/13 5/27 6/3   Visits  Authed:  Used 1 (IE) 1 1 1 1(PN) 1 1 1 1 1 (PN) 1 1 1 (re-eval) 1 1    Remaining                       PLAN OF CARE START: 25   PLAN OF CARE END: 25  PROGRESS NOTE DUE: PN on schedule   FREQUENCY: 1-2x/week for 8-12 weeks   PRECAUTIONS: none  DIAGNOSIS: alzheimers, maintenance program   VISITS: 15        Daily Note        Today's date: 6/3/2025  Patient name: Aurelia Jaime  : 1940  MRN: [id number]  Referring provider: Hamzah Nino MD  Dx:   Encounter Diagnosis     ICD-10-CM    1. Moderate late onset Alzheimer's dementia with other behavioral disturbance (HCC)  G30.1     F02.B18                         Start Time: 1035  Stop Time: 1115  Total time in clinic (min): 40 minutes        Subjective: Pt and her daughter report no new updates since last session.      Objective: See treatment below.    TA:  *Crossword recreation using bananagrams to copy 2D image, min cues for initiation and sequencing creation of pieces to match 2D image, good accuracy noted, emphasis on /EF skills, sequencing, and direction following    *16 piece puzzle, pt completed with good accuracy in a good amount of time, emphasis on /EF skills    *shape recreation with pattern blocks, emphasis on /EF skills,       Assessment: Tolerated treatment well. Focus of today's session on /EF skills, sequencing, and direction following. Skilled Maintenance occupational therapy is required to slow the patients functional decline due to dementia. Given the progressive nature of dementia a maintenance program will help reduce the risk of further deterioration, loss of functional mobility or independence as well as decrease  risk of secondary impairments form disease progress.       Plan: Continued skilled OT per POC.

## 2025-06-10 ENCOUNTER — OFFICE VISIT (OUTPATIENT)
Age: 85
End: 2025-06-10
Payer: COMMERCIAL

## 2025-06-10 DIAGNOSIS — G30.1 MODERATE LATE ONSET ALZHEIMER'S DEMENTIA WITH OTHER BEHAVIORAL DISTURBANCE (HCC): Primary | ICD-10-CM

## 2025-06-10 DIAGNOSIS — F02.B18 MODERATE LATE ONSET ALZHEIMER'S DEMENTIA WITH OTHER BEHAVIORAL DISTURBANCE (HCC): Primary | ICD-10-CM

## 2025-06-10 PROCEDURE — 97530 THERAPEUTIC ACTIVITIES: CPT | Performed by: OCCUPATIONAL THERAPIST

## 2025-06-10 NOTE — PROGRESS NOTES
POC expires Auth Status Total   Visits  Start date  Expiration date PT/OT + Visit Limit? Co-Insurance   25 BOMN  25   $35 Co-pay                                             Visit/Unit Tracking  AUTH Status: BOMN Date 2/4 2/13 2/20 2/25 3/11 3/18 3/25 4/1 4/8 4/15 4/29 5/6 5/13 5/27 6/3 6/10   Visits  Authed:  Used 1 (IE) 1 1 1 1(PN) 1 1 1 1 1 (PN) 1 1 1 (re-eval) 1 1 1    Remaining                        PLAN OF CARE START: 25   PLAN OF CARE END: 25  PROGRESS NOTE DUE: PN on schedule   FREQUENCY: 1-2x/week for 8-12 weeks   PRECAUTIONS: none  DIAGNOSIS: alzheimers, maintenance program   VISITS: 16        Daily Note        Today's date: 6/10/2025  Patient name: Aurelia Jaime  : 1940  MRN: [id number]  Referring provider: Hamzah Nino MD  Dx:   Encounter Diagnosis     ICD-10-CM    1. Moderate late onset Alzheimer's dementia with other behavioral disturbance (HCC)  G30.1     F02.B18                     Start Time: 1232  Stop Time: 1313  Total time in clinic (min): 41 minutes        Subjective: Pt and her daughter report no new updates since last session.      Objective: See treatment below.    TA:  *120 number board, pt following external key of highlighted numbers and placing alternating red and green chips onto numbers on board, upgraded to have pt naming states for green pieces and foods for red pieces, good accuracy noted, emphasis on sequencing, direction following, and sustained attention, min VC for sequencing    *Basic math worksheet, pt answering 10 word problems using mental math, good accuracy noted, emphasis on functional math situations, sustained attention, and problem solving skills    *calendar activity, pt answering questions about the provided monthly calendar, emphasis on simulation of functional tasks, sustained attention, and problem solving skills    Assessment: Tolerated treatment well. Focus of today's session on sequencing, direction following, sustained  attention, problem solving, and simulation of functional cognitive tasks. Skilled Maintenance occupational therapy is required to slow the patients functional decline due to dementia. Given the progressive nature of dementia a maintenance program will help reduce the risk of further deterioration, loss of functional mobility or independence as well as decrease risk of secondary impairments form disease progress.       Plan: Continued skilled OT per POC.

## 2025-06-20 ENCOUNTER — EVALUATION (OUTPATIENT)
Age: 85
End: 2025-06-20
Payer: COMMERCIAL

## 2025-06-20 ENCOUNTER — APPOINTMENT (OUTPATIENT)
Age: 85
End: 2025-06-20
Payer: COMMERCIAL

## 2025-06-20 DIAGNOSIS — G30.1 MODERATE LATE ONSET ALZHEIMER'S DEMENTIA WITH OTHER BEHAVIORAL DISTURBANCE (HCC): Primary | ICD-10-CM

## 2025-06-20 DIAGNOSIS — F02.B18 MODERATE LATE ONSET ALZHEIMER'S DEMENTIA WITH OTHER BEHAVIORAL DISTURBANCE (HCC): Primary | ICD-10-CM

## 2025-06-20 PROCEDURE — 97530 THERAPEUTIC ACTIVITIES: CPT

## 2025-06-20 NOTE — PROGRESS NOTES
POC expires Auth Status Total   Visits  Start date  Expiration date PT/OT + Visit Limit? Co-Insurance   25 BOMN  25   $35 Co-pay                                             Visit/Unit Tracking  AUTH Status: BOMN Date 2/4 2/13 2/20 2/25 3/11 3/18 3/25 4/1 4/8 4/15 4/29 5/6 5/13 5/27 6/3 6/10 6/20   Visits  Authed:  Used 1 (IE) 1 1 1 1(PN) 1 1 1 1 1 (PN) 1 1 1 (re-eval) 1 1 1 1    Remaining                         PLAN OF CARE START: 25   PLAN OF CARE END: 25  PROGRESS NOTE DUE: follow up next visit  FREQUENCY: 1-2x/week for 8-12 weeks   PRECAUTIONS: none  DIAGNOSIS: alzheimers, maintenance program   VISITS: 17        Daily Note        Today's date: 2025  Patient name: Aurelia Jaime  : 1940  MRN: [id number]  Referring provider: Hamzah Nino MD  Dx:   Encounter Diagnosis     ICD-10-CM    1. 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      Objective: See treatment below. Assessed the following this session to assess progress in therapy: Trail Making A/B. All other observations/assessments carried forward from previous evaluation. See below for goal updates.      TA:         FUNCTIONAL ACTIVITY QUESTIONAIRE ()  The Functional Activities Questionnaire (FAQ) measures instrumental activities of daily living (IADLs). The FAQ may be used to differentiate those with mild cognitive impairment and mild Alzheimer's disease   TASK Completely unable to perform task (3)  Requires assistance (2)  Has difficulty but accomplishes task, or has never done, but the informant feels could do the task with difficulty (1)  Normal performance, or has never don the task, but the informant feels the patient could do the task if necessary    Writing checks, paying bills,  "balancing checkbook    x       Assembling tax records, business affairs, papers  x         Shopping alone for clothes, house hold necessities, or groceries  x         Playing a game of skill, working on a hobby    x       Heating water, making a cup of coffee, turning off the stove x         Preparing a balance meal x         Keeping track of current events x         Paying attention to, understanding, discussing a TV show, book, or magazine     x     Remembering appointments, family occasions, holidays, medications x         Traveling our of the neighborhood, arranging or taking buses  x         Total points: 26/30      Evaluation   Sum scores (range 0-30). Cut-point of 9 (dependent in 3 or more activities) is recommended to indicate impaired function and possible cognitive impairment.     SLUMS:  *screen individuals to look for the presence of cognitive deficits, and to identify changes in cognition over time*  SCORING AND NORMS  HIGH SCHOOL EDUCATION  27-30, NORMAL   21-26, MILD NEUROCOGNITIVE DISORDER   1-20, DEMENTIA   LESS THEN HIGH SCHOOL EDUCATION  25-30, NORMAL   20-24, MILD NEUROCOGNITIVE DISORDER  1-19, DEMENTIA      QUESTION Status on Re-eval 5/13 Status on PN 4/15 Status on 2/4/25   What day of the week is it 1/1 1/1 0/1   What is the year 0/1 0/1 0/1   What state are we in 1/1 0/1 0/1   How much did you spend 1/1 0/1 1/1   How much do you have left 0/2 0/1 0/2   Naming animals in 1 minute 1/3 1/3 1/3   Recall of 5 objects 0/5 0/5 0/5   Series of numbers 2/2 1/2 2/2   Clock face hour markers 2/2 2/2 2/2   Clock face time  0/2 2/2 0/2   X in triangle 1/1 1/1 1/1   Largest figure 1/1 1/1 1/1   Short story, females name 2/2 2/2 2/2   Short story, when did she go back to work 0/2 0/2 0/2   Short story, what work did she do 0/2 0/2 0/2   Short story, what state did she live in 0/2 0/2 0/2   Total score 12/30 11/30 10/30, high school education         TRAIL MAKING TEST:   \"widely used test to assess executive " "function in patients with neuro injury. Successful performance of the TMT requires a variety of mental abilities including letter and number recognition mental flexibility, visual scanning, and motor function. Norms are based on age related scores\"    Status on PN 6/20/25 Status on re-eval 5/13 Status on PN 3/11 STATUS ON IE: 2/4/25 COMMENTS:   TRAIL MAKING PART A      NORM: 58 seconds 42 seconds 59 seconds 36 seconds 1 minute and 4 seconds IMPROVED   TRAIL MAKING PART B     NORM: [address] 3 minutes and 14 seconds 2 minutes and 16 seconds 2 minutes and 23 seconds, min VC for accuracy 2 minutes and 53 seconds DECLINED                 SHORT TERM GOALS (8-12 weeks)    GOAL  STATUS ON IE  GOAL STATUS    Pt caregiver will report score of 28/30 or less on FAQ in order for patient to maintain life roles  28/30 CONTINUE GOAL       Pt will maintain sore of 10/30 (+/-3 points) on SLUMs to maintain life roles 10/30 MAINTAINED, CONTINUE GOAL       Pt will maintain score of 1 minute and 4 seconds on trail making part A (+/-5 seconds) to maintain life roles 1 minute and 4 seconds MAINTAINED, CONTINUE GOAL   Pt will maintain score of 2 minutes and 53 seconds on trail making part B (+/-5 seconds) to maintain life roles 2 minutes and 53 seconds SLIGHT DECLINE, CONTINUE GOAL   Patient and/or family will demo G understanding and use of memory book to inc pt's overall engagement in life roles and to dec frustrations with STM/delayed memory loss   CONTINUE GOAL       Pt will follow 1-2 written directions with 50% accuracy to maintain life roles   CONTINUE GOAL           TA Treatment:  *120 number board, following serial subtraction pattern (-3). Emphasis on problem solving, working recall of rules, and attention. Pt able to complete with min vcs from therapist for memory of directions.    *Sequencing the steps worksheet; emphasis on mental manipulation skills and problem solving. Pt able to complete in fair amount of time with cues " needed for accurate comprehension of activity directions.       Assessment: Tolerated treatment well. Sustained/alternating attention assessed this session to assess progress in therapy services. Improvement in sustained attention noted, however slight decline with alternating attention skills noted. Pt continues to maintain >75% of goals. Focus of today's session on sequencing, direction following, sustained attention, problem solving. Skilled Maintenance occupational therapy is required to slow the patients functional decline due to dementia. Given the progressive nature of dementia a maintenance program will help reduce the risk of further deterioration, loss of functional mobility or independence as well as decrease risk of secondary impairments form disease progress.       Plan: Continued skilled OT per POC.

## 2025-06-27 ENCOUNTER — APPOINTMENT (OUTPATIENT)
Age: 85
End: 2025-06-27
Payer: COMMERCIAL

## 2025-06-27 ENCOUNTER — OFFICE VISIT (OUTPATIENT)
Age: 85
End: 2025-06-27
Payer: COMMERCIAL

## 2025-06-27 DIAGNOSIS — G30.1 MODERATE LATE ONSET ALZHEIMER'S DEMENTIA WITH OTHER BEHAVIORAL DISTURBANCE (HCC): Primary | ICD-10-CM

## 2025-06-27 DIAGNOSIS — F02.B18 MODERATE LATE ONSET ALZHEIMER'S DEMENTIA WITH OTHER BEHAVIORAL DISTURBANCE (HCC): Primary | ICD-10-CM

## 2025-06-27 PROCEDURE — 97530 THERAPEUTIC ACTIVITIES: CPT | Performed by: OCCUPATIONAL THERAPIST

## 2025-06-27 NOTE — PROGRESS NOTES
POC expires Auth Status Total   Visits  Start date  Expiration date PT/OT + Visit Limit? Co-Insurance   25 BOMN  25   $35 Co-pay                                             Visit/Unit Tracking  AUTH Status: BOMN Date 2/4 2/13 2/20 2/25 3/11 3/18 3/25 4/1 4/8 4/15 4/29 5/ 6/3 6/10 6/20 6/27   Visits  Authed:  Used 1 (IE) 1 1 1 1(PN) 1 1 1 1 1 (PN) 1 1 1 (re-eval) 1 1 1 1 1    Remaining                          PLAN OF CARE START: 25   PLAN OF CARE END: 25  PROGRESS NOTE DUE: follow up next visit  FREQUENCY: 1-2x/week for 8-12 weeks   PRECAUTIONS: none  DIAGNOSIS: alzheimers, maintenance program   VISITS: 18        Daily Note        Today's date: 2025  Patient name: Aurelia Jaime  : 1940  MRN: [id number]  Referring provider: Hamzah Nino MD  Dx:   Encounter Diagnosis     ICD-10-CM    1. 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 Pt is using a cane today due to back pain.      Objective: See treatment below.    TA:  *Multimatrix, pt found shape block to match chart and then placed on top of open letter block in ascending order, completed with good accuracy and in a good amount of time, min VC needed, emphasis on sequencing, sustained attention,  and working memory     *16 piece beach puzzle, emphasis on basic EF/ skills, sustained attention, and problem solving, pt able to complete independently with good accuracy    *sorting deck of cards into pile that is one less than card in hand (placing a 4 on top of a 3), emphasis on sustained attention, problem solving, and direction following      Assessment: Tolerated treatment well. Focus of today's session on sequencing, sustained attention, working memory, and problem  solving. Skilled Maintenance occupational therapy is required to slow the patients functional decline due to dementia. Given the progressive nature of dementia a maintenance program will help reduce the risk of further deterioration, loss of functional mobility or independence as well as decrease risk of secondary impairments form disease progress.       Plan: Continued skilled OT per POC.

## 2025-06-29 DIAGNOSIS — F02.B18 MODERATE LATE ONSET ALZHEIMER'S DEMENTIA WITH OTHER BEHAVIORAL DISTURBANCE (HCC): ICD-10-CM

## 2025-06-29 DIAGNOSIS — G30.1 MODERATE LATE ONSET ALZHEIMER'S DEMENTIA WITH OTHER BEHAVIORAL DISTURBANCE (HCC): ICD-10-CM

## 2025-06-30 ENCOUNTER — OFFICE VISIT (OUTPATIENT)
Age: 85
End: 2025-06-30
Payer: COMMERCIAL

## 2025-06-30 DIAGNOSIS — F02.B18 MODERATE LATE ONSET ALZHEIMER'S DEMENTIA WITH OTHER BEHAVIORAL DISTURBANCE (HCC): Primary | ICD-10-CM

## 2025-06-30 DIAGNOSIS — G30.1 MODERATE LATE ONSET ALZHEIMER'S DEMENTIA WITH OTHER BEHAVIORAL DISTURBANCE (HCC): Primary | ICD-10-CM

## 2025-06-30 PROCEDURE — 97530 THERAPEUTIC ACTIVITIES: CPT | Performed by: OCCUPATIONAL THERAPIST

## 2025-06-30 RX ORDER — MEMANTINE HYDROCHLORIDE 10 MG/1
10 TABLET ORAL 2 TIMES DAILY
Qty: 60 TABLET | Refills: 5 | Status: SHIPPED | OUTPATIENT
Start: 2025-06-30

## 2025-06-30 RX ORDER — DONEPEZIL HYDROCHLORIDE 10 MG/1
10 TABLET, FILM COATED ORAL
Qty: 30 TABLET | Refills: 5 | Status: SHIPPED | OUTPATIENT
Start: 2025-06-30

## 2025-06-30 NOTE — PROGRESS NOTES
POC expires Auth Status Total   Visits  Start date  Expiration date PT/OT + Visit Limit? Co-Insurance   25 BOMN  25   $35 Co-pay                                             Visit/Unit Tracking  AUTH Status: BOMN Date 2/4 2/13 2/20 2/25 3/11 3/18 3/25 4/1 4/8 4/15 4/29 5/ 6/3 6/10 6/20 6/27 6/30   Visits  Authed:  Used 1 (IE) 1 1 1 1(PN) 1 1 1 1 1 (PN) 1 1 1 (re-eval) 1 1 1 1 1 1    Remaining                           PLAN OF CARE START: 25   PLAN OF CARE END: 25  PROGRESS NOTE DUE: follow up next visit  FREQUENCY: 1-2x/week for 8-12 weeks   PRECAUTIONS: none  DIAGNOSIS: alzheimers, maintenance program   VISITS: 19        Daily Note        Today's date: 2025  Patient name: Aurelia Jaime  : 1940  MRN: [id number]  Referring provider: Hamzah Nino MD  Dx:   Encounter Diagnosis     ICD-10-CM    1. 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      Objective: See treatment below.    TA:  *Q-bitz, Pt completed 2 simple q-bitz puzzle with fair accuracy and in a fair amount of time, mod VC required for accuracy, emphasis on /EF skills     *Trail making activity, searching for numbers in ascending order and matching letter, min VC given for accuracy, occasional loss of sequence noted, emphasis on working memory, basic sequencing, and sustained attention      Assessment: Tolerated treatment well. Focus of today's session on EF/ skills, sequencing, working memory, and sustained attention. Skilled Maintenance occupational therapy is required to slow the patients functional decline due to dementia. Given the progressive nature of dementia a maintenance program will help reduce the risk of further deterioration, loss of functional mobility or independence as well  as decrease risk of secondary impairments form disease progress.       Plan: Continued skilled OT per POC.

## 2025-07-03 NOTE — ASSESSMENT & PLAN NOTE
Management per primary team.   The left upper lobe groundglass opacity now has a solid component in it.  The solid component is still less than 2 mm.  We will continue just to follow this at this time.

## 2025-07-07 ENCOUNTER — APPOINTMENT (OUTPATIENT)
Age: 85
End: 2025-07-07
Payer: COMMERCIAL

## 2025-07-08 DIAGNOSIS — G30.1 MODERATE LATE ONSET ALZHEIMER'S DEMENTIA WITH OTHER BEHAVIORAL DISTURBANCE (HCC): ICD-10-CM

## 2025-07-08 DIAGNOSIS — F02.B18 MODERATE LATE ONSET ALZHEIMER'S DEMENTIA WITH OTHER BEHAVIORAL DISTURBANCE (HCC): ICD-10-CM

## 2025-07-09 RX ORDER — DIVALPROEX SODIUM 125 MG/1
125 TABLET, DELAYED RELEASE ORAL DAILY
Qty: 90 TABLET | Refills: 1 | Status: SHIPPED | OUTPATIENT
Start: 2025-07-09

## 2025-07-14 ENCOUNTER — OFFICE VISIT (OUTPATIENT)
Age: 85
End: 2025-07-14
Payer: COMMERCIAL

## 2025-07-14 DIAGNOSIS — G30.1 MODERATE LATE ONSET ALZHEIMER'S DEMENTIA WITH OTHER BEHAVIORAL DISTURBANCE (HCC): Primary | ICD-10-CM

## 2025-07-14 DIAGNOSIS — F02.B18 MODERATE LATE ONSET ALZHEIMER'S DEMENTIA WITH OTHER BEHAVIORAL DISTURBANCE (HCC): Primary | ICD-10-CM

## 2025-07-14 PROCEDURE — 97530 THERAPEUTIC ACTIVITIES: CPT | Performed by: OCCUPATIONAL THERAPIST

## 2025-07-14 NOTE — PROGRESS NOTES
POC expires Auth Status Total   Visits  Start date  Expiration date PT/OT + Visit Limit? Co-Insurance   25 BOMN  25   $35 Co-pay                                             Visit/Unit Tracking  AUTH Status: BOMN Date 2/4 2/13 2/20 2/25 3/11 3/18 3/25 4/1 4/8 4/15 4/29 5/ 6/3 6/10 6/20 6/27 6/30 7/14   Visits  Authed:  Used 1 (IE) 1 1 1 1(PN) 1 1 1 1 1 (PN) 1 1 1 (re-eval) 1 1 1 1 1 1 1    Remaining                            PLAN OF CARE START: 25   PLAN OF CARE END: 25  PROGRESS NOTE DUE: PN on schedule   FREQUENCY: 1-2x/week for 8-12 weeks   PRECAUTIONS: none  DIAGNOSIS: alzheimers, maintenance program   VISITS: 20        Daily Note        Today's date: 2025  Patient name: Aurelia Jaime  : 1940  MRN: [id number]  Referring provider: Hamzah Nino MD  Dx:   Encounter Diagnosis     ICD-10-CM    1. 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      Objective: See treatment diary below.    TA:  *state the opposite worksheet, pt identifying opposite of words listed and then finding bananagram pieces to spell out the words, emphasis on sustained attention, direction following, and  skills    *pixy cube, pt complete semi-complex pixy cube puzzle with gridlines with fair accuracy in a fair amount of time, mod VC required throughout, emphasis on EF/ skills    *mental manipulation activity, pt listening to 3 words from therapist and then finding words scattered on table, pt then identifying what category all 3 words fall into (example: orange, plum, apple = fruit), emphasis on delayed recall, sustained attention, and problem solving skills      Assessment: Tolerated treatment well. Focus of today's session on sustained attention, direction following, EF/ skills, and  problem solving skills. Skilled Maintenance occupational therapy is required to slow the patients functional decline due to dementia. Given the progressive nature of dementia a maintenance program will help reduce the risk of further deterioration, loss of functional mobility or independence as well as decrease risk of secondary impairments form disease progress.       Plan: Continued skilled OT per POC.

## 2025-07-17 ENCOUNTER — OFFICE VISIT (OUTPATIENT)
Age: 85
End: 2025-07-17
Payer: COMMERCIAL

## 2025-07-17 VITALS
BODY MASS INDEX: 23.13 KG/M2 | WEIGHT: 139 LBS | RESPIRATION RATE: 18 BRPM | HEART RATE: 68 BPM | SYSTOLIC BLOOD PRESSURE: 140 MMHG | TEMPERATURE: 98.6 F | OXYGEN SATURATION: 98 % | DIASTOLIC BLOOD PRESSURE: 86 MMHG

## 2025-07-17 DIAGNOSIS — R35.0 FREQUENCY OF MICTURITION: Primary | ICD-10-CM

## 2025-07-17 LAB
SL AMB  POCT GLUCOSE, UA: ABNORMAL
SL AMB LEUKOCYTE ESTERASE,UA: ABNORMAL
SL AMB POCT BILIRUBIN,UA: ABNORMAL
SL AMB POCT BLOOD,UA: ABNORMAL
SL AMB POCT CLARITY,UA: CLEAR
SL AMB POCT COLOR,UA: ABNORMAL
SL AMB POCT KETONES,UA: ABNORMAL
SL AMB POCT NITRITE,UA: ABNORMAL
SL AMB POCT PH,UA: 6
SL AMB POCT SPECIFIC GRAVITY,UA: 1
SL AMB POCT URINE PROTEIN: ABNORMAL
SL AMB POCT UROBILINOGEN: 0.2

## 2025-07-17 PROCEDURE — 87147 CULTURE TYPE IMMUNOLOGIC: CPT | Performed by: PHYSICIAN ASSISTANT

## 2025-07-17 PROCEDURE — 81002 URINALYSIS NONAUTO W/O SCOPE: CPT | Performed by: PHYSICIAN ASSISTANT

## 2025-07-17 PROCEDURE — 99213 OFFICE O/P EST LOW 20 MIN: CPT | Performed by: PHYSICIAN ASSISTANT

## 2025-07-17 PROCEDURE — S9083 URGENT CARE CENTER GLOBAL: HCPCS | Performed by: PHYSICIAN ASSISTANT

## 2025-07-17 PROCEDURE — 87077 CULTURE AEROBIC IDENTIFY: CPT | Performed by: PHYSICIAN ASSISTANT

## 2025-07-17 PROCEDURE — 87086 URINE CULTURE/COLONY COUNT: CPT | Performed by: PHYSICIAN ASSISTANT

## 2025-07-17 RX ORDER — OXYBUTYNIN CHLORIDE 15 MG/1
15 TABLET, EXTENDED RELEASE ORAL DAILY
COMMUNITY

## 2025-07-17 RX ORDER — SULFAMETHOXAZOLE AND TRIMETHOPRIM 800; 160 MG/1; MG/1
1 TABLET ORAL EVERY 12 HOURS SCHEDULED
Qty: 10 TABLET | Refills: 0 | Status: SHIPPED | OUTPATIENT
Start: 2025-07-17 | End: 2025-07-22

## 2025-07-17 RX ORDER — DIPHENOXYLATE HYDROCHLORIDE AND ATROPINE SULFATE 2.5; .025 MG/1; MG/1
1 TABLET ORAL DAILY
COMMUNITY

## 2025-07-17 NOTE — PROGRESS NOTES
[facility]  Name: Aurelia Jaime      : 1940      MRN: [id number]  Encounter Provider: Quentin Barragan PA-C  Encounter Date: 2025   Encounter department: [facility] [place]  :  Assessment & Plan  Frequency of micturition  Will contact patient's daughter and let her know if cultures   Orders:  •  POCT urine dip  •  Urine culture; Future  •  sulfamethoxazole-trimethoprim (BACTRIM DS) 800-160 mg per tablet; Take 1 tablet by mouth every 12 (twelve) hours for 5 days      The patient and/or parent/legal guardian verbalized understanding of exam findings and   Treatment plan. We engaged in the shared decision-making process and treatment options were   discussed at length with the patient.  All questions, concerns and complaints were answered and   addressed to the patient's' and/or parent/legal guardians's satisfaction.    Patient Instructions  Follow up with PCP in 3-5 days.  Proceed to  ER if symptoms worsen.    If tests are performed, our office will contact you with results only if changes need to made to the care plan discussed with you at the visit. You can review your full results on  [facility] MyChart.    Chief Complaint:   Chief Complaint   Patient presents with   • Possible UTI     Yesterday daughter noticed that her mom was mentally a little off. Extra confusion last night.Encouraged mom to drink water.     History of Present Illness   Patient presents with her daughter who reports that she has been having increased urination and also some mild confusion.  She has had a history of this in the past.  As recently as 1 year ago.  Denies any fevers.  The patient denies any pain currently.      History obtained from: patient's child    Review of Systems All other related systems reviewed with patient or accompanying historian and are negative except as noted in HPI    Past Medical History   Past Medical History[1]  Past Surgical History[1]  Family  History[1]  she reports that she quit smoking about 9 years ago. Her smoking use included cigarettes. She started smoking about 67 years ago. She has a 60 pack-year smoking history. She has never used smokeless tobacco. She reports current alcohol use of about 1.0 standard drink of alcohol per week. She reports that she does not use drugs.  Current Outpatient Medications   Medication Instructions   • aspirin 81 mg, Oral, Daily   • divalproex sodium (DEPAKOTE) 125 mg, Oral, Daily   • donepezil (ARICEPT) 10 mg, Oral, Daily at bedtime   • gabapentin (NEURONTIN) 100 mg, Daily   • memantine (NAMENDA) 10 mg, Oral, 2 times daily   • multivitamin (THERAGRAN) TABS 1 tablet, Daily   • oxybutynin (DITROPAN XL) 15 mg, Daily   • rosuvastatin (CRESTOR) 10 mg, Oral, Daily   • sulfamethoxazole-trimethoprim (BACTRIM DS) 800-160 mg per tablet 1 tablet, Oral, Every 12 hours scheduled   • tolterodine (DETROL LA) 4 mg, Daily   Allergies[1]     Objective   /86 (Patient Position: Sitting, Cuff Size: Adult)   Pulse 68   Temp 98.6 °F (37 °C) (Tympanic)   Resp 18   Wt 63 kg (139 lb)   SpO2 98%   BMI 23.13 kg/m²      Physical Exam  Constitutional:       General: She is not in acute distress.     Appearance: Normal appearance. She is not ill-appearing or toxic-appearing.   HENT:      Head: Normocephalic and atraumatic.      Nose: No rhinorrhea.      Mouth/Throat:      Mouth: Mucous membranes are moist.      Pharynx: No posterior oropharyngeal erythema.     Eyes:      General: No scleral icterus.        Right eye: No discharge.         Left eye: No discharge.      Extraocular Movements: Extraocular movements intact.       Cardiovascular:      Rate and Rhythm: Normal rate.   Pulmonary:      Effort: Pulmonary effort is normal. No respiratory distress.   Abdominal:      Tenderness: There is no right CVA tenderness or left CVA tenderness.     Musculoskeletal:      Cervical back: Neck supple.     Skin:     Findings: No erythema.  "    Neurological:      Mental Status: She is alert and oriented to person, place, and time.      Cranial Nerves: No dysarthria or facial asymmetry.     Psychiatric:         Mood and Affect: Mood normal.         Behavior: Behavior normal.         Portions of the record may have been created with voice recognition software.  Occasional wrong word or \"sound a like\" substitutions may have occurred due to the inherent limitations of voice recognition software.  Read the chart carefully and recognize, using context, where substitutions have occurred.         [1]  Past Medical History:  Diagnosis Date   • Abnormal chest x-ray    • Abnormal mammogram    • Breast cancer (HCC)     DCIS 10/2024   • Cancer (HCC) October 2019    Cancer free surgically   • COPD (chronic obstructive pulmonary disease) (HCC)    • Dementia (HCC)     Early stage    • Ductal hyperplasia of breast     Left    • History of postoperative delirium    • Hyperlipidemia     diet controled   • Lung cancer (HCC) 10/2018    Resolved with surgery   • Memory loss    • Nodule of lower lobe of right lung     Right Lower Lobe    • Shingles 2013   • SOB (shortness of breath)    • SOB (shortness of breath)    • Wheezing    [1]  Past Surgical History:  Procedure Laterality Date   • BREAST BIOPSY Left 10/12/2018   • BREAST BIOPSY Right 09/24/2024   • BREAST CYST EXCISION Left 10/31/2018   • BREAST LUMPECTOMY Left 11/13/2018    Procedure: BREAST LUMPECTOMY; BREAST NEEDLE LOCALIZATION (NEEDLE LOC AT 0830);  Surgeon: Mian Valenzuela MD;  Location[facility];  Service: Surgical Oncology   • BREAST LUMPECTOMY Right 10/22/2024    Procedure: RIGHT BREAST MIREYA  DIRECTED LUMPECTOMY-BRACKETED;  Surgeon: Isai Valle MD;  Location: [facility];  Service: Surgical Oncology   • COLONOSCOPY     • LUNG SURGERY     • MAMMO NEEDLE LOCALIZATION LEFT (ALL INC) Left 11/13/2018   • MAMMO STEREOTACTIC BREAST BIOPSY LEFT (ALL INC) Left 10/16/2018   • [facility] INCL FLUOR GDNCE " DX W/CELL WASHG SPX Right 01/16/2019    Procedure: BRONCHOSCOPY FLEXIBLE,;  Surgeon: Julia Mccord MD;  Location: [facility];  Service: Thoracic   • FL MEDIASTINOSCOPY WITH LYMPH NODE BIOPSY/IES Right 01/16/2019    Procedure: MEDIASTINOSCOPY;  Surgeon: Julia Mccord MD;  [facility]: [facility];  Service: Thoracic   • FL THORACOSCOPY W/LOBECTOMY SINGLE LOBE Right 01/16/2019    Procedure: LOBECTOMY LUNG;  Surgeon: Julia Mccord MD;  [facility];  Service: Thoracic   • TRIGGER FINGER RELEASE Right 10/10/2019   • ULNAR NERVE TRANSPOSITION Right 10/10/2019   • US BREAST CLIP NEEDLE LOC RIGHT Right 10/16/2024   • US GUIDED BREAST BIOPSY RIGHT COMPLETE Right 09/24/2024   • US GUIDED BREAST BIOPSY RIGHT COMPLETE Right 10/08/2024   [1]  Family History  Problem Relation Name Age of Onset   • Hypertension Mother Gillian Anderson    • Glaucoma Mother Gillian Anderson    • Macular degeneration Mother Gillian Anderson    • Prostate cancer Father Vineet Anderson    • Cancer Father Vineet Anderson    • No Known Problems Daughter Ragini    • No Known Problems Daughter Dionna    • No Known Problems Maternal Grandmother     • No Known Problems Paternal Grandmother     • Heart attack Brother     • No Known Problems Paternal Aunt     • No Known Problems Paternal Aunt     • Breast cancer Neg Hx     [1]  No Known Allergies

## 2025-07-18 LAB — BACTERIA UR CULT: ABNORMAL

## 2025-07-24 ENCOUNTER — EVALUATION (OUTPATIENT)
Age: 85
End: 2025-07-24
Payer: COMMERCIAL

## 2025-07-24 DIAGNOSIS — F02.B18 MODERATE LATE ONSET ALZHEIMER'S DEMENTIA WITH OTHER BEHAVIORAL DISTURBANCE (HCC): Primary | ICD-10-CM

## 2025-07-24 DIAGNOSIS — G30.1 MODERATE LATE ONSET ALZHEIMER'S DEMENTIA WITH OTHER BEHAVIORAL DISTURBANCE (HCC): Primary | ICD-10-CM

## 2025-07-24 PROCEDURE — 97530 THERAPEUTIC ACTIVITIES: CPT | Performed by: OCCUPATIONAL THERAPIST

## 2025-07-24 NOTE — PROGRESS NOTES
POC expires Auth Status Total   Visits  Start date  Expiration date PT/OT + Visit Limit? Co-Insurance   25 BOMN  25   $35 Co-pay                                             Visit/Unit Tracking  AUTH Status: BOMN Date 5/13 5/27 6/3 6/10 6/20 6/27 6/30 7/14 7/24   Visits  Authed:  Used 1 (re-eval) 1 1 1 1 1 1 1 1 (PN)    Remaining                 PLAN OF CARE START: 25   PLAN OF CARE END: 25  PROGRESS NOTE DUE: full re-eval   FREQUENCY: 1-2x/week for 8-12 weeks   PRECAUTIONS: none  DIAGNOSIS: alzheimers, maintenance program   VISITS: 21 (PN)        Progress/Daily Note        Today's date: 2025  Patient name: Aurelia Jaime  : 1940  MRN: [id number]  Referring provider: Hamzah Nino MD  Dx:   Encounter Diagnosis     ICD-10-CM    1. 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      Objective: See treatment diary below. SLUMS assessed this session to assess progress in therapy services. All other assessments carried over from last evaluation. See below for goal status updates.       TA:         FUNCTIONAL ACTIVITY QUESTIONAIRE ()  The Functional Activities Questionnaire (FAQ) measures instrumental activities of daily living (IADLs). The FAQ may be used to differentiate those with mild cognitive impairment and mild Alzheimer's disease   TASK Completely unable to perform task (3)  Requires assistance (2)  Has difficulty but accomplishes task, or has never done, but the informant feels could do the task with difficulty (1)  Normal performance, or has never don the task, but the informant feels the patient could do the task if necessary    Writing checks, paying bills, balancing checkbook    x       Assembling tax records, business affairs, papers  x         Shopping alone for  "clothes, house hold necessities, or groceries  x         Playing a game of skill, working on a hobby    x       Heating water, making a cup of coffee, turning off the stove x         Preparing a balance meal x         Keeping track of current events x         Paying attention to, understanding, discussing a TV show, book, or magazine     x     Remembering appointments, family occasions, holidays, medications x         Traveling our of the neighborhood, arranging or taking buses  x         Total points: 26/30      Evaluation   Sum scores (range 0-30). Cut-point of 9 (dependent in 3 or more activities) is recommended to indicate impaired function and possible cognitive impairment.     SLUMS:  *screen individuals to look for the presence of cognitive deficits, and to identify changes in cognition over time*  SCORING AND NORMS  HIGH SCHOOL EDUCATION  27-30, NORMAL   21-26, MILD NEUROCOGNITIVE DISORDER   1-20, DEMENTIA   LESS THEN HIGH SCHOOL EDUCATION  25-30, NORMAL   20-24, MILD NEUROCOGNITIVE DISORDER  1-19, DEMENTIA      QUESTION Status on PN 7/24 Status on Re-eval 5/13 Status on PN 4/15 Status on 2/4/25   What day of the week is it 0/1 1/1 1/1 0/1   What is the year 0/1 0/1 0/1 0/1   What state are we in 1/1 1/1 0/1 0/1   How much did you spend 1/1 1/1 0/1 1/1   How much do you have left 0/2 0/2 0/1 0/2   Naming animals in 1 minute 1/3 1/3 1/3 1/3   Recall of 5 objects 0/5 0/5 0/5 0/5   Series of numbers 2/2 2/2 1/2 2/2   Clock face hour markers 2/2 2/2 2/2 2/2   Clock face time  0/2 0/2 2/2 0/2   X in triangle 1/1 1/1 1/1 1/1   Largest figure 1/1 1/1 1/1 1/1   Short story, females name 2/2 2/2 2/2 2/2   Short story, when did she go back to work 0/2 0/2 0/2 0/2   Short story, what work did she do 0/2 0/2 0/2 0/2   Short story, what state did she live in 0/2 0/2 0/2 0/2   Total score 11/30 12/30 11/30 10/30, high school education         TRAIL MAKING TEST:   \"widely used test to assess executive function in patients " "with neuro injury. Successful performance of the TMT requires a variety of mental abilities including letter and number recognition mental flexibility, visual scanning, and motor function. Norms are based on age related scores\"    Status on PN 6/20/25 Status on re-eval 5/13 Status on PN 3/11 STATUS ON IE: 2/4/25 COMMENTS:   TRAIL MAKING PART A      NORM: 58 seconds 42 seconds 59 seconds 36 seconds 1 minute and 4 seconds IMPROVED   TRAIL MAKING PART B     NORM: [address] 3 minutes and 14 seconds 2 minutes and 16 seconds 2 minutes and 23 seconds, min VC for accuracy 2 minutes and 53 seconds DECLINED                 SHORT TERM GOALS (8-12 weeks)    GOAL  STATUS ON IE  GOAL STATUS    Pt caregiver will report score of 28/30 or less on FAQ in order for patient to maintain life roles  28/30 CONTINUE GOAL       Pt will maintain sore of 10/30 (+/-3 points) on SLUMs to maintain life roles 10/30 MAINTAINED, CONTINUE GOAL       Pt will maintain score of 1 minute and 4 seconds on trail making part A (+/-5 seconds) to maintain life roles 1 minute and 4 seconds MAINTAINED, CONTINUE GOAL   Pt will maintain score of 2 minutes and 53 seconds on trail making part B (+/-5 seconds) to maintain life roles 2 minutes and 53 seconds SLIGHT DECLINE, CONTINUE GOAL   Patient and/or family will demo G understanding and use of memory book to inc pt's overall engagement in life roles and to dec frustrations with STM/delayed memory loss   CONTINUE GOAL       Pt will follow 1-2 written directions with 50% accuracy to maintain life roles   CONTINUE GOAL           TA:  *pixel art challenge, pt completed puzzles 1 and 3 with good accuracy and no VC, emphasis on basic  skills, sustained attention, and direction following    *perfection, pt matching pieces to same shape in board, emphasis on basic  skills, sustained attention, and problem solving skills    *Coogram puzzler, pt completing level 1 puzzle, copying 2D image into 3D structure and then " filling in open area with remaining pieces, emphasis on  skills, problem solving, and sustained attention, mod VC for correctly rotating pieces     Assessment: Tolerated treatment well. Focus of today's session on testing for progress note, sustained attention,  skills, direction following, and problem solving skills. SIMON assessed this session to assess progress in therapy services. Decrease in 1 point noted since last re-evaluation however pt is continuing to maintain goals.  Skilled Maintenance occupational therapy is required to slow the patients functional decline due to dementia. Given the progressive nature of dementia a maintenance program will help reduce the risk of further deterioration, loss of functional mobility or independence as well as decrease risk of secondary impairments form disease progress.       Plan: Continued skilled OT per POC.

## 2025-07-28 ENCOUNTER — OFFICE VISIT (OUTPATIENT)
Age: 85
End: 2025-07-28
Payer: COMMERCIAL

## 2025-07-28 DIAGNOSIS — G30.1 MODERATE LATE ONSET ALZHEIMER'S DEMENTIA WITH OTHER BEHAVIORAL DISTURBANCE (HCC): Primary | ICD-10-CM

## 2025-07-28 DIAGNOSIS — F02.B18 MODERATE LATE ONSET ALZHEIMER'S DEMENTIA WITH OTHER BEHAVIORAL DISTURBANCE (HCC): Primary | ICD-10-CM

## 2025-07-28 PROCEDURE — 97530 THERAPEUTIC ACTIVITIES: CPT | Performed by: OCCUPATIONAL THERAPIST

## 2025-07-31 ENCOUNTER — HOSPITAL ENCOUNTER (OUTPATIENT)
Dept: MAMMOGRAPHY | Facility: CLINIC | Age: 85
Discharge: HOME | End: 2025-07-31
Payer: COMMERCIAL

## 2025-07-31 DIAGNOSIS — Z08 ENCOUNTER FOR FOLLOW-UP SURVEILLANCE OF BREAST CANCER: ICD-10-CM

## 2025-07-31 DIAGNOSIS — Z85.3 ENCOUNTER FOR FOLLOW-UP SURVEILLANCE OF BREAST CANCER: ICD-10-CM

## 2025-07-31 PROCEDURE — 77066 DX MAMMO INCL CAD BI: CPT

## 2025-07-31 PROCEDURE — G0279 TOMOSYNTHESIS, MAMMO: HCPCS

## 2025-08-04 ENCOUNTER — OFFICE VISIT (OUTPATIENT)
Age: 85
End: 2025-08-04
Payer: COMMERCIAL

## 2025-08-04 DIAGNOSIS — G30.1 MODERATE LATE ONSET ALZHEIMER'S DEMENTIA WITH OTHER BEHAVIORAL DISTURBANCE (HCC): Primary | ICD-10-CM

## 2025-08-04 DIAGNOSIS — F02.B18 MODERATE LATE ONSET ALZHEIMER'S DEMENTIA WITH OTHER BEHAVIORAL DISTURBANCE (HCC): Primary | ICD-10-CM

## 2025-08-04 PROCEDURE — 97530 THERAPEUTIC ACTIVITIES: CPT | Performed by: OCCUPATIONAL THERAPIST

## 2025-08-07 ENCOUNTER — OFFICE VISIT (OUTPATIENT)
Dept: SURGICAL ONCOLOGY | Facility: CLINIC | Age: 85
End: 2025-08-07
Payer: COMMERCIAL

## 2025-08-07 VITALS
SYSTOLIC BLOOD PRESSURE: 124 MMHG | WEIGHT: 139 LBS | OXYGEN SATURATION: 97 % | TEMPERATURE: 98.1 F | BODY MASS INDEX: 23.16 KG/M2 | HEIGHT: 65 IN | HEART RATE: 65 BPM | DIASTOLIC BLOOD PRESSURE: 74 MMHG

## 2025-08-07 DIAGNOSIS — Z85.3 ENCOUNTER FOR FOLLOW-UP SURVEILLANCE OF BREAST CANCER: Primary | ICD-10-CM

## 2025-08-07 DIAGNOSIS — Z08 ENCOUNTER FOR FOLLOW-UP SURVEILLANCE OF BREAST CANCER: Primary | ICD-10-CM

## 2025-08-07 DIAGNOSIS — Z98.890 HISTORY OF LUMPECTOMY: ICD-10-CM

## 2025-08-07 DIAGNOSIS — Z85.3 HISTORY OF BREAST CANCER: ICD-10-CM

## 2025-08-07 PROCEDURE — 99215 OFFICE O/P EST HI 40 MIN: CPT | Performed by: SURGERY

## 2025-08-11 ENCOUNTER — EVALUATION (OUTPATIENT)
Age: 85
End: 2025-08-11
Payer: COMMERCIAL

## 2025-08-19 ENCOUNTER — OFFICE VISIT (OUTPATIENT)
Age: 85
End: 2025-08-19
Payer: COMMERCIAL

## 2025-08-19 DIAGNOSIS — G30.1 MODERATE LATE ONSET ALZHEIMER'S DEMENTIA WITH OTHER BEHAVIORAL DISTURBANCE (HCC): Primary | ICD-10-CM

## 2025-08-19 DIAGNOSIS — F02.B18 MODERATE LATE ONSET ALZHEIMER'S DEMENTIA WITH OTHER BEHAVIORAL DISTURBANCE (HCC): Primary | ICD-10-CM

## 2025-08-19 PROCEDURE — 97530 THERAPEUTIC ACTIVITIES: CPT | Performed by: OCCUPATIONAL THERAPIST

## (undated) DEVICE — SUT SILK 2-0 SH 30 IN K833H

## (undated) DEVICE — INTENDED FOR TISSUE SEPARATION, AND OTHER PROCEDURES THAT REQUIRE A SHARP SURGICAL BLADE TO PUNCTURE OR CUT.: Brand: BARD-PARKER SAFETY BLADES SIZE 15, STERILE

## (undated) DEVICE — SUT VICRYL 0 UR-6 27 IN J603H

## (undated) DEVICE — TRAY FOLEY 16FR URIMETER SURESTEP

## (undated) DEVICE — NEEDLE 25G X 1 1/2

## (undated) DEVICE — LIGHT HANDLE COVER SLEEVE DISP BLUE STELLAR

## (undated) DEVICE — Device: Brand: TISSUE RETRIEVAL SYSTEM

## (undated) DEVICE — ELECTRODE LAP L WIRE E-Z CLEAN 33CM -0100

## (undated) DEVICE — GLOVE SRG BIOGEL 7

## (undated) DEVICE — ELECTRODE BLADE MOD E-Z CLEAN 2.5IN 6.4CM -0012M

## (undated) DEVICE — SURGICEL 4 X 8

## (undated) DEVICE — SINGLE USE SUCTION VALVE MAJ-209: Brand: SINGLE USE SUCTION VALVE (STERILE)

## (undated) DEVICE — SUT MONOCRYL 3-0 PS-2 18 IN Y497G

## (undated) DEVICE — LIGAMAX 5 MM ENDOSCOPIC MULTIPLE CLIP APPLIER: Brand: LIGAMAX

## (undated) DEVICE — FIRST STEP BEDSIDE KIT - STAND-UP POUCH, ENDOSCOPIC CLEANING PAD - 1 POUCH: Brand: FIRST STEP BEDSIDE KIT - STAND-UP POUCH, ENDOSCOPIC CLEANING PAD

## (undated) DEVICE — SUT MONOCRYL 4-0 PS-2 18 IN Y496G

## (undated) DEVICE — SUT VICRYL 3-0 SH 27 IN J416H

## (undated) DEVICE — SUT VICRYL 0 CT-1 27 IN J260H

## (undated) DEVICE — DRAPE PROBE NEO-PROBE/ULTRASOUND

## (undated) DEVICE — ADHESIVE SKIN CLOSURE SYS EXOFIN FUSION 22CM

## (undated) DEVICE — DRAPE EQUIPMENT RF WAND

## (undated) DEVICE — COTTON TIP APPLICTOR 2 PK

## (undated) DEVICE — PAD GROUNDING ADULT

## (undated) DEVICE — PACK UNIVERSAL DRAPES SUB-Q ICD

## (undated) DEVICE — GLOVE INDICATOR PI UNDERGLOVE SZ 6.5 BLUE

## (undated) DEVICE — TELFA NON-ADHERENT ABSORBENT DRESSING: Brand: TELFA

## (undated) DEVICE — BETHLEHEM UNIVERSAL MINOR GEN: Brand: CARDINAL HEALTH

## (undated) DEVICE — CAUTERY TIP POLISHER: Brand: DEVON

## (undated) DEVICE — GLOVE SRG BIOGEL ORTHOPEDIC 7.5

## (undated) DEVICE — MEDI-VAC YANKAUER SUCTION HANDLE W/STRAIGHT TIP & CONTROL VENT: Brand: CARDINAL HEALTH

## (undated) DEVICE — GAUZE SPONGES,16 PLY: Brand: CURITY

## (undated) DEVICE — CHLORAPREP HI-LITE 26ML ORANGE

## (undated) DEVICE — GLOVE INDICATOR PI UNDERGLOVE SZ 7.5 BLUE

## (undated) DEVICE — TUBING SUCTION 5MM X 12 FT

## (undated) DEVICE — PENCIL SMOKE EVAC TELESCOPING W/TUBING

## (undated) DEVICE — SMOKE EVACUATION TUBING WITH 8 IN INTEGRAL WAND AND SPONGE GUARD: Brand: BUFFALO FILTER

## (undated) DEVICE — POV-IOD SOLUTION 4OZ BT

## (undated) DEVICE — HARMONIC ACE 5MM DIAMETER SHEARS 36CM SHAFT LENGTH + ADAPTIVE TISSUE TECHNOLOGY FOR USE WITH GENERATOR G11: Brand: HARMONIC ACE

## (undated) DEVICE — PENCIL ELECTROSURG E-Z CLEAN -0035H

## (undated) DEVICE — 2000CC GUARDIAN II: Brand: GUARDIAN

## (undated) DEVICE — DRAPE SHEET THREE QUARTER

## (undated) DEVICE — ECHELON FLEX  POWERED VASCULAR STAPLER WITH ADVANCED PLACEMENT TIP, 35MM: Brand: ECHELON FLEX

## (undated) DEVICE — MARGIN MARKER SET

## (undated) DEVICE — SUT SILK 2-0 18 IN A185H

## (undated) DEVICE — SUT PROLENE 0 CT-1 30 IN 8424H

## (undated) DEVICE — CANISTER FOR THORACIC SUCTION SYSTEM: Brand: THOPAZ DISPOSABLE CANISTER 0.8L

## (undated) DEVICE — 4-PORT MANIFOLD: Brand: NEPTUNE 2

## (undated) DEVICE — MEDI-VAC YANK SUCT HNDL W/TPRD BULBOUS TIP: Brand: CARDINAL HEALTH

## (undated) DEVICE — STERILE THORACIC PACK: Brand: CARDINAL HEALTH

## (undated) DEVICE — WOUND RETRACTOR AND PROTECTOR: Brand: ALEXIS WOUND PROTECTOR-RETRACTOR

## (undated) DEVICE — VIAL DECANTER

## (undated) DEVICE — ENDOPATH 5MM ENDOSCOPIC BLUNT TIP DISSECTORS (12 POUCHES CONTAINING 3 DISSECTORS EACH): Brand: ENDOPATH

## (undated) DEVICE — 3000CC GUARDIAN II: Brand: GUARDIAN

## (undated) DEVICE — 3M™ STERI-STRIP™ REINFORCED ADHESIVE SKIN CLOSURES, R1547, 1/2 IN X 4 IN (12 MM X 100 MM), 6 STRIPS/ENVELOPE: Brand: 3M™ STERI-STRIP™

## (undated) DEVICE — ENDOPATH ECHELON VASCULAR  RELOADS, WHITE, 35MM: Brand: ECHELON ENDOPATH

## (undated) DEVICE — ADAPTOR TRACH SWIVEL

## (undated) DEVICE — NEEDLE SPINAL 20G X 3.5 LF

## (undated) DEVICE — SUT VICRYL 2-0 SH 27 IN UNDYED J417H

## (undated) DEVICE — NEEDLE HYPO 22G X 1-1/2 IN

## (undated) DEVICE — SINGLE USE BIOPSY VALVE MAJ-210: Brand: SINGLE USE BIOPSY VALVE (STERILE)

## (undated) DEVICE — ADHESIVE SKN CLSR HISTOACRYL FLEX 0.5ML LF

## (undated) DEVICE — DRAPE FLUID WARMER (BIRD BATH)

## (undated) DEVICE — ANTI-FOG SOLUTION WITH FOAM PAD: Brand: DEVON

## (undated) DEVICE — INTENDED FOR TISSUE SEPARATION, AND OTHER PROCEDURES THAT REQUIRE A SHARP SURGICAL BLADE TO PUNCTURE OR CUT.: Brand: BARD-PARKER SAFETY BLADES SIZE 10, STERILE

## (undated) DEVICE — HEMOSTAT POWDER ADSORB SURGICEL 3GM

## (undated) DEVICE — THE ECHELON FLEX POWERED PLUS ARTICULATING ENDOSCOPIC LINEAR CUTTERS ARE STERILE, SINGLE PATIENT USE INSTRUMENTS THAT SIMULTANEOUSLYCUT AND STAPLE TISSUE. THERE ARE SIX STAGGERED ROWS OF STAPLES, THREE ON EITHER SIDE OF THE CUT LINE. THE ECHELON FLEX 45 POWERED PLUSINSTRUMENTS HAVE A STAPLE LINE THAT IS APPROXIMATELY 45 MM LONG AND A CUT LINE THAT IS APPROXIMATELY 42 MM LONG. THE SHAFT CAN ROTATE FREELYIN BOTH DIRECTIONS AND AN ARTICULATION MECHANISM ENABLES THE DISTAL PORTION OF THE SHAFT TO PIVOT TO FACILITATE LATERAL ACCESS TO THE OPERATIVESITE.THE INSTRUMENTS ARE PACKAGED WITH A PRIMARY LITHIUM BATTERY PACK THAT MUST BE INSTALLED PRIOR TO USE. THERE ARE SPECIFIC REQUIREMENTS FORDISPOSING OF THE BATTERY PACK. REFER TO THE BATTERY PACK DISPOSAL SECTION.THE INSTRUMENTS ARE PACKAGED WITHOUT A RELOAD AND MUST BE LOADED PRIOR TO USE. A STAPLE RETAINING CAP ON THE RELOAD PROTECTS THE STAPLE LEGPOINTS DURING SHIPPING AND TRANSPORTATION. THE INSTRUMENTS’ LOCK-OUT FEATURE IS DESIGNED TO PREVENT A USED OR IMPROPERLY INSTALLED RELOADFROM BEING REFIRED OR AN INSTRUMENT FROM BEING FIRED WITHOUT A RELOAD.: Brand: ECHELON FLEX

## (undated) DEVICE — SHEATH, GUIDE, SAVI SCOUT®: Brand: SAVI SCOUT®

## (undated) DEVICE — 28 FR STRAIGHT – SOFT PVC CATHETER: Brand: PVC THORACIC CATHETERS

## (undated) DEVICE — SINGLE TUBING WITH LARGE CONNECTOR FOR THORACIC SUCTION SYSTEM PUMP: Brand: THOPAZ TUBING SINGLE

## (undated) DEVICE — 3M™ IOBAN™ 2 ANTIMICROBIAL INCISE DRAPE 6640EZ: Brand: IOBAN™ 2

## (undated) DEVICE — THE ECHELON, ECHELON ENDOPATH™ AND ECHELON FLEX™ FAMILIES OF ENDOSCOPIC LINEAR CUTTERS AND RELOADS ARE STERILE, SINGLE PATIENT USE INSTRUMENTS THAT SIMULTANEOUSLY CUT AND STAPLE TISSUE. THERE ARE SIX STAGGERED ROWS OF STAPLES, THREE ON EITHER SIDE OF THE CUT LINE. THE 45 MM INSTRUMENTS HAVE A STAPLE LINE THATIS APPROXIMATELY 45 MM LONG AND A CUT LINE THAT IS APPROXIMATELY 42 MM LONG. THE SHAFT CAN ROTATE FREELY IN BOTH DIRECTIONS AND AN ARTICULATION MECHANISM ON ARTICULATING INSTRUMENTS ENABLES BENDING THE DISTAL PORTIONOF THE SHAFT TO FACILITATE LATERAL ACCESS OF THE OPERATIVE SITE.THE INSTRUMENTS ARE SHIPPED WITHOUT A RELOAD AND MUST BE LOADED PRIOR TO USE. A STAPLE RETAINING CAP ON THE RELOAD PROTECTS THE STAPLE LEG POINTS DURING SHIPPING AND TRANSPORTATION. THE INSTRUMENTS’ LOCK-OUT FEATURE IS DESIGNED TO PREVENT A USED RELOAD FROM BEING REFIRED.: Brand: ECHELON ENDOPATH

## (undated) DEVICE — GLOVE SRG BIOGEL ECLIPSE 6